# Patient Record
Sex: MALE | Race: WHITE | NOT HISPANIC OR LATINO
[De-identification: names, ages, dates, MRNs, and addresses within clinical notes are randomized per-mention and may not be internally consistent; named-entity substitution may affect disease eponyms.]

---

## 2018-04-16 PROBLEM — Z00.00 ENCOUNTER FOR PREVENTIVE HEALTH EXAMINATION: Status: ACTIVE | Noted: 2018-04-16

## 2018-07-17 ENCOUNTER — APPOINTMENT (OUTPATIENT)
Dept: NEUROLOGY | Facility: CLINIC | Age: 63
End: 2018-07-17

## 2018-07-26 ENCOUNTER — APPOINTMENT (OUTPATIENT)
Dept: PAIN MANAGEMENT | Facility: CLINIC | Age: 63
End: 2018-07-26

## 2019-11-27 ENCOUNTER — INPATIENT (INPATIENT)
Facility: HOSPITAL | Age: 64
LOS: 5 days | Discharge: NURSING, REHAB OR LONG-TERM CARE | DRG: 54 | End: 2019-12-03
Attending: NEUROLOGICAL SURGERY | Admitting: NEUROLOGICAL SURGERY
Payer: MEDICARE

## 2019-11-27 ENCOUNTER — EMERGENCY (EMERGENCY)
Facility: HOSPITAL | Age: 64
LOS: 1 days | Discharge: ANOTHER HOSPITAL | End: 2019-11-27
Attending: EMERGENCY MEDICINE | Admitting: EMERGENCY MEDICINE
Payer: MEDICARE

## 2019-11-27 VITALS
DIASTOLIC BLOOD PRESSURE: 104 MMHG | OXYGEN SATURATION: 98 % | HEART RATE: 761 BPM | RESPIRATION RATE: 15 BRPM | SYSTOLIC BLOOD PRESSURE: 191 MMHG | HEIGHT: 70 IN | WEIGHT: 195.11 LBS | TEMPERATURE: 98 F

## 2019-11-27 VITALS
TEMPERATURE: 98 F | SYSTOLIC BLOOD PRESSURE: 186 MMHG | HEART RATE: 103 BPM | OXYGEN SATURATION: 98 % | DIASTOLIC BLOOD PRESSURE: 106 MMHG | RESPIRATION RATE: 20 BRPM

## 2019-11-27 VITALS
OXYGEN SATURATION: 98 % | SYSTOLIC BLOOD PRESSURE: 181 MMHG | WEIGHT: 190.04 LBS | HEART RATE: 107 BPM | DIASTOLIC BLOOD PRESSURE: 92 MMHG | HEIGHT: 70 IN | TEMPERATURE: 98 F | RESPIRATION RATE: 20 BRPM

## 2019-11-27 LAB
ALBUMIN SERPL ELPH-MCNC: 3.5 G/DL — SIGNIFICANT CHANGE UP (ref 3.3–5)
ALBUMIN SERPL ELPH-MCNC: 4.2 G/DL — SIGNIFICANT CHANGE UP (ref 3.3–5)
ALP SERPL-CCNC: 124 U/L — HIGH (ref 40–120)
ALP SERPL-CCNC: 129 U/L — HIGH (ref 40–120)
ALT FLD-CCNC: 20 U/L — SIGNIFICANT CHANGE UP (ref 10–45)
ALT FLD-CCNC: 22 U/L — SIGNIFICANT CHANGE UP (ref 12–78)
ANION GAP SERPL CALC-SCNC: 13 MMOL/L — SIGNIFICANT CHANGE UP (ref 5–17)
ANION GAP SERPL CALC-SCNC: 9 MMOL/L — SIGNIFICANT CHANGE UP (ref 5–17)
APPEARANCE UR: CLEAR — SIGNIFICANT CHANGE UP
APTT BLD: 35.5 SEC — SIGNIFICANT CHANGE UP (ref 27.5–36.3)
APTT BLD: 38.7 SEC — HIGH (ref 28.5–37)
AST SERPL-CCNC: 12 U/L — SIGNIFICANT CHANGE UP (ref 10–40)
AST SERPL-CCNC: 16 U/L — SIGNIFICANT CHANGE UP (ref 15–37)
BASOPHILS # BLD AUTO: 0.09 K/UL — SIGNIFICANT CHANGE UP (ref 0–0.2)
BASOPHILS # BLD AUTO: 0.11 K/UL — SIGNIFICANT CHANGE UP (ref 0–0.2)
BASOPHILS NFR BLD AUTO: 0.7 % — SIGNIFICANT CHANGE UP (ref 0–2)
BASOPHILS NFR BLD AUTO: 1.2 % — SIGNIFICANT CHANGE UP (ref 0–2)
BILIRUB SERPL-MCNC: 0.6 MG/DL — SIGNIFICANT CHANGE UP (ref 0.2–1.2)
BILIRUB SERPL-MCNC: 0.6 MG/DL — SIGNIFICANT CHANGE UP (ref 0.2–1.2)
BILIRUB UR-MCNC: NEGATIVE — SIGNIFICANT CHANGE UP
BUN SERPL-MCNC: 21 MG/DL — SIGNIFICANT CHANGE UP (ref 7–23)
BUN SERPL-MCNC: 21 MG/DL — SIGNIFICANT CHANGE UP (ref 7–23)
CALCIUM SERPL-MCNC: 9.4 MG/DL — SIGNIFICANT CHANGE UP (ref 8.5–10.1)
CALCIUM SERPL-MCNC: 9.9 MG/DL — SIGNIFICANT CHANGE UP (ref 8.4–10.5)
CHLORIDE SERPL-SCNC: 103 MMOL/L — SIGNIFICANT CHANGE UP (ref 96–108)
CHLORIDE SERPL-SCNC: 108 MMOL/L — SIGNIFICANT CHANGE UP (ref 96–108)
CO2 SERPL-SCNC: 20 MMOL/L — LOW (ref 22–31)
CO2 SERPL-SCNC: 21 MMOL/L — LOW (ref 22–31)
COLOR SPEC: YELLOW — SIGNIFICANT CHANGE UP
CREAT SERPL-MCNC: 1.1 MG/DL — SIGNIFICANT CHANGE UP (ref 0.5–1.3)
CREAT SERPL-MCNC: 1.19 MG/DL — SIGNIFICANT CHANGE UP (ref 0.5–1.3)
DIFF PNL FLD: NEGATIVE — SIGNIFICANT CHANGE UP
EOSINOPHIL # BLD AUTO: 0.27 K/UL — SIGNIFICANT CHANGE UP (ref 0–0.5)
EOSINOPHIL # BLD AUTO: 0.31 K/UL — SIGNIFICANT CHANGE UP (ref 0–0.5)
EOSINOPHIL NFR BLD AUTO: 2.2 % — SIGNIFICANT CHANGE UP (ref 0–6)
EOSINOPHIL NFR BLD AUTO: 3.4 % — SIGNIFICANT CHANGE UP (ref 0–6)
GLUCOSE SERPL-MCNC: 123 MG/DL — HIGH (ref 70–99)
GLUCOSE SERPL-MCNC: 199 MG/DL — HIGH (ref 70–99)
GLUCOSE UR QL: 50 MG/DL
HCT VFR BLD CALC: 44.4 % — SIGNIFICANT CHANGE UP (ref 39–50)
HCT VFR BLD CALC: 44.4 % — SIGNIFICANT CHANGE UP (ref 39–50)
HGB BLD-MCNC: 15.2 G/DL — SIGNIFICANT CHANGE UP (ref 13–17)
HGB BLD-MCNC: 15.4 G/DL — SIGNIFICANT CHANGE UP (ref 13–17)
IMM GRANULOCYTES NFR BLD AUTO: 0.3 % — SIGNIFICANT CHANGE UP (ref 0–1.5)
IMM GRANULOCYTES NFR BLD AUTO: 0.7 % — SIGNIFICANT CHANGE UP (ref 0–1.5)
INR BLD: 1.14 RATIO — SIGNIFICANT CHANGE UP (ref 0.88–1.16)
INR BLD: 1.16 RATIO — SIGNIFICANT CHANGE UP (ref 0.88–1.16)
KETONES UR-MCNC: NEGATIVE — SIGNIFICANT CHANGE UP
LEUKOCYTE ESTERASE UR-ACNC: NEGATIVE — SIGNIFICANT CHANGE UP
LIDOCAIN IGE QN: 69 U/L — LOW (ref 73–393)
LYMPHOCYTES # BLD AUTO: 16.9 % — SIGNIFICANT CHANGE UP (ref 13–44)
LYMPHOCYTES # BLD AUTO: 2.02 K/UL — SIGNIFICANT CHANGE UP (ref 1–3.3)
LYMPHOCYTES # BLD AUTO: 2.06 K/UL — SIGNIFICANT CHANGE UP (ref 1–3.3)
LYMPHOCYTES # BLD AUTO: 22.3 % — SIGNIFICANT CHANGE UP (ref 13–44)
MCHC RBC-ENTMCNC: 26 PG — LOW (ref 27–34)
MCHC RBC-ENTMCNC: 26.3 PG — LOW (ref 27–34)
MCHC RBC-ENTMCNC: 34.2 GM/DL — SIGNIFICANT CHANGE UP (ref 32–36)
MCHC RBC-ENTMCNC: 34.7 GM/DL — SIGNIFICANT CHANGE UP (ref 32–36)
MCV RBC AUTO: 75.8 FL — LOW (ref 80–100)
MCV RBC AUTO: 75.9 FL — LOW (ref 80–100)
MONOCYTES # BLD AUTO: 0.69 K/UL — SIGNIFICANT CHANGE UP (ref 0–0.9)
MONOCYTES # BLD AUTO: 0.95 K/UL — HIGH (ref 0–0.9)
MONOCYTES NFR BLD AUTO: 7.6 % — SIGNIFICANT CHANGE UP (ref 2–14)
MONOCYTES NFR BLD AUTO: 7.8 % — SIGNIFICANT CHANGE UP (ref 2–14)
NEUTROPHILS # BLD AUTO: 5.88 K/UL — SIGNIFICANT CHANGE UP (ref 1.8–7.4)
NEUTROPHILS # BLD AUTO: 8.79 K/UL — HIGH (ref 1.8–7.4)
NEUTROPHILS NFR BLD AUTO: 64.8 % — SIGNIFICANT CHANGE UP (ref 43–77)
NEUTROPHILS NFR BLD AUTO: 72.1 % — SIGNIFICANT CHANGE UP (ref 43–77)
NITRITE UR-MCNC: POSITIVE
NRBC # BLD: 0 /100 WBCS — SIGNIFICANT CHANGE UP (ref 0–0)
NRBC # BLD: 0 /100 WBCS — SIGNIFICANT CHANGE UP (ref 0–0)
PH UR: 5 — SIGNIFICANT CHANGE UP (ref 5–8)
PLATELET # BLD AUTO: 439 K/UL — HIGH (ref 150–400)
PLATELET # BLD AUTO: 451 K/UL — HIGH (ref 150–400)
POTASSIUM SERPL-MCNC: 3.8 MMOL/L — SIGNIFICANT CHANGE UP (ref 3.5–5.3)
POTASSIUM SERPL-MCNC: 3.8 MMOL/L — SIGNIFICANT CHANGE UP (ref 3.5–5.3)
POTASSIUM SERPL-SCNC: 3.8 MMOL/L — SIGNIFICANT CHANGE UP (ref 3.5–5.3)
POTASSIUM SERPL-SCNC: 3.8 MMOL/L — SIGNIFICANT CHANGE UP (ref 3.5–5.3)
PROT SERPL-MCNC: 8 G/DL — SIGNIFICANT CHANGE UP (ref 6–8.3)
PROT SERPL-MCNC: 8 G/DL — SIGNIFICANT CHANGE UP (ref 6–8.3)
PROT UR-MCNC: NEGATIVE — SIGNIFICANT CHANGE UP
PROTHROM AB SERPL-ACNC: 13.1 SEC — HIGH (ref 10–12.9)
PROTHROM AB SERPL-ACNC: 13.3 SEC — HIGH (ref 10–12.9)
RBC # BLD: 5.85 M/UL — HIGH (ref 4.2–5.8)
RBC # BLD: 5.86 M/UL — HIGH (ref 4.2–5.8)
RBC # FLD: 13 % — SIGNIFICANT CHANGE UP (ref 10.3–14.5)
RBC # FLD: 13.2 % — SIGNIFICANT CHANGE UP (ref 10.3–14.5)
SODIUM SERPL-SCNC: 136 MMOL/L — SIGNIFICANT CHANGE UP (ref 135–145)
SODIUM SERPL-SCNC: 138 MMOL/L — SIGNIFICANT CHANGE UP (ref 135–145)
SP GR SPEC: 1.02 — SIGNIFICANT CHANGE UP (ref 1.01–1.02)
TROPONIN I SERPL-MCNC: <.015 NG/ML — SIGNIFICANT CHANGE UP (ref 0.01–0.04)
UROBILINOGEN FLD QL: NEGATIVE — SIGNIFICANT CHANGE UP
WBC # BLD: 12.2 K/UL — HIGH (ref 3.8–10.5)
WBC # BLD: 9.07 K/UL — SIGNIFICANT CHANGE UP (ref 3.8–10.5)
WBC # FLD AUTO: 12.2 K/UL — HIGH (ref 3.8–10.5)
WBC # FLD AUTO: 9.07 K/UL — SIGNIFICANT CHANGE UP (ref 3.8–10.5)

## 2019-11-27 PROCEDURE — 70450 CT HEAD/BRAIN W/O DYE: CPT

## 2019-11-27 PROCEDURE — 85610 PROTHROMBIN TIME: CPT

## 2019-11-27 PROCEDURE — 84484 ASSAY OF TROPONIN QUANT: CPT

## 2019-11-27 PROCEDURE — 85730 THROMBOPLASTIN TIME PARTIAL: CPT

## 2019-11-27 PROCEDURE — 83690 ASSAY OF LIPASE: CPT

## 2019-11-27 PROCEDURE — 81001 URINALYSIS AUTO W/SCOPE: CPT

## 2019-11-27 PROCEDURE — 36415 COLL VENOUS BLD VENIPUNCTURE: CPT

## 2019-11-27 PROCEDURE — 80053 COMPREHEN METABOLIC PANEL: CPT

## 2019-11-27 PROCEDURE — 99291 CRITICAL CARE FIRST HOUR: CPT | Mod: GC

## 2019-11-27 PROCEDURE — 71045 X-RAY EXAM CHEST 1 VIEW: CPT

## 2019-11-27 PROCEDURE — 70450 CT HEAD/BRAIN W/O DYE: CPT | Mod: 26

## 2019-11-27 PROCEDURE — 85027 COMPLETE CBC AUTOMATED: CPT

## 2019-11-27 PROCEDURE — 75809 NONVASCULAR SHUNT X-RAY: CPT | Mod: 26

## 2019-11-27 PROCEDURE — 93010 ELECTROCARDIOGRAM REPORT: CPT

## 2019-11-27 PROCEDURE — 96374 THER/PROPH/DIAG INJ IV PUSH: CPT

## 2019-11-27 PROCEDURE — 71045 X-RAY EXAM CHEST 1 VIEW: CPT | Mod: 26

## 2019-11-27 PROCEDURE — 93005 ELECTROCARDIOGRAM TRACING: CPT

## 2019-11-27 PROCEDURE — 99285 EMERGENCY DEPT VISIT HI MDM: CPT | Mod: 25

## 2019-11-27 PROCEDURE — 99285 EMERGENCY DEPT VISIT HI MDM: CPT

## 2019-11-27 RX ORDER — HYDRALAZINE HCL 50 MG
10 TABLET ORAL ONCE
Refills: 0 | Status: COMPLETED | OUTPATIENT
Start: 2019-11-27 | End: 2019-11-27

## 2019-11-27 RX ORDER — MORPHINE SULFATE 50 MG/1
4 CAPSULE, EXTENDED RELEASE ORAL ONCE
Refills: 0 | Status: DISCONTINUED | OUTPATIENT
Start: 2019-11-27 | End: 2019-11-27

## 2019-11-27 RX ADMIN — MORPHINE SULFATE 4 MILLIGRAM(S): 50 CAPSULE, EXTENDED RELEASE ORAL at 20:09

## 2019-11-27 RX ADMIN — Medication 10 MILLIGRAM(S): at 23:09

## 2019-11-27 RX ADMIN — MORPHINE SULFATE 4 MILLIGRAM(S): 50 CAPSULE, EXTENDED RELEASE ORAL at 20:24

## 2019-11-27 RX ADMIN — Medication 0.1 MILLIGRAM(S): at 21:01

## 2019-11-27 NOTE — ED PROVIDER NOTE - ATTENDING CONTRIBUTION TO CARE
65 y/o disheveled poorly maintained male with generalized weakness.  CT head with chronic  shunt, teratoma and questionable acute vs subacute small sdh.  pt unsure of any fall or trauma.  pt is not a reliable source.  transfer to [place] for further neuro evaluation and SW

## 2019-11-27 NOTE — ED ADULT NURSE NOTE - OBJECTIVE STATEMENT
Patient complaining of weakness and inability  to take care of himself. Arrived covered in feces cleaned up by staff. seen by social work.

## 2019-11-27 NOTE — ED ADULT NURSE NOTE - CHPI ED NUR SYMPTOMS NEG
no change in level of consciousness/no confusion/no dizziness/no loss of consciousness/no nausea/no weakness/no blurred vision/no vomiting/no fever

## 2019-11-27 NOTE — ED PROVIDER NOTE - CLINICAL SUMMARY MEDICAL DECISION MAKING FREE TEXT BOX
Neurosurgery to eval for new CT findings of subacute atraumatic SDH and large new brain mass. Pt A&Ox3, VS stable, complaining of worsening paresthesias in LUE and LLE; baseline weakness and paresthesias in this region from prior CVA -[place]

## 2019-11-27 NOTE — ED PROVIDER NOTE - PHYSICAL EXAMINATION
· CONSTITUTIONAL: Well appearing, awake, alert, oriented to person, place, time/situation and in no apparent distress. poor hygiene + dirt/stool in nails  · CARDIAC: Normal rate, regular rhythm.  Heart sounds S1, S2.  No murmurs, rubs or gallops.  · RESPIRATORY: Breath sounds clear and equal bilaterally.  · GASTROINTESTINAL: Abdomen soft, non-tender, no guarding.  · MUSCULOSKELETAL: Spine appears normal, range of motion is not limited, no muscle or joint tenderness  · NEUROLOGICAL: - - -     · NEURO LOC: alert     · NEURO NERVE: cranial nerves 2-12 intact     · NEURO NECK: normal     · NEURO SIGN: Brudzinski's sign negative, Kernig's sign negative     · NEURO SPEECH: clear     · NEURO WEIGHT: UNABLE     · NEURO SENSATION: DIMINISHED     · Sensation Absent: left upper extremity  left lower extremity     · NEURO POSTURING: normal  · SKIN: Skin normal color for race, warm, dry and intact. No evidence of rash.

## 2019-11-27 NOTE — ED PROVIDER NOTE - CONSTITUTIONAL, MLM
normal... Well appearing, awake, alert, oriented to person, place, time/situation and in no apparent distress. poor hygiene + dirt/stool in nails

## 2019-11-27 NOTE — ED PROVIDER NOTE - PROGRESS NOTE DETAILS
Clif, pgy3: neurosurg requesting 6hr rp head CT along with  shunt series. Also recommending SBP <160

## 2019-11-27 NOTE — ED ADULT NURSE NOTE - OBJECTIVE STATEMENT
64y M aaox4 transfer from [facility] 64y M aaox4 transfer from [facility].  PMHx CVA, brain surgery, placement brain shunt.  As per pt, in the last 2 days he felt pain and weakness "more than usual"  in the left side. Pt went to [facility] where  Cat scan was dome and Dx w/ SDH, pt denies trauma. CP< SoB , HA, abdominal pain,  issues , pt w/hx of incontinence. Denies Fever, chills, N/V/D  , PT safety maintained with family at bedside, call bell within reach and bed in the lowest position. 64y M aaox4 transfer from [facility].  PMHx CVA, brain surgeryv ,w/placement brain shunt. pt in blood thinner.   As per pt, in the last 2 days he felt pain and weakness "more than usual"  in the left side. Pt went to [facility] where  Cat scan was done and Dx w/ SDH, pt denies trauma. CP,  SOB , HA, abdominal pain,  issues , pt w/incontinence. Denies Fever, chills, N/V/D  , PT safety maintained with family at bedside, call bell within reach and bed in the lowest position. 64y M aaox4 transfer from [facility].  PMHx CVA, w/ residual left weakness,  brain surgery , w/placement brain shunt.  pt on  blood thinner.   As per pt, in the last 2 days he felt pain and weakness "more than usual"  in the left side. Pt went to [facility] where  Cat scan was done and Dx w/ SDH, pt denies trauma. CP,  SOB , HA, abdominal pain,  issues . Denies Fever, chills, N/V/D  , PT safety maintained with family at bedside, call bell within reach and bed in the lowest position.

## 2019-11-27 NOTE — ED PROVIDER NOTE - ATTENDING CONTRIBUTION TO CARE
Pt with new subdural hematoma with assoc symptoms of paresthesia L side.  Airway intact, b/l bs, pulses intact *4, no new neuro deficits just baseline L side weakness.  NSG consulted, consider admission for concerns for SDH and hydrocephalus.

## 2019-11-27 NOTE — ED PROVIDER NOTE - OBJECTIVE STATEMENT
64y m PMhx prior CVA w/ residual L sided weakness and paresthesias, brain "cyst" s/p removal c/b hydrocephalus s/p  shunt placement years ago in [place], HTN, pw 64y m PMhx prior CVA w/ residual L sided weakness and paresthesias, brain "cyst" s/p removal c/b hydrocephalus s/p  shunt placement in 1980 in [place], HTN, pw worsening L sided paresthesias and transferred from [facility] for new CT findings. Pt found to have subacute SDH along R frontal lobe and large mass in third ventricle. Pt denies any falls or recent head trauma to explain the SDH.

## 2019-11-27 NOTE — ED PROVIDER NOTE - CROS ED ROS STATEMENT
no abrasions, no jaundice, no lesions, no pruritis, and no rashes. all other ROS negative except as per HPI

## 2019-11-27 NOTE — ED PROVIDER NOTE - PROGRESS NOTE DETAILS
social work consulted due to poor living conditions   labs wnl ct head + subacute subdural/ teratoma spoke with Dr. Gregorio and Juliana Wills transfer to [place] for neur surg consult pt monica

## 2019-11-27 NOTE — ED PROVIDER NOTE - OBJECTIVE STATEMENT
Pt is a 63 yo male with pmhx of htn cva depression anxiety and seizure BIBEMS for generalized weakness elevated bp and unable to walk as per pt his son called. Pt c/o of pain and weakness in left side which is residual weakness since stroke denies any fall chest pain sob fever cough. Pt lives with his son and is wheelchair bound. Pt is a 63 yo male with pmhx of htn cva depression anxiety and seizure shunt placed 1980 at [facility] for generalized weakness elevated bp and unable to walk as per pt his son called. Pt c/o of pain and weakness in left side which is residual weakness since stroke denies any falls chest pain sob fever cough. Pt lives with his son and is wheelchair bound.

## 2019-11-28 DIAGNOSIS — E11.9 TYPE 2 DIABETES MELLITUS WITHOUT COMPLICATIONS: ICD-10-CM

## 2019-11-28 DIAGNOSIS — S06.5X9A TRAUMATIC SUBDURAL HEMORRHAGE WITH LOSS OF CONSCIOUSNESS OF UNSPECIFIED DURATION, INITIAL ENCOUNTER: ICD-10-CM

## 2019-11-28 DIAGNOSIS — Z87.898 PERSONAL HISTORY OF OTHER SPECIFIED CONDITIONS: ICD-10-CM

## 2019-11-28 DIAGNOSIS — Z86.73 PERSONAL HISTORY OF TRANSIENT ISCHEMIC ATTACK (TIA), AND CEREBRAL INFARCTION WITHOUT RESIDUAL DEFICITS: ICD-10-CM

## 2019-11-28 DIAGNOSIS — F41.9 ANXIETY DISORDER, UNSPECIFIED: ICD-10-CM

## 2019-11-28 DIAGNOSIS — G93.89 OTHER SPECIFIED DISORDERS OF BRAIN: ICD-10-CM

## 2019-11-28 DIAGNOSIS — Z98.2 PRESENCE OF CEREBROSPINAL FLUID DRAINAGE DEVICE: Chronic | ICD-10-CM

## 2019-11-28 DIAGNOSIS — I10 ESSENTIAL (PRIMARY) HYPERTENSION: ICD-10-CM

## 2019-11-28 DIAGNOSIS — Z98.890 OTHER SPECIFIED POSTPROCEDURAL STATES: Chronic | ICD-10-CM

## 2019-11-28 DIAGNOSIS — Z79.899 OTHER LONG TERM (CURRENT) DRUG THERAPY: ICD-10-CM

## 2019-11-28 DIAGNOSIS — G91.9 HYDROCEPHALUS, UNSPECIFIED: ICD-10-CM

## 2019-11-28 DIAGNOSIS — D72.829 ELEVATED WHITE BLOOD CELL COUNT, UNSPECIFIED: ICD-10-CM

## 2019-11-28 LAB
BLD GP AB SCN SERPL QL: NEGATIVE — SIGNIFICANT CHANGE UP
GLUCOSE BLDC GLUCOMTR-MCNC: 105 MG/DL — HIGH (ref 70–99)
GLUCOSE BLDC GLUCOMTR-MCNC: 133 MG/DL — HIGH (ref 70–99)
GLUCOSE BLDC GLUCOMTR-MCNC: 139 MG/DL — HIGH (ref 70–99)
GLUCOSE BLDC GLUCOMTR-MCNC: 222 MG/DL — HIGH (ref 70–99)
HCV AB S/CO SERPL IA: 0.19 S/CO — SIGNIFICANT CHANGE UP (ref 0–0.99)
HCV AB SERPL-IMP: SIGNIFICANT CHANGE UP
RH IG SCN BLD-IMP: POSITIVE — SIGNIFICANT CHANGE UP
RH IG SCN BLD-IMP: POSITIVE — SIGNIFICANT CHANGE UP

## 2019-11-28 PROCEDURE — 99223 1ST HOSP IP/OBS HIGH 75: CPT

## 2019-11-28 PROCEDURE — 99231 SBSQ HOSP IP/OBS SF/LOW 25: CPT

## 2019-11-28 PROCEDURE — 70250 X-RAY EXAM OF SKULL: CPT | Mod: 26

## 2019-11-28 PROCEDURE — 70450 CT HEAD/BRAIN W/O DYE: CPT | Mod: 26

## 2019-11-28 PROCEDURE — 70553 MRI BRAIN STEM W/O & W/DYE: CPT | Mod: 26

## 2019-11-28 RX ORDER — DEXTROSE 50 % IN WATER 50 %
15 SYRINGE (ML) INTRAVENOUS ONCE
Refills: 0 | Status: DISCONTINUED | OUTPATIENT
Start: 2019-11-28 | End: 2019-12-03

## 2019-11-28 RX ORDER — DEXTROSE 50 % IN WATER 50 %
25 SYRINGE (ML) INTRAVENOUS ONCE
Refills: 0 | Status: DISCONTINUED | OUTPATIENT
Start: 2019-11-28 | End: 2019-12-03

## 2019-11-28 RX ORDER — AMLODIPINE BESYLATE 2.5 MG/1
5 TABLET ORAL ONCE
Refills: 0 | Status: COMPLETED | OUTPATIENT
Start: 2019-11-28 | End: 2019-11-28

## 2019-11-28 RX ORDER — FENTANYL CITRATE 50 UG/ML
50 INJECTION INTRAVENOUS ONCE
Refills: 0 | Status: DISCONTINUED | OUTPATIENT
Start: 2019-11-28 | End: 2019-11-28

## 2019-11-28 RX ORDER — SODIUM CHLORIDE 9 MG/ML
1000 INJECTION, SOLUTION INTRAVENOUS
Refills: 0 | Status: DISCONTINUED | OUTPATIENT
Start: 2019-11-28 | End: 2019-12-03

## 2019-11-28 RX ORDER — GLUCAGON INJECTION, SOLUTION 0.5 MG/.1ML
1 INJECTION, SOLUTION SUBCUTANEOUS ONCE
Refills: 0 | Status: DISCONTINUED | OUTPATIENT
Start: 2019-11-28 | End: 2019-12-03

## 2019-11-28 RX ORDER — DEXTROSE MONOHYDRATE, SODIUM CHLORIDE, AND POTASSIUM CHLORIDE 50; .745; 4.5 G/1000ML; G/1000ML; G/1000ML
1000 INJECTION, SOLUTION INTRAVENOUS
Refills: 0 | Status: DISCONTINUED | OUTPATIENT
Start: 2019-11-28 | End: 2019-11-30

## 2019-11-28 RX ORDER — METOCLOPRAMIDE HCL 10 MG
10 TABLET ORAL ONCE
Refills: 0 | Status: COMPLETED | OUTPATIENT
Start: 2019-11-28 | End: 2019-11-28

## 2019-11-28 RX ORDER — INSULIN LISPRO 100/ML
VIAL (ML) SUBCUTANEOUS
Refills: 0 | Status: DISCONTINUED | OUTPATIENT
Start: 2019-11-28 | End: 2019-12-03

## 2019-11-28 RX ORDER — OXYCODONE HYDROCHLORIDE 5 MG/1
5 TABLET ORAL EVERY 4 HOURS
Refills: 0 | Status: DISCONTINUED | OUTPATIENT
Start: 2019-11-28 | End: 2019-12-03

## 2019-11-28 RX ORDER — ACETAMINOPHEN 500 MG
975 TABLET ORAL ONCE
Refills: 0 | Status: COMPLETED | OUTPATIENT
Start: 2019-11-28 | End: 2019-11-28

## 2019-11-28 RX ORDER — SODIUM CHLORIDE 9 MG/ML
1000 INJECTION INTRAMUSCULAR; INTRAVENOUS; SUBCUTANEOUS ONCE
Refills: 0 | Status: COMPLETED | OUTPATIENT
Start: 2019-11-28 | End: 2019-11-28

## 2019-11-28 RX ORDER — LABETALOL HCL 100 MG
100 TABLET ORAL DAILY
Refills: 0 | Status: DISCONTINUED | OUTPATIENT
Start: 2019-11-28 | End: 2019-11-28

## 2019-11-28 RX ORDER — GABAPENTIN 400 MG/1
300 CAPSULE ORAL THREE TIMES A DAY
Refills: 0 | Status: DISCONTINUED | OUTPATIENT
Start: 2019-11-28 | End: 2019-12-03

## 2019-11-28 RX ORDER — FOLIC ACID 0.8 MG
1 TABLET ORAL DAILY
Refills: 0 | Status: DISCONTINUED | OUTPATIENT
Start: 2019-11-28 | End: 2019-12-03

## 2019-11-28 RX ORDER — LANOLIN ALCOHOL/MO/W.PET/CERES
3 CREAM (GRAM) TOPICAL AT BEDTIME
Refills: 0 | Status: DISCONTINUED | OUTPATIENT
Start: 2019-11-28 | End: 2019-12-03

## 2019-11-28 RX ORDER — ONDANSETRON 8 MG/1
4 TABLET, FILM COATED ORAL EVERY 6 HOURS
Refills: 0 | Status: DISCONTINUED | OUTPATIENT
Start: 2019-11-28 | End: 2019-12-03

## 2019-11-28 RX ORDER — ACETAMINOPHEN 500 MG
650 TABLET ORAL EVERY 6 HOURS
Refills: 0 | Status: DISCONTINUED | OUTPATIENT
Start: 2019-11-28 | End: 2019-12-03

## 2019-11-28 RX ORDER — DEXTROSE 50 % IN WATER 50 %
12.5 SYRINGE (ML) INTRAVENOUS ONCE
Refills: 0 | Status: DISCONTINUED | OUTPATIENT
Start: 2019-11-28 | End: 2019-12-03

## 2019-11-28 RX ORDER — INSULIN LISPRO 100/ML
VIAL (ML) SUBCUTANEOUS AT BEDTIME
Refills: 0 | Status: DISCONTINUED | OUTPATIENT
Start: 2019-11-28 | End: 2019-12-03

## 2019-11-28 RX ORDER — SENNA PLUS 8.6 MG/1
2 TABLET ORAL AT BEDTIME
Refills: 0 | Status: DISCONTINUED | OUTPATIENT
Start: 2019-11-28 | End: 2019-12-02

## 2019-11-28 RX ORDER — SIMVASTATIN 20 MG/1
20 TABLET, FILM COATED ORAL AT BEDTIME
Refills: 0 | Status: DISCONTINUED | OUTPATIENT
Start: 2019-11-28 | End: 2019-12-03

## 2019-11-28 RX ORDER — DULOXETINE HYDROCHLORIDE 30 MG/1
60 CAPSULE, DELAYED RELEASE ORAL DAILY
Refills: 0 | Status: DISCONTINUED | OUTPATIENT
Start: 2019-11-28 | End: 2019-12-03

## 2019-11-28 RX ADMIN — FENTANYL CITRATE 50 MICROGRAM(S): 50 INJECTION INTRAVENOUS at 00:55

## 2019-11-28 RX ADMIN — Medication 975 MILLIGRAM(S): at 00:55

## 2019-11-28 RX ADMIN — Medication 1 TABLET(S): at 11:50

## 2019-11-28 RX ADMIN — SIMVASTATIN 20 MILLIGRAM(S): 20 TABLET, FILM COATED ORAL at 06:25

## 2019-11-28 RX ADMIN — Medication 1 MILLIGRAM(S): at 11:51

## 2019-11-28 RX ADMIN — Medication 3 MILLIGRAM(S): at 22:25

## 2019-11-28 RX ADMIN — Medication 650 MILLIGRAM(S): at 06:26

## 2019-11-28 RX ADMIN — Medication 4: at 09:02

## 2019-11-28 RX ADMIN — Medication 100 MILLIGRAM(S): at 06:25

## 2019-11-28 RX ADMIN — GABAPENTIN 300 MILLIGRAM(S): 400 CAPSULE ORAL at 14:05

## 2019-11-28 RX ADMIN — Medication 10 MILLIGRAM(S): at 08:46

## 2019-11-28 RX ADMIN — Medication 650 MILLIGRAM(S): at 05:30

## 2019-11-28 RX ADMIN — DULOXETINE HYDROCHLORIDE 60 MILLIGRAM(S): 30 CAPSULE, DELAYED RELEASE ORAL at 06:25

## 2019-11-28 RX ADMIN — SODIUM CHLORIDE 1000 MILLILITER(S): 9 INJECTION INTRAMUSCULAR; INTRAVENOUS; SUBCUTANEOUS at 08:28

## 2019-11-28 RX ADMIN — AMLODIPINE BESYLATE 5 MILLIGRAM(S): 2.5 TABLET ORAL at 06:25

## 2019-11-28 RX ADMIN — SIMVASTATIN 20 MILLIGRAM(S): 20 TABLET, FILM COATED ORAL at 22:25

## 2019-11-28 RX ADMIN — ONDANSETRON 4 MILLIGRAM(S): 8 TABLET, FILM COATED ORAL at 07:36

## 2019-11-28 RX ADMIN — GABAPENTIN 300 MILLIGRAM(S): 400 CAPSULE ORAL at 22:25

## 2019-11-28 RX ADMIN — FENTANYL CITRATE 50 MICROGRAM(S): 50 INJECTION INTRAVENOUS at 01:25

## 2019-11-28 RX ADMIN — Medication 975 MILLIGRAM(S): at 00:25

## 2019-11-28 RX ADMIN — DEXTROSE MONOHYDRATE, SODIUM CHLORIDE, AND POTASSIUM CHLORIDE 75 MILLILITER(S): 50; .745; 4.5 INJECTION, SOLUTION INTRAVENOUS at 11:51

## 2019-11-28 RX ADMIN — GABAPENTIN 300 MILLIGRAM(S): 400 CAPSULE ORAL at 06:25

## 2019-11-28 NOTE — PHYSICAL THERAPY INITIAL EVALUATION ADULT - IMPAIRMENTS CONTRIBUTING IMPAIRED BED MOBILITY, REHAB EVAL
decreased strength/impaired postural control/impaired balance/cognition/impaired coordination/impaired motor control

## 2019-11-28 NOTE — PHYSICAL THERAPY INITIAL EVALUATION ADULT - PRECAUTIONS/LIMITATIONS, REHAB EVAL
CTHead; Unchanged bilateral subdural collections. Unchanged noncommunicating hydrocephalus secondary to a third ventricle mass. MRI is recommended for further evaluation. XRayShuntogram:  shunt identified originating from the right mirian hole and ending in the lower abdomen without kinks or discontinuity. CTHead; Unchanged bilateral subdural collections. Unchanged noncommunicating hydrocephalus secondary to a third ventricle mass. MRI is recommended for further evaluation. XRayShuntogram:  shunt identified originating from the right mirian hole and ending in the lower abdomen without kinks or discontinuity./fall precautions

## 2019-11-28 NOTE — H&P ADULT - HISTORY OF PRESENT ILLNESS
· HPI Objective Statement: 64y m PMhx prior CVA w/ residual L sided weakness and paresthesias, brain "cyst" s/p removal c/b hydrocephalus s/p  shunt placement in 1980 in [place], HTN, pw worsening L sided paresthesias and transferred from [facility] for new CT findings. Pt found to have subacute SDH along R frontal lobe and large mass in third ventricle. Pt denies any falls or recent head trauma to explain the SDH.

## 2019-11-28 NOTE — CHART NOTE - NSCHARTNOTEFT_GEN_A_CORE
CAPRINI SCORE [CLOT] Score on Admission for     AGE RELATED RISK FACTORS                                                       MOBILITY RELATED FACTORS  [ ] Age 41-60 years                                            (1 Point)                  [ ] Bed rest                                                        (1 Point)  [ X] Age: 61-74 years                                           (2 Points)                 [ ] Plaster cast                                                   (2 Points)  [ ] Age= 75 years                                              (3 Points)                 [ ] Bed bound for more than 72 hours                 (2 Points)    DISEASE RELATED RISK FACTORS                                               GENDER SPECIFIC FACTORS  [ ] Edema in the lower extremities                       (1 Point)                  [ ] Pregnancy                                                     (1 Point)  [ ] Varicose veins                                               (1 Point)                  [ ] Post-partum < 6 weeks                                   (1 Point)             [ X] BMI > 25 Kg/m2                                            (1 Point)                  [ ] Hormonal therapy  or oral contraception          (1 Point)                 [ ] Sepsis (in the previous month)                        (1 Point)                  [ ] History of pregnancy complications                 (1 point)  [ ] Pneumonia or serious lung disease                                               [ ] Unexplained or recurrent                     (1 Point)           (in the previous month)                               (1 Point)  [ ] Abnormal pulmonary function test                     (1 Point)                 SURGERY RELATED RISK FACTORS (include planned surgeries)  [ ] Acute myocardial infarction                              (1 Point)                 [ ]  Section                                             (1 Point)  [ ] Congestive heart failure (in the previous month)  (1 Point)         [ ] Minor surgery                                                  (1 Point)   [ ] Inflammatory bowel disease                             (1 Point)                 [ ] Arthroscopic surgery                                        (2 Points)  [ ] Central venous access                                      (2 Points)                [ ] General surgery lasting more than 45 minutes   (2 Points)       [ ] Stroke (in the previous month)                          (5 Points)               [ ] Elective arthroplasty                                         (5 Points)            [ ] current or past malignancy                              (2 Points)                                                                                                       HEMATOLOGY RELATED FACTORS                                                 TRAUMA RELATED RISK FACTORS  [ ] Prior episodes of VTE                                     (3 Points)                [ ] Fracture of the hip, pelvis, or leg                       (5 Points)  [ ] Positive family history for VTE                         (3 Points)                 [ ] Acute spinal cord injury (in the previous month)  (5 Points)  [ ] Prothrombin 19051 A                                     (3 Points)                 [ ] Paralysis  (less than 1 month)                             (5 Points)  [ ] Factor V Leiden                                             (3 Points)                  [ ] Multiple Trauma within 1 month                        (5 Points)  [ ] Lupus anticoagulants                                     (3 Points)                                                           [ ] Anticardiolipin antibodies                               (3 Points)                                                       [ ] High homocysteine in the blood                      (3 Points)                                             [ ] Other congenital or acquired thrombophilia      (3 Points)                                                [ ] Heparin induced thrombocytopenia                  (3 Points)                                          Total Score [     3     ]    Risk:  Very low 0   Low 1 to 2   Moderate 3 to 4   High =5       VTE Prophylasix Recommednations:  [X ] mechanical pneumatic compression devices                                      [ ] contraindicated: _____________________  [ ] chemo prophylasix                                                                                   [ X] contraindicated _____________________    **** HIGH LIKELIHOOD DVT PRESENT ON ADMISSION  [ ] (please order LE dopplers within 24 hours of admission)

## 2019-11-28 NOTE — CONSULT NOTE ADULT - PROBLEM SELECTOR RECOMMENDATION 3
history of hydocephalus s/p VPS placement.  - plan for reprogramming of VPS after MRI per Neurosurgery

## 2019-11-28 NOTE — PROVIDER CONTACT NOTE (OTHER) - ASSESSMENT
Pt A&Ox4, hypotensive, BP 64/46, HR 59, diaphoretic, afebrile temp 97.9 orally. Vomiting after zofran given

## 2019-11-28 NOTE — PHYSICAL THERAPY INITIAL EVALUATION ADULT - STRENGTHENING, PT EVAL
GOAL: Pt will improve bilateral LLE and UE strength by 1/2 grade on MMT, for increased limb stability, to improve gait and facilitate stair negotiation in 4 weeks.

## 2019-11-28 NOTE — H&P ADULT - NSICDXPASTMEDICALHX_GEN_ALL_CORE_FT
PAST MEDICAL HISTORY:  Diabetes     H/O brain tumor     H/O hydrocephalus     Hypertension     Peripheral neuropathy     Stroke

## 2019-11-28 NOTE — CONSULT NOTE ADULT - PROBLEM SELECTOR RECOMMENDATION 9
CT head showing subacute b/l SDH as above.  - went for MRI today, f/u read  - management as per Neurosurgery confirmed medications with patient. tried to call pharmacy, but closed today.  - amlodipine 5mg BID  - losartan 50mg BID  - clonidine 0.1mg TID  - labetalol 100 BID  - clonazepam 0.5mg BID PRN  - simvastatin 20mg qHS  - patient unclear of metformin dose but takes after meals up to TID? states was instructed to take this way - will need to clarify with pharmacy  - patient NO LONGER TAKES gabapentin and cymbalta - informed neurosurgery IDALIA Warner who states it was restarted for patient's presenting paresthesias

## 2019-11-28 NOTE — PHYSICAL THERAPY INITIAL EVALUATION ADULT - IMPAIRMENTS CONTRIBUTING TO GAIT DEVIATIONS, PT EVAL
impaired postural control/decreased strength/cognition/impaired coordination/impaired motor control/impaired balance

## 2019-11-28 NOTE — PHYSICAL THERAPY INITIAL EVALUATION ADULT - GAIT DEVIATIONS NOTED, PT EVAL
Skilled Nursing Facility
decreased step length/decreased more/decreased stride length/decreased weight-shifting ability

## 2019-11-28 NOTE — CONSULT NOTE ADULT - PROBLEM SELECTOR RECOMMENDATION 7
hx of diabetes not on medications. diet controlled.  - f/u HbA1c  - c/w sliding scale insulin  - FS within acceptable range I-STOP Reference #: [id number]  - patient prescribed clonazepam 0.5mg BID PRN panic attack/insomnia  - patient amenable to trying melatonin instead for insomnia - ordered

## 2019-11-28 NOTE — CONSULT NOTE ADULT - SUBJECTIVE AND OBJECTIVE BOX
Dr. Marilin Walker  Pager: [phone]  (During off hours please page [phone])    PMD:     Patient is a 64y old  Male who presents with a chief complaint of SDH, hydrocephalus, Third ventricular tumor (28 Nov 2019 10:50)    Interval History/Subjective: Patient hypotensive this AM with SBP 60s-80s, improved s/p bolus of IVF.      HPI:  64y m PMH prior CVA w/ residual L sided weakness and paresthesias, brain "cyst" s/p removal c/b hydrocephalus s/p  shunt placement in 1980 in [place], HTN, who presents with  worsening L sided paresthesias and transferred from [facility] for new CT findings. Pt found to have subacute SDH along R frontal lobe and large mass in third ventricle. Pt denies any falls or recent head trauma to explain the SDH. (28 Nov 2019 01:00)      Allergies  No Known Allergies      HOME MEDICATIONS: [x] Reviewed   Home Medications:  amLODIPine 5 mg oral tablet: 1 tab(s) orally once a day (28 Nov 2019 01:34)  DULoxetine 60 mg oral delayed release capsule: 1 cap(s) orally once a day (28 Nov 2019 01:34)  gabapentin 300 mg oral tablet: 1 tab(s) orally 3 times a day (28 Nov 2019 01:34)  labetalol 100 mg oral tablet: 1 tab(s) orally 2 times a day (28 Nov 2019 01:34)  simvastatin 20 mg oral tablet: 1 tab(s) orally once a day (at bedtime) (28 Nov 2019 01:34)      MEDICATIONS  (STANDING):  dextrose 5%. 1000 milliLiter(s) (50 mL/Hr) IV Continuous <Continuous>  dextrose 50% Injectable 12.5 Gram(s) IV Push once  dextrose 50% Injectable 25 Gram(s) IV Push once  dextrose 50% Injectable 25 Gram(s) IV Push once  DULoxetine 60 milliGRAM(s) Oral daily  folic acid 1 milliGRAM(s) Oral daily  gabapentin 300 milliGRAM(s) Oral three times a day  insulin lispro (HumaLOG) corrective regimen sliding scale   SubCutaneous three times a day before meals  insulin lispro (HumaLOG) corrective regimen sliding scale   SubCutaneous at bedtime  multivitamin 1 Tablet(s) Oral daily  simvastatin 20 milliGRAM(s) Oral at bedtime  sodium chloride 0.9% with potassium chloride 20 mEq/L 1000 milliLiter(s) (75 mL/Hr) IV Continuous <Continuous>    MEDICATIONS  (PRN):  acetaminophen   Tablet .. 650 milliGRAM(s) Oral every 6 hours PRN Mild Pain (1 - 3)  bisacodyl 5 milliGRAM(s) Oral daily PRN Constipation  dextrose 40% Gel 15 Gram(s) Oral once PRN Blood Glucose LESS THAN 70 milliGRAM(s)/deciliter  glucagon  Injectable 1 milliGRAM(s) IntraMuscular once PRN Glucose LESS THAN 70 milligrams/deciliter  ondansetron Injectable 4 milliGRAM(s) IV Push every 6 hours PRN Nausea and/or Vomiting  oxyCODONE    IR 5 milliGRAM(s) Oral every 4 hours PRN Moderate Pain (4 - 6)  senna 2 Tablet(s) Oral at bedtime PRN Constipation      PAST MEDICAL & SURGICAL HISTORY:  H/O hydrocephalus  H/O brain tumor  Peripheral neuropathy  Stroke  Diabetes  Hypertension  S/P ventriculoperitoneal shunt  H/O craniotomy  [x] Reviewed     SOCIAL HISTORY:  Residence: [ ] [facility]  [ ] [facility]  [ ] Community  [ ] Substance abuse:   [ ] Tobacco:   [ ] Alcohol use:     FAMILY HISTORY:  [x] No pertinent family history in first degree relatives     REVIEW OF SYSTEMS:    CONSTITUTIONAL: No fever, weight loss, or fatigue  EYES: No eye pain, visual disturbances, or discharge  ENMT:  No difficulty hearing, tinnitus, vertigo; No sinus or throat pain  NECK: No pain or stiffness  RESPIRATORY: No cough, wheezing, chills or hemoptysis; No shortness of breath  CARDIOVASCULAR: No chest pain, palpitations, dizziness, or leg swelling  GASTROINTESTINAL: No abdominal or epigastric pain. No nausea, vomiting, or hematemesis; No diarrhea or constipation. No melena or hematochezia.  GENITOURINARY: No dysuria, frequency, hematuria, or incontinence  NEUROLOGICAL: No headaches, memory loss, loss of strength, numbness, or tremors  SKIN: No itching, burning, rashes, or lesions   LYMPH NODES: No enlarged glands  ENDOCRINE: No heat or cold intolerance; No hair loss  MUSCULOSKELETAL: No muscle or back pain  PSYCHIATRIC: No depression, anxiety, mood swings, or difficulty sleeping  HEME/LYMPH: No easy bruising, or bleeding gums    [  ] All other ROS negative  [  ] Unable to obtain due to poor mental status    Vital Signs Last 24 Hrs  T(C): 36.6 (28 Nov 2019 12:03), Max: 37.1 (28 Nov 2019 02:07)  T(F): 97.8 (28 Nov 2019 12:03), Max: 98.8 (28 Nov 2019 02:07)  HR: 64 (28 Nov 2019 12:03) (59 - 108)  BP: 102/65 (28 Nov 2019 10:43) (64/46 - 181/92)  BP(mean): --  RR: 19 (28 Nov 2019 12:03) (18 - 20)  SpO2: 96% (28 Nov 2019 12:03) (94% - 99%)    PHYSICAL EXAM:    GENERAL: NAD, well-groomed, well-developed  HEAD:  Atraumatic, Normocephalic  EYES: EOMI, PERRLA, conjunctiva and sclera clear  ENMT: Moist mucous membranes  NECK: Supple, No JVD  RESPIRATORY: Clear to auscultation bilaterally; No rales, rhonchi, wheezing, or rubs  CARDIOVASCULAR: Regular rate and rhythm; No murmurs, rubs, or gallops  GASTROINTESTINAL: Soft, Nontender, Nondistended; Bowel sounds present  EXTREMITIES:  2+ Peripheral Pulses, No clubbing, cyanosis, or edema  NEURO:  Alert & Oriented X3; Moving all 4 extremities; No gross sensory deficits  HEME/LYMPH: No lymphadenopathy noted  SKIN: No rashes or lesions; Incisions C/D/I    LABS:                        15.4   12.20 )-----------( 451      ( 27 Nov 2019 22:19 )             44.4     11-27    136  |  103  |  21  ----------------------------<  199<H>  3.8   |  20<L>  |  1.19    Ca    9.9      27 Nov 2019 22:19    TPro  8.0  /  Alb  4.2  /  TBili  0.6  /  DBili  x   /  AST  12  /  ALT  20  /  AlkPhos  124<H>  11-27    PT/INR - ( 27 Nov 2019 22:19 )   PT: 13.3 sec;   INR: 1.16 ratio         PTT - ( 27 Nov 2019 22:19 )  PTT:35.5 sec    CAPILLARY BLOOD GLUCOSE      POCT Blood Glucose.: 139 mg/dL (28 Nov 2019 12:18)        RADIOLOGY & ADDITIONAL STUDIES:      Imaging:   Personally Reviewed:  [x] YES   XR Shuntogram with VPS originating from R mirian hole ending in lower abd; no kinks nor discontinuity  CT Head: unchanged b/l subdural hematomas 4mm on L and 7mm on R. 2.6x2.0x3.3cm lesion in 3rd ventricle with resultant hydrocephalus.       Consultant(s) notes reviewed:  Neurosurgery  Care Discussed with Consultant(s)/Other Providers: Neurosurgery PA Dr. Marilin Walker  Pager: [phone]  (During off hours please page [phone])    PMD:     Patient is a 64y old  Male who presents with a chief complaint of SDH, hydrocephalus, Third ventricular tumor (28 Nov 2019 10:50)    Interval History/Subjective: Patient hypotensive this AM with SBP 60s-80s, improved s/p bolus of IVF, during episode of nausea/vomiting that has since resolved.       HPI:  64y m PMH prior CVA w/ residual L sided weakness and paresthesias, brain "cyst" s/p removal c/b hydrocephalus s/p  shunt placement in 1980 in [place], HTN, who presents with  worsening L sided paresthesias and transferred from [facility] for new CT findings. Pt found to have subacute SDH along R frontal lobe and large mass in third ventricle. Of note, patient states did and fall and tumble down flight of stairs 2 weeks ago, no LOC, but did not go to ER/hospital to get checked at that time.      Allergies  No Known Allergies      HOME MEDICATIONS: [x] Reviewed   Home Medications:  amLODIPine 5 mg oral tablet: 1 tab(s) orally once a day (28 Nov 2019 01:34)  DULoxetine 60 mg oral delayed release capsule: 1 cap(s) orally once a day (28 Nov 2019 01:34)  gabapentin 300 mg oral tablet: 1 tab(s) orally 3 times a day (28 Nov 2019 01:34)  labetalol 100 mg oral tablet: 1 tab(s) orally 2 times a day (28 Nov 2019 01:34)  simvastatin 20 mg oral tablet: 1 tab(s) orally once a day (at bedtime) (28 Nov 2019 01:34)      MEDICATIONS  (STANDING):  dextrose 5%. 1000 milliLiter(s) (50 mL/Hr) IV Continuous <Continuous>  dextrose 50% Injectable 12.5 Gram(s) IV Push once  dextrose 50% Injectable 25 Gram(s) IV Push once  dextrose 50% Injectable 25 Gram(s) IV Push once  DULoxetine 60 milliGRAM(s) Oral daily  folic acid 1 milliGRAM(s) Oral daily  gabapentin 300 milliGRAM(s) Oral three times a day  insulin lispro (HumaLOG) corrective regimen sliding scale   SubCutaneous three times a day before meals  insulin lispro (HumaLOG) corrective regimen sliding scale   SubCutaneous at bedtime  multivitamin 1 Tablet(s) Oral daily  simvastatin 20 milliGRAM(s) Oral at bedtime  sodium chloride 0.9% with potassium chloride 20 mEq/L 1000 milliLiter(s) (75 mL/Hr) IV Continuous <Continuous>    MEDICATIONS  (PRN):  acetaminophen   Tablet .. 650 milliGRAM(s) Oral every 6 hours PRN Mild Pain (1 - 3)  bisacodyl 5 milliGRAM(s) Oral daily PRN Constipation  dextrose 40% Gel 15 Gram(s) Oral once PRN Blood Glucose LESS THAN 70 milliGRAM(s)/deciliter  glucagon  Injectable 1 milliGRAM(s) IntraMuscular once PRN Glucose LESS THAN 70 milligrams/deciliter  ondansetron Injectable 4 milliGRAM(s) IV Push every 6 hours PRN Nausea and/or Vomiting  oxyCODONE    IR 5 milliGRAM(s) Oral every 4 hours PRN Moderate Pain (4 - 6)  senna 2 Tablet(s) Oral at bedtime PRN Constipation      PAST MEDICAL & SURGICAL HISTORY:  H/O hydrocephalus  H/O brain tumor  Peripheral neuropathy  Stroke  Diabetes  Hypertension  S/P ventriculoperitoneal shunt  H/O craniotomy  [x] Reviewed     SOCIAL HISTORY:  Lives at home with son, wheelchair-bound  Former smoker, smoked 2PPD quit in 2008 but still vapes "3 drags" when has urge once in a while  Denies drug use  retired     FAMILY HISTORY:  [x] No pertinent family history in first degree relatives     REVIEW OF SYSTEMS:    CONSTITUTIONAL: No fever, weight loss, or fatigue  EYES: No eye pain, visual disturbances, or discharge  ENMT:  No difficulty hearing, tinnitus, vertigo; No sinus or throat pain  NECK: No pain or stiffness  RESPIRATORY: No cough, wheezing, chills or hemoptysis; No shortness of breath  CARDIOVASCULAR: No chest pain, palpitations, dizziness, or leg swelling  GASTROINTESTINAL: No abdominal or epigastric pain. No hematemesis; No diarrhea or constipation. No melena or hematochezia. +nausea, +vomiting  GENITOURINARY: No dysuria, hematuria, or incontinence +frequency  NEUROLOGICAL: No headaches, memory loss, loss of strength, numbness, or tremors  SKIN: No itching, burning, rashes, or lesions   LYMPH NODES: No enlarged glands  ENDOCRINE: No heat or cold intolerance; No hair loss  MUSCULOSKELETAL: No muscle or back pain  PSYCHIATRIC: No depression, mood swings. +anxiety, +difficulty sleeping  HEME/LYMPH: No easy bruising, or bleeding gums    Vital Signs Last 24 Hrs  T(C): 36.6 (28 Nov 2019 12:03), Max: 37.1 (28 Nov 2019 02:07)  T(F): 97.8 (28 Nov 2019 12:03), Max: 98.8 (28 Nov 2019 02:07)  HR: 64 (28 Nov 2019 12:03) (59 - 108)  BP: 102/65 (28 Nov 2019 10:43) (64/46 - 181/92)  BP(mean): --  RR: 19 (28 Nov 2019 12:03) (18 - 20)  SpO2: 96% (28 Nov 2019 12:03) (94% - 99%)    PHYSICAL EXAM:    GENERAL: NAD, well-developed  HEAD:  Atraumatic, Normocephalic  EYES: EOMI, PERRLA, conjunctiva and sclera clear  ENMT: Moist mucous membranes, +poor oral hygiene/dentition  NECK: Supple, No JVD  RESPIRATORY: Clear to auscultation bilaterally; No rales, rhonchi, wheezing, or rubs  CARDIOVASCULAR: Regular rate and rhythm; No murmurs, rubs, or gallops  GASTROINTESTINAL: Soft, Nontender, Nondistended; Bowel sounds present  EXTREMITIES:  2+ Peripheral Pulses, No clubbing, cyanosis, or edema  NEURO:  Alert & Oriented X3; Moving all 4 extremities; No gross sensory deficits 5/5 strength in R UE/LE, 4/5 strength in L UE/LE  HEME/LYMPH: No lymphadenopathy noted  SKIN: No rashes or lesions; Incisions C/D/I    LABS:                        15.4   12.20 )-----------( 451      ( 27 Nov 2019 22:19 )             44.4     11-27    136  |  103  |  21  ----------------------------<  199<H>  3.8   |  20<L>  |  1.19    Ca    9.9      27 Nov 2019 22:19    TPro  8.0  /  Alb  4.2  /  TBili  0.6  /  DBili  x   /  AST  12  /  ALT  20  /  AlkPhos  124<H>  11-27    PT/INR - ( 27 Nov 2019 22:19 )   PT: 13.3 sec;   INR: 1.16 ratio         PTT - ( 27 Nov 2019 22:19 )  PTT:35.5 sec    CAPILLARY BLOOD GLUCOSE      POCT Blood Glucose.: 139 mg/dL (28 Nov 2019 12:18)        RADIOLOGY & ADDITIONAL STUDIES:      Imaging:   Personally Reviewed:  [x] YES   XR Shuntogram with VPS originating from R mirian hole ending in lower abd; no kinks nor discontinuity  CT Head: unchanged b/l subdural hematomas 4mm on L and 7mm on R. 2.6x2.0x3.3cm lesion in 3rd ventricle with resultant hydrocephalus.       Consultant(s) notes reviewed:  Neurosurgery  Care Discussed with Consultant(s)/Other Providers: Neurosurgery IDALIA Warner

## 2019-11-28 NOTE — PROGRESS NOTE ADULT - SUBJECTIVE AND OBJECTIVE BOX
SUBJECTIVE:     OVERNIGHT EVENTS:     Vital Signs Last 24 Hrs  T(C): 36.6 (28 Nov 2019 08:28), Max: 37.1 (28 Nov 2019 02:07)  T(F): 97.9 (28 Nov 2019 08:28), Max: 98.8 (28 Nov 2019 02:07)  HR: 66 (28 Nov 2019 10:43) (59 - 108)  BP: 102/65 (28 Nov 2019 10:43) (64/46 - 181/92)  BP(mean): --  RR: 20 (28 Nov 2019 08:28) (18 - 20)  SpO2: 94% (28 Nov 2019 08:28) (94% - 99%)  IVF: [ ] IVL [ ] NS+K@   DIET: [ ] Regular [ ] CCD [ ] Renal [ ] Puree [ ] Dysphagia [ ] Tube Feeds:   PCA: [ ] YES [ ] NO   RUSSELL: [ ] YES [ ] NO [ ] VOID   BM: [ ] YES [ ] NO     DRAINS: [ ] MARIA E (cc/24h) [ ] HMV (cc/24h)    PHYSICAL EXAM:    General: No Acute Distress     Neurological: Awake, alert oriented to person, place and time, Following Commands, PERRL, EOMI, Face Symmetrical, Speech Fluent, Moving all extremities, Muscle Strength normal in all four extremities, No Drift, Sensation to Light Touch Intact    Pulmonary: Clear to Auscultation, No Rales, No Rhonchi, No Wheezes     Cardiovascular: S1, S2, Regular Rate and Rhythm     Gastrointestinal: Soft, Nontender, Nondistended     Incision:     LABS:                        15.4   12.20 )-----------( 451      ( 27 Nov 2019 22:19 )             44.4    11-27    136  |  103  |  21  ----------------------------<  199<H>  3.8   |  20<L>  |  1.19    Ca    9.9      27 Nov 2019 22:19    TPro  8.0  /  Alb  4.2  /  TBili  0.6  /  DBili  x   /  AST  12  /  ALT  20  /  AlkPhos  124<H>  11-27  PT/INR - ( 27 Nov 2019 22:19 )   PT: 13.3 sec;   INR: 1.16 ratio         PTT - ( 27 Nov 2019 22:19 )  PTT:35.5 sec      11-27 @ 07:01 - 11-28 @ 07:00  --------------------------------------------------------  IN: 0 mL / OUT: 1 mL / NET: -1 mL      IMAGING:     MEDICATIONS  (STANDING):  dextrose 5%. 1000 milliLiter(s) (50 mL/Hr) IV Continuous <Continuous>  dextrose 50% Injectable 12.5 Gram(s) IV Push once  dextrose 50% Injectable 25 Gram(s) IV Push once  dextrose 50% Injectable 25 Gram(s) IV Push once  DULoxetine 60 milliGRAM(s) Oral daily  folic acid 1 milliGRAM(s) Oral daily  gabapentin 300 milliGRAM(s) Oral three times a day  insulin lispro (HumaLOG) corrective regimen sliding scale   SubCutaneous three times a day before meals  insulin lispro (HumaLOG) corrective regimen sliding scale   SubCutaneous at bedtime  labetalol 100 milliGRAM(s) Oral daily  multivitamin 1 Tablet(s) Oral daily  simvastatin 20 milliGRAM(s) Oral at bedtime    MEDICATIONS  (PRN):  acetaminophen   Tablet .. 650 milliGRAM(s) Oral every 6 hours PRN Mild Pain (1 - 3)  bisacodyl 5 milliGRAM(s) Oral daily PRN Constipation  dextrose 40% Gel 15 Gram(s) Oral once PRN Blood Glucose LESS THAN 70 milliGRAM(s)/deciliter  glucagon  Injectable 1 milliGRAM(s) IntraMuscular once PRN Glucose LESS THAN 70 milligrams/deciliter  ondansetron Injectable 4 milliGRAM(s) IV Push every 6 hours PRN Nausea and/or Vomiting  oxyCODONE    IR 5 milliGRAM(s) Oral every 4 hours PRN Moderate Pain (4 - 6)  senna 2 Tablet(s) Oral at bedtime PRN Constipation SUBJECTIVE: NAD, c/o lt sided pain and nausea this AM.     OVERNIGHT EVENTS: None    Vital Signs Last 24 Hrs  T(C): 36.6 (28 Nov 2019 08:28), Max: 37.1 (28 Nov 2019 02:07)  T(F): 97.9 (28 Nov 2019 08:28), Max: 98.8 (28 Nov 2019 02:07)  HR: 66 (28 Nov 2019 10:43) (59 - 108)  BP: 102/65 (28 Nov 2019 10:43) (64/46 - 181/92)  BP(mean): --  RR: 20 (28 Nov 2019 08:28) (18 - 20)  SpO2: 94% (28 Nov 2019 08:28) (94% - 99%)  IVF: [X ] IVL [ ] NS+K@   DIET: [ ] Regular [ X] CCD [ ] Renal [ ] Puree [ ] Dysphagia [ ] Tube Feeds:   PCA: [ ] YES [ X] NO   RUSSELL: [ ] YES [X ] NO [X ] VOID   BM: [ X] YES-on 11/28 x2    DRAINS: None    PHYSICAL EXAM:    General: No Acute Distress     Neurological: Awake, alert oriented to person, place and time, Following Commands, PERRL, EOMI, Face Symmetrical, Rt eye-count fingers, Lt eye peripheral vision only. Speech Fluent, No Drift, HINSON 5/5, except LUE 4+/5 and contracted at elbow (Hx CVA). Sensation to Light Touch Intact    Pulmonary: Clear to Auscultation, No Rales, No Rhonchi, No Wheezes     Cardiovascular: S1, S2, Regular Rate and Rhythm     Gastrointestinal: Soft, Nontender, Nondistended     Incision: N/A    LABS:                        15.4   12.20 )-----------( 451      ( 27 Nov 2019 22:19 )             44.4    11-27    136  |  103  |  21  ----------------------------<  199<H>  3.8   |  20<L>  |  1.19    Ca    9.9      27 Nov 2019 22:19    TPro  8.0  /  Alb  4.2  /  TBili  0.6  /  DBili  x   /  AST  12  /  ALT  20  /  AlkPhos  124<H>  11-27  PT/INR - ( 27 Nov 2019 22:19 )   PT: 13.3 sec;   INR: 1.16 ratio         PTT - ( 27 Nov 2019 22:19 )  PTT:35.5 sec      11-27 @ 07:01  -  11-28 @ 07:00  --------------------------------------------------------  IN: 0 mL / OUT: 1 mL / NET: -1 mL      IMAGING:   < from: CT Head No Cont (11.28.19 @ 00:07) >  IMPRESSION:  Unchanged bilateral subdural collections.    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 MRI is recommended for further evaluation.    < from: Xray Shuntogram  Shunt (11.27.19 @ 23:34) >   shunt identified originating from the right mirian hole and ending in   the lower abdomen without kinks or discontinuity.    MEDICATIONS  (STANDING):  dextrose 5%. 1000 milliLiter(s) (50 mL/Hr) IV Continuous <Continuous>  dextrose 50% Injectable 12.5 Gram(s) IV Push once  dextrose 50% Injectable 25 Gram(s) IV Push once  dextrose 50% Injectable 25 Gram(s) IV Push once  DULoxetine 60 milliGRAM(s) Oral daily  folic acid 1 milliGRAM(s) Oral daily  gabapentin 300 milliGRAM(s) Oral three times a day  insulin lispro (HumaLOG) corrective regimen sliding scale   SubCutaneous three times a day before meals  insulin lispro (HumaLOG) corrective regimen sliding scale   SubCutaneous at bedtime  labetalol 100 milliGRAM(s) Oral daily  multivitamin 1 Tablet(s) Oral daily  simvastatin 20 milliGRAM(s) Oral at bedtime    MEDICATIONS  (PRN):  acetaminophen   Tablet .. 650 milliGRAM(s) Oral every 6 hours PRN Mild Pain (1 - 3)  bisacodyl 5 milliGRAM(s) Oral daily PRN Constipation  dextrose 40% Gel 15 Gram(s) Oral once PRN Blood Glucose LESS THAN 70 milliGRAM(s)/deciliter  glucagon  Injectable 1 milliGRAM(s) IntraMuscular once PRN Glucose LESS THAN 70 milligrams/deciliter  ondansetron Injectable 4 milliGRAM(s) IV Push every 6 hours PRN Nausea and/or Vomiting  oxyCODONE    IR 5 milliGRAM(s) Oral every 4 hours PRN Moderate Pain (4 - 6)  senna 2 Tablet(s) Oral at bedtime PRN Constipation

## 2019-11-28 NOTE — H&P ADULT - ASSESSMENT
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 CTH at OSH showed R subacute SDH, with R frontal encephalomalacia, hydrocephalus. Patient endorses fall off stairs 2 weeks ago, with no LOC. Patient reports having a cyst removed from his brain about 15-16 yrs ago in [place] and was told he was at high risk for developing hydrocephalus needing  shunt. Patient is unsure of the type of tumor and his current shunt settings. On exam: Patient has some baseline L side weakness 4/5 in UE and LE. he also states he has blurry vision in R eye with +fc worse than the left which has been ongoing for a period of time.  Shunt pumps and refills and shunt series shows no kinks or breaks (Hakim valve at 200)   - Admit to neurosurgery  - Interval 4 hr CTH stable.  f/u final read.   - Shunt settings confirmed at 200.    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 CTH at OSH showed R subacute SDH, with R frontal encephalomalacia, hydrocephalus. Patient endorses fall off stairs 2 weeks ago, with no LOC. Patient reports having a cyst removed from his brain about 15-16 yrs ago in [place] and was told he was at high risk for developing hydrocephalus needing  shunt. Patient is unsure of the type of tumor and his current shunt settings. On exam: Patient has some baseline L side weakness 4/5 in UE and LE. he also states he has blurry vision in R eye with +fc worse than the left which has been ongoing for a period of time.  Shunt pumps and refills and shunt series shows no kinks or breaks (Hakim valve at 200)   - Admit to neurosurgery  - Interval 4 hr CTH stable.  f/u final read.   - Shunt settings confirmed at 200.    - MRI poonam LUNDBERG

## 2019-11-28 NOTE — PHYSICAL THERAPY INITIAL EVALUATION ADULT - IMPAIRED TRANSFERS: SIT/STAND, REHAB EVAL
impaired postural control/impaired motor control/decreased strength/impaired balance/cognition/impaired coordination

## 2019-11-28 NOTE — PHYSICAL THERAPY INITIAL EVALUATION ADULT - PERTINENT HX OF CURRENT PROBLEM, REHAB EVAL
64y m PMhx prior CVA w/ residual L sided weakness and paresthesias, brain "cyst" s/p removal c/b hydrocephalus s/p  shunt placement in 1980 in [place], HTN, pw worsening L sided paresthesias and transferred from [facility] for new CT findings. Pt found to have subacute SDH along R frontal lobe and large mass in third ventricle. Pt denies any falls or recent head trauma to explain the SDH.

## 2019-11-28 NOTE — PHYSICAL THERAPY INITIAL EVALUATION ADULT - HEALTH SCREEN CRITERIA
yes
Bilateral knee flexion 0-45 degrees, ankles within functional limits, bilateral hips 0-20 degrees. Limited secondary to pain.

## 2019-11-28 NOTE — PHYSICAL THERAPY INITIAL EVALUATION ADULT - PLANNED THERAPY INTERVENTIONS, PT EVAL
stair negotiation GOAL: pt will ascend/descend 6 steps (I) with U HR and step to pattern in 4 weeks/bed mobility training/transfer training/gait training/balance training/strengthening

## 2019-11-28 NOTE — PROGRESS NOTE ADULT - ASSESSMENT
· HPI Objective Statement: 64y m PMhx prior CVA w/ residual L sided weakness and paresthesias, brain "cyst" s/p removal c/b hydrocephalus s/p  shunt placement in 1980 in [place], HTN, pw worsening L sided paresthesias and transferred from [facility] for new CT findings. Pt found to have subacute SDH along R frontal lobe and large mass in third ventricle. Pt denies any falls or recent head trauma to explain the SDH. 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  [id number] FU. MRI Brain w/wo-P (will send for pt now as d/w MRI Dept). Need to reprogram VPS after MRI.  Inc activity/OOB. Further plans after MRI completed.    Medicine/Hosp-made aware at this time to see pt FU.    Respiratory: Patient instructed to use incentive spirometer [ X] YES [ ] NO              DVT ppx: [X ] SQL [ ] SQH and Venodynes [ ] Left [ ] Right [ X] Bilateral    Discharge Planning:  The patient was evaluated by PT and recommended out patient PT/A. Rehab/S. Acute Rehab.   She/He was subsequently DC on /2018 in stable condition.    More than 30 minutes spent on total encounter: more than 50% of the visit was spent on educating the patient and family regarding condition, medications, follow up plans, signs and symptoms to be concerned with, preparing paperwork, and questions answered regarding discharge.      Assessment:  Please Check When Present   []  GCS  E   V  M     Heart Failure: []Acute, [] acute on chronic , []chronic  Heart Failure:  [] Diastolic (HFpEF), [] Systolic (HFrEF), []Combined (HFpEF and HFrEF), [] RHF, [] Pulm HTN, [] Other    [] RA, [] ATN, [] AIN, [] other  [] CKD1, [] CKD2, [] CKD 3, [] CKD 4, [] CKD 5, []ESRD    Encephalopathy: [] Metabolic, [] Hepatic, [] toxic, [] Neurological, [] Other    Abnormal Nurtitional Status: [] malnurtition (see nutrition note), [ ]underweight: BMI < 19, [] morbid obesity: BMI >40, [] Cachexia    [] Sepsis  [] hypovolemic shock,[] cardiogenic shock, [] hemorrhagic shock, [] neuogenic shock  [] Acute Respiratory Failure  []Cerebral edema, [] Brain compression/ herniation,   [] Functional quadriplegia  [] Acute blood loss anemia · HPI Objective Statement: 64y m PMhx prior CVA w/ residual L sided weakness and paresthesias, brain "cyst" s/p removal c/b hydrocephalus s/p  shunt placement in 1980 in [place], HTN, pw worsening L sided paresthesias and transferred from [facility] for new CT findings. Pt found to have subacute SDH along R frontal lobe and large mass in third ventricle. Pt denies any falls or recent head trauma to explain the SDH. 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  [id number]-MX-Y FU. MRI Brain w/wo-P (will send for pt now as d/w MRI Dept). Need to reprogram VPS after MRI.  Inc activity/OOB. Further plans after MRI completed.    Medicine/Hosp-made aware at this time to see pt FU.    Respiratory: Patient instructed to use incentive spirometer [ ] YES [ ] NO              DVT ppx: [ ] SQL-Hold [ ] SQH and Venodynes [ ] Left [ ] Right [ X] Bilateral    Discharge Planning:  PT eval-P FU      Assessment:  Please Check When Present   []  GCS  E   V  M     Heart Failure: []Acute, [] acute on chronic , []chronic  Heart Failure:  [] Diastolic (HFpEF), [] Systolic (HFrEF), []Combined (HFpEF and HFrEF), [] RHF, [] Pulm HTN, [] Other    [] RA, [] ATN, [] AIN, [] other  [] CKD1, [] CKD2, [] CKD 3, [] CKD 4, [] CKD 5, []ESRD    Encephalopathy: [] Metabolic, [] Hepatic, [] toxic, [] Neurological, [] Other    Abnormal Nurtitional Status: [] malnurtition (see nutrition note), [ ]underweight: BMI < 19, [] morbid obesity: BMI >40, [] Cachexia    [] Sepsis  [] hypovolemic shock,[] cardiogenic shock, [] hemorrhagic shock, [] neuogenic shock  [] Acute Respiratory Failure  []Cerebral edema, [] Brain compression/ herniation,   [] Functional quadriplegia  [] Acute blood loss anemia

## 2019-11-28 NOTE — PHYSICAL THERAPY INITIAL EVALUATION ADULT - TRANSFER SAFETY CONCERNS NOTED: SIT/STAND, REHAB EVAL
decreased sequencing ability/decreased safety awareness/decreased balance during turns/decreased weight-shifting ability/decreased step length/losing balance

## 2019-11-28 NOTE — PHYSICAL THERAPY INITIAL EVALUATION ADULT - ADDITIONAL COMMENTS
"----- Message from Eugenia Owens sent at 12/12/2018 10:33 AM CST -----  Contact: Self [phone]  RX request - refill or new RX.  Is this a refill or new RX:  refill  RX name and strength: oxyCODONE-acetaminophen (PERCOCET)  mg per tablet  Directions:   Is this a 30 day or 90 day RX:  25 day  Local pharmacy or mail order pharmacy:    Pharmacy name and phone # (DON'T enter "on file" or "in chart"):   Comments:          " Pt lives with son and family in private split level home, 6 steps to each level. Pt states he requires assist with functional mobility, ambulates short distances with RW and a "". Propels himself independently in wheelchair with BLE. Pt lives with son and family in private split level home, 6 steps to each level. Pt reports he ambulates short distances with RW and a "" without much assist. Propels himself independently in wheelchair with BLE for longer distances. Pt was able to negotiate steps (I), required assist with bathing from son. Toileting and dressing (I) Pt reports several months ago he was able to ambulate short distances without use of an AD with physical therapy.

## 2019-11-28 NOTE — PHYSICAL THERAPY INITIAL EVALUATION ADULT - GENERAL OBSERVATIONS, REHAB EVAL
pt received semi-supine in bed +IV pt received semi-supine in bed in NAD, VSS, IVL, agreeable to PT session

## 2019-11-28 NOTE — H&P ADULT - NSHPPHYSICALEXAM_GEN_ALL_CORE
AOx3, FC, PERRL, EOMI, no facial, R eye blurry, L eye peripheral vision loss  5/5 R, 4+/5 L no drift  SILT  no clonus

## 2019-11-28 NOTE — CONSULT NOTE ADULT - PROBLEM SELECTOR RECOMMENDATION 5
currently hypotensive.  - discontinued all home antihypertensives (amlodipine, labetalol, losartan)  - would resume one by one as BP improves currently hypotensive. improved s/p 1L NS bolus.  - agree with starting maintenance fluids for now  - discontinued all home antihypertensives (amlodipine, labetalol, losartan)  - would resume one by one as BP improves currently hypotensive. improved s/p 1L NS bolus.  - agree with starting maintenance fluids for now  - discontinued all home antihypertensives (amlodipine, labetalol, losartan, clonidine)  - would resume one by one as BP improves with hold parameters

## 2019-11-28 NOTE — CONSULT NOTE ADULT - PROBLEM SELECTOR RECOMMENDATION 8
states takes metformin after each meal? does not recall who instructed him this way, but that is how he was informed to take it. diet controlled.  - f/u HbA1c  - c/w sliding scale insulin  - FS within acceptable range

## 2019-11-29 DIAGNOSIS — F17.200 NICOTINE DEPENDENCE, UNSPECIFIED, UNCOMPLICATED: ICD-10-CM

## 2019-11-29 DIAGNOSIS — R79.89 OTHER SPECIFIED ABNORMAL FINDINGS OF BLOOD CHEMISTRY: ICD-10-CM

## 2019-11-29 LAB
ANION GAP SERPL CALC-SCNC: 11 MMOL/L — SIGNIFICANT CHANGE UP (ref 5–17)
APPEARANCE UR: CLEAR — SIGNIFICANT CHANGE UP
BILIRUB UR-MCNC: NEGATIVE — SIGNIFICANT CHANGE UP
BUN SERPL-MCNC: 23 MG/DL — SIGNIFICANT CHANGE UP (ref 7–23)
CALCIUM SERPL-MCNC: 8.9 MG/DL — SIGNIFICANT CHANGE UP (ref 8.4–10.5)
CHLORIDE SERPL-SCNC: 109 MMOL/L — HIGH (ref 96–108)
CO2 SERPL-SCNC: 18 MMOL/L — LOW (ref 22–31)
COLOR SPEC: YELLOW — SIGNIFICANT CHANGE UP
CREAT SERPL-MCNC: 1.4 MG/DL — HIGH (ref 0.5–1.3)
DIFF PNL FLD: NEGATIVE — SIGNIFICANT CHANGE UP
GLUCOSE BLDC GLUCOMTR-MCNC: 105 MG/DL — HIGH (ref 70–99)
GLUCOSE BLDC GLUCOMTR-MCNC: 116 MG/DL — HIGH (ref 70–99)
GLUCOSE BLDC GLUCOMTR-MCNC: 133 MG/DL — HIGH (ref 70–99)
GLUCOSE BLDC GLUCOMTR-MCNC: 149 MG/DL — HIGH (ref 70–99)
GLUCOSE SERPL-MCNC: 110 MG/DL — HIGH (ref 70–99)
GLUCOSE UR QL: NEGATIVE — SIGNIFICANT CHANGE UP
HBA1C BLD-MCNC: 6.4 % — HIGH (ref 4–5.6)
KETONES UR-MCNC: NEGATIVE — SIGNIFICANT CHANGE UP
LEUKOCYTE ESTERASE UR-ACNC: NEGATIVE — SIGNIFICANT CHANGE UP
NITRITE UR-MCNC: NEGATIVE — SIGNIFICANT CHANGE UP
PH UR: 5.5 — SIGNIFICANT CHANGE UP (ref 5–8)
POTASSIUM SERPL-MCNC: 4 MMOL/L — SIGNIFICANT CHANGE UP (ref 3.5–5.3)
POTASSIUM SERPL-SCNC: 4 MMOL/L — SIGNIFICANT CHANGE UP (ref 3.5–5.3)
PROT UR-MCNC: SIGNIFICANT CHANGE UP
SODIUM SERPL-SCNC: 138 MMOL/L — SIGNIFICANT CHANGE UP (ref 135–145)
SP GR SPEC: 1.04 — HIGH (ref 1.01–1.02)
UROBILINOGEN FLD QL: ABNORMAL

## 2019-11-29 PROCEDURE — 99223 1ST HOSP IP/OBS HIGH 75: CPT

## 2019-11-29 PROCEDURE — 99233 SBSQ HOSP IP/OBS HIGH 50: CPT

## 2019-11-29 PROCEDURE — 99221 1ST HOSP IP/OBS SF/LOW 40: CPT

## 2019-11-29 PROCEDURE — 99231 SBSQ HOSP IP/OBS SF/LOW 25: CPT

## 2019-11-29 RX ORDER — GABAPENTIN 400 MG/1
1 CAPSULE ORAL
Qty: 0 | Refills: 0 | DISCHARGE

## 2019-11-29 RX ORDER — NICOTINE POLACRILEX 2 MG
1 GUM BUCCAL DAILY
Refills: 0 | Status: DISCONTINUED | OUTPATIENT
Start: 2019-11-29 | End: 2019-11-30

## 2019-11-29 RX ORDER — DULOXETINE HYDROCHLORIDE 30 MG/1
1 CAPSULE, DELAYED RELEASE ORAL
Qty: 0 | Refills: 0 | DISCHARGE

## 2019-11-29 RX ORDER — CLONAZEPAM 1 MG
0.5 TABLET ORAL EVERY 12 HOURS
Refills: 0 | Status: DISCONTINUED | OUTPATIENT
Start: 2019-11-29 | End: 2019-12-03

## 2019-11-29 RX ADMIN — Medication 0.5 MILLIGRAM(S): at 18:24

## 2019-11-29 RX ADMIN — Medication 3 MILLIGRAM(S): at 21:37

## 2019-11-29 RX ADMIN — Medication 1 TABLET(S): at 13:49

## 2019-11-29 RX ADMIN — Medication 1 PATCH: at 17:00

## 2019-11-29 RX ADMIN — GABAPENTIN 300 MILLIGRAM(S): 400 CAPSULE ORAL at 13:48

## 2019-11-29 RX ADMIN — DULOXETINE HYDROCHLORIDE 60 MILLIGRAM(S): 30 CAPSULE, DELAYED RELEASE ORAL at 13:49

## 2019-11-29 RX ADMIN — SIMVASTATIN 20 MILLIGRAM(S): 20 TABLET, FILM COATED ORAL at 21:37

## 2019-11-29 RX ADMIN — Medication 1 MILLIGRAM(S): at 13:48

## 2019-11-29 RX ADMIN — GABAPENTIN 300 MILLIGRAM(S): 400 CAPSULE ORAL at 21:37

## 2019-11-29 RX ADMIN — GABAPENTIN 300 MILLIGRAM(S): 400 CAPSULE ORAL at 06:29

## 2019-11-29 RX ADMIN — Medication 1 PATCH: at 19:16

## 2019-11-29 NOTE — OCCUPATIONAL THERAPY INITIAL EVALUATION ADULT - LIVES WITH, PROFILE
lives with son in a private house +6 steps to the bathroom, pt uses WC for mobility throughout house, pt requires assistance for bathing, and toileting. Pt states he is independent with dressing and transferring from bed to WC. Pt has quad cane, WC, RW, shower chair and urinal

## 2019-11-29 NOTE — PROGRESS NOTE ADULT - PROBLEM SELECTOR PLAN 5
creatinine is rising after low bp episode  could be ATN/hemodynamically mediated  continue to monitor - no RA yet

## 2019-11-29 NOTE — OCCUPATIONAL THERAPY INITIAL EVALUATION ADULT - VISUAL ACUITY
not tested/pt with glasses, reports double vision with upward gaze, loss of peripheral vision at baseline

## 2019-11-29 NOTE — OCCUPATIONAL THERAPY INITIAL EVALUATION ADULT - ADDITIONAL COMMENTS
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 This lesion does demonstrate subtle area of enhancement anteriorly as well as calcification on prior head CT. Approximately 2.9 x 2.1 x 3.3 cm. This lesion was present on prior CT scan. Mass effect on the third ventricle is seen with dilatation of the third and lateral ventricles seen and normal appearing fourth ventricle.

## 2019-11-29 NOTE — OCCUPATIONAL THERAPY INITIAL EVALUATION ADULT - PLANNED THERAPY INTERVENTIONS, OT EVAL
ADL retraining/balance training/bed mobility training/strengthening/transfer training/fine motor coordination training

## 2019-11-29 NOTE — PROGRESS NOTE ADULT - SUBJECTIVE AND OBJECTIVE BOX
SUBJECTIVE: Comfortable, NAD. No Nausea x 24hrs, Lt side pain better today.     OVERNIGHT EVENTS: None    Vital Signs Last 24 Hrs  T(C): 36.9 (29 Nov 2019 08:11), Max: 37.6 (28 Nov 2019 23:40)  T(F): 98.5 (29 Nov 2019 08:11), Max: 99.6 (28 Nov 2019 23:40)  HR: 91 (29 Nov 2019 08:11) (64 - 91)  BP: 134/74 (29 Nov 2019 08:11) (99/61 - 134/74)  BP(mean): --  RR: 18 (29 Nov 2019 08:11) (18 - 19)  SpO2: 96% (29 Nov 2019 08:11) (96% - 98%)  IVF: [ ] IVL [X ] NS+K@ 75  DIET: [ ] Regular [ X] CCD [ ] Renal [ ] Puree [ ] Dysphagia [ ] Tube Feeds:   PCA: [ ] YES [ X] NO   RUSSELL: [ ] YES [X ] NO [X ] VOID   BM: [ X] YES-on 11/28 x2    DRAINS: None    PHYSICAL EXAM:    General: No Acute Distress     Neurological: Feel better today. Awake, alert oriented to person, place and time, Following Commands, PERRL, EOMI, Face Symmetrical, Rt eye-count fingers, Lt eye peripheral vision only. Speech Fluent, No Drift, HINSON 5/5, except LUE 4+/5 and sl contracted at elbow (Hx CVA). Sensation to Light Touch Intact    Pulmonary: Clear to Auscultation, No Rales, No Rhonchi, No Wheezes     Cardiovascular: S1, S2, Regular Rate and Rhythm     Gastrointestinal: Soft, Nontender, Nondistended     Incision: N/A    LABS:                        15.4   12.20 )-----------( 451      ( 27 Nov 2019 22:19 )             44.4      11-29    138  |  109<H>  |  23  ----------------------------<  110<H>  4.0   |  18<L>  |  1.40<H>    Ca    8.9      29 Nov 2019 06:51    TPro  8.0  /  Alb  4.2  /  TBili  0.6  /  DBili  x   /  AST  12  /  ALT  20  /  AlkPhos  124<H>  11-27    PT/INR - ( 27 Nov 2019 22:19 )   PT: 13.3 sec;   INR: 1.16 ratio       PTT - ( 27 Nov 2019 22:19 )  PTT:35.5 sec    Urinalysis (11.28.19 @ 23:53)    pH Urine: 5.5    Glucose Qualitative, Urine: Negative    Blood, Urine: Negative    Color: Yellow    Urine Appearance: Clear    Bilirubin: Negative    Ketone - Urine: Negative    Specific Gravity: 1.038    Protein, Urine: Trace    Urobilinogen: 3 mg/dL    Nitrite: Negative    Leukocyte Esterase Concentration: Negative    I&O's Summary    28 Nov 2019 07:01  -  29 Nov 2019 07:00  --------------------------------------------------------  IN: 240 mL / OUT: 900 mL / NET: -660 mL      IMAGING:   < from: Xray Shuntogram  Shunt (11.27.19 @ 23:34) >  There is a  shuntcoursing out of a right-sided mirian hole.    The  shunt tip is identified in the region of the right lateral   ventricle and courses within the soft tissues of the right neck, right   hemithorax, and finally into the abdomen. The distal end is coiled within   the lower abdomen. There is no discontinuity or kinks.    IMPRESSION:    shunt identified originating from the right mirian hole and ending in   the lower abdomen without kinks or discontinuity.    < from: MR Head w/wo IV Cont (11.28.19 @ 15:22) >  MRI of the brain was performed using sagittal T1, axial T1 T2 T2 FLAIR   diffusion and susceptibility weighted sequence. The patient was injected   with approximately 8 cc of Gadavist IV with 2 cc conscious discarded.   Sagittal coronal and axial T1-weighted sequences were performed.    There is evidence of abnormal midline lesion seen in the septum   pellucidum region. This lesion does demonstrate subtle area of   enhancement anteriorly as well as calcification on prior head CT.   Approximately 2.9 x 2.1 x 3.3 cm. This lesion was present on prior CT   scan. Mass effect on the third ventricle is seen with dilatation of the   third and lateral ventricles seen and normal appearing fourth ventricle.    Diffuse smooth dural enhancement is seen which is likely the result of   patient's shunt catheter.    Right frontal shunt catheter is identified.     There is extra-axial collection seen involving the left frontal region   again seen. This collection measures approximately 1.2 cm in widest   diameter.     Abnormal T2 prolongation involving the rightfrontal region is identified   which is likely compatible area gliosis.    The visualized paranasal sinuses mastoid and middle ear regions appear   clear.    The patient is status post cataract surgery.    Impression: Abnormal lesion as described above    < from: CT Head No Cont (11.28.19 @ 00:07) >  IMPRESSION:  Unchanged bilateral subdural collections.    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 MRI is recommended for further evaluation.    < from: Xray Shuntogram  Shunt (11.27.19 @ 23:34) >   shunt identified originating from the right mirian hole and ending in   the lower abdomen without kinks or discontinuity.    MEDICATIONS  (STANDING):  dextrose 5%. 1000 milliLiter(s) (50 mL/Hr) IV Continuous <Continuous>  dextrose 50% Injectable 12.5 Gram(s) IV Push once  dextrose 50% Injectable 25 Gram(s) IV Push once  dextrose 50% Injectable 25 Gram(s) IV Push once  DULoxetine 60 milliGRAM(s) Oral daily  folic acid 1 milliGRAM(s) Oral daily  gabapentin 300 milliGRAM(s) Oral three times a day  insulin lispro (HumaLOG) corrective regimen sliding scale   SubCutaneous three times a day before meals  insulin lispro (HumaLOG) corrective regimen sliding scale   SubCutaneous at bedtime  melatonin 3 milliGRAM(s) Oral at bedtime  multivitamin 1 Tablet(s) Oral daily  simvastatin 20 milliGRAM(s) Oral at bedtime  sodium chloride 0.9% with potassium chloride 20 mEq/L 1000 milliLiter(s) (75 mL/Hr) IV Continuous <Continuous>    MEDICATIONS  (PRN):  acetaminophen   Tablet .. 650 milliGRAM(s) Oral every 6 hours PRN Mild Pain (1 - 3)  bisacodyl 5 milliGRAM(s) Oral daily PRN Constipation  dextrose 40% Gel 15 Gram(s) Oral once PRN Blood Glucose LESS THAN 70 milliGRAM(s)/deciliter  glucagon  Injectable 1 milliGRAM(s) IntraMuscular once PRN Glucose LESS THAN 70 milligrams/deciliter  ondansetron Injectable 4 milliGRAM(s) IV Push every 6 hours PRN Nausea and/or Vomiting  oxyCODONE    IR 5 milliGRAM(s) Oral every 4 hours PRN Moderate Pain (4 - 6)  senna 2 Tablet(s) Oral at bedtime PRN Constipation

## 2019-11-29 NOTE — PROGRESS NOTE ADULT - ASSESSMENT
· HPI Objective Statement: 64y m PMhx prior CVA w/ residual L sided weakness and paresthesias, brain "cyst" s/p removal c/b hydrocephalus s/p  shunt placement in 1980 in [place], HTN, pw worsening L sided paresthesias and transferred from [facility] for new CT findings. Pt found to have subacute SDH along R frontal lobe and large mass in third ventricle. Pt denies any falls or recent head trauma to explain the SDH. 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  Lt side pain controlled. Sl bump in Creatinine-poss from MRI w/duy 11/28-cont IVF. Leukocytosis-UA neg, Bld Cx done FU. Inc activity/OOB. Surgical plans TBD FU. Hold SQl and cont off ASA. Shut reprogrammed 11/28 after MRI by resident Dr KEREN Sevilla.     Medicine/Hosp note of 11/28-Problem: Leukocytosis.  Recommendation: patient with leukocytosis to 12K on admission. would pursue infectious work-up especially in setting of hypotension to rule out SIRS/sepsis. - CXR with clear lungs and no evidence of pneumonia. - f/u UA. - f/u blood culture. - monitor off abx at this time.Problem: Hypertension.  Recommendation: currently hypotensive. improved s/p 1L NS bolus. - agree with starting maintenance fluids for now. - discontinued all home antihypertensives (amlodipine, labetalol, losartan, clonidine). - would resume one by one as BP improves with hold parameters.Problem: History of CVA (cerebrovascular accident). Recommendation: with residual L sided weakness. - c/w statin. Problem: Anxiety.Recommendation: I-STOP Reference #: [id number] - patient prescribed clonazepam 0.5mg BID PRN panic attack/insomnia. - patient amenable to trying melatonin instead for insomnia - ordered.Problem: Diabetes. Recommendation: states takes metformin after each meal? does not recall who instructed him this way, but that is how he was informed to take it. diet controlled. - f/u HbA1c. - c/w sliding scale insulin. - FS within acceptable range. Problem: Medication management. Recommendation: confirmed medications with patient. tried to call pharmacy, but closed today. - amlodipine 5mg BID. - losartan 50mg BID. - clonidine 0.1mg TID. - labetalol 100 BID. - clonazepam 0.5mg BID PRN. - simvastatin 20mg qHS. - patient unclear of metformin dose but takes after meals up to TID? states was instructed to take this way - will need to clarify with pharmacy. - patient NO LONGER TAKES gabapentin and cymbalta - informed neurosurgery IDALIA Warner who states it was restarted for patient's presenting paresthesias. Attending Attestation:   Dr. Marilin Walker Division of Hospital Medicine [facility]  Pager: [phone]. Electronic Signatures: Marilin Walker (MD)    Respiratory: Patient instructed to use incentive spirometer [ ] YES [X ] NO              DVT ppx: [ ] SQL-Hold [ ] SQH and Venodynes [ ] Left [ ] Right [ X] Bilateral    Discharge Planning:  PT eval-P FU    Assessment:  Please Check When Present   []  GCS  E   V  M     Heart Failure: []Acute, [] acute on chronic , []chronic  Heart Failure:  [] Diastolic (HFpEF), [] Systolic (HFrEF), []Combined (HFpEF and HFrEF), [] RHF, [] Pulm HTN, [] Other    [] RA, [] ATN, [] AIN, [] other  [] CKD1, [] CKD2, [] CKD 3, [] CKD 4, [] CKD 5, []ESRD    Encephalopathy: [] Metabolic, [] Hepatic, [] toxic, [] Neurological, [] Other    Abnormal Nurtitional Status: [] malnurtition (see nutrition note), [ ]underweight: BMI < 19, [] morbid obesity: BMI >40, [] Cachexia    [] Sepsis  [] hypovolemic shock,[] cardiogenic shock, [] hemorrhagic shock, [] neuogenic shock  [] Acute Respiratory Failure  []Cerebral edema, [] Brain compression/ herniation,   [] Functional quadriplegia  [] Acute blood loss anemia

## 2019-11-29 NOTE — OCCUPATIONAL THERAPY INITIAL EVALUATION ADULT - DIAGNOSIS, OT EVAL
Pt demonstrated decreased strength, balance, cognition, vision, sensation impacting pt's ability to participate in functional mobility and ADLs.

## 2019-11-29 NOTE — PROGRESS NOTE ADULT - ATTENDING COMMENTS
I examined the patient and reviewed the MRI. Discussed recurrence of third ventricle tumor. Not sure what was the prior diagnoses, but the enhancement and calcification are not typical for colloid cyst. Other possibilities are ependymoma, astrocytoma, craniopharyngioma or central neurocytoma.  Obtain medical and cardiac clearance. Off all AP/AC. Possible surgery in December. I examined the patient and reviewed the MRI. 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 Not sure what was the prior diagnoses, but the enhancement and calcification are not typical for colloid cyst. Other possibilities are ependymoma, low grade astrocytoma or oligodindroglioma, SEGA or central neurocytoma. Craniopharyngioma unlikely but not excluded.   Obtain medical and cardiac clearance. Off all AP/AC. Possible surgery in December.

## 2019-11-29 NOTE — CONSULT NOTE ADULT - SUBJECTIVE AND OBJECTIVE BOX
Patient is a 64y old  Male who presents with a chief complaint of SDH, hydrocephalus, Third ventricular tumor (2019 14:35)      HPI: (per H &P)  · HPI Objective Statement: 64y m PMhx prior CVA w/ residual L sided weakness and paresthesias, brain "cyst" s/p removal c/b hydrocephalus s/p  shunt placement in  in [place], HTN, pw worsening L sided paresthesias and transferred from [facility] for new CT findings. Pt found to have subacute SDH along R frontal lobe and large mass in third ventricle. Pt denies any falls or recent head trauma to explain the SDH. (2019 01:00)    Per H & P patient denied recent fall, however today patient reports he fell a few days prior to admission.  Patient states he uses wheelchair most of the time but walks short distances with RW.  Patient had prior cva and was in acute and subacute rehab in the past. He states he does not want to go to subacute rehab but would be willing to go to acute rehab if needed.  Pt complains of left knee buckling.     REVIEW OF SYSTEMS  Constitutional - No fever, No weight loss, No fatigue  HEENT - No eye pain, No visual disturbances, No difficulty hearing, No tinnitus, No vertigo, No neck pain  Respiratory - No cough, No wheezing, No shortness of breath  Cardiovascular - No chest pain, No palpitations  Gastrointestinal - No abdominal pain, No nausea, No vomiting, No diarrhea, No constipation  Genitourinary - No dysuria, No frequency, No hematuria, +incontinence  Neurological - No headaches, No memory loss, No loss of strength, + numbness, No tremors  Skin - No itching, No rashes, No lesions   Endocrine - No temperature intolerance  Musculoskeletal - No joint pain, No joint swelling, No muscle pain, + left knee buckling  Psychiatric - No depression,+ anxiety    PAST MEDICAL & SURGICAL HISTORY  H/O hydrocephalus  H/O brain tumor  History of pineal cyst  Peripheral neuropathy  Stroke  Diabetes  Hypertension  Depression  Seizure  HTN (hypertension)  CVA (cerebral vascular accident)  S/P ventriculoperitoneal shunt  H/O craniotomy      SOCIAL HISTORY  Smoking - Denied however per chart vapes  EtOH - Denied   Drugs - Denied    FUNCTIONAL HISTORY  Lives with family in private house split level with 6 stairs per level.  Patient uses RW at times but uses wheelchair for longer distances  Independent for toileting and dressing, son helps with bathing.    CURRENT FUNCTIONAL STATUS  BM: cg  T: mod  ambulated 5 steps min assist      RECENT LABS/IMAGING  CBC Full  -  ( 2019 22:19 )  WBC Count : 12.20 K/uL  RBC Count : 5.86 M/uL  Hemoglobin : 15.4 g/dL  Hematocrit : 44.4 %  Platelet Count - Automated : 451 K/uL  Mean Cell Volume : 75.8 fl  Mean Cell Hemoglobin : 26.3 pg  Mean Cell Hemoglobin Concentration : 34.7 gm/dL  Auto Neutrophil # : 8.79 K/uL  Auto Lymphocyte # : 2.06 K/uL  Auto Monocyte # : 0.95 K/uL  Auto Eosinophil # : 0.27 K/uL  Auto Basophil # : 0.09 K/uL  Auto Neutrophil % : 72.1 %  Auto Lymphocyte % : 16.9 %  Auto Monocyte % : 7.8 %  Auto Eosinophil % : 2.2 %  Auto Basophil % : 0.7 %        138  |  109<H>  |  23  ----------------------------<  110<H>  4.0   |  18<L>  |  1.40<H>    Ca    8.9      2019 06:51    TPro  8.0  /  Alb  4.2  /  TBili  0.6  /  DBili  x   /  AST  12  /  ALT  20  /  AlkPhos  124<H>      Urinalysis Basic - ( 2019 23:53 )    Color: Yellow / Appearance: Clear / S.038 / pH: x  Gluc: x / Ketone: Negative  / Bili: Negative / Urobili: 3 mg/dL   Blood: x / Protein: Trace / Nitrite: Negative   Leuk Esterase: Negative / RBC: x / WBC x   Sq Epi: x / Non Sq Epi: x / Bacteria: x        VITALS  T(C): 37 (19 @ 11:45), Max: 37.6 (19 @ 23:40)  HR: 71 (19 @ 14:56) (65 - 98)  BP: 153/87 (19 @ 14:56) (110/67 - 153/87)  RR: 18 (19 @ 11:45) (18 - 18)  SpO2: 99% (19 @ 13:15) (95% - 99%)  Wt(kg): --    ALLERGIES  No Known Allergies      MEDICATIONS   acetaminophen   Tablet .. 650 milliGRAM(s) Oral every 6 hours PRN  bisacodyl 5 milliGRAM(s) Oral daily PRN  clonazePAM  Tablet 0.5 milliGRAM(s) Oral every 12 hours PRN  dextrose 40% Gel 15 Gram(s) Oral once PRN  dextrose 5%. 1000 milliLiter(s) IV Continuous <Continuous>  dextrose 50% Injectable 12.5 Gram(s) IV Push once  dextrose 50% Injectable 25 Gram(s) IV Push once  dextrose 50% Injectable 25 Gram(s) IV Push once  DULoxetine 60 milliGRAM(s) Oral daily  folic acid 1 milliGRAM(s) Oral daily  gabapentin 300 milliGRAM(s) Oral three times a day  glucagon  Injectable 1 milliGRAM(s) IntraMuscular once PRN  insulin lispro (HumaLOG) corrective regimen sliding scale   SubCutaneous three times a day before meals  insulin lispro (HumaLOG) corrective regimen sliding scale   SubCutaneous at bedtime  melatonin 3 milliGRAM(s) Oral at bedtime  multivitamin 1 Tablet(s) Oral daily  nicotine -  14 mG/24Hr(s) Patch 1 patch Transdermal daily  ondansetron Injectable 4 milliGRAM(s) IV Push every 6 hours PRN  oxyCODONE    IR 5 milliGRAM(s) Oral every 4 hours PRN  senna 2 Tablet(s) Oral at bedtime PRN  simvastatin 20 milliGRAM(s) Oral at bedtime  sodium chloride 0.9% with potassium chloride 20 mEq/L 1000 milliLiter(s) IV Continuous <Continuous>      ----------------------------------------------------------------------------------------  PHYSICAL EXAM  Constitutional - NAD, Comfortable  HEENT - NCAT, EOMI  Neck - Supple, No limited ROM  Chest - CTA bilaterally, No wheeze, No rhonchi, No crackles  Cardiovascular - RRR, S1S2, No murmurs  Abdomen - BS+, Soft, NTND  Extremities - No C/C/E, No calf tenderness, pt denies tenderness to left knee, no swelling, bruising or erythema noted  Neurologic Exam -                    Cognitive - Awake, Alert, AAO to self, place, date, year, situation     Communication - Fluent, No dysarthria     Cranial Nerves - CN 2-12 intact     Motor -                     LEFT    UE - ShAB 5/5, EF 5/5, EE 5/5, WE 5/5,  5/5                    RIGHT UE - ShAB 5/5, EF 5/5, EE 5/5, WE 5/5,  5/5                    LEFT    LE - HF 4/5, KE 4+/5, DF 5/5, PF 5/5                    RIGHT LE - HF 4+/5, KE 4+/5, DF 5/5, PF 5/5        Sensory - + paresthesias LUE and LLE, sensation intact on R side     Coordination - FTN intact on right, dysmetria on left.        Balance - WNL Static   Psychiatric - Mood stable, + anxious  ----------------------------------------------------------------------------------------  ASSESSMENT/PLAN  64 year old male with residual L sided weakness and paresthesias, presents after fall, found to have SDH along R frontal lobe, also with left knee buckling.     Patient reports new buckling of left knee s/p fall- consider imaging and workup for left knee as this may be limiting him functionally.  Denies pain in left knee however has discomfort on palpation from chronic paresthesias  Pain -tylenol prn, neurontin, oxy ir prn, senna prn, dulcolax prn  DVT PPX - SCDs  PT/OT: continue bedside therapy for bed mobility, transfers, ambulation with AD, ADLs  Rehab -    Recommend ACUTE inpatient rehabilitation for the functional deficits consisting of 3 hours of therapy/day & 24 hour RN/daily PMR physician for comorbid medical management. Will continue to follow for ongoing rehab needs and recommendations. Patient is a 64y old  Male who presents with a chief complaint of SDH, hydrocephalus, Third ventricular tumor (2019 14:35)      HPI: (per H &P)  · HPI Objective Statement: 64y m PMhx prior CVA w/ residual L sided weakness and paresthesias, brain "cyst" s/p removal c/b hydrocephalus s/p  shunt placement in  in [place], HTN, pw worsening L sided paresthesias and transferred from [facility] for new CT findings. Pt found to have subacute SDH along R frontal lobe and large mass in third ventricle. Pt denies any falls or recent head trauma to explain the SDH. (2019 01:00)    Per H & P patient denied recent fall, however today patient reports he fell a few days prior to admission.  Patient states he uses wheelchair most of the time but walks short distances with RW.  Patient had prior cva and was in acute and subacute rehab in the past. He states he does not want to go to subacute rehab but would be willing to go to acute rehab if needed.  Pt complains of left knee buckling.   Patient with leukocytosis during admission, workup ongoing.  Pt received IV fluids for hypotension, home antihtn medications held.      REVIEW OF SYSTEMS  Constitutional - No fever, No weight loss, No fatigue  HEENT - No eye pain, No visual disturbances, No difficulty hearing, No tinnitus, No vertigo, No neck pain  Respiratory - No cough, No wheezing, No shortness of breath  Cardiovascular - No chest pain, No palpitations  Gastrointestinal - No abdominal pain, No nausea, No vomiting, No diarrhea, No constipation  Genitourinary - No dysuria, No frequency, No hematuria, +incontinence  Neurological - No headaches, No memory loss, No loss of strength, + numbness, No tremors  Skin - No itching, No rashes, No lesions   Endocrine - No temperature intolerance  Musculoskeletal - No joint pain, No joint swelling, No muscle pain, + left knee buckling  Psychiatric - No depression,+ anxiety    PAST MEDICAL & SURGICAL HISTORY  H/O hydrocephalus  H/O brain tumor  History of pineal cyst  Peripheral neuropathy  Stroke  Diabetes  Hypertension  Depression  Seizure  HTN (hypertension)  CVA (cerebral vascular accident)  S/P ventriculoperitoneal shunt  H/O craniotomy      SOCIAL HISTORY  Smoking - Denied however per chart vapes  EtOH - Denied   Drugs - Denied    FUNCTIONAL HISTORY  Lives with family in private house split level with 6 stairs per level.  Patient uses RW at times but uses wheelchair for longer distances  Independent for toileting and dressing, son helps with bathing.    CURRENT FUNCTIONAL STATUS  BM: cg  T: mod  ambulated 5 steps min assist      RECENT LABS/IMAGING  CBC Full  -  ( 2019 22:19 )  WBC Count : 12.20 K/uL  RBC Count : 5.86 M/uL  Hemoglobin : 15.4 g/dL  Hematocrit : 44.4 %  Platelet Count - Automated : 451 K/uL  Mean Cell Volume : 75.8 fl  Mean Cell Hemoglobin : 26.3 pg  Mean Cell Hemoglobin Concentration : 34.7 gm/dL  Auto Neutrophil # : 8.79 K/uL  Auto Lymphocyte # : 2.06 K/uL  Auto Monocyte # : 0.95 K/uL  Auto Eosinophil # : 0.27 K/uL  Auto Basophil # : 0.09 K/uL  Auto Neutrophil % : 72.1 %  Auto Lymphocyte % : 16.9 %  Auto Monocyte % : 7.8 %  Auto Eosinophil % : 2.2 %  Auto Basophil % : 0.7 %        138  |  109<H>  |  23  ----------------------------<  110<H>  4.0   |  18<L>  |  1.40<H>    Ca    8.9      2019 06:51    TPro  8.0  /  Alb  4.2  /  TBili  0.6  /  DBili  x   /  AST  12  /  ALT  20  /  AlkPhos  124<H>      Urinalysis Basic - ( 2019 23:53 )    Color: Yellow / Appearance: Clear / S.038 / pH: x  Gluc: x / Ketone: Negative  / Bili: Negative / Urobili: 3 mg/dL   Blood: x / Protein: Trace / Nitrite: Negative   Leuk Esterase: Negative / RBC: x / WBC x   Sq Epi: x / Non Sq Epi: x / Bacteria: x    < from: MR Head w/wo IV Cont (19 @ 15:22) >  EXAM:  MR BRAIN WAW IC                            PROCEDURE DATE:  2019            INTERPRETATION:  Clinical indication: Double vision. Left-sided   neuropathy. Nausea and vomiting.     MRI of the brain was performed using sagittal T1, axial T1 T2 T2 FLAIR   diffusion and susceptibility weighted sequence. The patient was injected   with approximately 8 cc of Gadavist IV with 2 cc conscious discarded.   Sagittal coronal and axial T1-weighted sequences were performed.    There is evidence of abnormal midline lesion seen in the septum   pellucidum region. This lesion does demonstrate subtle area of   enhancement anteriorly as well as calcification on prior head CT.   Approximately 2.9 x 2.1 x 3.3 cm. This lesion was present on prior CT   scan. Mass effect on the third ventricle is seen with dilatation of the   third and lateral ventricles seen and normal appearing fourth ventricle.    Diffuse smooth dural enhancement is seen which is likely the result of   patient's shunt catheter.    Right frontal shunt catheter is identified.     There is extra-axial collection seen involving the left frontal region   again seen. This collection measures approximately 1.2 cm in widest   diameter.     Abnormal T2 prolongation involving the rightfrontal region is identified   which is likely compatible area gliosis.    The visualized paranasal sinuses mastoid and middle ear regions appear   clear.    The patient is status post cataract surgery.    Impression: Abnormal lesion as described above      < end of copied text >    < from: CT Head No Cont (19 @ 00:07) >  EXAM:  CT BRAIN                            PROCEDURE DATE:  2019            INTERPRETATION:  HISTORY: CVA with residual left-sided weakness and   paresthesias. Brain "cyst" status post removal complicated by   hydrocephalus status post  shunt placement in . Now worsening   left-sided paresthesias. Subacute subdural hematoma along the right   frontal lobe and large mass in third ventricle seen on prior CT before   transfer.    Technique: CT of the head was performed without contrast.    Multiple contiguous axial images were acquired from the skullbase to the   vertex without the administration of intravenous contrast.  Coronal and   sagittal reformations were made.    COMPARISON: CT head from 2019 at 5:55 PM    FINDINGS:   shunt with tip in the right lateral ventricle courses through right   frontal mirian hole with gliosis along the catheter tract.     A 2.6 x 2.0 x 3.3 cm lesion in the third ventricle containing   calcifications and fat results in hydrocephalus with abifrontal diameter   of 7.0 cm.    Unchanged left frontotemporal subdural hematoma measuring 4 mm. Right   holohemispheric subdural hematoma measuring 7 mm in greatest transverse   diameter, unchanged. Both collections are medium density, likely   subacute. Calcifications are noted within the bilateral collections.    Right frontal encephalomalacia is unchanged.    Status post frontal craniotomy. The visualized intraorbital compartments,   paranasal sinuses and mastoid complexes appear free of acute disease.    IMPRESSION:  Unchanged bilateral subdural collections.    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 MRI is recommended for further evaluation.      < end of copied text >      VITALS  T(C): 37 (19 @ 11:45), Max: 37.6 (19 @ 23:40)  HR: 71 (19 @ 14:56) (65 - 98)  BP: 153/87 (19 @ 14:56) (110/67 - 153/87)  RR: 18 (19 @ 11:45) (18 - 18)  SpO2: 99% (19 @ 13:15) (95% - 99%)  Wt(kg): --    ALLERGIES  No Known Allergies      MEDICATIONS   acetaminophen   Tablet .. 650 milliGRAM(s) Oral every 6 hours PRN  bisacodyl 5 milliGRAM(s) Oral daily PRN  clonazePAM  Tablet 0.5 milliGRAM(s) Oral every 12 hours PRN  dextrose 40% Gel 15 Gram(s) Oral once PRN  dextrose 5%. 1000 milliLiter(s) IV Continuous <Continuous>  dextrose 50% Injectable 12.5 Gram(s) IV Push once  dextrose 50% Injectable 25 Gram(s) IV Push once  dextrose 50% Injectable 25 Gram(s) IV Push once  DULoxetine 60 milliGRAM(s) Oral daily  folic acid 1 milliGRAM(s) Oral daily  gabapentin 300 milliGRAM(s) Oral three times a day  glucagon  Injectable 1 milliGRAM(s) IntraMuscular once PRN  insulin lispro (HumaLOG) corrective regimen sliding scale   SubCutaneous three times a day before meals  insulin lispro (HumaLOG) corrective regimen sliding scale   SubCutaneous at bedtime  melatonin 3 milliGRAM(s) Oral at bedtime  multivitamin 1 Tablet(s) Oral daily  nicotine -  14 mG/24Hr(s) Patch 1 patch Transdermal daily  ondansetron Injectable 4 milliGRAM(s) IV Push every 6 hours PRN  oxyCODONE    IR 5 milliGRAM(s) Oral every 4 hours PRN  senna 2 Tablet(s) Oral at bedtime PRN  simvastatin 20 milliGRAM(s) Oral at bedtime  sodium chloride 0.9% with potassium chloride 20 mEq/L 1000 milliLiter(s) IV Continuous <Continuous>      ----------------------------------------------------------------------------------------  PHYSICAL EXAM  Constitutional - NAD, Comfortable  HEENT - NCAT, EOMI  Neck - Supple, No limited ROM  Chest - CTA bilaterally, No wheeze, No rhonchi, No crackles  Cardiovascular - RRR, S1S2, No murmurs  Abdomen - BS+, Soft, NTND  Extremities - No C/C/E, No calf tenderness, pt denies tenderness to left knee, no swelling, bruising or erythema noted  Neurologic Exam -                    Cognitive - Awake, Alert, AAO to self, place, date, year, situation     Communication - Fluent, No dysarthria     Cranial Nerves - CN 2-12 intact     Motor -                     LEFT    UE - ShAB 5/5, EF 5/5, EE 5/5, WE 5/5,  5/5                    RIGHT UE - ShAB 5/5, EF 5/5, EE 5/5, WE 5/5,  5/5                    LEFT    LE - HF 4/5, KE 4+/5, DF 5/5, PF 5/5                    RIGHT LE - HF 4+/5, KE 4+/5, DF 5/5, PF 5/5        Sensory - + paresthesias LUE and LLE, sensation intact on R side     Coordination - FTN intact on right, dysmetria on left.        Balance - WNL Static   Psychiatric - Mood stable, + anxious  ----------------------------------------------------------------------------------------  ASSESSMENT/PLAN  64 year old male with residual L sided weakness and paresthesias, presents after fall, found to have SDH along R frontal lobe, as well as enhancing brain lesion.  also with left knee buckling.     Patient reports new buckling of left knee s/p fall- consider imaging and workup for left knee as this may be limiting him functionally.  Denies pain in left knee however has discomfort on palpation from chronic paresthesias  Pain -tylenol prn, neurontin, oxy ir prn, senna prn, dulcolax prn  DVT PPX - SCDs  PT/OT: continue bedside therapy for bed mobility, transfers, ambulation with AD, ADLs  Rehab -    Recommend ACUTE inpatient rehabilitation for the functional deficits consisting of 3 hours of therapy/day & 24 hour RN/daily PMR physician for comorbid medical management. Will continue to follow for ongoing rehab needs and recommendations. Patient is a 64y old  Male who presents with a chief complaint of SDH, hydrocephalus, Third ventricular tumor (2019 14:35)      HPI: (per H &P)  · HPI Objective Statement: 64y m PMhx prior CVA w/ residual L sided weakness and paresthesias, brain "cyst" s/p removal c/b hydrocephalus s/p  shunt placement in  in [place], HTN, pw worsening L sided paresthesias and transferred from [facility] for new CT findings. Pt found to have subacute SDH along R frontal lobe and large mass in third ventricle. Pt denies any falls or recent head trauma to explain the SDH. (2019 01:00)    Per H & P patient denied recent fall, however today patient reports he fell a few days prior to admission.  Patient states he uses wheelchair most of the time but walks short distances with RW.  Patient had prior cva and was in acute and subacute rehab in the past. He states he does not want to go to subacute rehab but would be willing to go to acute rehab if needed.  Pt complains of left knee buckling.   Patient with leukocytosis during admission, workup ongoing.  Pt received IV fluids for hypotension, home antihtn medications held.      REVIEW OF SYSTEMS  Constitutional - No fever, No weight loss, No fatigue  HEENT - No eye pain, No visual disturbances, No difficulty hearing, No tinnitus, No vertigo, No neck pain  Respiratory - No cough, No wheezing, No shortness of breath  Cardiovascular - No chest pain, No palpitations  Gastrointestinal - No abdominal pain, No nausea, No vomiting, No diarrhea, No constipation  Genitourinary - No dysuria, No frequency, No hematuria, +incontinence  Neurological - No headaches, No memory loss, No loss of strength, + numbness, No tremors  Skin - No itching, No rashes, No lesions   Endocrine - No temperature intolerance  Musculoskeletal - No joint pain, No joint swelling, No muscle pain, + left knee buckling  Psychiatric - No depression,+ anxiety    PAST MEDICAL & SURGICAL HISTORY  H/O hydrocephalus  H/O brain tumor  History of pineal cyst  Peripheral neuropathy  Stroke  Diabetes  Hypertension  Depression  Seizure  HTN (hypertension)  CVA (cerebral vascular accident)  S/P ventriculoperitoneal shunt  H/O craniotomy      SOCIAL HISTORY  Smoking - Denied however per chart vapes  EtOH - Denied   Drugs - Denied    FUNCTIONAL HISTORY  Lives with family in private house split level with 6 stairs per level.  Patient uses RW at times but uses wheelchair for longer distances  Independent for toileting and dressing, son helps with bathing.    CURRENT FUNCTIONAL STATUS  BM: cg  T: mod  ambulated 5 steps min assist      RECENT LABS/IMAGING  CBC Full  -  ( 2019 22:19 )  WBC Count : 12.20 K/uL  RBC Count : 5.86 M/uL  Hemoglobin : 15.4 g/dL  Hematocrit : 44.4 %  Platelet Count - Automated : 451 K/uL  Mean Cell Volume : 75.8 fl  Mean Cell Hemoglobin : 26.3 pg  Mean Cell Hemoglobin Concentration : 34.7 gm/dL  Auto Neutrophil # : 8.79 K/uL  Auto Lymphocyte # : 2.06 K/uL  Auto Monocyte # : 0.95 K/uL  Auto Eosinophil # : 0.27 K/uL  Auto Basophil # : 0.09 K/uL  Auto Neutrophil % : 72.1 %  Auto Lymphocyte % : 16.9 %  Auto Monocyte % : 7.8 %  Auto Eosinophil % : 2.2 %  Auto Basophil % : 0.7 %        138  |  109<H>  |  23  ----------------------------<  110<H>  4.0   |  18<L>  |  1.40<H>    Ca    8.9      2019 06:51    TPro  8.0  /  Alb  4.2  /  TBili  0.6  /  DBili  x   /  AST  12  /  ALT  20  /  AlkPhos  124<H>      Urinalysis Basic - ( 2019 23:53 )    Color: Yellow / Appearance: Clear / S.038 / pH: x  Gluc: x / Ketone: Negative  / Bili: Negative / Urobili: 3 mg/dL   Blood: x / Protein: Trace / Nitrite: Negative   Leuk Esterase: Negative / RBC: x / WBC x   Sq Epi: x / Non Sq Epi: x / Bacteria: x    < from: MR Head w/wo IV Cont (19 @ 15:22) >  EXAM:  MR BRAIN WAW IC                            PROCEDURE DATE:  2019            INTERPRETATION:  Clinical indication: Double vision. Left-sided   neuropathy. Nausea and vomiting.     MRI of the brain was performed using sagittal T1, axial T1 T2 T2 FLAIR   diffusion and susceptibility weighted sequence. The patient was injected   with approximately 8 cc of Gadavist IV with 2 cc conscious discarded.   Sagittal coronal and axial T1-weighted sequences were performed.    There is evidence of abnormal midline lesion seen in the septum   pellucidum region. This lesion does demonstrate subtle area of   enhancement anteriorly as well as calcification on prior head CT.   Approximately 2.9 x 2.1 x 3.3 cm. This lesion was present on prior CT   scan. Mass effect on the third ventricle is seen with dilatation of the   third and lateral ventricles seen and normal appearing fourth ventricle.    Diffuse smooth dural enhancement is seen which is likely the result of   patient's shunt catheter.    Right frontal shunt catheter is identified.     There is extra-axial collection seen involving the left frontal region   again seen. This collection measures approximately 1.2 cm in widest   diameter.     Abnormal T2 prolongation involving the rightfrontal region is identified   which is likely compatible area gliosis.    The visualized paranasal sinuses mastoid and middle ear regions appear   clear.    The patient is status post cataract surgery.    Impression: Abnormal lesion as described above      < end of copied text >    < from: CT Head No Cont (19 @ 00:07) >  EXAM:  CT BRAIN                            PROCEDURE DATE:  2019            INTERPRETATION:  HISTORY: CVA with residual left-sided weakness and   paresthesias. Brain "cyst" status post removal complicated by   hydrocephalus status post  shunt placement in . Now worsening   left-sided paresthesias. Subacute subdural hematoma along the right   frontal lobe and large mass in third ventricle seen on prior CT before   transfer.    Technique: CT of the head was performed without contrast.    Multiple contiguous axial images were acquired from the skullbase to the   vertex without the administration of intravenous contrast.  Coronal and   sagittal reformations were made.    COMPARISON: CT head from 2019 at 5:55 PM    FINDINGS:   shunt with tip in the right lateral ventricle courses through right   frontal mirian hole with gliosis along the catheter tract.     A 2.6 x 2.0 x 3.3 cm lesion in the third ventricle containing   calcifications and fat results in hydrocephalus with abifrontal diameter   of 7.0 cm.    Unchanged left frontotemporal subdural hematoma measuring 4 mm. Right   holohemispheric subdural hematoma measuring 7 mm in greatest transverse   diameter, unchanged. Both collections are medium density, likely   subacute. Calcifications are noted within the bilateral collections.    Right frontal encephalomalacia is unchanged.    Status post frontal craniotomy. The visualized intraorbital compartments,   paranasal sinuses and mastoid complexes appear free of acute disease.    IMPRESSION:  Unchanged bilateral subdural collections.    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 MRI is recommended for further evaluation.      < end of copied text >      VITALS  T(C): 37 (19 @ 11:45), Max: 37.6 (19 @ 23:40)  HR: 71 (19 @ 14:56) (65 - 98)  BP: 153/87 (19 @ 14:56) (110/67 - 153/87)  RR: 18 (19 @ 11:45) (18 - 18)  SpO2: 99% (19 @ 13:15) (95% - 99%)  Wt(kg): --    ALLERGIES  No Known Allergies      MEDICATIONS   acetaminophen   Tablet .. 650 milliGRAM(s) Oral every 6 hours PRN  bisacodyl 5 milliGRAM(s) Oral daily PRN  clonazePAM  Tablet 0.5 milliGRAM(s) Oral every 12 hours PRN  dextrose 40% Gel 15 Gram(s) Oral once PRN  dextrose 5%. 1000 milliLiter(s) IV Continuous <Continuous>  dextrose 50% Injectable 12.5 Gram(s) IV Push once  dextrose 50% Injectable 25 Gram(s) IV Push once  dextrose 50% Injectable 25 Gram(s) IV Push once  DULoxetine 60 milliGRAM(s) Oral daily  folic acid 1 milliGRAM(s) Oral daily  gabapentin 300 milliGRAM(s) Oral three times a day  glucagon  Injectable 1 milliGRAM(s) IntraMuscular once PRN  insulin lispro (HumaLOG) corrective regimen sliding scale   SubCutaneous three times a day before meals  insulin lispro (HumaLOG) corrective regimen sliding scale   SubCutaneous at bedtime  melatonin 3 milliGRAM(s) Oral at bedtime  multivitamin 1 Tablet(s) Oral daily  nicotine -  14 mG/24Hr(s) Patch 1 patch Transdermal daily  ondansetron Injectable 4 milliGRAM(s) IV Push every 6 hours PRN  oxyCODONE    IR 5 milliGRAM(s) Oral every 4 hours PRN  senna 2 Tablet(s) Oral at bedtime PRN  simvastatin 20 milliGRAM(s) Oral at bedtime  sodium chloride 0.9% with potassium chloride 20 mEq/L 1000 milliLiter(s) IV Continuous <Continuous>      ----------------------------------------------------------------------------------------  PHYSICAL EXAM  Constitutional - NAD, Comfortable  HEENT - NCAT, EOMI  Neck - Supple, No limited ROM  Chest - CTA bilaterally, No wheeze, No rhonchi, No crackles  Cardiovascular - RRR, S1S2, No murmurs  Abdomen - BS+, Soft, NTND  Extremities - No C/C/E, No calf tenderness, pt denies tenderness to left knee, no swelling, bruising or erythema noted  Neurologic Exam -                    Cognitive - Awake, Alert, AAO to self, place, date, year, situation     Communication - Fluent, No dysarthria     Cranial Nerves - CN 2-12 intact     Motor -                     LEFT    UE - ShAB 5/5, EF 5/5, EE 5/5, WE 5/5,  5/5                    RIGHT UE - ShAB 5/5, EF 5/5, EE 5/5, WE 5/5,  5/5                    LEFT    LE - HF 4/5, KE 4+/5, DF 5/5, PF 5/5                    RIGHT LE - HF 4+/5, KE 4+/5, DF 5/5, PF 5/5        Sensory - + paresthesias LUE and LLE, sensation intact on R side     Coordination - FTN intact on right, dysmetria on left.        Balance - WNL Static   Psychiatric - Mood stable, + anxious  ----------------------------------------------------------------------------------------  ASSESSMENT/PLAN  64 year old male with residual L sided weakness and paresthesias, presents after fall, found to have SDH along R frontal lobe, as well as enhancing brain lesion.  also with left knee buckling.     Patient reports new buckling of left knee s/p fall- consider imaging and workup for left knee as this may be limiting him functionally.  Denies pain in left knee however has discomfort on palpation from chronic paresthesias  -pending neurosurgery rec for plan regarding brain mass  Pain -tylenol prn, neurontin, oxy ir prn, senna prn, dulcolax prn  DVT PPX - SCDs  PT/OT: continue bedside therapy for bed mobility, transfers, ambulation with AD, ADLs  Rehab - pending medical course and clearance by medical team,    Recommend ACUTE inpatient rehabilitation for the functional deficits consisting of 3 hours of therapy/day & 24 hour RN/daily PMR physician for comorbid medical management. Will continue to follow for ongoing rehab needs and recommendations.

## 2019-11-29 NOTE — OCCUPATIONAL THERAPY INITIAL EVALUATION ADULT - PERTINENT HX OF CURRENT PROBLEM, REHAB EVAL
64y m PMhx prior CVA w/ residual L sided weakness and paresthesias, brain "cyst" s/p removal c/b hydrocephalus s/p  shunt placement in 1980 in [place], HTN, pw worsening L sided paresthesias and transferred from [facility] for new CT findings. Pt found to have subacute SDH along R frontal lobe and large mass in third ventricle.

## 2019-11-29 NOTE — PROGRESS NOTE ADULT - SUBJECTIVE AND OBJECTIVE BOX
[facility] Division of Hospital Medicine  Trell Hannah MD  Pager (M-F, 2V-1S): [phone]  Other Times:  [phone]    Patient is a 64y old  Male who presents with a chief complaint of SDH, hydrocephalus, Third ventricular tumor (2019 09:30)      SUBJECTIVE / OVERNIGHT EVENTS: no new complaints - L sided clumsiness. craves nicotine  ADDITIONAL REVIEW OF SYSTEMS:    MEDICATIONS  (STANDING):  dextrose 5%. 1000 milliLiter(s) (50 mL/Hr) IV Continuous <Continuous>  dextrose 50% Injectable 12.5 Gram(s) IV Push once  dextrose 50% Injectable 25 Gram(s) IV Push once  dextrose 50% Injectable 25 Gram(s) IV Push once  DULoxetine 60 milliGRAM(s) Oral daily  folic acid 1 milliGRAM(s) Oral daily  gabapentin 300 milliGRAM(s) Oral three times a day  insulin lispro (HumaLOG) corrective regimen sliding scale   SubCutaneous three times a day before meals  insulin lispro (HumaLOG) corrective regimen sliding scale   SubCutaneous at bedtime  melatonin 3 milliGRAM(s) Oral at bedtime  multivitamin 1 Tablet(s) Oral daily  nicotine -  14 mG/24Hr(s) Patch 1 patch Transdermal daily  simvastatin 20 milliGRAM(s) Oral at bedtime  sodium chloride 0.9% with potassium chloride 20 mEq/L 1000 milliLiter(s) (75 mL/Hr) IV Continuous <Continuous>    MEDICATIONS  (PRN):  acetaminophen   Tablet .. 650 milliGRAM(s) Oral every 6 hours PRN Mild Pain (1 - 3)  bisacodyl 5 milliGRAM(s) Oral daily PRN Constipation  clonazePAM  Tablet 0.5 milliGRAM(s) Oral every 12 hours PRN anxiety/panic attack  dextrose 40% Gel 15 Gram(s) Oral once PRN Blood Glucose LESS THAN 70 milliGRAM(s)/deciliter  glucagon  Injectable 1 milliGRAM(s) IntraMuscular once PRN Glucose LESS THAN 70 milligrams/deciliter  ondansetron Injectable 4 milliGRAM(s) IV Push every 6 hours PRN Nausea and/or Vomiting  oxyCODONE    IR 5 milliGRAM(s) Oral every 4 hours PRN Moderate Pain (4 - 6)  senna 2 Tablet(s) Oral at bedtime PRN Constipation      CAPILLARY BLOOD GLUCOSE      POCT Blood Glucose.: 149 mg/dL (2019 12:30)  POCT Blood Glucose.: 105 mg/dL (2019 08:41)  POCT Blood Glucose.: 105 mg/dL (2019 22:23)  POCT Blood Glucose.: 133 mg/dL (2019 17:28)    I&O's Summary    2019 07:  -  2019 07:00  --------------------------------------------------------  IN: 240 mL / OUT: 900 mL / NET: -660 mL    :  -  2019 14:35  --------------------------------------------------------  IN: 0 mL / OUT: 500 mL / NET: -500 mL        PHYSICAL EXAM:  Vital Signs Last 24 Hrs  T(C): 37 (2019 11:45), Max: 37.6 (2019 23:40)  T(F): 98.6 (2019 11:45), Max: 99.6 (2019 23:40)  HR: 71 (2019 13:15) (65 - 98)  BP: 135/76 (2019 13:15) (110/67 - 135/76)  BP(mean): --  RR: 18 (2019 11:45) (18 - 18)  SpO2: 99% (2019 13:15) (95% - 99%)  GENERAL: NAD, well-developed  HEAD:  Atraumatic, Normocephalic  EYES: EOMI, PERRLA, conjunctiva and sclera clear  ENMT: Moist mucous membranes, +poor oral hygiene/dentition  NECK: Supple, No JVD  RESPIRATORY: Clear to auscultation bilaterally; No rales, rhonchi, wheezing, or rubs  CARDIOVASCULAR: Regular rate and rhythm; No murmurs, rubs, or gallops  GASTROINTESTINAL: Soft, Nontender, Nondistended; Bowel sounds present  EXTREMITIES:  2+ Peripheral Pulses, No clubbing, cyanosis, or edema  NEURO:  Alert & Oriented X3; Moving all 4 extremities; 5/5 strength in R UE/LE, 4/5 strength in L UE/LE  SKIN: No rashes or lesions; Incisions C/D/I    LABS:                        15.4   12.20 )-----------( 451      ( 2019 22:19 )             44.4     11    138  |  109<H>  |  23  ----------------------------<  110<H>  4.0   |  18<L>  |  1.40<H>    Ca    8.9      2019 06:51    TPro  8.0  /  Alb  4.2  /  TBili  0.6  /  DBili  x   /  AST  12  /  ALT  20  /  AlkPhos  124<H>  11    PT/INR - ( 2019 22:19 )   PT: 13.3 sec;   INR: 1.16 ratio      PTT - ( 2019 22:19 )  PTT:35.5 sec  CARDIAC MARKERS ( 2019 18:27 )  <.015 ng/mL / x     / x     / x     / x          Urinalysis Basic - ( 2019 23:53 )    Color: Yellow / Appearance: Clear / S.038 / pH: x  Gluc: x / Ketone: Negative  / Bili: Negative / Urobili: 3 mg/dL   Blood: x / Protein: Trace / Nitrite: Negative   Leuk Esterase: Negative / RBC: x / WBC x   Sq Epi: x / Non Sq Epi: x / Bacteria: x    RADIOLOGY & ADDITIONAL TESTS:  Results Reviewed:   Imaging Personally Reviewed:  Electrocardiogram Personally Reviewed:    COORDINATION OF CARE:  Care Discussed with Consultants/Other Providers [Y/N]:  Prior or Outpatient Records Reviewed [Y/N]:

## 2019-11-29 NOTE — PROGRESS NOTE ADULT - ASSESSMENT
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 CTH at OSH showed R subacute SDH, with R frontal encephalomalacia, hydrocephalus. s/p MRI - plan being determined by nsgy

## 2019-11-29 NOTE — OCCUPATIONAL THERAPY INITIAL EVALUATION ADULT - NS ASR FOLLOW COMMAND OT EVAL
able to follow single-step instructions/Pt is tangential, hyperverbal, confused throughout session/100% of the time

## 2019-11-30 DIAGNOSIS — D49.6 NEOPLASM OF UNSPECIFIED BEHAVIOR OF BRAIN: ICD-10-CM

## 2019-11-30 DIAGNOSIS — M25.562 PAIN IN LEFT KNEE: ICD-10-CM

## 2019-11-30 LAB
ANION GAP SERPL CALC-SCNC: 15 MMOL/L — SIGNIFICANT CHANGE UP (ref 5–17)
BUN SERPL-MCNC: 21 MG/DL — SIGNIFICANT CHANGE UP (ref 7–23)
CALCIUM SERPL-MCNC: 8.9 MG/DL — SIGNIFICANT CHANGE UP (ref 8.4–10.5)
CHLORIDE SERPL-SCNC: 102 MMOL/L — SIGNIFICANT CHANGE UP (ref 96–108)
CO2 SERPL-SCNC: 20 MMOL/L — LOW (ref 22–31)
CREAT SERPL-MCNC: 1.19 MG/DL — SIGNIFICANT CHANGE UP (ref 0.5–1.3)
GLUCOSE BLDC GLUCOMTR-MCNC: 115 MG/DL — HIGH (ref 70–99)
GLUCOSE BLDC GLUCOMTR-MCNC: 117 MG/DL — HIGH (ref 70–99)
GLUCOSE BLDC GLUCOMTR-MCNC: 126 MG/DL — HIGH (ref 70–99)
GLUCOSE BLDC GLUCOMTR-MCNC: 160 MG/DL — HIGH (ref 70–99)
GLUCOSE SERPL-MCNC: 114 MG/DL — HIGH (ref 70–99)
HCT VFR BLD CALC: 37.3 % — LOW (ref 39–50)
HGB BLD-MCNC: 12.6 G/DL — LOW (ref 13–17)
MCHC RBC-ENTMCNC: 26.6 PG — LOW (ref 27–34)
MCHC RBC-ENTMCNC: 33.8 GM/DL — SIGNIFICANT CHANGE UP (ref 32–36)
MCV RBC AUTO: 78.7 FL — LOW (ref 80–100)
PLATELET # BLD AUTO: 337 K/UL — SIGNIFICANT CHANGE UP (ref 150–400)
POTASSIUM SERPL-MCNC: 3.6 MMOL/L — SIGNIFICANT CHANGE UP (ref 3.5–5.3)
POTASSIUM SERPL-SCNC: 3.6 MMOL/L — SIGNIFICANT CHANGE UP (ref 3.5–5.3)
RBC # BLD: 4.74 M/UL — SIGNIFICANT CHANGE UP (ref 4.2–5.8)
RBC # FLD: 13.2 % — SIGNIFICANT CHANGE UP (ref 10.3–14.5)
SODIUM SERPL-SCNC: 137 MMOL/L — SIGNIFICANT CHANGE UP (ref 135–145)
WBC # BLD: 8.54 K/UL — SIGNIFICANT CHANGE UP (ref 3.8–10.5)
WBC # FLD AUTO: 8.54 K/UL — SIGNIFICANT CHANGE UP (ref 3.8–10.5)

## 2019-11-30 PROCEDURE — 99231 SBSQ HOSP IP/OBS SF/LOW 25: CPT

## 2019-11-30 PROCEDURE — 99233 SBSQ HOSP IP/OBS HIGH 50: CPT

## 2019-11-30 PROCEDURE — 73564 X-RAY EXAM KNEE 4 OR MORE: CPT | Mod: 26,LT

## 2019-11-30 RX ORDER — POTASSIUM CHLORIDE 20 MEQ
40 PACKET (EA) ORAL ONCE
Refills: 0 | Status: COMPLETED | OUTPATIENT
Start: 2019-11-30 | End: 2019-11-30

## 2019-11-30 RX ORDER — AMLODIPINE BESYLATE 2.5 MG/1
5 TABLET ORAL AT BEDTIME
Refills: 0 | Status: DISCONTINUED | OUTPATIENT
Start: 2019-11-30 | End: 2019-12-03

## 2019-11-30 RX ORDER — NICOTINE POLACRILEX 2 MG
1 GUM BUCCAL DAILY
Refills: 0 | Status: DISCONTINUED | OUTPATIENT
Start: 2019-11-30 | End: 2019-12-03

## 2019-11-30 RX ADMIN — Medication 1 PATCH: at 19:22

## 2019-11-30 RX ADMIN — Medication 3 MILLIGRAM(S): at 21:19

## 2019-11-30 RX ADMIN — GABAPENTIN 300 MILLIGRAM(S): 400 CAPSULE ORAL at 14:28

## 2019-11-30 RX ADMIN — Medication 1 TABLET(S): at 11:10

## 2019-11-30 RX ADMIN — SIMVASTATIN 20 MILLIGRAM(S): 20 TABLET, FILM COATED ORAL at 21:19

## 2019-11-30 RX ADMIN — Medication 2: at 08:41

## 2019-11-30 RX ADMIN — Medication 1 PATCH: at 17:21

## 2019-11-30 RX ADMIN — Medication 40 MILLIEQUIVALENT(S): at 11:10

## 2019-11-30 RX ADMIN — Medication 650 MILLIGRAM(S): at 01:00

## 2019-11-30 RX ADMIN — AMLODIPINE BESYLATE 5 MILLIGRAM(S): 2.5 TABLET ORAL at 11:22

## 2019-11-30 RX ADMIN — Medication 650 MILLIGRAM(S): at 00:31

## 2019-11-30 RX ADMIN — GABAPENTIN 300 MILLIGRAM(S): 400 CAPSULE ORAL at 21:19

## 2019-11-30 RX ADMIN — GABAPENTIN 300 MILLIGRAM(S): 400 CAPSULE ORAL at 05:43

## 2019-11-30 RX ADMIN — Medication 1 PATCH: at 07:00

## 2019-11-30 RX ADMIN — DULOXETINE HYDROCHLORIDE 60 MILLIGRAM(S): 30 CAPSULE, DELAYED RELEASE ORAL at 11:10

## 2019-11-30 RX ADMIN — Medication 1 PATCH: at 12:09

## 2019-11-30 RX ADMIN — Medication 1 MILLIGRAM(S): at 11:10

## 2019-11-30 NOTE — CONSULT NOTE ADULT - CONSULT REASON
medicine co-management
Pre-Operative Cardiac Risk Stratification and Optimization
evaluate for rehab

## 2019-11-30 NOTE — PROGRESS NOTE ADULT - PROBLEM SELECTOR PLAN 5
uptrending. home BP meds had been held due to initial hypotension.  - start amlodipine 5mg daily today (ordered), if continues to be elevated, can uptitrate to home dose which is 5mg BID  - continue to monitor and add back patient's home regimen one by one 2/2 to recent fall down flight of stairs 2 weeks ago worse with weight-bearing and ambulating with PT. L knee ROM intact with no swelling  - f/u L knee xray in setting of recent traumatic injury though low suspicion for fracture

## 2019-11-30 NOTE — PROGRESS NOTE ADULT - ATTENDING COMMENTS
Dr. Marilin Walker  Division of Hospital Medicine  [facility]   Pager: [phone]

## 2019-11-30 NOTE — PROGRESS NOTE ADULT - ASSESSMENT
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 CTH at OSH showed R subacute SDH, with R frontal encephalomalacia, hydrocephalus. s/p MRI with plan for OR in next 2-3 weeks.

## 2019-11-30 NOTE — PROGRESS NOTE ADULT - SUBJECTIVE AND OBJECTIVE BOX
[facility] Division of Hospital Medicine  Marilin Walker MD  Pager (NBA-BASSAM, 0E-9H): [phone]  Other Times:  [phone]      Patient is a 64y old  Male who presents with a chief complaint of SDH, hydrocephalus, Third ventricular tumor (2019 10:36)      SUBJECTIVE / OVERNIGHT EVENTS: Reports L knee infrapatellar pain worse after working with PT worse with weight bearing and ambulation. Believes it began after his fall down the stairs 2 weeks ago, but more pronounced now after working with PT. Otherwise no complaints.    ADDITIONAL REVIEW OF SYSTEMS:    MEDICATIONS  (STANDING):  amLODIPine   Tablet 5 milliGRAM(s) Oral at bedtime  dextrose 5%. 1000 milliLiter(s) (50 mL/Hr) IV Continuous <Continuous>  dextrose 50% Injectable 12.5 Gram(s) IV Push once  dextrose 50% Injectable 25 Gram(s) IV Push once  dextrose 50% Injectable 25 Gram(s) IV Push once  DULoxetine 60 milliGRAM(s) Oral daily  folic acid 1 milliGRAM(s) Oral daily  gabapentin 300 milliGRAM(s) Oral three times a day  insulin lispro (HumaLOG) corrective regimen sliding scale   SubCutaneous three times a day before meals  insulin lispro (HumaLOG) corrective regimen sliding scale   SubCutaneous at bedtime  melatonin 3 milliGRAM(s) Oral at bedtime  multivitamin 1 Tablet(s) Oral daily  nicotine - 21 mG/24Hr(s) Patch 1 patch Transdermal daily  simvastatin 20 milliGRAM(s) Oral at bedtime    MEDICATIONS  (PRN):  acetaminophen   Tablet .. 650 milliGRAM(s) Oral every 6 hours PRN Mild Pain (1 - 3)  bisacodyl 5 milliGRAM(s) Oral daily PRN Constipation  clonazePAM  Tablet 0.5 milliGRAM(s) Oral every 12 hours PRN anxiety/panic attack  dextrose 40% Gel 15 Gram(s) Oral once PRN Blood Glucose LESS THAN 70 milliGRAM(s)/deciliter  glucagon  Injectable 1 milliGRAM(s) IntraMuscular once PRN Glucose LESS THAN 70 milligrams/deciliter  ondansetron Injectable 4 milliGRAM(s) IV Push every 6 hours PRN Nausea and/or Vomiting  oxyCODONE    IR 5 milliGRAM(s) Oral every 4 hours PRN Moderate Pain (4 - 6)  senna 2 Tablet(s) Oral at bedtime PRN Constipation      CAPILLARY BLOOD GLUCOSE      POCT Blood Glucose.: 160 mg/dL (2019 08:39)  POCT Blood Glucose.: 133 mg/dL (2019 21:31)  POCT Blood Glucose.: 116 mg/dL (2019 17:59)  POCT Blood Glucose.: 149 mg/dL (2019 12:30)    I&O's Summary    2019 07:01  -  2019 07:00  --------------------------------------------------------  IN: 0 mL / OUT: 1100 mL / NET: -1100 mL        PHYSICAL EXAM:  Vital Signs Last 24 Hrs  T(C): 36.4 (2019 08:21), Max: 37.4 (2019 15:50)  T(F): 97.6 (2019 08:21), Max: 99.3 (2019 15:50)  HR: 65 (2019 08:21) (64 - 98)  BP: 148/81 (2019 08:21) (129/94 - 158/78)  BP(mean): --  RR: 17 (2019 08:21) (16 - 18)  SpO2: 95% (2019 08:21) (95% - 99%)    GENERAL: NAD, well-developed  HEAD:  Atraumatic, Normocephalic  EYES: EOMI, PERRLA, conjunctiva and sclera clear  ENMT: Moist mucous membranes, +poor oral hygiene/dentition  NECK: Supple, No JVD  RESPIRATORY: Clear to auscultation bilaterally; No rales, rhonchi, wheezing, or rubs  CARDIOVASCULAR: Regular rate and rhythm; No murmurs, rubs, or gallops  GASTROINTESTINAL: Soft, Nontender, Nondistended; Bowel sounds present  MUSCULOSKELETAL: +L knee ROM intact, no palpable swelling nor erythema, +tenderness to palpation in L infrapatellar region  EXTREMITIES:  2+ Peripheral Pulses, No clubbing, cyanosis, or edema    LABS:                        12.6   8.54  )-----------( 337      ( 2019 10:45 )             37.3     11    137  |  102  |  21  ----------------------------<  114<H>  3.6   |  20<L>  |  1.19    Ca    8.9      2019 06:27            Urinalysis Basic - ( 2019 23:53 )    Color: Yellow / Appearance: Clear / S.038 / pH: x  Gluc: x / Ketone: Negative  / Bili: Negative / Urobili: 3 mg/dL   Blood: x / Protein: Trace / Nitrite: Negative   Leuk Esterase: Negative / RBC: x / WBC x   Sq Epi: x / Non Sq Epi: x / Bacteria: x        Culture - Blood (collected 2019 08:25)  Source: .Blood Blood-Peripheral  Preliminary Report (2019 09:01):  No growth to date.        RADIOLOGY & ADDITIONAL TESTS:  Results Reviewed: leukocytosis resolved, RA resolved. 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

## 2019-11-30 NOTE — PROGRESS NOTE ADULT - PROBLEM SELECTOR PLAN 10
patient vapes at home, has nicotine craving  - nicotine patch ordered patient vapes at home, has nicotine craving  - nicotine patch ordered    PT recommending Rehab.

## 2019-11-30 NOTE — PROGRESS NOTE ADULT - PROBLEM SELECTOR PLAN 4
resolved.   - no need for abx uptrending. home BP meds had been held due to initial hypotension.  - start amlodipine 5mg daily today (ordered), if continues to be elevated, can uptitrate to home dose which is 5mg BID  - continue to monitor and add back patient's home regimen one by one

## 2019-11-30 NOTE — CONSULT NOTE ADULT - SUBJECTIVE AND OBJECTIVE BOX
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 CTH at OSH showed R subacute SDH, with R frontal encephalomalacia, hydrocephalus.   planned for surgery  Pre-Operative Cardiac Risk Stratification and Optimization is requested  denies any chest pain, sob, palpitation, dizziness or syncope.      PAST MEDICAL & SURGICAL HISTORY:  H/O hydrocephalus  H/O brain tumor  Peripheral neuropathy  Stroke  Diabetes  Hypertension  Depression  Seizure  HTN (hypertension)  CVA (cerebral vascular accident)  S/P ventriculoperitoneal shunt  H/O craniotomy          MEDICATIONS:        acetaminophen   Tablet .. 650 milliGRAM(s) Oral every 6 hours PRN  clonazePAM  Tablet 0.5 milliGRAM(s) Oral every 12 hours PRN  DULoxetine 60 milliGRAM(s) Oral daily  gabapentin 300 milliGRAM(s) Oral three times a day  melatonin 3 milliGRAM(s) Oral at bedtime  ondansetron Injectable 4 milliGRAM(s) IV Push every 6 hours PRN  oxyCODONE    IR 5 milliGRAM(s) Oral every 4 hours PRN    bisacodyl 5 milliGRAM(s) Oral daily PRN  senna 2 Tablet(s) Oral at bedtime PRN    dextrose 40% Gel 15 Gram(s) Oral once PRN  dextrose 50% Injectable 12.5 Gram(s) IV Push once  dextrose 50% Injectable 25 Gram(s) IV Push once  dextrose 50% Injectable 25 Gram(s) IV Push once  glucagon  Injectable 1 milliGRAM(s) IntraMuscular once PRN  insulin lispro (HumaLOG) corrective regimen sliding scale   SubCutaneous three times a day before meals  insulin lispro (HumaLOG) corrective regimen sliding scale   SubCutaneous at bedtime  simvastatin 20 milliGRAM(s) Oral at bedtime    dextrose 5%. 1000 milliLiter(s) IV Continuous <Continuous>  folic acid 1 milliGRAM(s) Oral daily  multivitamin 1 Tablet(s) Oral daily  sodium chloride 0.9% with potassium chloride 20 mEq/L 1000 milliLiter(s) IV Continuous <Continuous>      FAMILY HISTORY:      Non-contributory    SOCIAL HISTORY:    No tobacco, drugs or etoh    Allergies    No Known Allergies    Intolerances    	    REVIEW OF SYSTEMS:  as above  The rest of the 14 points ROS reviewed and except above they are unremarkable.        PHYSICAL EXAM:  T(C): 36.6 (11-30-19 @ 04:57), Max: 37.4 (11-29-19 @ 15:50)  HR: 64 (11-30-19 @ 04:57) (64 - 98)  BP: 135/80 (11-30-19 @ 04:57) (129/94 - 158/78)  RR: 16 (11-30-19 @ 04:57) (16 - 18)  SpO2: 97% (11-30-19 @ 04:57) (95% - 99%)  Wt(kg): --  I&O's Summary    29 Nov 2019 07:01  -  30 Nov 2019 07:00  --------------------------------------------------------  IN: 0 mL / OUT: 1100 mL / NET: -1100 mL      JVP: Normal  Neck: supple  Lung: clear   CV: S1 S2 , Murmur:  Abd: soft  Ext: No edema  neuro: Awake / alert  Psych: flat affect  Skin: normal    LABS/DATA:    TELEMETRY: 	    ECG:  	sinus, old inf , anterolat infarct    	  CARDIAC MARKERS:  Troponin I, Serum: <.015 ng/mL (11-27 @ 18:27)                  11-30    137  |  102  |  21  ----------------------------<  114<H>  3.6   |  20<L>  |  1.19    Ca    8.9      30 Nov 2019 06:27      proBNP:   Lipid Profile:   HgA1c: Hemoglobin A1C, Whole Blood: 6.4 % (11-29 @ 08:25)    TSH:

## 2019-11-30 NOTE — CONSULT NOTE ADULT - ASSESSMENT
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 CTH at OSH showed R subacute SDH, with R frontal encephalomalacia, hydrocephalus. Pending MRI. Medicine consulted for co-management.
Abnormal EKG  suspect segmental LV systolic dysfunction   obtain echo    DM  Monitor finger stick. Insulin coverage. Diabetic education and Diabetic diet. Consider nutrition consultation.    PreOP  Based on current ACC/AHA guidelines, patient history and physical exam, the patient is considered to have elevated risk   pending echo  may need ischemic eval

## 2019-11-30 NOTE — PROGRESS NOTE ADULT - ATTENDING COMMENTS
I examined the patient and reviewed the MRI. 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 Not sure what was the prior diagnoses, but the enhancement and calcification are not typical for colloid cyst. Other possibilities are ependymoma, low grade astrocytoma or oligodindroglioma, SEGA or central neurocytoma. Craniopharyngioma unlikely but not excluded.   Obtain medical and cardiac clearance. Off all AP/AC. Possible surgery in December.

## 2019-11-30 NOTE — CONSULT NOTE ADULT - ATTENDING COMMENTS
Advanced care planning was discussed with patient and family.  Risks, benefits and alternatives of the cardiac treatments and medical therapy including procedures were discussed in detail and all questions were answered. Importance of compliance with medical therapy and lifestyle modification to improve cardiovascular health were addressed. Appropriate forms and patient educational materials were reviewed. 30 minutes face to face spent.
Dr. Marilin Walker  Division of Hospital Medicine  [facility]   Pager: [phone]

## 2019-11-30 NOTE — PROGRESS NOTE ADULT - SUBJECTIVE AND OBJECTIVE BOX
SUBJECTIVE: Comfortable, NAD. No Nausea x 48hrs, Lt side pain improved, but c/o Lt knee pain s/p fall on knee.     OVERNIGHT EVENTS: None    Vital Signs Last 24 Hrs  T(C): 36.4 (30 Nov 2019 08:21), Max: 37.4 (29 Nov 2019 15:50)  T(F): 97.6 (30 Nov 2019 08:21), Max: 99.3 (29 Nov 2019 15:50)  HR: 65 (30 Nov 2019 08:21) (64 - 98)  BP: 148/81 (30 Nov 2019 08:21) (129/94 - 158/78)  BP(mean): --  RR: 17 (30 Nov 2019 08:21) (16 - 18)  SpO2: 95% (30 Nov 2019 08:21) (95% - 99%)  IVF: [ ] IVL [X ] NS+K@ 75  DIET: [ ] Regular [ X] CCD [ ] Renal [ ] Puree [ ] Dysphagia [ ] Tube Feeds:   PCA: [ ] YES [ X] NO   RUSSELL: [ ] YES [X ] NO [X ] VOID   BM: [ X] YES-on 11/28 x2    DRAINS: None    PHYSICAL EXAM:    General: No Acute Distress     Neurological: No interval change-improved. Awake, alert oriented to person, place and time, Following Commands, PERRL, EOMI, Face Symmetrical, Rt eye-count fingers, Lt eye peripheral vision only. Speech Fluent, No Drift, HINSON 5/5, except LUE 4+/5 and sl contracted at elbow (Hx CVA). Sensation to Light Touch Intact    Pulmonary: Clear to Auscultation, No Rales, No Rhonchi, No Wheezes     Cardiovascular: S1, S2, Regular Rate and Rhythm     Gastrointestinal: Soft, Nontender, Nondistended     Ext:  Lt Knee-mild inferior patella tenderness, no daniel/ecchymosis. FROM    Incision: N/A    LABS:                        15.4   12.20 )-----------( 451      ( 27 Nov 2019 22:19 )             44.4      11-29 11-30    137  |  102  |  21  ----------------------------<  114<H>  3.6   |  20<L>  |  1.19    Ca    8.9      30 Nov 2019 06:27    TPro  8.0  /  Alb  4.2  /  TBili  0.6  /  DBili  x   /  AST  12  /  ALT  20  /  AlkPhos  124<H>  11-27    PT/INR - ( 27 Nov 2019 22:19 )   PT: 13.3 sec;   INR: 1.16 ratio    PTT - ( 27 Nov 2019 22:19 )  PTT:35.5 sec    Urinalysis (11.28.19 @ 23:53)    pH Urine: 5.5    Glucose Qualitative, Urine: Negative    Blood, Urine: Negative    Color: Yellow    Urine Appearance: Clear    Bilirubin: Negative    Ketone - Urine: Negative    Specific Gravity: 1.038    Protein, Urine: Trace    Urobilinogen: 3 mg/dL    Nitrite: Negative    Leukocyte Esterase Concentration: Negative    Culture - Blood in AM (11.29.19 @ 08:25)    Specimen Source: .Blood Blood-Peripheral    Culture Results:   No growth to date.    I&O's Summary    I&O's Summary    29 Nov 2019 07:01  -  30 Nov 2019 07:00  --------------------------------------------------------  IN: 0 mL / OUT: 1100 mL / NET: -1100 mL    IMAGING:   < from: Xray Shuntogram  Shunt (11.27.19 @ 23:34) >  There is a  shuntcoursing out of a right-sided mirian hole.    The  shunt tip is identified in the region of the right lateral   ventricle and courses within the soft tissues of the right neck, right   hemithorax, and finally into the abdomen. The distal end is coiled within   the lower abdomen. There is no discontinuity or kinks.    IMPRESSION:    shunt identified originating from the right mirian hole and ending in   the lower abdomen without kinks or discontinuity.    < from: MR Head w/wo IV Cont (11.28.19 @ 15:22) >  MRI of the brain was performed using sagittal T1, axial T1 T2 T2 FLAIR   diffusion and susceptibility weighted sequence. The patient was injected   with approximately 8 cc of Gadavist IV with 2 cc conscious discarded.   Sagittal coronal and axial T1-weighted sequences were performed.    There is evidence of abnormal midline lesion seen in the septum   pellucidum region. This lesion does demonstrate subtle area of   enhancement anteriorly as well as calcification on prior head CT.   Approximately 2.9 x 2.1 x 3.3 cm. This lesion was present on prior CT   scan. Mass effect on the third ventricle is seen with dilatation of the   third and lateral ventricles seen and normal appearing fourth ventricle.    Diffuse smooth dural enhancement is seen which is likely the result of   patient's shunt catheter.    Right frontal shunt catheter is identified.     There is extra-axial collection seen involving the left frontal region   again seen. This collection measures approximately 1.2 cm in widest   diameter.     Abnormal T2 prolongation involving the rightfrontal region is identified   which is likely compatible area gliosis.    The visualized paranasal sinuses mastoid and middle ear regions appear   clear.    The patient is status post cataract surgery.    Impression: Abnormal lesion as described above    < from: CT Head No Cont (11.28.19 @ 00:07) >  IMPRESSION:  Unchanged bilateral subdural collections.    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 MRI is recommended for further evaluation.    < from: Xray Shuntogram  Shunt (11.27.19 @ 23:34) >   shunt identified originating from the right mirian hole and ending in   the lower abdomen without kinks or discontinuity.    MEDICATIONS  (STANDING):  dextrose 5%. 1000 milliLiter(s) (50 mL/Hr) IV Continuous <Continuous>  dextrose 50% Injectable 12.5 Gram(s) IV Push once  dextrose 50% Injectable 25 Gram(s) IV Push once  dextrose 50% Injectable 25 Gram(s) IV Push once  DULoxetine 60 milliGRAM(s) Oral daily  folic acid 1 milliGRAM(s) Oral daily  gabapentin 300 milliGRAM(s) Oral three times a day  insulin lispro (HumaLOG) corrective regimen sliding scale   SubCutaneous three times a day before meals  insulin lispro (HumaLOG) corrective regimen sliding scale   SubCutaneous at bedtime  melatonin 3 milliGRAM(s) Oral at bedtime  multivitamin 1 Tablet(s) Oral daily  nicotine -  14 mG/24Hr(s) Patch 1 patch Transdermal daily  potassium chloride    Tablet ER 40 milliEquivalent(s) Oral once  simvastatin 20 milliGRAM(s) Oral at bedtime  sodium chloride 0.9% with potassium chloride 20 mEq/L 1000 milliLiter(s) (75 mL/Hr) IV Continuous <Continuous>    MEDICATIONS  (PRN):  acetaminophen   Tablet .. 650 milliGRAM(s) Oral every 6 hours PRN Mild Pain (1 - 3)  bisacodyl 5 milliGRAM(s) Oral daily PRN Constipation  clonazePAM  Tablet 0.5 milliGRAM(s) Oral every 12 hours PRN anxiety/panic attack  dextrose 40% Gel 15 Gram(s) Oral once PRN Blood Glucose LESS THAN 70 milliGRAM(s)/deciliter  glucagon  Injectable 1 milliGRAM(s) IntraMuscular once PRN Glucose LESS THAN 70 milligrams/deciliter  ondansetron Injectable 4 milliGRAM(s) IV Push every 6 hours PRN Nausea and/or Vomiting  oxyCODONE    IR 5 milliGRAM(s) Oral every 4 hours PRN Moderate Pain (4 - 6)  senna 2 Tablet(s) Oral at bedtime PRN Constipation

## 2019-11-30 NOTE — CONSULT NOTE ADULT - REASON FOR ADMISSION
SDH, hydrocephalus, Third ventricular tumor

## 2019-11-30 NOTE — PROGRESS NOTE ADULT - ASSESSMENT
· HPI Objective Statement: 64y m PMhx prior CVA w/ residual L sided weakness and paresthesias, brain "cyst" s/p removal c/b hydrocephalus s/p  shunt placement in 1980 in [place], HTN, pw worsening L sided paresthesias and transferred from [facility] for new CT findings. Pt found to have subacute SDH along R frontal lobe and large mass in third ventricle. Pt denies any falls or recent head trauma to explain the SDH. 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  Lt side pain improved. Creatinine now normalized. IVL. Leukocytosis-UA neg, Bld Cx NGTD FU. Inc activity/OOB. Med/Cardio optimization for surgery in 2-3 weeks due to scheduling. Hold SQl and cont off ASA. Shut reprogrammed 11/28 after MRI by resident Dr KEREN Sevilla. Xray Lt knee-P FU.  Echo-P FU. DC plan to Rehab and return for surgery in 2-3 weeks.  Med FU for BP control-?resume Amlodipine. K 3.6 supp today.    Cardio-Abnormal EKG. suspect segmental LV systolic dysfunction. obtain echo. DM-Monitor finger stick. Insulin coverage. Diabetic education and Diabetic diet. Consider nutrition consultation. PreOP-Based on current ACC/AHA guidelines, patient history and physical exam, the patient is considered to have elevated risk. pending echo  may need ischemic eval.     Medicine/Hosp note of 11/28-Problem: Leukocytosis.  Recommendation: patient with leukocytosis to 12K on admission. would pursue infectious work-up especially in setting of hypotension to rule out SIRS/sepsis. - CXR with clear lungs and no evidence of pneumonia. - f/u UA. - f/u blood culture. - monitor off abx at this time.Problem: Hypertension.  Recommendation: currently hypotensive. improved s/p 1L NS bolus. - agree with starting maintenance fluids for now. - discontinued all home antihypertensives (amlodipine, labetalol, losartan, clonidine). - would resume one by one as BP improves with hold parameters.Problem: History of CVA (cerebrovascular accident). Recommendation: with residual L sided weakness. - c/w statin. Problem: Anxiety.Recommendation: I-STOP Reference #: [id number] - patient prescribed clonazepam 0.5mg BID PRN panic attack/insomnia. - patient amenable to trying melatonin instead for insomnia - ordered.Problem: Diabetes. Recommendation: states takes metformin after each meal? does not recall who instructed him this way, but that is how he was informed to take it. diet controlled. - f/u HbA1c. - c/w sliding scale insulin. - FS within acceptable range. Problem: Medication management. Recommendation: confirmed medications with patient. tried to call pharmacy, but closed today. - amlodipine 5mg BID. - losartan 50mg BID. - clonidine 0.1mg TID. - labetalol 100 BID. - clonazepam 0.5mg BID PRN. - simvastatin 20mg qHS. - patient unclear of metformin dose but takes after meals up to TID? states was instructed to take this way - will need to clarify with pharmacy. - patient NO LONGER TAKES gabapentin and cymbalta - informed neurosurgery IDALIA Warner who states it was restarted for patient's presenting paresthesias. Attending Attestation:   Dr. Marilin Walker Division of Hospital Medicine [facility]  Pager: [phone]. Electronic Signatures: Marilin Walker (MD)    Respiratory: Patient instructed to use incentive spirometer [ ] YES [X ] NO              DVT ppx: [ ] SQL-Hold [ ] SQH and Venodynes [ ] Left [ ] Right [ X] Bilateral    Discharge Planning:  PT/OT/PMR-recomm A. Rehab.    Assessment:  Please Check When Present   []  GCS  E   V  M     Heart Failure: []Acute, [] acute on chronic , []chronic  Heart Failure:  [] Diastolic (HFpEF), [] Systolic (HFrEF), []Combined (HFpEF and HFrEF), [] RHF, [] Pulm HTN, [] Other    [] RA, [] ATN, [] AIN, [] other  [] CKD1, [] CKD2, [] CKD 3, [] CKD 4, [] CKD 5, []ESRD    Encephalopathy: [] Metabolic, [] Hepatic, [] toxic, [] Neurological, [] Other    Abnormal Nurtitional Status: [] malnurtition (see nutrition note), [ ]underweight: BMI < 19, [] morbid obesity: BMI >40, [] Cachexia    [] Sepsis  [] hypovolemic shock,[] cardiogenic shock, [] hemorrhagic shock, [] neuogenic shock  [] Acute Respiratory Failure  []Cerebral edema, [] Brain compression/ herniation,   [] Functional quadriplegia  [] Acute blood loss anemia

## 2019-12-01 LAB
GLUCOSE BLDC GLUCOMTR-MCNC: 107 MG/DL — HIGH (ref 70–99)
GLUCOSE BLDC GLUCOMTR-MCNC: 109 MG/DL — HIGH (ref 70–99)
GLUCOSE BLDC GLUCOMTR-MCNC: 117 MG/DL — HIGH (ref 70–99)
GLUCOSE BLDC GLUCOMTR-MCNC: 139 MG/DL — HIGH (ref 70–99)

## 2019-12-01 PROCEDURE — 93306 TTE W/DOPPLER COMPLETE: CPT | Mod: 26

## 2019-12-01 PROCEDURE — 99232 SBSQ HOSP IP/OBS MODERATE 35: CPT

## 2019-12-01 RX ORDER — POLYETHYLENE GLYCOL 3350 17 G/17G
17 POWDER, FOR SOLUTION ORAL
Refills: 0 | Status: DISCONTINUED | OUTPATIENT
Start: 2019-12-01 | End: 2019-12-03

## 2019-12-01 RX ADMIN — AMLODIPINE BESYLATE 5 MILLIGRAM(S): 2.5 TABLET ORAL at 21:24

## 2019-12-01 RX ADMIN — DULOXETINE HYDROCHLORIDE 60 MILLIGRAM(S): 30 CAPSULE, DELAYED RELEASE ORAL at 12:49

## 2019-12-01 RX ADMIN — GABAPENTIN 300 MILLIGRAM(S): 400 CAPSULE ORAL at 05:57

## 2019-12-01 RX ADMIN — Medication 1 PATCH: at 12:00

## 2019-12-01 RX ADMIN — Medication 1 PATCH: at 19:17

## 2019-12-01 RX ADMIN — Medication 1 PATCH: at 12:49

## 2019-12-01 RX ADMIN — SIMVASTATIN 20 MILLIGRAM(S): 20 TABLET, FILM COATED ORAL at 21:24

## 2019-12-01 RX ADMIN — GABAPENTIN 300 MILLIGRAM(S): 400 CAPSULE ORAL at 21:24

## 2019-12-01 RX ADMIN — Medication 1 MILLIGRAM(S): at 12:49

## 2019-12-01 RX ADMIN — Medication 3 MILLIGRAM(S): at 21:24

## 2019-12-01 RX ADMIN — Medication 1 PATCH: at 07:00

## 2019-12-01 RX ADMIN — GABAPENTIN 300 MILLIGRAM(S): 400 CAPSULE ORAL at 14:00

## 2019-12-01 RX ADMIN — Medication 1 TABLET(S): at 12:49

## 2019-12-01 NOTE — PROGRESS NOTE ADULT - ASSESSMENT
Abnormal EKG  suspect segmental LV systolic dysfunction   awaiting echo    DM  Monitor finger stick. Insulin coverage. Diabetic education and Diabetic diet. Consider nutrition consultation.    PreOP  Based on current ACC/AHA guidelines, patient history and physical exam, the patient is considered to have elevated risk   pending echo  may need ischemic eval

## 2019-12-01 NOTE — PROGRESS NOTE ADULT - ASSESSMENT
HPI:  · HPI Objective Statement: 64y m PMhx prior CVA w/ residual L sided weakness and paresthesias, brain "cyst" s/p removal c/b hydrocephalus s/p  shunt placement in 1980 in [place], HTN, pw worsening L sided paresthesias and transferred from [facility] for new CT findings. Pt found to have subacute SDH along R frontal lobe and large mass in third ventricle. Pt denies any falls or recent head trauma to explain the SDH. (28 Nov 2019 01:00)    PAST MEDICAL & SURGICAL HISTORY:  H/O hydrocephalus  H/O brain tumor  Peripheral neuropathy  Stroke  Diabetes  Hypertension  Depression  Seizure  HTN (hypertension)  CVA (cerebral vascular accident)  S/P ventriculoperitoneal shunt  H/O craniotomy      Assessment:  Please Check When Present   [x]  GCS 15    Heart Failure: []Acute, [] acute on chronic , []chronic  Heart Failure:  [] Diastolic (HFpEF), [] Systolic (HFrEF), []Combined (HFpEF and HFrEF), [] RHF, [] Pulm HTN, [] Other    [] RA, [] ATN, [] AIN, [] other  [] CKD1, [] CKD2, [] CKD 3, [] CKD 4, [] CKD 5, []ESRD    Encephalopathy: [] Metabolic, [] Hepatic, [] toxic, [] Neurological, [] Other    Abnormal Nurtitional Status: [] malnurtition (see nutrition note), [ ]underweight: BMI < 19, [] morbid obesity: BMI >40, [] Cachexia    [] Sepsis  [] hypovolemic shock,[] cardiogenic shock, [] hemorrhagic shock, [] neuogenic shock  [] Acute Respiratory Failure  []Cerebral edema, [] Brain compression/ herniation,   [] Functional quadriplegia  [] Acute blood loss anemia      PLAN:  1. neuro:  - medicine cleared  - OR in2-3 weeks    2. cardio:  - TTE grossly normal  - vitals stable overnight, continue amlodipine 5mg daily  - cardiology Dr. Sahu following    3. endo:  - blood glucose well controlled with HISS and CCD diet    4. GI:  - IVL  - bowel regimen with senna, miralax, and dulcolax                DVT ppx: [] SQL [ ] SQH and Venodynes [ ] Left [ ] Right [ X] Bilateral    Discharge Planning: PT/OT/PMR- acute rehab

## 2019-12-01 NOTE — PROGRESS NOTE ADULT - SUBJECTIVE AND OBJECTIVE BOX
Subjective: Patient seen and examined. No new events except as noted.     SUBJECTIVE/ROS:  No chest pain, dyspnea, palpitation, or dizziness.       MEDICATIONS:  MEDICATIONS  (STANDING):  amLODIPine   Tablet 5 milliGRAM(s) Oral at bedtime  dextrose 5%. 1000 milliLiter(s) (50 mL/Hr) IV Continuous <Continuous>  dextrose 50% Injectable 12.5 Gram(s) IV Push once  dextrose 50% Injectable 25 Gram(s) IV Push once  dextrose 50% Injectable 25 Gram(s) IV Push once  DULoxetine 60 milliGRAM(s) Oral daily  folic acid 1 milliGRAM(s) Oral daily  gabapentin 300 milliGRAM(s) Oral three times a day  insulin lispro (HumaLOG) corrective regimen sliding scale   SubCutaneous three times a day before meals  insulin lispro (HumaLOG) corrective regimen sliding scale   SubCutaneous at bedtime  melatonin 3 milliGRAM(s) Oral at bedtime  multivitamin 1 Tablet(s) Oral daily  nicotine - 21 mG/24Hr(s) Patch 1 patch Transdermal daily  simvastatin 20 milliGRAM(s) Oral at bedtime      PHYSICAL EXAM:  T(C): 36.5 (12-01-19 @ 08:03), Max: 36.8 (11-30-19 @ 13:53)  HR: 61 (12-01-19 @ 08:03) (61 - 74)  BP: 146/88 (12-01-19 @ 08:03) (129/82 - 148/81)  RR: 20 (12-01-19 @ 08:03) (17 - 20)  SpO2: 96% (12-01-19 @ 08:03) (95% - 98%)  Wt(kg): --  I&O's Summary    30 Nov 2019 07:01  -  01 Dec 2019 07:00  --------------------------------------------------------  IN: 0 mL / OUT: 200 mL / NET: -200 mL            JVP: Normal  Neck: supple  Lung: clear   CV: S1 S2 , Murmur:  Abd: soft  Ext: No edema  neuro: Awake / alert  Psych: flat affect  Skin: normal``    LABS/DATA:    CARDIAC MARKERS:                                12.6   8.54  )-----------( 337      ( 30 Nov 2019 10:45 )             37.3     11-30    137  |  102  |  21  ----------------------------<  114<H>  3.6   |  20<L>  |  1.19    Ca    8.9      30 Nov 2019 06:27      proBNP:   Lipid Profile:   HgA1c:   TSH:     TELE:  EKG:

## 2019-12-01 NOTE — PROGRESS NOTE ADULT - SUBJECTIVE AND OBJECTIVE BOX
SUBJECTIVE:   Patient was seen at bedside this morning. Patient was not in any acute distress. Patient still has left side paresthesia with no acute worsening. Denies any headaches, new focal weakness, cp, sob, n/v, or abd pain.    OVERNIGHT EVENTS: no acute events    Vital Signs Last 24 Hrs  T(C): 36.6 (01 Dec 2019 12:00), Max: 36.8 (30 Nov 2019 13:53)  T(F): 97.9 (01 Dec 2019 12:00), Max: 98.2 (30 Nov 2019 13:53)  HR: 65 (01 Dec 2019 12:00) (61 - 74)  BP: 139/85 (01 Dec 2019 12:00) (129/82 - 146/88)  BP(mean): --  RR: 20 (01 Dec 2019 12:00) (17 - 20)  SpO2: 97% (01 Dec 2019 12:00) (95% - 98%)  IVF: [x] IVL [ ] NS+K@   DIET: [ ] Regular [x] CCD [ ] Renal [ ] Puree [ ] Dysphagia [ ] Tube Feeds:   PCA: [ ] YES [ ] NO   RUSSELL: [ ] YES [ ] NO [x] VOID   BM: [ ] YES [ ] NO       PHYSICAL EXAM:    General: No Acute Distress     Neurological: AOx3, facial symmetrical, Following Commands, right side full strength, left side 4+/5, but contracted at elbow from prior stroke. Sensation to Light Touch Intact    Pulmonary: Clear to Auscultation, No Rales, No Rhonchi, No Wheezes     Cardiovascular: S1, S2, Regular Rate and Rhythm     Gastrointestinal: Soft, Nontender, Nondistended     Musculoskeletal: no joint swelling noted at Lt knee with normal ROM. Nontender to palpate at Lt knee.    LABS:                        12.6   8.54  )-----------( 337      ( 30 Nov 2019 10:45 )             37.3    11-30    137  |  102  |  21  ----------------------------<  114<H>  3.6   |  20<L>  |  1.19    Ca    8.9      30 Nov 2019 06:27      Hemoglobin A1C, Whole Blood: 6.4 % (11-29 @ 08:25)      11-30 @ 07:01  -  12-01 @ 07:00  --------------------------------------------------------  IN: 0 mL / OUT: 200 mL / NET: -200 mL    12-01 @ 07:01  -  12-01 @ 13:11  --------------------------------------------------------  IN: 0 mL / OUT: 400 mL / NET: -400 mL      IMAGING:     MEDICATIONS  (STANDING):  amLODIPine   Tablet 5 milliGRAM(s) Oral at bedtime  dextrose 5%. 1000 milliLiter(s) (50 mL/Hr) IV Continuous <Continuous>  dextrose 50% Injectable 12.5 Gram(s) IV Push once  dextrose 50% Injectable 25 Gram(s) IV Push once  dextrose 50% Injectable 25 Gram(s) IV Push once  DULoxetine 60 milliGRAM(s) Oral daily  folic acid 1 milliGRAM(s) Oral daily  gabapentin 300 milliGRAM(s) Oral three times a day  insulin lispro (HumaLOG) corrective regimen sliding scale   SubCutaneous three times a day before meals  insulin lispro (HumaLOG) corrective regimen sliding scale   SubCutaneous at bedtime  melatonin 3 milliGRAM(s) Oral at bedtime  multivitamin 1 Tablet(s) Oral daily  nicotine - 21 mG/24Hr(s) Patch 1 patch Transdermal daily  simvastatin 20 milliGRAM(s) Oral at bedtime    MEDICATIONS  (PRN):  acetaminophen   Tablet .. 650 milliGRAM(s) Oral every 6 hours PRN Mild Pain (1 - 3)  bisacodyl 5 milliGRAM(s) Oral daily PRN Constipation  clonazePAM  Tablet 0.5 milliGRAM(s) Oral every 12 hours PRN anxiety/panic attack  dextrose 40% Gel 15 Gram(s) Oral once PRN Blood Glucose LESS THAN 70 milliGRAM(s)/deciliter  glucagon  Injectable 1 milliGRAM(s) IntraMuscular once PRN Glucose LESS THAN 70 milligrams/deciliter  ondansetron Injectable 4 milliGRAM(s) IV Push every 6 hours PRN Nausea and/or Vomiting  oxyCODONE    IR 5 milliGRAM(s) Oral every 4 hours PRN Moderate Pain (4 - 6)  senna 2 Tablet(s) Oral at bedtime PRN Constipation

## 2019-12-02 LAB
ANION GAP SERPL CALC-SCNC: 14 MMOL/L — SIGNIFICANT CHANGE UP (ref 5–17)
BUN SERPL-MCNC: 21 MG/DL — SIGNIFICANT CHANGE UP (ref 7–23)
CALCIUM SERPL-MCNC: 9.2 MG/DL — SIGNIFICANT CHANGE UP (ref 8.4–10.5)
CHLORIDE SERPL-SCNC: 102 MMOL/L — SIGNIFICANT CHANGE UP (ref 96–108)
CO2 SERPL-SCNC: 20 MMOL/L — LOW (ref 22–31)
CREAT SERPL-MCNC: 1.14 MG/DL — SIGNIFICANT CHANGE UP (ref 0.5–1.3)
GLUCOSE BLDC GLUCOMTR-MCNC: 106 MG/DL — HIGH (ref 70–99)
GLUCOSE BLDC GLUCOMTR-MCNC: 107 MG/DL — HIGH (ref 70–99)
GLUCOSE BLDC GLUCOMTR-MCNC: 92 MG/DL — SIGNIFICANT CHANGE UP (ref 70–99)
GLUCOSE BLDC GLUCOMTR-MCNC: 97 MG/DL — SIGNIFICANT CHANGE UP (ref 70–99)
GLUCOSE SERPL-MCNC: 107 MG/DL — HIGH (ref 70–99)
HCT VFR BLD CALC: 39.4 % — SIGNIFICANT CHANGE UP (ref 39–50)
HGB BLD-MCNC: 13 G/DL — SIGNIFICANT CHANGE UP (ref 13–17)
MCHC RBC-ENTMCNC: 25.9 PG — LOW (ref 27–34)
MCHC RBC-ENTMCNC: 33 GM/DL — SIGNIFICANT CHANGE UP (ref 32–36)
MCV RBC AUTO: 78.6 FL — LOW (ref 80–100)
PLATELET # BLD AUTO: 349 K/UL — SIGNIFICANT CHANGE UP (ref 150–400)
POTASSIUM SERPL-MCNC: 3.9 MMOL/L — SIGNIFICANT CHANGE UP (ref 3.5–5.3)
POTASSIUM SERPL-SCNC: 3.9 MMOL/L — SIGNIFICANT CHANGE UP (ref 3.5–5.3)
RBC # BLD: 5.01 M/UL — SIGNIFICANT CHANGE UP (ref 4.2–5.8)
RBC # FLD: 13.3 % — SIGNIFICANT CHANGE UP (ref 10.3–14.5)
SODIUM SERPL-SCNC: 136 MMOL/L — SIGNIFICANT CHANGE UP (ref 135–145)
WBC # BLD: 9.61 K/UL — SIGNIFICANT CHANGE UP (ref 3.8–10.5)
WBC # FLD AUTO: 9.61 K/UL — SIGNIFICANT CHANGE UP (ref 3.8–10.5)

## 2019-12-02 PROCEDURE — 99232 SBSQ HOSP IP/OBS MODERATE 35: CPT | Mod: 57

## 2019-12-02 PROCEDURE — 99232 SBSQ HOSP IP/OBS MODERATE 35: CPT

## 2019-12-02 RX ORDER — NYSTATIN CREAM 100000 [USP'U]/G
1 CREAM TOPICAL THREE TIMES A DAY
Refills: 0 | Status: DISCONTINUED | OUTPATIENT
Start: 2019-12-02 | End: 2019-12-03

## 2019-12-02 RX ORDER — ENOXAPARIN SODIUM 100 MG/ML
40 INJECTION SUBCUTANEOUS DAILY
Refills: 0 | Status: DISCONTINUED | OUTPATIENT
Start: 2019-12-02 | End: 2019-12-03

## 2019-12-02 RX ORDER — SENNA PLUS 8.6 MG/1
2 TABLET ORAL AT BEDTIME
Refills: 0 | Status: DISCONTINUED | OUTPATIENT
Start: 2019-12-02 | End: 2019-12-03

## 2019-12-02 RX ADMIN — Medication 1 PATCH: at 11:50

## 2019-12-02 RX ADMIN — GABAPENTIN 300 MILLIGRAM(S): 400 CAPSULE ORAL at 05:28

## 2019-12-02 RX ADMIN — Medication 1 PATCH: at 07:15

## 2019-12-02 RX ADMIN — GABAPENTIN 300 MILLIGRAM(S): 400 CAPSULE ORAL at 22:11

## 2019-12-02 RX ADMIN — Medication 3 MILLIGRAM(S): at 22:11

## 2019-12-02 RX ADMIN — GABAPENTIN 300 MILLIGRAM(S): 400 CAPSULE ORAL at 13:29

## 2019-12-02 RX ADMIN — NYSTATIN CREAM 1 APPLICATION(S): 100000 CREAM TOPICAL at 22:40

## 2019-12-02 RX ADMIN — AMLODIPINE BESYLATE 5 MILLIGRAM(S): 2.5 TABLET ORAL at 22:11

## 2019-12-02 RX ADMIN — Medication 1 MILLIGRAM(S): at 11:56

## 2019-12-02 RX ADMIN — Medication 650 MILLIGRAM(S): at 14:15

## 2019-12-02 RX ADMIN — Medication 1 TABLET(S): at 11:56

## 2019-12-02 RX ADMIN — SIMVASTATIN 20 MILLIGRAM(S): 20 TABLET, FILM COATED ORAL at 22:11

## 2019-12-02 RX ADMIN — Medication 1 PATCH: at 19:33

## 2019-12-02 RX ADMIN — DULOXETINE HYDROCHLORIDE 60 MILLIGRAM(S): 30 CAPSULE, DELAYED RELEASE ORAL at 11:56

## 2019-12-02 RX ADMIN — POLYETHYLENE GLYCOL 3350 17 GRAM(S): 17 POWDER, FOR SOLUTION ORAL at 05:28

## 2019-12-02 RX ADMIN — Medication 1 PATCH: at 11:56

## 2019-12-02 RX ADMIN — Medication 650 MILLIGRAM(S): at 13:29

## 2019-12-02 RX ADMIN — ENOXAPARIN SODIUM 40 MILLIGRAM(S): 100 INJECTION SUBCUTANEOUS at 22:11

## 2019-12-02 RX ADMIN — POLYETHYLENE GLYCOL 3350 17 GRAM(S): 17 POWDER, FOR SOLUTION ORAL at 17:12

## 2019-12-02 NOTE — PROGRESS NOTE ADULT - SUBJECTIVE AND OBJECTIVE BOX
CHIEF COMPLAINT: still c/o left sided parasthesias and pain and visual issues stable    Vital Signs Last 24 Hrs  T(C): 36.7 (02 Dec 2019 12:02), Max: 37.3 (02 Dec 2019 08:24)  T(F): 98 (02 Dec 2019 12:02), Max: 99.2 (02 Dec 2019 08:24)  HR: 70 (02 Dec 2019 12:02) (70 - 84)  BP: 132/79 (02 Dec 2019 12:02) (127/86 - 149/78)  BP(mean): --  RR: 17 (02 Dec 2019 12:02) (17 - 18)  SpO2: 98% (02 Dec 2019 12:02) (94% - 98%)    PHYSICAL EXAM:    General: No Acute Distress     Neurological: Awake, alert oriented to person, place and time, Following Commands, PERRL, EOMI bilaterally no nystagmus; able to finger count with right eye but very blurry vision, ?left partial visual feild loss- unable to fully assess due to inattentinon, Face Symmetrical, Speech Fluent, Muscle Strength 4+/5 left UE/LE with contracture of left arm post CVA,  No Drift, Sensation to Light Touch Intact; + L UE dysmetria, none noted on right    Pulmonary: Clear to Auscultation, No Rales, No Rhonchi, No Wheezes     Cardiovascular: S1, S2, Regular Rate and Rhythm     Gastrointestinal: Soft, Nontender, Nondistended     Incision: none    LABS:                        13.0   9.61  )-----------( 349      ( 02 Dec 2019 08:22 )             39.4    12-02    136  |  102  |  21  ----------------------------<  107<H>  3.9   |  20<L>  |  1.14    Ca    9.2      02 Dec 2019 07:09      Hemoglobin A1C, Whole Blood: 6.4 % (11-29 @ 08:25)      12-01 @ 07:01  -  12-02 @ 07:00  --------------------------------------------------------  IN: 0 mL / OUT: 750 mL / NET: -750 mL    12-02 @ 07:01  -  12-02 @ 12:49  --------------------------------------------------------  IN: 240 mL / OUT: 0 mL / NET: 240 mL    MEDICATIONS  (STANDING):  amLODIPine   Tablet 5 milliGRAM(s) Oral at bedtime  dextrose 5%. 1000 milliLiter(s) (50 mL/Hr) IV Continuous <Continuous>  dextrose 50% Injectable 12.5 Gram(s) IV Push once  dextrose 50% Injectable 25 Gram(s) IV Push once  dextrose 50% Injectable 25 Gram(s) IV Push once  DULoxetine 60 milliGRAM(s) Oral daily  folic acid 1 milliGRAM(s) Oral daily  gabapentin 300 milliGRAM(s) Oral three times a day  insulin lispro (HumaLOG) corrective regimen sliding scale   SubCutaneous three times a day before meals  insulin lispro (HumaLOG) corrective regimen sliding scale   SubCutaneous at bedtime  melatonin 3 milliGRAM(s) Oral at bedtime  multivitamin 1 Tablet(s) Oral daily  nicotine - 21 mG/24Hr(s) Patch 1 patch Transdermal daily  polyethylene glycol 3350 17 Gram(s) Oral two times a day  simvastatin 20 milliGRAM(s) Oral at bedtime    MEDICATIONS  (PRN):  acetaminophen   Tablet .. 650 milliGRAM(s) Oral every 6 hours PRN Mild Pain (1 - 3)  bisacodyl 5 milliGRAM(s) Oral daily PRN Constipation  clonazePAM  Tablet 0.5 milliGRAM(s) Oral every 12 hours PRN anxiety/panic attack  dextrose 40% Gel 15 Gram(s) Oral once PRN Blood Glucose LESS THAN 70 milliGRAM(s)/deciliter  glucagon  Injectable 1 milliGRAM(s) IntraMuscular once PRN Glucose LESS THAN 70 milligrams/deciliter  ondansetron Injectable 4 milliGRAM(s) IV Push every 6 hours PRN Nausea and/or Vomiting  oxyCODONE    IR 5 milliGRAM(s) Oral every 4 hours PRN Moderate Pain (4 - 6)  senna 2 Tablet(s) Oral at bedtime PRN Constipation    Other:   dextrose 5%. 1000 milliLiter(s) IV Continuous <Continuous>  folic acid 1 milliGRAM(s) Oral daily  multivitamin 1 Tablet(s) Oral daily  nicotine - 21 mG/24Hr(s) Patch 1 patch Transdermal daily    DIET: [] Regular [x] CCD [] Renal [] Puree [] Dysphagia [] Tube Feeds:     IMAGING:   < from: Xray Knee w/Patella 4 View, Left (11.30.19 @ 15:50) >  INTERPRETATION:  Clinical indication: Left patella pain, fall    Comparison: None    Findings: Normal bone mineralization and alignment. No fractures  identified. Mild degenerative narrowing of the medial compartment.   Minimal spurring of the superior margin of the patella at the distal   quadriceps insertion. No soft tissue swelling or knee effusion. Segmental   atherosclerotic ossification of the distal femoral and popliteal arteries.      IMPRESSION:    No acute osseous abnormality.    < end of copied text >    < from: MR Head w/wo IV Cont (11.28.19 @ 15:22) >  INTERPRETATION:  Clinical indication: Double vision. Left-sided   neuropathy. Nausea and vomiting.     MRI of the brain was performed using sagittal T1, axial T1 T2 T2 FLAIR   diffusion and susceptibility weighted sequence. The patient was injected   with approximately 8 cc of Gadavist IV with 2 cc conscious discarded.   Sagittal coronal and axial T1-weighted sequences were performed.    There is evidence of abnormal midline lesion seen in the septum   pellucidum region. This lesion does demonstrate subtle area of   enhancement anteriorly as well as calcification on prior head CT.   Approximately 2.9 x 2.1 x 3.3 cm. This lesion was present on prior CT   scan. Mass effect on the third ventricle is seen with dilatation of the   third and lateral ventricles seen and normal appearing fourth ventricle.    Diffuse smooth dural enhancement is seen which is likely the result of   patient's shunt catheter.    Right frontal shunt catheter is identified.     There is extra-axial collection seen involving the left frontal region   again seen. This collection measures approximately 1.2 cm in widest   diameter.     Abnormal T2 prolongation involving the rightfrontal region is identified   which is likely compatible area gliosis.    The visualized paranasal sinuses mastoid and middle ear regions appear   clear.    The patient is status post cataract surgery.    Impression: Abnormal lesion as described above      < end of copied text >

## 2019-12-02 NOTE — PROGRESS NOTE ADULT - ASSESSMENT
HPI:  · HPI Objective Statement: 64y m PMhx prior CVA w/ residual L sided weakness and paresthesias, brain "cyst" s/p removal c/b hydrocephalus s/p  shunt placement in 1980 in [place], HTN, pw worsening L sided paresthesias and transferred from [facility] for new CT findings. Pt found to have subacute SDH along R frontal lobe and large mass in third ventricle. Pt denies any falls or recent head trauma to explain the SDH. (28 Nov 2019 01:00)    PAST MEDICAL & SURGICAL HISTORY:  H/O hydrocephalus  H/O brain tumor  Peripheral neuropathy  Stroke  Diabetes  Hypertension  Depression  Seizure  HTN (hypertension)  CVA (cerebral vascular accident)  S/P ventriculoperitoneal shunt  H/O craniotomy    PROCEDURE:  none    PLAN:  Neuro: no surgical intervention on this admission, to return in approximately 4 weeks for surgical intervention for brain mass  Respiratory: patient instructed on incentive spirometer  CV: norvasc for HTN; appreciate cardiology following  Endocrine: HISS for type 2 DM  Heme/Onc:  will need tissue diagnosis at time of surgery to evaluate for further need of adjuvant therapy            DVT ppx: [] SQH [] SQL and venodynes bilaterally; off chemoprophylaxis as was on aspirin prior to admission and ambulatory/OOB daily  Renal: IVL  ID: afebrile  GI: bowel regimen  PT/OT/PMR- acute rehab upon d/c  Hospitalist Medicine following  Patient has elevated risk for surgical procedure     Will discuss with Dr Vineet Arriaza # [id number]    Assessment:  Please Check When Present   []  GCS  E   V  M     Heart Failure: []Acute, [] acute on chronic , []chronic  Heart Failure:  [] Diastolic (HFpEF), [] Systolic (HFrEF), []Combined (HFpEF and HFrEF), [] RHF, [] Pulm HTN, [] Other    [] RA, [] ATN, [] AIN, [] other  [] CKD1, [] CKD2, [] CKD 3, [] CKD 4, [] CKD 5, []ESRD    Encephalopathy: [] Metabolic, [] Hepatic, [] toxic, [] Neurological, [] Other    Abnormal Nutritional Status: [] malnutrition (see nutrition note), [ ]underweight: BMI < 19, [] morbid obesity: BMI >40, [] Cachexia    [] Sepsis  [] hypovolemic shock,[] cardiogenic shock, [] hemorrhagic shock, [] neurogenic shock  [] Acute Respiratory Failure  [x]Cerebral edema, [] Brain compression/ herniation,   [] Functional quadriplegia  [] Acute blood loss anemia

## 2019-12-02 NOTE — PROGRESS NOTE ADULT - ASSESSMENT
64y m PMhx prior CVA w/ residual L sided weakness and paresthesias, brain "cyst" s/p removal c/b hydrocephalus s/p  shunt placement in 1980 in [place], HTN, pw worsening L sided paresthesias and transferred from [facility] for new CT findings. Pt found to have subacute SDH along R frontal lobe and large mass in third ventricle.

## 2019-12-02 NOTE — PROGRESS NOTE ADULT - PROBLEM SELECTOR PLAN 6
Resolved. Transient elevation of Cr after episode of hypotension.   - Cr downtrended to 1.19 from 1.40  - continue to monitor Cr
statin
A1C 6.4. Fingersticks well controlled. Resume Metformin bid at discharge.

## 2019-12-02 NOTE — PROGRESS NOTE ADULT - PROBLEM SELECTOR PLAN 7
- c/w statin
klonopin restarted
patient vapes at home, has nicotine craving. Continue Nicotine patch.     PT recommending Rehab.

## 2019-12-02 NOTE — PROGRESS NOTE ADULT - ASSESSMENT
Abnormal EKG  echo shows normal LV function without segmental wma     DM  Monitor finger stick. Insulin coverage. Diabetic education and Diabetic diet. Consider nutrition consultation.    PreOP  Based on current ACC/AHA guidelines, patient history and physical exam, the patient is considered to have elevated risk   echo shows normal LV function   normal wall motion  asymptomatic from cardiac standpoint  may proceed to OR as planned

## 2019-12-02 NOTE — PROGRESS NOTE ADULT - PROBLEM SELECTOR PLAN 1
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  - per Neurosurgery, plan for OR in 2-3 weeks  - poor functional status due his L sided weakness 2/2 to CVA and mostly wheelchair -bound at home, thus already at higher risk debra in setting of hx of CVA  - RCRI risk (1pt for CVA, Class II risk), 6.0% 30 day risk of death, MI, or cardiac arrest  - Abnormal EKG on admission with no history of prior ischemic evaluation nor testing  - patient's home antihypertensive regimen will need to be adjusted based on his BP trends for further optimization (patient was on 4 antihypertensives prior to admission but presented with hypotension)  - Agree with Cardiology re: ECHO for further evaluation  - Medically cleared for OR unless ECHO with overt abnormalities warranting ischemic evaluation.
mgmt per nsgy
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  - per Neurosurgery, plan for OR in 2-3 weeks  - RCRI risk (1pt for CVA, Class II risk), 6.0% 30 day risk of death, MI, or cardiac arrest. Echo w normal LVSF.   - patient's home antihypertensive regimen will need to be adjusted based on his BP trends for further optimization (patient was on 4 antihypertensives prior to admission but presented with hypotension)

## 2019-12-02 NOTE — PROGRESS NOTE ADULT - SUBJECTIVE AND OBJECTIVE BOX
Patient is a 64y old  Male who presents with a chief complaint of SDH, hydrocephalus, Third ventricular tumor (02 Dec 2019 12:49)    HPI: Pt up in chair. Feels well.     Vital Signs Last 24 Hrs  T(C): 36.7 (02 Dec 2019 12:02), Max: 37.3 (02 Dec 2019 08:24)  T(F): 98 (02 Dec 2019 12:02), Max: 99.2 (02 Dec 2019 08:24)  HR: 70 (02 Dec 2019 12:02) (70 - 84)  BP: 132/79 (02 Dec 2019 12:02) (127/86 - 149/78)  BP(mean): --  RR: 17 (02 Dec 2019 12:02) (17 - 18)  SpO2: 98% (02 Dec 2019 12:02) (94% - 98%)                          13.0   9.61  )-----------( 349      ( 02 Dec 2019 08:22 )             39.4     12-02    136  |  102  |  21  ----------------------------<  107<H>  3.9   |  20<L>  |  1.14    Ca    9.2      02 Dec 2019 07:09      Home Medications:  amLODIPine 5 mg oral tablet: 1 tab(s) orally once a day (29 Nov 2019 12:04)  cloNIDine 0.1 mg oral tablet: 1 tab(s) orally 3 times a day (29 Nov 2019 12:04)  labetalol 100 mg oral tablet: 1 tab(s) orally 2 times a day (29 Nov 2019 12:04)  losartan 50 mg oral tablet: orally 2 times a day (29 Nov 2019 12:04)  metFORMIN: orally 3 times a day after meals? (29 Nov 2019 12:04)  simvastatin 20 mg oral tablet: 1 tab(s) orally once a day (at bedtime) (29 Nov 2019 12:04)

## 2019-12-02 NOTE — PROGRESS NOTE ADULT - PROBLEM SELECTOR PLAN 4
Home BP meds had been held due to initial hypotension. BP now improved, Norvasc added on 11/30. Monitor.

## 2019-12-02 NOTE — PROGRESS NOTE ADULT - SUBJECTIVE AND OBJECTIVE BOX
Subjective: Patient seen and examined. No new events except as noted.     SUBJECTIVE/ROS:  No chest pain, dyspnea, palpitation, or dizziness.       MEDICATIONS:  MEDICATIONS  (STANDING):  amLODIPine   Tablet 5 milliGRAM(s) Oral at bedtime  dextrose 5%. 1000 milliLiter(s) (50 mL/Hr) IV Continuous <Continuous>  dextrose 50% Injectable 12.5 Gram(s) IV Push once  dextrose 50% Injectable 25 Gram(s) IV Push once  dextrose 50% Injectable 25 Gram(s) IV Push once  DULoxetine 60 milliGRAM(s) Oral daily  folic acid 1 milliGRAM(s) Oral daily  gabapentin 300 milliGRAM(s) Oral three times a day  insulin lispro (HumaLOG) corrective regimen sliding scale   SubCutaneous three times a day before meals  insulin lispro (HumaLOG) corrective regimen sliding scale   SubCutaneous at bedtime  melatonin 3 milliGRAM(s) Oral at bedtime  multivitamin 1 Tablet(s) Oral daily  nicotine - 21 mG/24Hr(s) Patch 1 patch Transdermal daily  polyethylene glycol 3350 17 Gram(s) Oral two times a day  simvastatin 20 milliGRAM(s) Oral at bedtime      PHYSICAL EXAM:  T(C): 37.2 (12-02-19 @ 04:00), Max: 37.2 (12-02-19 @ 04:00)  HR: 71 (12-02-19 @ 04:00) (61 - 84)  BP: 136/80 (12-02-19 @ 04:00) (127/86 - 149/78)  RR: 17 (12-02-19 @ 04:00) (17 - 20)  SpO2: 97% (12-02-19 @ 04:00) (96% - 97%)  Wt(kg): --  I&O's Summary    01 Dec 2019 07:01  -  02 Dec 2019 07:00  --------------------------------------------------------  IN: 0 mL / OUT: 750 mL / NET: -750 mL            JVP: Normal  Neck: supple  Lung: clear   CV: S1 S2 , Murmur:  Abd: soft  Ext: No edema  neuro: Awake / alert  Psych: flat affect  Skin: normal``    LABS/DATA:    CARDIAC MARKERS:                                12.6   8.54  )-----------( 337      ( 30 Nov 2019 10:45 )             37.3           proBNP:   Lipid Profile:   HgA1c:   TSH:     TELE:  EKG:    < from: Transthoracic Echocardiogram (12.01.19 @ 08:02) >  Conclusions:  1. Mitral annular calcification, otherwise normal mitral  valve.  2. Aortic valve not well visualized; probably normal.  3. Normal left ventricular systolic function. No segmental  wall motion abnormalities.  4. Normal right ventricular size and function.  ------------------------------------------------------------------------  Confirmed on  12/1/2019 - 12:49:38 by Giselle Cha M.D.  ------------------------------------------------------------------------    < end of copied text >

## 2019-12-03 ENCOUNTER — TRANSCRIPTION ENCOUNTER (OUTPATIENT)
Age: 64
End: 2019-12-03

## 2019-12-03 ENCOUNTER — INPATIENT (INPATIENT)
Facility: HOSPITAL | Age: 64
LOS: 13 days | Discharge: NURSING, REHAB OR LONG-TERM CARE | DRG: 949 | End: 2019-12-17
Attending: PHYSICAL MEDICINE & REHABILITATION | Admitting: PHYSICAL MEDICINE & REHABILITATION
Payer: MEDICARE

## 2019-12-03 VITALS
OXYGEN SATURATION: 99 % | RESPIRATION RATE: 18 BRPM | HEART RATE: 77 BPM | DIASTOLIC BLOOD PRESSURE: 81 MMHG | TEMPERATURE: 98 F | SYSTOLIC BLOOD PRESSURE: 128 MMHG

## 2019-12-03 VITALS
DIASTOLIC BLOOD PRESSURE: 90 MMHG | HEIGHT: 70 IN | RESPIRATION RATE: 14 BRPM | SYSTOLIC BLOOD PRESSURE: 142 MMHG | WEIGHT: 198.42 LBS | HEART RATE: 78 BPM | OXYGEN SATURATION: 98 % | TEMPERATURE: 99 F

## 2019-12-03 DIAGNOSIS — Z98.2 PRESENCE OF CEREBROSPINAL FLUID DRAINAGE DEVICE: Chronic | ICD-10-CM

## 2019-12-03 DIAGNOSIS — S06.5X9A TRAUMATIC SUBDURAL HEMORRHAGE WITH LOSS OF CONSCIOUSNESS OF UNSPECIFIED DURATION, INITIAL ENCOUNTER: ICD-10-CM

## 2019-12-03 DIAGNOSIS — Z98.890 OTHER SPECIFIED POSTPROCEDURAL STATES: Chronic | ICD-10-CM

## 2019-12-03 PROBLEM — I10 ESSENTIAL (PRIMARY) HYPERTENSION: Chronic | Status: ACTIVE | Noted: 2019-11-27

## 2019-12-03 PROBLEM — I10 ESSENTIAL (PRIMARY) HYPERTENSION: Chronic | Status: ACTIVE | Noted: 2019-11-28

## 2019-12-03 PROBLEM — Z87.898 PERSONAL HISTORY OF OTHER SPECIFIED CONDITIONS: Chronic | Status: ACTIVE | Noted: 2019-11-28

## 2019-12-03 PROBLEM — F32.9 MAJOR DEPRESSIVE DISORDER, SINGLE EPISODE, UNSPECIFIED: Chronic | Status: ACTIVE | Noted: 2019-11-27

## 2019-12-03 PROBLEM — G62.9 POLYNEUROPATHY, UNSPECIFIED: Chronic | Status: ACTIVE | Noted: 2019-11-28

## 2019-12-03 LAB
ALBUMIN SERPL ELPH-MCNC: 3.2 G/DL — LOW (ref 3.3–5)
ALP SERPL-CCNC: 116 U/L — SIGNIFICANT CHANGE UP (ref 40–120)
ALT FLD-CCNC: 25 U/L — SIGNIFICANT CHANGE UP (ref 10–45)
ANION GAP SERPL CALC-SCNC: 11 MMOL/L — SIGNIFICANT CHANGE UP (ref 5–17)
AST SERPL-CCNC: 15 U/L — SIGNIFICANT CHANGE UP (ref 10–40)
BASOPHILS # BLD AUTO: 0.1 K/UL — SIGNIFICANT CHANGE UP (ref 0–0.2)
BASOPHILS NFR BLD AUTO: 1.1 % — SIGNIFICANT CHANGE UP (ref 0–2)
BILIRUB SERPL-MCNC: 0.4 MG/DL — SIGNIFICANT CHANGE UP (ref 0.2–1.2)
BUN SERPL-MCNC: 31 MG/DL — HIGH (ref 7–23)
CALCIUM SERPL-MCNC: 8.7 MG/DL — SIGNIFICANT CHANGE UP (ref 8.4–10.5)
CHLORIDE SERPL-SCNC: 103 MMOL/L — SIGNIFICANT CHANGE UP (ref 96–108)
CO2 SERPL-SCNC: 23 MMOL/L — SIGNIFICANT CHANGE UP (ref 22–31)
CREAT SERPL-MCNC: 1.51 MG/DL — HIGH (ref 0.5–1.3)
EOSINOPHIL # BLD AUTO: 0.22 K/UL — SIGNIFICANT CHANGE UP (ref 0–0.5)
EOSINOPHIL NFR BLD AUTO: 2.4 % — SIGNIFICANT CHANGE UP (ref 0–6)
GLUCOSE BLDC GLUCOMTR-MCNC: 119 MG/DL — HIGH (ref 70–99)
GLUCOSE BLDC GLUCOMTR-MCNC: 123 MG/DL — HIGH (ref 70–99)
GLUCOSE BLDC GLUCOMTR-MCNC: 126 MG/DL — HIGH (ref 70–99)
GLUCOSE BLDC GLUCOMTR-MCNC: 96 MG/DL — SIGNIFICANT CHANGE UP (ref 70–99)
GLUCOSE SERPL-MCNC: 130 MG/DL — HIGH (ref 70–99)
HCT VFR BLD CALC: 38.6 % — LOW (ref 39–50)
HGB BLD-MCNC: 12.7 G/DL — LOW (ref 13–17)
IMM GRANULOCYTES NFR BLD AUTO: 0.4 % — SIGNIFICANT CHANGE UP (ref 0–1.5)
LYMPHOCYTES # BLD AUTO: 2.27 K/UL — SIGNIFICANT CHANGE UP (ref 1–3.3)
LYMPHOCYTES # BLD AUTO: 24.4 % — SIGNIFICANT CHANGE UP (ref 13–44)
MCHC RBC-ENTMCNC: 26.1 PG — LOW (ref 27–34)
MCHC RBC-ENTMCNC: 32.9 GM/DL — SIGNIFICANT CHANGE UP (ref 32–36)
MCV RBC AUTO: 79.4 FL — LOW (ref 80–100)
MONOCYTES # BLD AUTO: 0.74 K/UL — SIGNIFICANT CHANGE UP (ref 0–0.9)
MONOCYTES NFR BLD AUTO: 8 % — SIGNIFICANT CHANGE UP (ref 2–14)
NEUTROPHILS # BLD AUTO: 5.92 K/UL — SIGNIFICANT CHANGE UP (ref 1.8–7.4)
NEUTROPHILS NFR BLD AUTO: 63.7 % — SIGNIFICANT CHANGE UP (ref 43–77)
NRBC # BLD: 0 /100 WBCS — SIGNIFICANT CHANGE UP (ref 0–0)
PLATELET # BLD AUTO: 344 K/UL — SIGNIFICANT CHANGE UP (ref 150–400)
POTASSIUM SERPL-MCNC: 4 MMOL/L — SIGNIFICANT CHANGE UP (ref 3.5–5.3)
POTASSIUM SERPL-SCNC: 4 MMOL/L — SIGNIFICANT CHANGE UP (ref 3.5–5.3)
PROT SERPL-MCNC: 7.5 G/DL — SIGNIFICANT CHANGE UP (ref 6–8.3)
RBC # BLD: 4.86 M/UL — SIGNIFICANT CHANGE UP (ref 4.2–5.8)
RBC # FLD: 13.4 % — SIGNIFICANT CHANGE UP (ref 10.3–14.5)
SODIUM SERPL-SCNC: 137 MMOL/L — SIGNIFICANT CHANGE UP (ref 135–145)
WBC # BLD: 9.29 K/UL — SIGNIFICANT CHANGE UP (ref 3.8–10.5)
WBC # FLD AUTO: 9.29 K/UL — SIGNIFICANT CHANGE UP (ref 3.8–10.5)

## 2019-12-03 PROCEDURE — A9585: CPT

## 2019-12-03 PROCEDURE — 85610 PROTHROMBIN TIME: CPT

## 2019-12-03 PROCEDURE — 96374 THER/PROPH/DIAG INJ IV PUSH: CPT

## 2019-12-03 PROCEDURE — 86901 BLOOD TYPING SEROLOGIC RH(D): CPT

## 2019-12-03 PROCEDURE — 93306 TTE W/DOPPLER COMPLETE: CPT

## 2019-12-03 PROCEDURE — 99291 CRITICAL CARE FIRST HOUR: CPT | Mod: 25

## 2019-12-03 PROCEDURE — 99238 HOSP IP/OBS DSCHRG MGMT 30/<: CPT

## 2019-12-03 PROCEDURE — 97166 OT EVAL MOD COMPLEX 45 MIN: CPT

## 2019-12-03 PROCEDURE — 82962 GLUCOSE BLOOD TEST: CPT

## 2019-12-03 PROCEDURE — 97116 GAIT TRAINING THERAPY: CPT

## 2019-12-03 PROCEDURE — 70553 MRI BRAIN STEM W/O & W/DYE: CPT

## 2019-12-03 PROCEDURE — 99231 SBSQ HOSP IP/OBS SF/LOW 25: CPT

## 2019-12-03 PROCEDURE — 86803 HEPATITIS C AB TEST: CPT

## 2019-12-03 PROCEDURE — 86900 BLOOD TYPING SEROLOGIC ABO: CPT

## 2019-12-03 PROCEDURE — 87040 BLOOD CULTURE FOR BACTERIA: CPT

## 2019-12-03 PROCEDURE — 80048 BASIC METABOLIC PNL TOTAL CA: CPT

## 2019-12-03 PROCEDURE — 81003 URINALYSIS AUTO W/O SCOPE: CPT

## 2019-12-03 PROCEDURE — 70250 X-RAY EXAM OF SKULL: CPT

## 2019-12-03 PROCEDURE — 80053 COMPREHEN METABOLIC PANEL: CPT

## 2019-12-03 PROCEDURE — 75809 NONVASCULAR SHUNT X-RAY: CPT

## 2019-12-03 PROCEDURE — 97162 PT EVAL MOD COMPLEX 30 MIN: CPT

## 2019-12-03 PROCEDURE — 70450 CT HEAD/BRAIN W/O DYE: CPT

## 2019-12-03 PROCEDURE — 85027 COMPLETE CBC AUTOMATED: CPT

## 2019-12-03 PROCEDURE — 97110 THERAPEUTIC EXERCISES: CPT

## 2019-12-03 PROCEDURE — 83036 HEMOGLOBIN GLYCOSYLATED A1C: CPT

## 2019-12-03 PROCEDURE — 85730 THROMBOPLASTIN TIME PARTIAL: CPT

## 2019-12-03 PROCEDURE — 86850 RBC ANTIBODY SCREEN: CPT

## 2019-12-03 PROCEDURE — 73564 X-RAY EXAM KNEE 4 OR MORE: CPT

## 2019-12-03 RX ORDER — DULOXETINE HYDROCHLORIDE 30 MG/1
60 CAPSULE, DELAYED RELEASE ORAL DAILY
Refills: 0 | Status: DISCONTINUED | OUTPATIENT
Start: 2019-12-03 | End: 2019-12-17

## 2019-12-03 RX ORDER — GABAPENTIN 400 MG/1
1 CAPSULE ORAL
Qty: 0 | Refills: 0 | DISCHARGE
Start: 2019-12-03

## 2019-12-03 RX ORDER — FOLIC ACID 0.8 MG
1 TABLET ORAL DAILY
Refills: 0 | Status: DISCONTINUED | OUTPATIENT
Start: 2019-12-03 | End: 2019-12-17

## 2019-12-03 RX ORDER — SIMVASTATIN 20 MG/1
1 TABLET, FILM COATED ORAL
Qty: 0 | Refills: 0 | DISCHARGE
Start: 2019-12-03

## 2019-12-03 RX ORDER — OXYCODONE HYDROCHLORIDE 5 MG/1
5 TABLET ORAL EVERY 4 HOURS
Refills: 0 | Status: DISCONTINUED | OUTPATIENT
Start: 2019-12-03 | End: 2019-12-09

## 2019-12-03 RX ORDER — SENNA PLUS 8.6 MG/1
2 TABLET ORAL
Qty: 0 | Refills: 0 | DISCHARGE
Start: 2019-12-03

## 2019-12-03 RX ORDER — DEXTROSE 50 % IN WATER 50 %
15 SYRINGE (ML) INTRAVENOUS ONCE
Refills: 0 | Status: DISCONTINUED | OUTPATIENT
Start: 2019-12-03 | End: 2019-12-17

## 2019-12-03 RX ORDER — ACETAMINOPHEN 500 MG
2 TABLET ORAL
Qty: 0 | Refills: 0 | DISCHARGE
Start: 2019-12-03

## 2019-12-03 RX ORDER — AMLODIPINE BESYLATE 2.5 MG/1
5 TABLET ORAL DAILY
Refills: 0 | Status: DISCONTINUED | OUTPATIENT
Start: 2019-12-03 | End: 2019-12-17

## 2019-12-03 RX ORDER — LANOLIN ALCOHOL/MO/W.PET/CERES
3 CREAM (GRAM) TOPICAL AT BEDTIME
Refills: 0 | Status: DISCONTINUED | OUTPATIENT
Start: 2019-12-03 | End: 2019-12-17

## 2019-12-03 RX ORDER — ENOXAPARIN SODIUM 100 MG/ML
40 INJECTION SUBCUTANEOUS AT BEDTIME
Refills: 0 | Status: DISCONTINUED | OUTPATIENT
Start: 2019-12-03 | End: 2019-12-17

## 2019-12-03 RX ORDER — DEXTROSE 50 % IN WATER 50 %
25 SYRINGE (ML) INTRAVENOUS ONCE
Refills: 0 | Status: DISCONTINUED | OUTPATIENT
Start: 2019-12-03 | End: 2019-12-17

## 2019-12-03 RX ORDER — NYSTATIN CREAM 100000 [USP'U]/G
1 CREAM TOPICAL
Qty: 0 | Refills: 0 | DISCHARGE
Start: 2019-12-03

## 2019-12-03 RX ORDER — DULOXETINE HYDROCHLORIDE 30 MG/1
1 CAPSULE, DELAYED RELEASE ORAL
Qty: 0 | Refills: 0 | DISCHARGE
Start: 2019-12-03

## 2019-12-03 RX ORDER — LANOLIN ALCOHOL/MO/W.PET/CERES
1 CREAM (GRAM) TOPICAL
Qty: 0 | Refills: 0 | DISCHARGE
Start: 2019-12-03

## 2019-12-03 RX ORDER — SIMVASTATIN 20 MG/1
1 TABLET, FILM COATED ORAL
Qty: 0 | Refills: 0 | DISCHARGE

## 2019-12-03 RX ORDER — OXYCODONE HYDROCHLORIDE 5 MG/1
1 TABLET ORAL
Qty: 0 | Refills: 0 | DISCHARGE
Start: 2019-12-03

## 2019-12-03 RX ORDER — SODIUM CHLORIDE 9 MG/ML
1000 INJECTION, SOLUTION INTRAVENOUS
Refills: 0 | Status: DISCONTINUED | OUTPATIENT
Start: 2019-12-03 | End: 2019-12-17

## 2019-12-03 RX ORDER — SENNA PLUS 8.6 MG/1
2 TABLET ORAL AT BEDTIME
Refills: 0 | Status: DISCONTINUED | OUTPATIENT
Start: 2019-12-03 | End: 2019-12-17

## 2019-12-03 RX ORDER — CLONAZEPAM 1 MG
0.5 TABLET ORAL EVERY 12 HOURS
Refills: 0 | Status: DISCONTINUED | OUTPATIENT
Start: 2019-12-03 | End: 2019-12-09

## 2019-12-03 RX ORDER — INSULIN LISPRO 100/ML
VIAL (ML) SUBCUTANEOUS
Refills: 0 | Status: DISCONTINUED | OUTPATIENT
Start: 2019-12-03 | End: 2019-12-06

## 2019-12-03 RX ORDER — GLUCAGON INJECTION, SOLUTION 0.5 MG/.1ML
1 INJECTION, SOLUTION SUBCUTANEOUS ONCE
Refills: 0 | Status: DISCONTINUED | OUTPATIENT
Start: 2019-12-03 | End: 2019-12-17

## 2019-12-03 RX ORDER — CLONAZEPAM 1 MG
1 TABLET ORAL
Qty: 0 | Refills: 0 | DISCHARGE
Start: 2019-12-03

## 2019-12-03 RX ORDER — ATORVASTATIN CALCIUM 80 MG/1
10 TABLET, FILM COATED ORAL AT BEDTIME
Refills: 0 | Status: DISCONTINUED | OUTPATIENT
Start: 2019-12-03 | End: 2019-12-17

## 2019-12-03 RX ORDER — SIMVASTATIN 20 MG/1
20 TABLET, FILM COATED ORAL AT BEDTIME
Refills: 0 | Status: DISCONTINUED | OUTPATIENT
Start: 2019-12-03 | End: 2019-12-03

## 2019-12-03 RX ORDER — NICOTINE POLACRILEX 2 MG
1 GUM BUCCAL DAILY
Refills: 0 | Status: DISCONTINUED | OUTPATIENT
Start: 2019-12-03 | End: 2019-12-17

## 2019-12-03 RX ORDER — POLYETHYLENE GLYCOL 3350 17 G/17G
17 POWDER, FOR SOLUTION ORAL
Qty: 0 | Refills: 0 | DISCHARGE
Start: 2019-12-03

## 2019-12-03 RX ORDER — ENOXAPARIN SODIUM 100 MG/ML
40 INJECTION SUBCUTANEOUS
Qty: 0 | Refills: 0 | DISCHARGE
Start: 2019-12-03

## 2019-12-03 RX ORDER — POLYETHYLENE GLYCOL 3350 17 G/17G
17 POWDER, FOR SOLUTION ORAL
Refills: 0 | Status: DISCONTINUED | OUTPATIENT
Start: 2019-12-03 | End: 2019-12-17

## 2019-12-03 RX ORDER — ACETAMINOPHEN 500 MG
650 TABLET ORAL EVERY 6 HOURS
Refills: 0 | Status: DISCONTINUED | OUTPATIENT
Start: 2019-12-03 | End: 2019-12-17

## 2019-12-03 RX ORDER — NICOTINE POLACRILEX 2 MG
24 GUM BUCCAL
Qty: 0 | Refills: 0 | DISCHARGE
Start: 2019-12-03

## 2019-12-03 RX ORDER — GABAPENTIN 400 MG/1
300 CAPSULE ORAL THREE TIMES A DAY
Refills: 0 | Status: DISCONTINUED | OUTPATIENT
Start: 2019-12-03 | End: 2019-12-17

## 2019-12-03 RX ORDER — LABETALOL HCL 100 MG
1 TABLET ORAL
Qty: 0 | Refills: 0 | DISCHARGE

## 2019-12-03 RX ORDER — LOSARTAN POTASSIUM 100 MG/1
0 TABLET, FILM COATED ORAL
Qty: 0 | Refills: 0 | DISCHARGE

## 2019-12-03 RX ORDER — FOLIC ACID 0.8 MG
1 TABLET ORAL
Qty: 0 | Refills: 0 | DISCHARGE
Start: 2019-12-03

## 2019-12-03 RX ORDER — DEXTROSE 50 % IN WATER 50 %
12.5 SYRINGE (ML) INTRAVENOUS ONCE
Refills: 0 | Status: DISCONTINUED | OUTPATIENT
Start: 2019-12-03 | End: 2019-12-17

## 2019-12-03 RX ADMIN — NYSTATIN CREAM 1 APPLICATION(S): 100000 CREAM TOPICAL at 13:44

## 2019-12-03 RX ADMIN — GABAPENTIN 300 MILLIGRAM(S): 400 CAPSULE ORAL at 13:44

## 2019-12-03 RX ADMIN — Medication 1 PATCH: at 07:20

## 2019-12-03 RX ADMIN — ENOXAPARIN SODIUM 40 MILLIGRAM(S): 100 INJECTION SUBCUTANEOUS at 21:28

## 2019-12-03 RX ADMIN — GABAPENTIN 300 MILLIGRAM(S): 400 CAPSULE ORAL at 05:27

## 2019-12-03 RX ADMIN — ENOXAPARIN SODIUM 40 MILLIGRAM(S): 100 INJECTION SUBCUTANEOUS at 13:44

## 2019-12-03 RX ADMIN — ATORVASTATIN CALCIUM 10 MILLIGRAM(S): 80 TABLET, FILM COATED ORAL at 21:28

## 2019-12-03 RX ADMIN — Medication 1 TABLET(S): at 13:44

## 2019-12-03 RX ADMIN — DULOXETINE HYDROCHLORIDE 60 MILLIGRAM(S): 30 CAPSULE, DELAYED RELEASE ORAL at 13:44

## 2019-12-03 RX ADMIN — POLYETHYLENE GLYCOL 3350 17 GRAM(S): 17 POWDER, FOR SOLUTION ORAL at 17:04

## 2019-12-03 RX ADMIN — NYSTATIN CREAM 1 APPLICATION(S): 100000 CREAM TOPICAL at 05:27

## 2019-12-03 RX ADMIN — Medication 1 MILLIGRAM(S): at 13:44

## 2019-12-03 RX ADMIN — SENNA PLUS 2 TABLET(S): 8.6 TABLET ORAL at 21:27

## 2019-12-03 RX ADMIN — Medication 1 PATCH: at 13:44

## 2019-12-03 RX ADMIN — Medication 3 MILLIGRAM(S): at 21:27

## 2019-12-03 RX ADMIN — Medication 1 PATCH: at 13:42

## 2019-12-03 RX ADMIN — GABAPENTIN 300 MILLIGRAM(S): 400 CAPSULE ORAL at 21:27

## 2019-12-03 RX ADMIN — Medication 5 MILLIGRAM(S): at 17:04

## 2019-12-03 NOTE — DISCHARGE NOTE NURSING/CASE MANAGEMENT/SOCIAL WORK - NSDCPEPTSTRK_GEN_ALL_CORE
Stroke education booklet/Prescribed medications/Call 911 for stroke/Signs and symptoms of stroke/Stroke warning signs and symptoms/Risk factors for stroke/Stroke support groups for patients, families, and friends/Need for follow up after discharge

## 2019-12-03 NOTE — PROGRESS NOTE ADULT - SUBJECTIVE AND OBJECTIVE BOX
Subjective: Patient seen and examined. No new events except as noted.     SUBJECTIVE/ROS:  No chest pain, dyspnea, palpitation, or dizziness.       MEDICATIONS:  MEDICATIONS  (STANDING):  amLODIPine   Tablet 5 milliGRAM(s) Oral at bedtime  dextrose 5%. 1000 milliLiter(s) (50 mL/Hr) IV Continuous <Continuous>  dextrose 50% Injectable 12.5 Gram(s) IV Push once  dextrose 50% Injectable 25 Gram(s) IV Push once  dextrose 50% Injectable 25 Gram(s) IV Push once  DULoxetine 60 milliGRAM(s) Oral daily  enoxaparin Injectable 40 milliGRAM(s) SubCutaneous daily  folic acid 1 milliGRAM(s) Oral daily  gabapentin 300 milliGRAM(s) Oral three times a day  insulin lispro (HumaLOG) corrective regimen sliding scale   SubCutaneous three times a day before meals  insulin lispro (HumaLOG) corrective regimen sliding scale   SubCutaneous at bedtime  melatonin 3 milliGRAM(s) Oral at bedtime  multivitamin 1 Tablet(s) Oral daily  nicotine - 21 mG/24Hr(s) Patch 1 patch Transdermal daily  nystatin Powder 1 Application(s) Topical three times a day  polyethylene glycol 3350 17 Gram(s) Oral two times a day  senna 2 Tablet(s) Oral at bedtime  simvastatin 20 milliGRAM(s) Oral at bedtime      PHYSICAL EXAM:  T(C): 37.3 (12-03-19 @ 08:07), Max: 37.3 (12-03-19 @ 08:07)  HR: 66 (12-03-19 @ 08:07) (66 - 78)  BP: 127/80 (12-03-19 @ 08:07) (120/80 - 132/79)  RR: 17 (12-03-19 @ 08:07) (17 - 18)  SpO2: 97% (12-03-19 @ 08:07) (97% - 99%)  Wt(kg): --  I&O's Summary    02 Dec 2019 07:01  -  03 Dec 2019 07:00  --------------------------------------------------------  IN: 940 mL / OUT: 1750 mL / NET: -810 mL            JVP: Normal  Neck: supple  Lung: clear   CV: S1 S2 , Murmur:  Abd: soft  Ext: No edema  neuro: Awake / alert  Psych: flat affect  Skin: normal``    LABS/DATA:    CARDIAC MARKERS:                                13.0   9.61  )-----------( 349      ( 02 Dec 2019 08:22 )             39.4     12-02    136  |  102  |  21  ----------------------------<  107<H>  3.9   |  20<L>  |  1.14    Ca    9.2      02 Dec 2019 07:09      proBNP:   Lipid Profile:   HgA1c:   TSH:     TELE:  EKG:

## 2019-12-03 NOTE — PROGRESS NOTE ADULT - REASON FOR ADMISSION
SDH, hydrocephalus, Third ventricular tumor

## 2019-12-03 NOTE — DISCHARGE NOTE NURSING/CASE MANAGEMENT/SOCIAL WORK - NSDCPELOVENOX_GEN_ALL_CORE
Enoxaparin/Lovenox - Compliance/Enoxaparin/Lovenox - Potential for adverse drug reactions and interactions/Enoxaparin/Lovenox - Follow up monitoring/Enoxaparin/Lovenox - Dietary Advice

## 2019-12-03 NOTE — DISCHARGE NOTE PROVIDER - NSDCFUADDINST_GEN_ALL_CORE_FT
Followup with your Private MD in 1-2 weeks.  Return to Emergency Department or contact your Neurosurgeon if any changes in mental status, weakness, numbness or tingling of extremities; difficulty swallowing; drainage or redness of wound, fever; pain in legs; difficulty urinating or constipation.  Donot restart your Aspirin or take any Motrin/NSAIDS until checking with your Neurosurgeon.

## 2019-12-03 NOTE — H&P ADULT - NSHPPHYSICALEXAM_GEN_ALL_CORE
PHYSICAL EXAMINATION     General: NAD, Resting Comfortable,                                  HEENT: NC/AT, EOM I, PERRLA, Normal Conjunctivae  Cardio: RRR, Normal S1-S2, No M/G/R                              Pulm: No Respiratory Distress,  Lungs CTAB                        Abdomen: ND/NT, Soft, BS+                                                Breast/Pelvic: not necessary   MSK: No joint swelling, Full ROM                                         Ext: No C/C/E, Pulses 2+ throughout, No calf tenderness    Skin:  all skin intact                                                                 Wounds: none  Decubitus Ulcers: None Present     Neurological Examination    Cognitive: AAO x 3                                                                                                                                                  CN II - XII  intact                                                                      Sensory: Intact to light touch, PP and Vibration   Coordination: FTN/HTS intact                                                                                                 Tone: normal Throughout     Motor    LEFT    UE: SF 5/5, EF 5/5, EE 5/5, WE 5/5, Hand Intrinsics 5/5,  wnl  RIGHT UE: SF 5/5, EF 5/5, EE 5/5, WE 5/5, Hand Intrinsics 5/5,  wnl  LEFT    LE:  HF 5/5, KE 5/5, DF 5/5, EHL 5/5,  PF 5/5  RIGHT LE:  HF 5/5, KE 5/5, DF 5/5, EHL 5/5,  PF 5/5     Reflex:  2 + thoroughout, Cuellar/Babinski negative     Mood/Affect: wnl PHYSICAL EXAMINATION     General: NAD, Resting Comfortable,                                  HEENT: NC/AT, EOM I, PERRLA, Normal Conjunctivae  Cardio: RRR, Normal S1-S2, No M/G/R                              Pulm: No Respiratory Distress,  Lungs CTAB                        Abdomen: ND/NT, Soft, BS+                                                Breast/Pelvic: not necessary   MSK: No joint swelling, Full ROM                                         Ext: No C/C/E, Pulses 2+ throughout, No calf tenderness    Skin:  all skin intact                                                                 Wounds: none  Decubitus Ulcers: None Present     Neurological Examination    Cognitive: AAO x 3                                                                                                                                                  CN II - XII  intact                                                                      Sensory: Intact to light touch, PP and Vibration   Coordination: FTN/HTS intact                                                                                                 Tone: normal Throughout     Motor    LEFT    UE: SF 5/5, EF 5/5, EE 5/5, WE 5/5, Hand Intrinsics 4/5,  dec  RIGHT UE: SF 5/5, EF 5/5, EE 5/5, WE 5/5, Hand Intrinsics 5/5,  wnl  LEFT    LE:  HF 5/5, KE 5/5, DF 5/5, EHL 5/5,  PF 5/5  RIGHT LE:  HF 5/5, KE 5/5, DF 5/5, EHL 5/5,  PF 5/5         Mood/Affect: wnl

## 2019-12-03 NOTE — DISCHARGE NOTE PROVIDER - NSDCMRMEDTOKEN_GEN_ALL_CORE_FT
acetaminophen 325 mg oral tablet: 2 tab(s) orally every 6 hours, As needed, Mild Pain (1 - 3)  amLODIPine 5 mg oral tablet: 1 tab(s) orally once a day  bisacodyl 5 mg oral delayed release tablet: 1 tab(s) orally once a day, As needed, Constipation  clonazePAM 0.5 mg oral tablet: 1 tab(s) orally every 12 hours, As needed, anxiety/panic attack  DULoxetine 60 mg oral delayed release capsule: 1 cap(s) orally once a day  enoxaparin: 40 milligram(s) subcutaneous once a day (at bedtime)  folic acid 1 mg oral tablet: 1 tab(s) orally once a day  gabapentin 300 mg oral capsule: 1 cap(s) orally 3 times a day  melatonin 3 mg oral tablet: 1 tab(s) orally once a day (at bedtime)  metFORMIN: orally 3 times a day after meals?  Multiple Vitamins oral tablet: 1 tab(s) orally once a day  nicotine 21 mg/24 hr transdermal film, extended release: 24 milligram(s) transdermal once a day  nystatin 100,000 units/g topical powder: 1 application topically 3 times a day  oxyCODONE 5 mg oral tablet: 1 tab(s) orally every 4 hours, As needed, Moderate Pain (4 - 6)  polyethylene glycol 3350 oral powder for reconstitution: 17 gram(s) orally 2 times a day  senna oral tablet: 2 tab(s) orally once a day (at bedtime)  simvastatin 20 mg oral tablet: 1 tab(s) orally once a day (at bedtime)

## 2019-12-03 NOTE — PROGRESS NOTE ADULT - ASSESSMENT
· HPI Objective Statement: 64y m PMhx prior CVA w/ residual L sided weakness and paresthesias, brain "cyst" s/p removal c/b hydrocephalus s/p  shunt placement in 1980 in [place], HTN, pw worsening L sided paresthesias and transferred from [facility] for new CT findings. Pt found to have subacute SDH along R frontal lobe and large mass in third ventricle. Pt denies any falls or recent head trauma to explain the SDH. (28 Nov 2019 01:00)    PROCEDURE:  Adm 11/27 Stable Rt front SDH, VPS Hakim @ 200, 3rd Ventricular Tumor  POD# N/A    PLAN:  Neuro:  Lt side pain improved. Creatinine now normalized.  Leukocytosis-now normalized. UA neg, Bld Cx NGTD.  Inc activity/OOB. Shut reprogrammed 11/28 after MRI by resident Dr KEREN Sevilla.  Start SQL tonite.  DC to Rehab when bed available and will schedule Surgery at end of December with Dr Manley. Pt to call Optho Dr Edmondson [phone] for appt outpt.    Hosp/Med-Problem: Brain tumor, recurrent.  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 - per Neurosurgery, plan for OR in 2-3 weeks.- RCRI risk (1pt for CVA, Class II risk), 6.0% 30 day risk of death, MI, or cardiac arrest. Echo w normal LVSF.   - patient's home antihypertensive regimen will need to be adjusted based on his BP trends for further optimization (patient was on 4 antihypertensives prior to admission but presented with hypotension).  Problem: SDH (subdural hematoma).  Plan: management as per neurosurgery.   Problem: Hydrocephalus.  Plan:  shunt management as per neurosurgery. Problem: Hypertension.  Plan: Home BP meds had been held due to initial hypotension. BP now improved, Norvasc added on 11/30. Monitor. Problem: Anxiety.  Plan: - c/w home clonazepam (ISTOP in intial medicine consult note). Problem: Diabetes. Plan: A1C 6.4. Fingersticks well controlled. Resume Metformin bid at discharge.  Problem: Smoker.  Plan: patient vapes at home, has nicotine craving. Continue Nicotine patch. PT recommending Rehab. Electronic Signatures:  Myrtle Hernandez)    Cardio-Abnormal EKG. suspect segmental LV systolic dysfunction. DM. Monitor finger stick. Insulin coverage. Diabetic education and Diabetic diet. Consider nutrition consultation. PreOP Based on current ACC/AHA guidelines, patient history and physical exam, the patient is considered to have elevated risk. may need ischemic eval. Attending Attestation: Advanced care planning was discussed with patient and family.  Risks, benefits and alternatives of the cardiac treatments and medical therapy including procedures were discussed in detail and all questions were answered. Importance of compliance with medical therapy and lifestyle modification to improve cardiovascular health were addressed. Appropriate forms and patient educational materials were reviewed. 30 minutes face to face spent. Electronic Signatures:  Paulino Sahu)    Respiratory: Patient instructed to use incentive spirometer [ ] YES [X ] NO              DVT ppx: [ ] SQL-Hold [ ] SQH and Venodynes [ ] Left [ ] Right [ X] Bilateral    Discharge Planning:  PT/OT/PMR-recomm A. Rehab.    Assessment:  Please Check When Present   []  GCS  E   V  M     Heart Failure: []Acute, [] acute on chronic , []chronic  Heart Failure:  [] Diastolic (HFpEF), [] Systolic (HFrEF), []Combined (HFpEF and HFrEF), [] RHF, [] Pulm HTN, [] Other    [] RA, [] ATN, [] AIN, [] other  [] CKD1, [] CKD2, [] CKD 3, [] CKD 4, [] CKD 5, []ESRD    Encephalopathy: [] Metabolic, [] Hepatic, [] toxic, [] Neurological, [] Other    Abnormal Nurtitional Status: [] malnurtition (see nutrition note), [ ]underweight: BMI < 19, [] morbid obesity: BMI >40, [] Cachexia    [] Sepsis  [] hypovolemic shock,[] cardiogenic shock, [] hemorrhagic shock, [] neuogenic shock  [] Acute Respiratory Failure  []Cerebral edema, [] Brain compression/ herniation,   [] Functional quadriplegia  [] Acute blood loss anemia · HPI Objective Statement: 64y m PMhx prior CVA w/ residual L sided weakness and paresthesias, brain "cyst" s/p removal c/b hydrocephalus s/p  shunt placement in 1980 in [place], HTN, pw worsening L sided paresthesias and transferred from [facility] for new CT findings. Pt found to have subacute SDH along R frontal lobe and large mass in third ventricle. Pt denies any falls or recent head trauma to explain the SDH. (28 Nov 2019 01:00)    PROCEDURE:  Adm 11/27 Stable Rt front SDH, VPS Hakim @ 200, 3rd Ventricular Tumor  POD# N/A    PLAN:  Neuro:  Lt side pain improved. Creatinine now normalized.  Leukocytosis-now normalized. UA neg, Bld Cx NGTD.  Inc activity/OOB. Shut reprogrammed 11/28 after MRI by resident Dr KEREN Sevilla.  Start SQL tonite.  The pt was Medically & Cardio optimized for planned surgery. DC to Rehab when bed available and will schedule Surgery at end of December with Dr Manley. Pt to call Optho Dr Edmondson [phone] for appt outpt.    Hosp/Med-Problem: Brain tumor, recurrent.  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 - per Neurosurgery, plan for OR in 2-3 weeks.- RCRI risk (1pt for CVA, Class II risk), 6.0% 30 day risk of death, MI, or cardiac arrest. Echo w normal LVSF.   - patient's home antihypertensive regimen will need to be adjusted based on his BP trends for further optimization (patient was on 4 antihypertensives prior to admission but presented with hypotension).  Problem: SDH (subdural hematoma).  Plan: management as per neurosurgery.   Problem: Hydrocephalus.  Plan:  shunt management as per neurosurgery. Problem: Hypertension.  Plan: Home BP meds had been held due to initial hypotension. BP now improved, Norvasc added on 11/30. Monitor. Problem: Anxiety.  Plan: - c/w home clonazepam (ISTOP in intial medicine consult note). Problem: Diabetes. Plan: A1C 6.4. Fingersticks well controlled. Resume Metformin bid at discharge.  Problem: Smoker.  Plan: patient vapes at home, has nicotine craving. Continue Nicotine patch. PT recommending Rehab. Electronic Signatures:  Myrtle Hernandez)    Cardio-Abnormal EKG. Echo shows normal LV function without segmental wma. DM. Monitor finger stick. Insulin coverage. Diabetic education and Diabetic diet. Consider nutrition consultation. PreOP Based on current ACC/AHA guidelines, patient history and physical exam, the patient is considered to have elevated risk echo shows normal LV function. normal wall motion. asymptomatic from cardiac standpoint. may proceed to OR as planned. Attending Attestation:  Advanced care planning was discussed with patient and family.  Risks, benefits and alternatives of the cardiac treatments and medical therapy including procedures were discussed in detail and all questions were answered. Importance of compliance with medical therapy and lifestyle modification to improve cardiovascular health were addressed. Appropriate forms and patient educational materials were reviewed. 30 minutes face to face spent. Electronic Signatures: Paulino Sahu)    Respiratory: Patient instructed to use incentive spirometer [ ] YES [X ] NO              DVT ppx: [ ] SQL-Hold [ ] SQH and Venodynes [ ] Left [ ] Right [ X] Bilateral    Discharge Planning:  PT/OT/PMR-recomm A. Rehab.    Assessment:  Please Check When Present   []  GCS  E   V  M     Heart Failure: []Acute, [] acute on chronic , []chronic  Heart Failure:  [] Diastolic (HFpEF), [] Systolic (HFrEF), []Combined (HFpEF and HFrEF), [] RHF, [] Pulm HTN, [] Other    [] RA, [] ATN, [] AIN, [] other  [] CKD1, [] CKD2, [] CKD 3, [] CKD 4, [] CKD 5, []ESRD    Encephalopathy: [] Metabolic, [] Hepatic, [] toxic, [] Neurological, [] Other    Abnormal Nurtitional Status: [] malnurtition (see nutrition note), [ ]underweight: BMI < 19, [] morbid obesity: BMI >40, [] Cachexia    [] Sepsis  [] hypovolemic shock,[] cardiogenic shock, [] hemorrhagic shock, [] neuogenic shock  [] Acute Respiratory Failure  []Cerebral edema, [] Brain compression/ herniation,   [] Functional quadriplegia  [] Acute blood loss anemia

## 2019-12-03 NOTE — DISCHARGE NOTE PROVIDER - HOSPITAL COURSE
HPI Objective Statement: 64y m PMhx prior CVA w/ residual L sided weakness and paresthesias, brain "cyst" s/p removal c/b hydrocephalus s/p  shunt placement in 1980 in [place], HTN, pw worsening L sided paresthesias and transferred from [facility] for new CT findings. Pt found to have subacute SDH along R frontal lobe and large mass in third ventricle. Pt denies any falls or recent head trauma to explain the SDH. (28 Nov 2019 01:00)        PROCEDURE:  Adm 11/27 Stable Rt front SDH, VPS Hakim @ 200, 3rd Ventricular Tumor    POD# N/A        PLAN:    Neuro:  Lt side pain improved. Creatinine now normalized.  Leukocytosis-now normalized. UA neg, Bld Cx NGTD.  Inc activity/OOB. Shut reprogrammed 11/28 after MRI by resident Dr KEREN Sevilla.  Start SQL tonite.  The pt was Medically & Cardio optimized for planned surgery. DC to Rehab when bed available and will schedule Surgery at end of December with Dr Manley. Pt to call Optho Dr Edmondson [phone] for appt outpt.        Hosp/Med-Problem: Brain tumor, recurrent.  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 - per Neurosurgery, plan for OR in 2-3 weeks.- RCRI risk (1pt for CVA, Class II risk), 6.0% 30 day risk of death, MI, or cardiac arrest. Echo w normal LVSF.     - patient's home antihypertensive regimen will need to be adjusted based on his BP trends for further optimization (patient was on 4 antihypertensives prior to admission but presented with hypotension).  Problem: SDH (subdural hematoma).  Plan: management as per neurosurgery.     Problem: Hydrocephalus.  Plan:  shunt management as per neurosurgery. Problem: Hypertension.  Plan: Home BP meds had been held due to initial hypotension. BP now improved, Norvasc added on 11/30. Monitor. Problem: Anxiety.  Plan: - c/w home clonazepam (ISTOP in intial medicine consult note). Problem: Diabetes. Plan: A1C 6.4. Fingersticks well controlled. Resume Metformin bid at discharge.  Problem: Smoker.  Plan: patient vapes at home, has nicotine craving. Continue Nicotine patch. PT recommending Rehab. Electronic Signatures:    Myrtle Hernandez)        Cardio-Abnormal EKG. Echo shows normal LV function without segmental wma. DM. Monitor finger stick. Insulin coverage. Diabetic education and Diabetic diet. Consider nutrition consultation. PreOP Based on current ACC/AHA guidelines, patient history and physical exam, the patient is considered to have elevated risk echo shows normal LV function. normal wall motion. asymptomatic from cardiac standpoint. may proceed to OR as planned. Attending Attestation:  Advanced care planning was discussed with patient and family.  Risks, benefits and alternatives of the cardiac treatments and medical therapy including procedures were discussed in detail and all questions were answered. Importance of compliance with medical therapy and lifestyle modification to improve cardiovascular health were addressed. Appropriate forms and patient educational materials were reviewed. 30 minutes face to face spent. Electronic Signatures: Paulino Sahu)        Respiratory: Patient instructed to use incentive spirometer [ ] YES [X ] NO                  DVT ppx: [ ] SQL-Hold [ ] SQH and Venodynes [ ] Left [ ] Right [ X] Bilateral        Discharge Planning:  PT/OT/PMR-recomm A. Rehab. HPI Objective Statement: 64y m PMhx prior CVA w/ residual L sided weakness and paresthesias, brain "cyst" s/p removal c/b hydrocephalus s/p  shunt placement in 1980 in [place], HTN, pw worsening L sided paresthesias and transferred from [facility] for new CT findings. Pt found to have subacute SDH along R frontal lobe and large mass in third ventricle. Pt denies any falls or recent head trauma to explain the SDH. 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 Creatinine now normalized.  Leukocytosis-now normalized. UA neg, Bld Cx NGTD.  Inc activity/OOB. Shut reprogrammed 11/28 after MRI by resident Dr KEREN Sevilla.  Start SQL tonite.  The pt was Medically & Cardio optimized for planned surgery. DC to Rehab when bed available and will schedule Surgery at end of December with Dr Manley. Pt to call Optho Dr Edmondson [phone] for appt outpt.        Hosp/Med-Problem: Brain tumor, recurrent.  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 - per Neurosurgery, plan for OR in 2-3 weeks.- RCRI risk (1pt for CVA, Class II risk), 6.0% 30 day risk of death, MI, or cardiac arrest. Echo w normal LVSF.     - patient's home antihypertensive regimen will need to be adjusted based on his BP trends for further optimization (patient was on 4 antihypertensives prior to admission but presented with hypotension).  Problem: SDH (subdural hematoma).  Plan: management as per neurosurgery.     Problem: Hydrocephalus.  Plan:  shunt management as per neurosurgery. Problem: Hypertension.  Plan: Home BP meds had been held due to initial hypotension. BP now improved, Norvasc added on 11/30. Monitor. Problem: Anxiety.  Plan: - c/w home clonazepam (ISTOP in intial medicine consult note). Problem: Diabetes. Plan: A1C 6.4. Fingersticks well controlled. Resume Metformin bid at discharge.  Problem: Smoker.  Plan: patient vapes at home, has nicotine craving. Continue Nicotine patch. PT recommending Rehab. Electronic Signatures:    Myrtle Hernandez)        Cardio-Abnormal EKG. Echo shows normal LV function without segmental wma. DM. Monitor finger stick. Insulin coverage. Diabetic education and Diabetic diet. Consider nutrition consultation. PreOP Based on current ACC/AHA guidelines, patient history and physical exam, the patient is considered to have elevated risk echo shows normal LV function. normal wall motion. asymptomatic from cardiac standpoint. may proceed to OR as planned. Attending Attestation:  Advanced care planning was discussed with patient and family.  Risks, benefits and alternatives of the cardiac treatments and medical therapy including procedures were discussed in detail and all questions were answered. Importance of compliance with medical therapy and lifestyle modification to improve cardiovascular health were addressed. Appropriate forms and patient educational materials were reviewed. 30 minutes face to face spent. Electronic Signatures: Paulino Sahu)        Respiratory: Patient instructed to use incentive spirometer [ ] YES [X ] NO                  DVT ppx: [ ] SQL-Hold [ ] SQH and Venodynes [ ] Left [ ] Right [ X] Bilateral        Discharge Planning:  PT/OT/PMR-recomm A. Rehab.

## 2019-12-03 NOTE — DISCHARGE NOTE NURSING/CASE MANAGEMENT/SOCIAL WORK - PATIENT PORTAL LINK FT
You can access the FollowMyHealth Patient Portal offered by [facility] by registering at the following website: [email or web address]. By joining [employer]’s FollowMyHealth portal, you will also be able to view your health information using other applications (apps) compatible with our system.

## 2019-12-03 NOTE — H&P ADULT - NSHPSOCIALHISTORY_GEN_ALL_CORE
FUNCTIONAL HISTORY  Lives with family in private house split level with 6 stairs per level.  Patient uses RW at times but uses wheelchair for longer distances  Independent for toileting and dressing, son helps with bathing.    Patient states he uses wheelchair most of the time but walks short distances with RW.  Patient had prior cva and was in acute and subacute rehab in the past. He states he does not want to go to subacute rehab but would be willing to go to acute rehab if needed.      CURRENT FUNCTIONAL STATUS  BM: cg  T: mod  ambulated 5 steps min assist

## 2019-12-03 NOTE — PROGRESS NOTE ADULT - PROBLEM SELECTOR PROBLEM 1
Brain tumor, recurrent
H/O brain tumor
SDH (subdural hematoma)
H/O brain tumor
Brain tumor, recurrent

## 2019-12-03 NOTE — DISCHARGE NOTE NURSING/CASE MANAGEMENT/SOCIAL WORK - NSDCPEWEB_GEN_ALL_CORE
[facility] for Tobacco Control website --- [email or web address] --- [email or web address]

## 2019-12-03 NOTE — H&P ADULT - HISTORY OF PRESENT ILLNESS
64y m PMhx prior CVA w/ residual L sided weakness and paresthesias, brain "cyst" s/p removal c/b hydrocephalus s/p  shunt placement in 1980 in [place], HTN, pw worsening L sided paresthesias and transferred from [facility] for new CT findings. Pt found to have subacute SDH along R frontal lobe and large mass in third ventricle. Pt denies any falls or recent head trauma to explain the SDH. (28 Nov 2019 01:00)    Per H & P patient denied recent fall, however patient reports he fell a few days prior to admission.  Patient states he uses wheelchair most of the time but walks short distances with RW.  Patient had prior cva and was in acute and subacute rehab in the past. He states he does not want to go to subacute rehab but would be willing to go to acute rehab if needed.  Pt complains of left knee buckling.   Patient with leukocytosis during admission, workup ongoing.  Pt received IV fluids for hypotension, home anti htn medications held. 64y m PMhx prior CVA w/ residual L sided weakness and paresthesias, brain "cyst" s/p removal c/b hydrocephalus s/p  shunt placement in 1980 in [place], HTN, pw worsening L sided paresthesias and transferred from [facility] for new CT findings. Pt found to have subacute SDH along R frontal lobe and large mass in third ventricle. Pt denies any falls or recent head trauma to explain the SDH. 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  Hospital course complicated  with leukocytosis during admission, workup ongoing.  Pt received IV fluids for hypotension, home anti htn medications held.

## 2019-12-03 NOTE — PROGRESS NOTE ADULT - SUBJECTIVE AND OBJECTIVE BOX
SUBJECTIVE: Appears well, sitting at edge of bed adama diet.  Still with Lt side paresthesia, pain better.    OVERNIGHT EVENTS: None    Vital Signs Last 24 Hrs  T(C): 37.3 (03 Dec 2019 08:07), Max: 37.3 (03 Dec 2019 08:07)  T(F): 99.1 (03 Dec 2019 08:07), Max: 99.1 (03 Dec 2019 08:07)  HR: 66 (03 Dec 2019 08:07) (66 - 78)  BP: 127/80 (03 Dec 2019 08:07) (120/80 - 132/79)  BP(mean): --  RR: 17 (03 Dec 2019 08:07) (17 - 18)  SpO2: 97% (03 Dec 2019 08:07) (97% - 99%)  IVL  DIET: [ ] Regular [ X] CCD [ ] Renal [ ] Puree [ ] Dysphagia [ ] Tube Feeds:   PCA: [ ] YES [ X] NO   RUSSELL: [ ] YES [X ] NO [X ] VOID & incontinence of Urine  BM: [ X] YES-on 11/28 x2, 12/2    DRAINS: None    PHYSICAL EXAM:    General: No Acute Distress     Neurological: Stable. Awake, alert oriented to person, place and time, Following Commands, PERRL, EOMI, Face Symmetrical, Rt eye-count fingers, Lt eye peripheral vision only. Speech Fluent, No Drift, HINSON 5/5, except LUE 4+/5 and sl contracted at elbow (Hx CVA). Sensation to Light Touch Intact    Pulmonary: Clear to Auscultation, No Rales, No Rhonchi, No Wheezes     Cardiovascular: S1, S2, Regular Rate and Rhythm     Gastrointestinal: Soft, Nontender, Nondistended     Incision: N/A    LABS:                                   13.0   9.61  )-----------( 349      ( 02 Dec 2019 08:22 )             39.4     12-02    136  |  102  |  21  ----------------------------<  107<H>  3.9   |  20<L>  |  1.14    Ca    9.2      02 Dec 2019 07:09    TPro  8.0  /  Alb  4.2  /  TBili  0.6  /  DBili  x   /  AST  12  /  ALT  20  /  AlkPhos  124<H>  11-27    PT/INR - ( 27 Nov 2019 22:19 )   PT: 13.3 sec;   INR: 1.16 ratio    PTT - ( 27 Nov 2019 22:19 )  PTT:35.5 sec    Urinalysis (11.28.19 @ 23:53)    pH Urine: 5.5    Glucose Qualitative, Urine: Negative    Blood, Urine: Negative    Color: Yellow    Urine Appearance: Clear    Bilirubin: Negative    Ketone - Urine: Negative    Specific Gravity: 1.038    Protein, Urine: Trace    Urobilinogen: 3 mg/dL    Nitrite: Negative    Leukocyte Esterase Concentration: Negative    Culture - Blood in AM (11.29.19 @ 08:25)    Specimen Source: .Blood Blood-Peripheral    Culture Results:   No growth to date.    I&O's Summary    02 Dec 2019 07:01  -  03 Dec 2019 07:00  --------------------------------------------------------  IN: 940 mL / OUT: 1750 mL / NET: -810 mL    IMAGING:   < from: Xray Knee w/Patella 4 View, Left (11.30.19 @ 15:50) >  No acute osseous abnormality.    < from: Transthoracic Echocardiogram (12.01.19 @ 08:02) >  Conclusions:  1. Mitral annular calcification, otherwise normal mitral  valve.  2. Aortic valve not well visualized; probably normal.  3. Normal left ventricular systolic function. No segmental  wall motion abnormalities.  4. Normal right ventricular size and function.    < from: Xray Shuntogram  Shunt (11.27.19 @ 23:34) >  There is a  shuntcoursing out of a right-sided mirian hole.    The  shunt tip is identified in the region of the right lateral   ventricle and courses within the soft tissues of the right neck, right   hemithorax, and finally into the abdomen. The distal end is coiled within   the lower abdomen. There is no discontinuity or kinks.    IMPRESSION:    shunt identified originating from the right mirian hole and ending in   the lower abdomen without kinks or discontinuity.    < from: MR Head w/wo IV Cont (11.28.19 @ 15:22) >  MRI of the brain was performed using sagittal T1, axial T1 T2 T2 FLAIR   diffusion and susceptibility weighted sequence. The patient was injected   with approximately 8 cc of Gadavist IV with 2 cc conscious discarded.   Sagittal coronal and axial T1-weighted sequences were performed.    There is evidence of abnormal midline lesion seen in the septum   pellucidum region. This lesion does demonstrate subtle area of   enhancement anteriorly as well as calcification on prior head CT.   Approximately 2.9 x 2.1 x 3.3 cm. This lesion was present on prior CT   scan. Mass effect on the third ventricle is seen with dilatation of the   third and lateral ventricles seen and normal appearing fourth ventricle.    Diffuse smooth dural enhancement is seen which is likely the result of   patient's shunt catheter.    Right frontal shunt catheter is identified.     There is extra-axial collection seen involving the left frontal region   again seen. This collection measures approximately 1.2 cm in widest   diameter.     Abnormal T2 prolongation involving the rightfrontal region is identified   which is likely compatible area gliosis.    The visualized paranasal sinuses mastoid and middle ear regions appear   clear.    The patient is status post cataract surgery.    Impression: Abnormal lesion as described above    < from: CT Head No Cont (11.28.19 @ 00:07) >  IMPRESSION:  Unchanged bilateral subdural collections.    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 MRI is recommended for further evaluation.    < from: Xray Shuntogram  Shunt (11.27.19 @ 23:34) >   shunt identified originating from the right mirian hole and ending in   the lower abdomen without kinks or discontinuity.    MEDICATIONS  (STANDING):  amLODIPine   Tablet 5 milliGRAM(s) Oral at bedtime  dextrose 5%. 1000 milliLiter(s) (50 mL/Hr) IV Continuous <Continuous>  dextrose 50% Injectable 12.5 Gram(s) IV Push once  dextrose 50% Injectable 25 Gram(s) IV Push once  dextrose 50% Injectable 25 Gram(s) IV Push once  DULoxetine 60 milliGRAM(s) Oral daily  enoxaparin Injectable 40 milliGRAM(s) SubCutaneous daily  folic acid 1 milliGRAM(s) Oral daily  gabapentin 300 milliGRAM(s) Oral three times a day  insulin lispro (HumaLOG) corrective regimen sliding scale   SubCutaneous three times a day before meals  insulin lispro (HumaLOG) corrective regimen sliding scale   SubCutaneous at bedtime  melatonin 3 milliGRAM(s) Oral at bedtime  multivitamin 1 Tablet(s) Oral daily  nicotine - 21 mG/24Hr(s) Patch 1 patch Transdermal daily  nystatin Powder 1 Application(s) Topical three times a day  polyethylene glycol 3350 17 Gram(s) Oral two times a day  senna 2 Tablet(s) Oral at bedtime  simvastatin 20 milliGRAM(s) Oral at bedtime    MEDICATIONS  (PRN):  acetaminophen   Tablet .. 650 milliGRAM(s) Oral every 6 hours PRN Mild Pain (1 - 3)  bisacodyl 5 milliGRAM(s) Oral daily PRN Constipation  clonazePAM  Tablet 0.5 milliGRAM(s) Oral every 12 hours PRN anxiety/panic attack  dextrose 40% Gel 15 Gram(s) Oral once PRN Blood Glucose LESS THAN 70 milliGRAM(s)/deciliter  glucagon  Injectable 1 milliGRAM(s) IntraMuscular once PRN Glucose LESS THAN 70 milligrams/deciliter  ondansetron Injectable 4 milliGRAM(s) IV Push every 6 hours PRN Nausea and/or Vomiting  oxyCODONE    IR 5 milliGRAM(s) Oral every 4 hours PRN Moderate Pain (4 - 6)

## 2019-12-03 NOTE — H&P ADULT - NSHPLABSRESULTS_GEN_ALL_CORE
13.0   9.61  )-----------( 349      ( 02 Dec 2019 08:22 )             39.4     12-02    136  |  102  |  21  ----------------------------<  107<H>  3.9   |  20<L>  |  1.14    Ca    9.2      02 Dec 2019 07:09

## 2019-12-03 NOTE — H&P ADULT - ATTENDING COMMENTS
Patient seen and examined. Agree with note/plan.   Begin PT/OT/Speech eval.  Plan for OR in 4 weeks. Will coordinate with NSG  Labs/vitals stable     MEDICAL PROGNOSIS: GOOD            REHAB POTENTIAL: GOOD             ESTIMATED DISPOSITION: HOME            ELOS: 10-14 Days   EXPECTED THERAPY:     P.T. 1hr/day  and   O.T. 1hr/day and if necessary  S.L.P. 1hr/day  and if necessary  P&O    EXP FREQUENCY: 5 days per 7 day period     PRESCREEN COMPARISON:   I have reviewed the prescreen information and I have found no relevant changes between the preadmission screening and my post admission evaluation     RATIONALE FOR INPATIENT ADMISSION - Patient demonstrates the following: (check all that apply)  [X] Medically appropriate for rehabilitation admission  [X] Has attainable rehab goals with an appropriate initial discharge plan  [X] Has rehabilitation potential (expected to make a significant improvement within a reasonable period of time)   [X] Requires close medical management by a rehab physician, rehab nursing care, Hospitalist and comprehensive interdisciplinary team (including PT, OT, & or SLP, Prosthetics and Orthotics)

## 2019-12-03 NOTE — DISCHARGE NOTE NURSING/CASE MANAGEMENT/SOCIAL WORK - NSDCFUADDAPPT_GEN_ALL_CORE_FT
Please call your Neurosurgeon to schedule surgery for the end of December if he has not contacted you,    [facility] to schedule an appointment AS[facility].

## 2019-12-03 NOTE — DISCHARGE NOTE NURSING/CASE MANAGEMENT/SOCIAL WORK - NSDCPEEMAIL_GEN_ALL_CORE
[facility] for Tobacco Control email [email or web address]

## 2019-12-03 NOTE — DISCHARGE NOTE PROVIDER - NSDCACTIVITY_GEN_ALL_CORE
No heavy lifting/straining/Stairs allowed/Bathing allowed/Do not drive or operate machinery/Showering allowed/Do not make important decisions/Walking - Outdoors allowed/Walking - Indoors allowed

## 2019-12-03 NOTE — DISCHARGE NOTE PROVIDER - CARE PROVIDERS DIRECT ADDRESSES
[email or web address]

## 2019-12-03 NOTE — DISCHARGE NOTE PROVIDER - CARE PROVIDER_API CALL
Tamar Manely)  Neurosurgery  General  [address] [facility], [address] [place] [id number]  Phone: [phone]  Fax: [phone]  Follow Up Time:     Denis Franklin)  Ophthalmology  [address] [place] [address], [address]  [place] [address]  Phone: [phone]  Fax: [phone]  Follow Up Time:

## 2019-12-03 NOTE — DISCHARGE NOTE PROVIDER - NSDCFUADDAPPT_GEN_ALL_CORE_FT
Please call your Neurosurgeon to schedule surgery for the end of December if he has not contacted you,    [facility] to schedule an appointment ASAP.

## 2019-12-03 NOTE — H&P ADULT - ASSESSMENT
ASSESSMENT/PLAN  64 year-old Man with a PMH of CVA who was found to have subacute SDH along R frontal lobe and large mass in third ventricle and is now with Gait Instability, ADL impairments and Functional impairments.    COMORBIDITIES / ACTIVE MEDICAL ISSUES     Gait Instability, ADL impairments and Functional impairments 2/2 SDH and Brain Mass :   - start Comprehensive Rehab Program of PT/OT   - Pain Mgmt - Tylenol PRN, Oxycodone PRN, gabapentin, cymbalta   - GI/Bowel Mgmt -  Senna,  Dulcolax PRN, Miralax PRN,  - /Bladder Mgmt -  PVR  - sleep - melatonin     Cardio  - HTN/HLD   - norvasc , Statin   - Home BP meds had been held due to initial hypotension. BP now improved, Norvasc added on 11/30. Monitor.    Anxiety  - prn clonazepam     FEN   - Diet - Regular + Thins  ([facility])       Precautions / PROPHYLAXIS:   - Falls, Seizure  - ortho: Weight bearing status: WBAT   - Pressure injury/Skin: Turn Q2hrs while in bed, OOB to Chair, PT/OT    - DVT: Lovenox, SCDs, TEDs     MEDICAL PROGNOSIS: GOOD            REHAB POTENTIAL: GOOD             ESTIMATED DISPOSITION: HOME            ELOS: 10-14 Days   EXPECTED THERAPY:     P.T. 1hr/day  and   O.T. 1hr/day and if necessary  S.L.P. 1hr/day  and if necessary  P&O    EXP FREQUENCY: 5 days per 7 day period     PRESCREEN COMPARISON:   I have reviewed the prescreen information and I have found no relevant changes between the preadmission screening and my post admission evaluation     RATIONALE FOR INPATIENT ADMISSION - Patient demonstrates the following: (check all that apply)  [X] Medically appropriate for rehabilitation admission  [X] Has attainable rehab goals with an appropriate initial discharge plan  [X] Has rehabilitation potential (expected to make a significant improvement within a reasonable period of time)   [X] Requires close medical management by a rehab physician, rehab nursing care, Hospitalist and comprehensive interdisciplinary team (including PT, OT, & or SLP, Prosthetics and Orthotics)

## 2019-12-03 NOTE — DISCHARGE NOTE PROVIDER - REASON FOR ADMISSION
SDH, hydrocephalus, Third ventricular tumor.  64y m PMhx prior CVA w/ residual L sided weakness and paresthesias, brain "cyst" s/p removal c/b hydrocephalus s/p  shunt placement in 1980 in [place], HTN, pw worsening L sided paresthesias and transferred from [facility] for new CT findings. Pt found to have subacute SDH along R frontal lobe and large mass in third ventricle. Pt denies any falls or recent head trauma to explain the SDH. SDH, hydrocephalus, Third ventricular tumor

## 2019-12-03 NOTE — H&P ADULT - NSHPREVIEWOFSYSTEMS_GEN_ALL_CORE
[X] Constitutional WNL       [X] HEENT   [X] Cardio WNL              [X] Resp WNL            [X] GI WNL                            [X]  WNL                               [X] MSK WNL            [X] Neuro WNL                     [X] Cognitive WNL   [X] Psych WNL  [X] Skin WNL      [X] Heme WNL              [X] Endo WNL [X] Constitutional WNL       [X] HEENT   [X] Cardio WNL              [X] Resp WNL            [X] GI WNL                            [X]  WNL                               [X] MSK WNL            [X] Neuro +Hx left sided weakness, fine motor impairment on left  [X] Cognitive WNL +worked at [employer]   [X] Psych WNL  [X] Skin WNL

## 2019-12-04 LAB
CULTURE RESULTS: SIGNIFICANT CHANGE UP
GLUCOSE BLDC GLUCOMTR-MCNC: 102 MG/DL — HIGH (ref 70–99)
GLUCOSE BLDC GLUCOMTR-MCNC: 104 MG/DL — HIGH (ref 70–99)
GLUCOSE BLDC GLUCOMTR-MCNC: 125 MG/DL — HIGH (ref 70–99)
GLUCOSE BLDC GLUCOMTR-MCNC: 170 MG/DL — HIGH (ref 70–99)
MAGNESIUM SERPL-MCNC: 2 MG/DL — SIGNIFICANT CHANGE UP (ref 1.6–2.6)
PHOSPHATE SERPL-MCNC: 4.5 MG/DL — SIGNIFICANT CHANGE UP (ref 2.5–4.5)
SPECIMEN SOURCE: SIGNIFICANT CHANGE UP

## 2019-12-04 PROCEDURE — 99223 1ST HOSP IP/OBS HIGH 75: CPT | Mod: GC

## 2019-12-04 PROCEDURE — 99223 1ST HOSP IP/OBS HIGH 75: CPT

## 2019-12-04 RX ADMIN — Medication 50 MILLIGRAM(S): at 20:15

## 2019-12-04 RX ADMIN — ENOXAPARIN SODIUM 40 MILLIGRAM(S): 100 INJECTION SUBCUTANEOUS at 20:15

## 2019-12-04 RX ADMIN — Medication 650 MILLIGRAM(S): at 12:57

## 2019-12-04 RX ADMIN — AMLODIPINE BESYLATE 5 MILLIGRAM(S): 2.5 TABLET ORAL at 05:34

## 2019-12-04 RX ADMIN — Medication 1 PATCH: at 19:40

## 2019-12-04 RX ADMIN — Medication 1 TABLET(S): at 12:57

## 2019-12-04 RX ADMIN — Medication 1 PATCH: at 12:57

## 2019-12-04 RX ADMIN — GABAPENTIN 300 MILLIGRAM(S): 400 CAPSULE ORAL at 20:14

## 2019-12-04 RX ADMIN — Medication 650 MILLIGRAM(S): at 13:02

## 2019-12-04 RX ADMIN — DULOXETINE HYDROCHLORIDE 60 MILLIGRAM(S): 30 CAPSULE, DELAYED RELEASE ORAL at 12:56

## 2019-12-04 RX ADMIN — Medication 3 MILLIGRAM(S): at 20:15

## 2019-12-04 RX ADMIN — Medication 650 MILLIGRAM(S): at 17:09

## 2019-12-04 RX ADMIN — GABAPENTIN 300 MILLIGRAM(S): 400 CAPSULE ORAL at 13:02

## 2019-12-04 RX ADMIN — SENNA PLUS 2 TABLET(S): 8.6 TABLET ORAL at 20:15

## 2019-12-04 RX ADMIN — Medication 650 MILLIGRAM(S): at 06:00

## 2019-12-04 RX ADMIN — Medication 650 MILLIGRAM(S): at 05:33

## 2019-12-04 RX ADMIN — ATORVASTATIN CALCIUM 10 MILLIGRAM(S): 80 TABLET, FILM COATED ORAL at 20:14

## 2019-12-04 RX ADMIN — Medication 1 MILLIGRAM(S): at 12:56

## 2019-12-04 NOTE — CONSULT NOTE ADULT - ASSESSMENT
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    dm2- ISS Accu-Cheks,  was on metformin at home, A1C 6.4    HTN-- norvasc     HLD- Statin     insomnia- melatonin     Anxiety- clonazepam prn    DVT: Lovenox    former smoker- nicotine patch Transdermal daily    will follow, c/w current care

## 2019-12-04 NOTE — DIETITIAN INITIAL EVALUATION ADULT. - DIET TYPE
on Consistent Carbohydrate Diet w/ Thin Liquids  Nutrition Education Provided on Consistent Carbohydrate DIet  Patient Does Not Follow Diet @Home, Consumed 2Meals a Day  & Doesn't Take Vitamin/Supplements @Home

## 2019-12-04 NOTE — DIETITIAN INITIAL EVALUATION ADULT. - ENERGY NEEDS
Height: 70Inches   Weight: 198lb   Body Mass Index (BMI): 28.5kg/m2   Ideal Body Weight Range: 166lb (+/-10%)   Percent Ideal Body Weight ~119%

## 2019-12-04 NOTE — DIETITIAN INITIAL EVALUATION ADULT. - OTHER INFO
64yr Old Male - Dx: SDH - Initial Assessment - Diet - Consistent Carbohydrate Diet w/ Thin Liquids, Denies Food Allergy/Intolerance, Tolerates Diet Well, No Chewing/Swallowing Complications (Per Patient), No Pressure Ulcers (as Per Nursing Flow Sheets), No Edema Noted (as Per Nursing Flow Sheets), No Recent Nausea/Vomiting/Diarrhea & Some Constipation (as Per Patient)

## 2019-12-04 NOTE — DIETITIAN INITIAL EVALUATION ADULT. - ADD RECOMMEND
1) Monitor Weights, Intake, Tolerance, Skin, POCT & Labwork   2) Nutrition Education Provided on Consistent Carbohydrate Diet 4) Continue Nutrition Plan of Care

## 2019-12-04 NOTE — DIETITIAN INITIAL EVALUATION ADULT. - PERTINENT LABORATORY DATA
12/3 - Na-137  K-4.0  BUN-31H  Creat-1.51H  Gluc-130H  Hemoglobin A1c -  6.4H  POCT (over Last 2 Days) - Ranging from

## 2019-12-05 LAB
GLUCOSE BLDC GLUCOMTR-MCNC: 101 MG/DL — HIGH (ref 70–99)
GLUCOSE BLDC GLUCOMTR-MCNC: 109 MG/DL — HIGH (ref 70–99)
GLUCOSE BLDC GLUCOMTR-MCNC: 113 MG/DL — HIGH (ref 70–99)
GLUCOSE BLDC GLUCOMTR-MCNC: 99 MG/DL — SIGNIFICANT CHANGE UP (ref 70–99)

## 2019-12-05 PROCEDURE — 99232 SBSQ HOSP IP/OBS MODERATE 35: CPT | Mod: GC

## 2019-12-05 PROCEDURE — 99232 SBSQ HOSP IP/OBS MODERATE 35: CPT

## 2019-12-05 RX ADMIN — Medication 650 MILLIGRAM(S): at 06:08

## 2019-12-05 RX ADMIN — SENNA PLUS 2 TABLET(S): 8.6 TABLET ORAL at 21:33

## 2019-12-05 RX ADMIN — Medication 650 MILLIGRAM(S): at 17:35

## 2019-12-05 RX ADMIN — ENOXAPARIN SODIUM 40 MILLIGRAM(S): 100 INJECTION SUBCUTANEOUS at 21:33

## 2019-12-05 RX ADMIN — AMLODIPINE BESYLATE 5 MILLIGRAM(S): 2.5 TABLET ORAL at 05:41

## 2019-12-05 RX ADMIN — DULOXETINE HYDROCHLORIDE 60 MILLIGRAM(S): 30 CAPSULE, DELAYED RELEASE ORAL at 12:39

## 2019-12-05 RX ADMIN — Medication 650 MILLIGRAM(S): at 03:00

## 2019-12-05 RX ADMIN — Medication 3 MILLIGRAM(S): at 21:33

## 2019-12-05 RX ADMIN — GABAPENTIN 300 MILLIGRAM(S): 400 CAPSULE ORAL at 13:31

## 2019-12-05 RX ADMIN — ATORVASTATIN CALCIUM 10 MILLIGRAM(S): 80 TABLET, FILM COATED ORAL at 21:33

## 2019-12-05 RX ADMIN — GABAPENTIN 300 MILLIGRAM(S): 400 CAPSULE ORAL at 21:33

## 2019-12-05 RX ADMIN — Medication 1 PATCH: at 12:38

## 2019-12-05 RX ADMIN — Medication 650 MILLIGRAM(S): at 17:34

## 2019-12-05 RX ADMIN — Medication 50 MILLIGRAM(S): at 21:33

## 2019-12-05 RX ADMIN — Medication 1 TABLET(S): at 12:38

## 2019-12-05 RX ADMIN — Medication 650 MILLIGRAM(S): at 05:42

## 2019-12-05 RX ADMIN — Medication 650 MILLIGRAM(S): at 21:54

## 2019-12-05 RX ADMIN — POLYETHYLENE GLYCOL 3350 17 GRAM(S): 17 POWDER, FOR SOLUTION ORAL at 17:33

## 2019-12-05 RX ADMIN — GABAPENTIN 300 MILLIGRAM(S): 400 CAPSULE ORAL at 05:41

## 2019-12-05 RX ADMIN — Medication 1 PATCH: at 17:35

## 2019-12-05 RX ADMIN — Medication 650 MILLIGRAM(S): at 12:39

## 2019-12-05 RX ADMIN — Medication 650 MILLIGRAM(S): at 23:00

## 2019-12-05 RX ADMIN — Medication 1 MILLIGRAM(S): at 12:38

## 2019-12-05 NOTE — PROGRESS NOTE ADULT - SUBJECTIVE AND OBJECTIVE BOX
64y m PMhx prior CVA w/ residual L sided weakness and paresthesias, brain "cyst" s/p removal c/b hydrocephalus s/p  shunt placement in 1980 in [place], HTN, pw worsening L sided paresthesias and transferred from [facility] for new CT findings. Pt found to have subacute SDH along R frontal lobe and large mass in third ventricle. Pt denies any falls or recent head trauma to explain the SDH. 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  Hospital course complicated  with leukocytosis during admission, workup ongoing.  Pt received IV fluids for hypotension, home anti htn medications held.      seen at the bedside, no new complaints, still with left sided chronic pain. no n/v, no sob    Vital Signs Last 24 Hrs  T(C): 36.8 (05 Dec 2019 08:20), Max: 36.8 (05 Dec 2019 08:20)  T(F): 98.2 (05 Dec 2019 08:20), Max: 98.2 (05 Dec 2019 08:20)  HR: 70 (05 Dec 2019 08:20) (70 - 70)  BP: 131/93 (05 Dec 2019 08:20) (125/80 - 131/93)  BP(mean): --  RR: 14 (05 Dec 2019 08:20) (14 - 14)  SpO2: 97% (05 Dec 2019 08:20) (96% - 97%)      GENERAL- NAD  EAR/NOSE/MOUTH/THROAT - no pharyngeal exudates, no oral lesions  MMM  EYES- CORINNE, conjunctiva and Sclera clear  NECK- supple  RESPIRATORY-  clear to auscultation bilaterally  CARDIOVASCULAR - SIS2, RRR  GI - soft NT BS present  EXTREMITIES- no pedal edema  NEUROLOGY- no gross focal deficits  SKIN- no rashes, warm to touch  PSYCHIATRY- AAO X 3  MUSCULOSKELETAL- ROM normal                x                    x    | x    | x            x     >-----------< x       ------------------------< x                     x                    x    | x    | x                                            Ca x     Mg 2.0   Ph 4.5          CAPILLARY BLOOD GLUCOSE      POCT Blood Glucose.: 99 mg/dL (05 Dec 2019 11:59)  POCT Blood Glucose.: 109 mg/dL (05 Dec 2019 07:58)  POCT Blood Glucose.: 170 mg/dL (04 Dec 2019 20:11)  POCT Blood Glucose.: 125 mg/dL (04 Dec 2019 17:07)

## 2019-12-05 NOTE — PROGRESS NOTE ADULT - ASSESSMENT
64 year-old Man with a PMH of CVA who was found to have subacute SDH along R frontal lobe and large mass in third ventricle and is now with Gait Instability, ADL impairments and Functional impairments.    COMORBIDITIES / ACTIVE MEDICAL ISSUES     Gait Instability, ADL impairments and Functional impairments 2/2 SDH and Brain Mass :    Comprehensive Rehab Program of PT/OT with focus on RUE/RLE strengthening, balance and coordination training  - Pain Mgmt - Tylenol PRN, Oxycodone PRN,  cymbalta - added Lyrica 25 mg TID   - GI/Bowel Mgmt -  Senna,  Dulcolax PRN, Miralax PRN,  - /Bladder Mgmt -  PVR  - sleep - melatonin     Cardio  - HTN/HLD   - norvasc , Statin   Anxiety- chronic problem   - prn clonazepam     Team Conference 12/5/19  PT: mod a for gait, min a for transfers  OT: min a for transfers, LE dressing  barriers to dc: lives with son, but alone all day, poor safety awareness

## 2019-12-05 NOTE — PROGRESS NOTE ADULT - SUBJECTIVE AND OBJECTIVE BOX
64y m PMhx prior CVA w/ residual L sided weakness and paresthesias, brain "cyst" s/p removal c/b hydrocephalus s/p  shunt placement in 1980 in [place], HTN, pw worsening L sided paresthesias and transferred from [facility] for new CT findings. Pt found to have subacute SDH along R frontal lobe and large mass in third ventricle. Pt denies any falls or recent head trauma to explain the SDH. 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     Interval: continues to have whole body right sided pain.       CURRENT FUNCTIONAL STATUS      REVIEW OF SYSTEMS  No chest pain, SOB, nausea, vomiting, diarrhea        RECENT LABS/IMAGING  CBC Full  -  ( 03 Dec 2019 22:00 )  WBC Count : 9.29 K/uL  RBC Count : 4.86 M/uL  Hemoglobin : 12.7 g/dL  Hematocrit : 38.6 %  Platelet Count - Automated : 344 K/uL  Mean Cell Volume : 79.4 fl  Mean Cell Hemoglobin : 26.1 pg  Mean Cell Hemoglobin Concentration : 32.9 gm/dL  Auto Neutrophil # : 5.92 K/uL  Auto Lymphocyte # : 2.27 K/uL  Auto Monocyte # : 0.74 K/uL  Auto Eosinophil # : 0.22 K/uL  Auto Basophil # : 0.10 K/uL  Auto Neutrophil % : 63.7 %  Auto Lymphocyte % : 24.4 %  Auto Monocyte % : 8.0 %  Auto Eosinophil % : 2.4 %  Auto Basophil % : 1.1 %    12-03    137  |  103  |  31<H>  ----------------------------<  130<H>  4.0   |  23  |  1.51<H>    Ca    8.7      03 Dec 2019 22:00  Phos  4.5     12-04  Mg     2.0     12-04    TPro  7.5  /  Alb  3.2<L>  /  TBili  0.4  /  DBili  x   /  AST  15  /  ALT  25  /  AlkPhos  116  12-03        VITALS  T(C): 36.8 (12-05-19 @ 08:20), Max: 36.8 (12-05-19 @ 08:20)  HR: 70 (12-05-19 @ 08:20) (70 - 70)  BP: 131/93 (12-05-19 @ 08:20) (125/80 - 131/93)  RR: 14 (12-05-19 @ 08:20) (14 - 14)  SpO2: 97% (12-05-19 @ 08:20) (96% - 97%)  Wt(kg): --    MEDICATIONS   acetaminophen   Tablet .. 650 milliGRAM(s) every 6 hours  amLODIPine   Tablet 5 milliGRAM(s) daily  atorvastatin 10 milliGRAM(s) at bedtime  clonazePAM  Tablet 0.5 milliGRAM(s) every 12 hours PRN  dextrose 40% Gel 15 Gram(s) once PRN  dextrose 5%. 1000 milliLiter(s) <Continuous>  dextrose 50% Injectable 12.5 Gram(s) once  dextrose 50% Injectable 25 Gram(s) once  dextrose 50% Injectable 25 Gram(s) once  DULoxetine 60 milliGRAM(s) daily  enoxaparin Injectable 40 milliGRAM(s) at bedtime  folic acid 1 milliGRAM(s) daily  gabapentin 300 milliGRAM(s) three times a day  glucagon  Injectable 1 milliGRAM(s) once PRN  insulin lispro (HumaLOG) corrective regimen sliding scale   three times a day before meals  melatonin 3 milliGRAM(s) at bedtime  multivitamin 1 Tablet(s) daily  nicotine - 21 mG/24Hr(s) Patch 1 patch daily  oxyCODONE    IR 5 milliGRAM(s) every 4 hours PRN  polyethylene glycol 3350 17 Gram(s) two times a day  pregabalin 50 milliGRAM(s) at bedtime  senna 2 Tablet(s) at bedtime        PE:    Vital Signs Last 24 Hrs  T(C): 36.8 (05 Dec 2019 08:20), Max: 36.8 (05 Dec 2019 08:20)  T(F): 98.2 (05 Dec 2019 08:20), Max: 98.2 (05 Dec 2019 08:20)  HR: 70 (05 Dec 2019 08:20) (70 - 70)  BP: 131/93 (05 Dec 2019 08:20) (125/80 - 131/93)  BP(mean): --  RR: 14 (05 Dec 2019 08:20) (14 - 14)  SpO2: 97% (05 Dec 2019 08:20) (96% - 97%)    	General: NAD, Resting Comfortable,                                  	HEENT: NC/AT, EOM I, PERRLA, Normal Conjunctivae  	Cardio: RRR, Normal S1-S2, No M/G/R                              	Pulm: No Respiratory Distress,  Lungs CTAB                        	Abdomen: ND/NT, Soft, BS+                                                	Breast/Pelvic: not necessary   	MSK: No joint swelling, Full ROM                                         	Ext: No C/C/E, Pulses 2+ throughout, No calf tenderness    	Skin:  all skin intact                                                                 	Wounds: none  	Decubitus Ulcers: None Present     	Neurological Examination    	Cognitive: AAO x 3                                                                                                                                                  	  	Motor    	LEFT    UE: SF 5/5, EF 5/5, EE 5/5, WE 5/5, Hand Intrinsics 4/5,  dec  	RIGHT UE: SF 5/5, EF 5/5, EE 5/5, WE 5/5, Hand Intrinsics 5/5,  wnl  	LEFT    LE:  HF 5/5, KE 5/5, DF 5/5, EHL 5/5,  PF 5/5  	RIGHT LE:  HF 5/5, KE 5/5, DF 5/5, EHL 5/5,  PF 5/5       --------------------------------------------------------------------

## 2019-12-06 LAB — GLUCOSE BLDC GLUCOMTR-MCNC: 103 MG/DL — HIGH (ref 70–99)

## 2019-12-06 PROCEDURE — 99232 SBSQ HOSP IP/OBS MODERATE 35: CPT | Mod: GC

## 2019-12-06 PROCEDURE — 99232 SBSQ HOSP IP/OBS MODERATE 35: CPT

## 2019-12-06 RX ORDER — INSULIN LISPRO 100/ML
VIAL (ML) SUBCUTANEOUS
Refills: 0 | Status: DISCONTINUED | OUTPATIENT
Start: 2019-12-06 | End: 2019-12-17

## 2019-12-06 RX ADMIN — Medication 650 MILLIGRAM(S): at 17:48

## 2019-12-06 RX ADMIN — Medication 650 MILLIGRAM(S): at 12:50

## 2019-12-06 RX ADMIN — Medication 1 PATCH: at 11:30

## 2019-12-06 RX ADMIN — POLYETHYLENE GLYCOL 3350 17 GRAM(S): 17 POWDER, FOR SOLUTION ORAL at 05:41

## 2019-12-06 RX ADMIN — Medication 1 DROP(S): at 17:48

## 2019-12-06 RX ADMIN — GABAPENTIN 300 MILLIGRAM(S): 400 CAPSULE ORAL at 22:14

## 2019-12-06 RX ADMIN — Medication 50 MILLIGRAM(S): at 22:14

## 2019-12-06 RX ADMIN — ATORVASTATIN CALCIUM 10 MILLIGRAM(S): 80 TABLET, FILM COATED ORAL at 22:14

## 2019-12-06 RX ADMIN — Medication 650 MILLIGRAM(S): at 11:28

## 2019-12-06 RX ADMIN — Medication 1 PATCH: at 11:32

## 2019-12-06 RX ADMIN — Medication 1 TABLET(S): at 11:30

## 2019-12-06 RX ADMIN — Medication 3 MILLIGRAM(S): at 22:14

## 2019-12-06 RX ADMIN — Medication 650 MILLIGRAM(S): at 05:41

## 2019-12-06 RX ADMIN — AMLODIPINE BESYLATE 5 MILLIGRAM(S): 2.5 TABLET ORAL at 05:41

## 2019-12-06 RX ADMIN — SENNA PLUS 2 TABLET(S): 8.6 TABLET ORAL at 22:14

## 2019-12-06 RX ADMIN — GABAPENTIN 300 MILLIGRAM(S): 400 CAPSULE ORAL at 05:41

## 2019-12-06 RX ADMIN — Medication 1 PATCH: at 08:07

## 2019-12-06 RX ADMIN — Medication 1 MILLIGRAM(S): at 11:30

## 2019-12-06 RX ADMIN — DULOXETINE HYDROCHLORIDE 60 MILLIGRAM(S): 30 CAPSULE, DELAYED RELEASE ORAL at 11:30

## 2019-12-06 RX ADMIN — Medication 1 PATCH: at 19:23

## 2019-12-06 RX ADMIN — ENOXAPARIN SODIUM 40 MILLIGRAM(S): 100 INJECTION SUBCUTANEOUS at 22:14

## 2019-12-06 NOTE — PROGRESS NOTE ADULT - ASSESSMENT
64 year-old Man with a PMH of CVA who was found to have subacute SDH along R frontal lobe and large mass in third ventricle and is now with Gait Instability, ADL impairments and Functional impairments.    COMORBIDITIES / ACTIVE MEDICAL ISSUES     Gait Instability, ADL impairments and Functional impairments 2/2 SDH and Brain Mass :    Comprehensive Rehab Program of PT/OT with focus on RUE/RLE strengthening, balance and coordination training  - Pain Mgmt - Tylenol PRN, Oxycodone PRN,  cymbalta - added Lyrica 25 mg TID, will increase to 50 TID mon   - GI/Bowel Mgmt -  Senna,  Dulcolax PRN, Miralax PRN,  - /Bladder Mgmt -  PVR  - sleep - melatonin     Cardio  - HTN/HLD- controlled     Anxiety- chronic problem- prn clonazepam   added nasal saline, aritificial tears     Team Conference 12/5/19  PT: mod a for gait, min a for transfers  OT: min a for transfers, LE dressing  barriers to dc: lives with son, but alone all day, poor safety awareness

## 2019-12-06 NOTE — PROGRESS NOTE ADULT - ASSESSMENT
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    dm2- ISS Accu-Cheks,  was on metformin at home, A1C 6.4, diabetic diet, repeat bmp on monday if gfr improves consider d/c home on metformin 250 bid, d/w dr. olivera.    HTN-- norvasc     HLD- Statin     insomnia- melatonin     Anxiety- clonazepam prn    DVT: Lovenox    former smoker- nicotine patch Transdermal daily    will follow, c/w current care

## 2019-12-06 NOTE — PROGRESS NOTE ADULT - SUBJECTIVE AND OBJECTIVE BOX
64y m PMhx prior CVA w/ residual L sided weakness and paresthesias, brain "cyst" s/p removal c/b hydrocephalus s/p  shunt placement in 1980 in [place], HTN, pw worsening L sided paresthesias and transferred from [facility] for new CT findings. Pt found to have subacute SDH along R frontal lobe and large mass in third ventricle. Pt denies any falls or recent head trauma to explain the SDH. 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     Interval: continues to have whole body right sided pain, unsure if Lyrica is helping. + nasal congestion and dry eyes,       CURRENT FUNCTIONAL STATUS min a       REVIEW OF SYSTEMS  No chest pain, SOB, nausea, vomiting, diarrhea    Vital Signs Last 24 Hrs  T(C): 36.7 (06 Dec 2019 08:42), Max: 37.1 (05 Dec 2019 20:20)  T(F): 98.1 (06 Dec 2019 08:42), Max: 98.8 (05 Dec 2019 20:20)  HR: 74 (06 Dec 2019 08:42) (64 - 74)  BP: 129/77 (06 Dec 2019 08:42) (129/77 - 147/88)  BP(mean): --  RR: 14 (06 Dec 2019 08:42) (14 - 14)  SpO2: 97% (06 Dec 2019 08:42) (96% - 97%)    PE:	General: NAD, Resting Comfortable,                                  	HEENT: NC/AT, EOM I, PERRLA, Normal Conjunctivae  	Cardio: RRR, Normal S1-S2, No M/G/R                              	Pulm: No Respiratory Distress,  Lungs CTAB                        	Abdomen: ND/NT, Soft, BS+                                                	Breast/Pelvic: not necessary   	MSK: No joint swelling, Full ROM                                         	Ext: No C/C/E, Pulses 2+ throughout, No calf tenderness    	Skin:  all skin intact                                                                 	Wounds: none  	Decubitus Ulcers: None Present     	Neurological Examination    	Cognitive: AAO x 3                                                                                                                                                  	  	Motor    	LEFT    UE: SF 5/5, EF 5/5, EE 5/5, WE 5/5, Hand Intrinsics 4/5,  dec  	RIGHT UE: SF 5/5, EF 5/5, EE 5/5, WE 5/5, Hand Intrinsics 5/5,  wnl  	LEFT    LE:  HF 5/5, KE 5/5, DF 5/5, EHL 5/5,  PF 5/5  	RIGHT LE:  HF 5/5, KE 5/5, DF 5/5, EHL 5/5,  PF 5/5       --------------------------------------------------------------------

## 2019-12-06 NOTE — PROGRESS NOTE ADULT - SUBJECTIVE AND OBJECTIVE BOX
REHABILITATION DIAGNOSIS/IMPAIRMENT GROUP CODE:   ***    COMORBIDITIES/COMPLICATING CONDITIONS IMPACTING REHABILITATION:  HEALTH ISSUES - PROBLEM Dx:Brain Tumor,  Gait abnormality.   Gait abnormality   HTN      PAST MEDICAL & SURGICAL HISTORY:  H/O hydrocephalus  H/O brain tumor  Peripheral neuropathy  Stroke  Diabetes  Hypertension  Depression  Seizure  HTN (hypertension)  CVA (cerebral vascular accident)  S/P ventriculoperitoneal shunt  H/O craniotomy      Based upon consideration of the patient's impairments, functional status, complicating conditions and any other contributing factors and after information garnered from the assessments of all therapy disciplines involved in treating the patient and other pertinent clinicians:    INTERDISCIPLINARY REHABILITATION INTERVENTIONS:    [  X ] Transfer Training  [  X ] Bed Mobility  [  X ] Therapeutic Exercise  [X   ] Balance/Coordination Exercises  [  X ] Locomotion retraining  [   ] Stairs  [X   ] Functional Transfer Training  [ X  ] Bowel/Bladder program  [ X  ] Pain Management  [ X  ] Skin/Wound Care  [   ] Visual/Perceptual Training  [ X  ] Therapeutic Recreation Activities  [ X  ] Neuromuscular Re-education  [  X ] Activities of Daily Living  [   ] Speech Exercise  [   ] Swallowing Exercises  [   ] Vital Stim  [   ] Dietary Supplements  [   ] Calorie Count  [   ] Cognitive Exercises  [   ] Congnitive/Linguistic Treatment  [   ] Behavior Program  [  X ] Neuropsych Therapy  [  X ] Patient/Family Counseling  [ X  ] Family Training  [ X  ] Community Re-entry  [   X] Orthotic Evaluation  [   ] Prosthetic Eval/Training    MEDICAL PROGNOSIS: good   ***    REHAB POTENTIAL: fair   ***  EXPECTED DAILY THERAPY:         PT: 1 hour        OT: 1 hour        ST: 1 hour        S&O:    EXPECTED INTENSITY OF PROGRAM: 3hours a day   ***    EXPECTED FREQUENCY OF PROGRAM: 5 days a week   ***    ESTIMATED LOS: 14 d   ***    ESTIMATED DISPOSITION: home   ***    INTERDISCIPLINARY FUNCTIONAL OUTCOMES?GOALS:         Gait/Mobility: mod i        Transfers: mod i        ADLs: mod i        Functional Transfers: mod i        Medication Management: min a        Communication: independent        Cognitive: supervision        Dysphagia:       Bladder mod i        Bowel: mod i

## 2019-12-06 NOTE — PROGRESS NOTE ADULT - SUBJECTIVE AND OBJECTIVE BOX
64y m PMhx prior CVA w/ residual L sided weakness and paresthesias, brain "cyst" s/p removal c/b hydrocephalus s/p  shunt placement in 1980 in [place], HTN, pw worsening L sided paresthesias and transferred from [facility] for new CT findings. Pt found to have subacute SDH along R frontal lobe and large mass in third ventricle. Pt denies any falls or recent head trauma to explain the SDH. 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  Hospital course complicated  with leukocytosis during admission, workup ongoing.  Pt received IV fluids for hypotension, home anti htn medications held.      seen at the bedside, no new complaints,  left sided chronic pain. no n/v, no sob    Vital Signs Last 24 Hrs  T(C): 36.7 (06 Dec 2019 08:42), Max: 37.1 (05 Dec 2019 20:20)  T(F): 98.1 (06 Dec 2019 08:42), Max: 98.8 (05 Dec 2019 20:20)  HR: 74 (06 Dec 2019 08:42) (64 - 74)  BP: 129/77 (06 Dec 2019 08:42) (129/77 - 147/88)  BP(mean): --  RR: 14 (06 Dec 2019 08:42) (14 - 14)  SpO2: 97% (06 Dec 2019 08:42) (96% - 97%)      GENERAL- NAD  EAR/NOSE/MOUTH/THROAT - no pharyngeal exudates, no oral lesions  MMM  EYES- CORINNE, conjunctiva and Sclera clear  NECK- supple  RESPIRATORY-  clear to auscultation bilaterally  CARDIOVASCULAR - SIS2, RRR  GI - soft NT BS present  EXTREMITIES- no pedal edema  NEUROLOGY- no gross focal deficits  SKIN- no rashes, warm to touch  PSYCHIATRY- AAO X 3  MUSCULOSKELETAL- ROM normal     CAPILLARY BLOOD GLUCOSE      POCT Blood Glucose.: 103 mg/dL (06 Dec 2019 08:00)  POCT Blood Glucose.: 113 mg/dL (05 Dec 2019 21:27)  POCT Blood Glucose.: 101 mg/dL (05 Dec 2019 16:44)  POCT Blood Glucose.: 99 mg/dL (05 Dec 2019 11:59)

## 2019-12-07 LAB — GLUCOSE BLDC GLUCOMTR-MCNC: 113 MG/DL — HIGH (ref 70–99)

## 2019-12-07 PROCEDURE — 99232 SBSQ HOSP IP/OBS MODERATE 35: CPT

## 2019-12-07 RX ORDER — LIDOCAINE 4 G/100G
1 CREAM TOPICAL EVERY 24 HOURS
Refills: 0 | Status: DISCONTINUED | OUTPATIENT
Start: 2019-12-07 | End: 2019-12-17

## 2019-12-07 RX ORDER — FLUTICASONE PROPIONATE 50 MCG
1 SPRAY, SUSPENSION NASAL
Refills: 0 | Status: DISCONTINUED | OUTPATIENT
Start: 2019-12-07 | End: 2019-12-17

## 2019-12-07 RX ADMIN — Medication 3 MILLIGRAM(S): at 22:02

## 2019-12-07 RX ADMIN — Medication 650 MILLIGRAM(S): at 12:21

## 2019-12-07 RX ADMIN — Medication 650 MILLIGRAM(S): at 18:20

## 2019-12-07 RX ADMIN — POLYETHYLENE GLYCOL 3350 17 GRAM(S): 17 POWDER, FOR SOLUTION ORAL at 05:33

## 2019-12-07 RX ADMIN — Medication 1 PATCH: at 06:50

## 2019-12-07 RX ADMIN — LIDOCAINE 1 PATCH: 4 CREAM TOPICAL at 12:17

## 2019-12-07 RX ADMIN — GABAPENTIN 300 MILLIGRAM(S): 400 CAPSULE ORAL at 22:02

## 2019-12-07 RX ADMIN — Medication 650 MILLIGRAM(S): at 12:17

## 2019-12-07 RX ADMIN — ATORVASTATIN CALCIUM 10 MILLIGRAM(S): 80 TABLET, FILM COATED ORAL at 22:02

## 2019-12-07 RX ADMIN — GABAPENTIN 300 MILLIGRAM(S): 400 CAPSULE ORAL at 13:01

## 2019-12-07 RX ADMIN — ENOXAPARIN SODIUM 40 MILLIGRAM(S): 100 INJECTION SUBCUTANEOUS at 22:02

## 2019-12-07 RX ADMIN — Medication 1 DROP(S): at 22:02

## 2019-12-07 RX ADMIN — Medication 1 PATCH: at 12:09

## 2019-12-07 RX ADMIN — GABAPENTIN 300 MILLIGRAM(S): 400 CAPSULE ORAL at 05:33

## 2019-12-07 RX ADMIN — Medication 650 MILLIGRAM(S): at 06:50

## 2019-12-07 RX ADMIN — Medication 1 MILLIGRAM(S): at 12:18

## 2019-12-07 RX ADMIN — Medication 650 MILLIGRAM(S): at 17:30

## 2019-12-07 RX ADMIN — Medication 1 PATCH: at 20:14

## 2019-12-07 RX ADMIN — Medication 1 TABLET(S): at 12:18

## 2019-12-07 RX ADMIN — Medication 1 SPRAY(S): at 17:29

## 2019-12-07 RX ADMIN — LIDOCAINE 1 PATCH: 4 CREAM TOPICAL at 23:56

## 2019-12-07 RX ADMIN — DULOXETINE HYDROCHLORIDE 60 MILLIGRAM(S): 30 CAPSULE, DELAYED RELEASE ORAL at 12:17

## 2019-12-07 RX ADMIN — Medication 650 MILLIGRAM(S): at 05:33

## 2019-12-07 RX ADMIN — Medication 1 PATCH: at 12:17

## 2019-12-07 RX ADMIN — Medication 50 MILLIGRAM(S): at 22:02

## 2019-12-07 RX ADMIN — SENNA PLUS 2 TABLET(S): 8.6 TABLET ORAL at 22:02

## 2019-12-07 RX ADMIN — AMLODIPINE BESYLATE 5 MILLIGRAM(S): 2.5 TABLET ORAL at 05:33

## 2019-12-07 RX ADMIN — LIDOCAINE 1 PATCH: 4 CREAM TOPICAL at 20:14

## 2019-12-07 NOTE — PROGRESS NOTE ADULT - SUBJECTIVE AND OBJECTIVE BOX
64y m PMhx prior CVA w/ residual L sided weakness and paresthesias, brain "cyst" s/p removal c/b hydrocephalus s/p  shunt placement in 1980 in [place], HTN, pw worsening L sided paresthesias and transferred from [facility] for new CT findings. Pt found to have subacute SDH along R frontal lobe and large mass in third ventricle. Pt denies any falls or recent head trauma to explain the SDH. 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  Hospital course complicated  with leukocytosis during admission, workup ongoing.  Pt received IV fluids for hypotension, home anti htn medications held.      seen at the bedside, no new complaints,  left sided chronic pain due to neuropathy. no n/v, no sob      Vital Signs Last 24 Hrs  T(C): 36.8 (07 Dec 2019 08:55), Max: 36.8 (07 Dec 2019 08:55)  T(F): 98.2 (07 Dec 2019 08:55), Max: 98.2 (07 Dec 2019 08:55)  HR: 67 (07 Dec 2019 08:55) (60 - 69)  BP: 147/87 (07 Dec 2019 08:55) (142/78 - 148/86)  BP(mean): --  RR: 14 (07 Dec 2019 08:55) (14 - 14)  SpO2: 96% (07 Dec 2019 08:55) (94% - 96%)      GENERAL- NAD  EAR/NOSE/MOUTH/THROAT - no pharyngeal exudates, no oral lesions  MMM  EYES- CORINNE, conjunctiva and Sclera clear  NECK- supple  RESPIRATORY-  clear to auscultation bilaterally  CARDIOVASCULAR - SIS2, RRR  GI - soft NT BS present  EXTREMITIES- no pedal edema  NEUROLOGY- no gross focal deficits  SKIN- no rashes, warm to touch  PSYCHIATRY- AAO X 3  MUSCULOSKELETAL- ROM normal        CAPILLARY BLOOD GLUCOSE      POCT Blood Glucose.: 113 mg/dL (07 Dec 2019 08:00)

## 2019-12-07 NOTE — PROGRESS NOTE ADULT - SUBJECTIVE AND OBJECTIVE BOX
CC: Gait dysfunction    Subjective: Patient seen and evaluated at the bedside. Pain controlled, no chest pain, no nausea, vomitting, no fever, chills.   No acute events overnight. Has been tolerating rehabilitation program. He reports stuffy nose and congestion. Denies shortness of breath.    acetaminophen   Tablet .. 650 milliGRAM(s) Oral every 6 hours  amLODIPine   Tablet 5 milliGRAM(s) Oral daily  artificial tears (preservative free) Ophthalmic Solution 1 Drop(s) Both EYES two times a day PRN  atorvastatin 10 milliGRAM(s) Oral at bedtime  clonazePAM  Tablet 0.5 milliGRAM(s) Oral every 12 hours PRN  dextrose 40% Gel 15 Gram(s) Oral once PRN  dextrose 5%. 1000 milliLiter(s) IV Continuous <Continuous>  dextrose 50% Injectable 12.5 Gram(s) IV Push once  dextrose 50% Injectable 25 Gram(s) IV Push once  dextrose 50% Injectable 25 Gram(s) IV Push once  DULoxetine 60 milliGRAM(s) Oral daily  enoxaparin Injectable 40 milliGRAM(s) SubCutaneous at bedtime  fluticasone propionate 50 MICROgram(s)/spray Nasal Spray 1 Spray(s) Both Nostrils two times a day  folic acid 1 milliGRAM(s) Oral daily  gabapentin 300 milliGRAM(s) Oral three times a day  glucagon  Injectable 1 milliGRAM(s) IntraMuscular once PRN  insulin lispro (HumaLOG) corrective regimen sliding scale   SubCutaneous before breakfast  melatonin 3 milliGRAM(s) Oral at bedtime  multivitamin 1 Tablet(s) Oral daily  nicotine - 21 mG/24Hr(s) Patch 1 patch Transdermal daily  oxyCODONE    IR 5 milliGRAM(s) Oral every 4 hours PRN  polyethylene glycol 3350 17 Gram(s) Oral two times a day  pregabalin 50 milliGRAM(s) Oral at bedtime  senna 2 Tablet(s) Oral at bedtime      T(C): 36.8 (12-07-19 @ 08:55), Max: 36.8 (12-07-19 @ 08:55)  HR: 67 (12-07-19 @ 08:55) (60 - 69)  BP: 147/87 (12-07-19 @ 08:55) (142/78 - 148/86)  RR: 14 (12-07-19 @ 08:55) (14 - 14)  SpO2: 96% (12-07-19 @ 08:55) (94% - 96%)    Exam:  NAD  HEENT: EOMI  Heart: RRR, S1/2  Lungs: CTA bilaterally  Abd: soft ND  Ext: no calf pain  Neuro: exam unchanged      Impression:  Traumatic subdural hemorrhage with loss of consciousness of unspecified duration, initial encounter  Handoff  MEWS Score  H/O hydrocephalus  H/O brain tumor  History of pineal cyst  Peripheral neuropathy  Stroke  Diabetes  Hypertension  Depression  Seizure  HTN (hypertension)  CVA (cerebral vascular accident)  S/P ventriculoperitoneal shunt  H/O craniotomy      BRITTNEE POSADA 64 year-old Man with a PMH of CVA who was found to have subacute SDH along R frontal lobe and large mass in third ventricle and is now with Gait Instability, ADL impairments and Functional impairments.      Plan:  - continue medical management as per primary team  - continue PT/OT/SLP  - DVT prophylaxis  - CVS stable  - lidoderm patch of left knee  - flonase for nasal congestion  - Patient is stable to continue current rehabilitation program

## 2019-12-07 NOTE — PROGRESS NOTE ADULT - ASSESSMENT
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    dm2- ISS Accu-Cheks,  was on metformin at home, A1C 6.4, diabetic diet, repeat bmp on Monday if gfr improves consider d/c home on metformin 250 bid    HTN-- norvasc     HLD- Statin     insomnia- melatonin     Anxiety- clonazepam prn    DVT: Lovenox    former smoker- nicotine patch Transdermal daily    will follow, c/w current care

## 2019-12-08 LAB — GLUCOSE BLDC GLUCOMTR-MCNC: 111 MG/DL — HIGH (ref 70–99)

## 2019-12-08 PROCEDURE — 99232 SBSQ HOSP IP/OBS MODERATE 35: CPT

## 2019-12-08 RX ADMIN — SENNA PLUS 2 TABLET(S): 8.6 TABLET ORAL at 21:42

## 2019-12-08 RX ADMIN — Medication 1 PATCH: at 13:14

## 2019-12-08 RX ADMIN — AMLODIPINE BESYLATE 5 MILLIGRAM(S): 2.5 TABLET ORAL at 06:39

## 2019-12-08 RX ADMIN — GABAPENTIN 300 MILLIGRAM(S): 400 CAPSULE ORAL at 15:29

## 2019-12-08 RX ADMIN — POLYETHYLENE GLYCOL 3350 17 GRAM(S): 17 POWDER, FOR SOLUTION ORAL at 06:40

## 2019-12-08 RX ADMIN — DULOXETINE HYDROCHLORIDE 60 MILLIGRAM(S): 30 CAPSULE, DELAYED RELEASE ORAL at 13:13

## 2019-12-08 RX ADMIN — POLYETHYLENE GLYCOL 3350 17 GRAM(S): 17 POWDER, FOR SOLUTION ORAL at 18:01

## 2019-12-08 RX ADMIN — ENOXAPARIN SODIUM 40 MILLIGRAM(S): 100 INJECTION SUBCUTANEOUS at 21:42

## 2019-12-08 RX ADMIN — GABAPENTIN 300 MILLIGRAM(S): 400 CAPSULE ORAL at 21:42

## 2019-12-08 RX ADMIN — Medication 1 PATCH: at 07:00

## 2019-12-08 RX ADMIN — Medication 650 MILLIGRAM(S): at 13:12

## 2019-12-08 RX ADMIN — Medication 650 MILLIGRAM(S): at 13:45

## 2019-12-08 RX ADMIN — Medication 1 PATCH: at 12:30

## 2019-12-08 RX ADMIN — Medication 1 SPRAY(S): at 18:02

## 2019-12-08 RX ADMIN — LIDOCAINE 1 PATCH: 4 CREAM TOPICAL at 20:53

## 2019-12-08 RX ADMIN — Medication 50 MILLIGRAM(S): at 21:42

## 2019-12-08 RX ADMIN — Medication 650 MILLIGRAM(S): at 00:29

## 2019-12-08 RX ADMIN — GABAPENTIN 300 MILLIGRAM(S): 400 CAPSULE ORAL at 06:40

## 2019-12-08 RX ADMIN — ATORVASTATIN CALCIUM 10 MILLIGRAM(S): 80 TABLET, FILM COATED ORAL at 21:42

## 2019-12-08 RX ADMIN — Medication 650 MILLIGRAM(S): at 07:00

## 2019-12-08 RX ADMIN — Medication 650 MILLIGRAM(S): at 00:05

## 2019-12-08 RX ADMIN — Medication 1 SPRAY(S): at 09:10

## 2019-12-08 RX ADMIN — Medication 3 MILLIGRAM(S): at 21:42

## 2019-12-08 RX ADMIN — Medication 650 MILLIGRAM(S): at 06:40

## 2019-12-08 RX ADMIN — Medication 1 TABLET(S): at 13:13

## 2019-12-08 RX ADMIN — Medication 650 MILLIGRAM(S): at 18:03

## 2019-12-08 RX ADMIN — LIDOCAINE 1 PATCH: 4 CREAM TOPICAL at 13:14

## 2019-12-08 RX ADMIN — Medication 650 MILLIGRAM(S): at 18:02

## 2019-12-08 RX ADMIN — Medication 1 MILLIGRAM(S): at 13:13

## 2019-12-08 RX ADMIN — Medication 1 PATCH: at 20:53

## 2019-12-08 NOTE — PROGRESS NOTE ADULT - SUBJECTIVE AND OBJECTIVE BOX
64y m PMhx prior CVA w/ residual L sided weakness and paresthesias, brain "cyst" s/p removal c/b hydrocephalus s/p  shunt placement in 1980 in [place], HTN, pw worsening L sided paresthesias and transferred from [facility] for new CT findings. Pt found to have subacute SDH along R frontal lobe and large mass in third ventricle. Pt denies any falls or recent head trauma to explain the SDH. 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  Hospital course complicated  with leukocytosis during admission, workup ongoing.  Pt received IV fluids for hypotension, home anti htn medications held.      seen at the bedside, no new complaints,  left sided chronic pain due to neuropathy. no n/v, no sob      Vital Signs Last 24 Hrs  T(C): 36.3 (08 Dec 2019 09:00), Max: 36.9 (07 Dec 2019 21:50)  T(F): 97.3 (08 Dec 2019 09:00), Max: 98.5 (07 Dec 2019 21:50)  HR: 76 (08 Dec 2019 09:00) (75 - 83)  BP: 128/81 (08 Dec 2019 09:00) (123/73 - 147/90)  BP(mean): --  RR: 14 (08 Dec 2019 09:00) (14 - 14)  SpO2: 98% (08 Dec 2019 09:00) (95% - 98%)      GENERAL- NAD  EAR/NOSE/MOUTH/THROAT - no pharyngeal exudates, no oral lesions  MMM  EYES- CORINNE, conjunctiva and Sclera clear  NECK- supple  RESPIRATORY-  clear to auscultation bilaterally  CARDIOVASCULAR - SIS2, RRR  GI - soft NT BS present  EXTREMITIES- no pedal edema  NEUROLOGY- no gross focal deficits  SKIN- no rashes, warm to touch  PSYCHIATRY- AAO X 3  MUSCULOSKELETAL- ROM normal

## 2019-12-08 NOTE — PROGRESS NOTE ADULT - SUBJECTIVE AND OBJECTIVE BOX
CC: Gait dysfunction    Subjective: Patient seen and evaluated at the bedside. Pain controlled, no chest pain, no nausea, vomitting, no fever, chills.   No acute events overnight. Has been tolerating rehabilitation program. Congestion improved.    acetaminophen   Tablet .. 650 milliGRAM(s) Oral every 6 hours  amLODIPine   Tablet 5 milliGRAM(s) Oral daily  artificial tears (preservative free) Ophthalmic Solution 1 Drop(s) Both EYES two times a day PRN  atorvastatin 10 milliGRAM(s) Oral at bedtime  clonazePAM  Tablet 0.5 milliGRAM(s) Oral every 12 hours PRN  dextrose 40% Gel 15 Gram(s) Oral once PRN  dextrose 5%. 1000 milliLiter(s) IV Continuous <Continuous>  dextrose 50% Injectable 12.5 Gram(s) IV Push once  dextrose 50% Injectable 25 Gram(s) IV Push once  dextrose 50% Injectable 25 Gram(s) IV Push once  DULoxetine 60 milliGRAM(s) Oral daily  enoxaparin Injectable 40 milliGRAM(s) SubCutaneous at bedtime  fluticasone propionate 50 MICROgram(s)/spray Nasal Spray 1 Spray(s) Both Nostrils two times a day  folic acid 1 milliGRAM(s) Oral daily  gabapentin 300 milliGRAM(s) Oral three times a day  glucagon  Injectable 1 milliGRAM(s) IntraMuscular once PRN  insulin lispro (HumaLOG) corrective regimen sliding scale   SubCutaneous before breakfast  melatonin 3 milliGRAM(s) Oral at bedtime  multivitamin 1 Tablet(s) Oral daily  nicotine - 21 mG/24Hr(s) Patch 1 patch Transdermal daily  oxyCODONE    IR 5 milliGRAM(s) Oral every 4 hours PRN  polyethylene glycol 3350 17 Gram(s) Oral two times a day  pregabalin 50 milliGRAM(s) Oral at bedtime  senna 2 Tablet(s) Oral at bedtime      Vital Signs Last 24 Hrs  T(C): 36.9 (07 Dec 2019 21:50), Max: 36.9 (07 Dec 2019 21:50)  T(F): 98.5 (07 Dec 2019 21:50), Max: 98.5 (07 Dec 2019 21:50)  HR: 75 (08 Dec 2019 06:37) (75 - 83)  BP: 123/73 (08 Dec 2019 06:37) (123/73 - 147/90)  BP(mean): --  RR: 14 (07 Dec 2019 21:50) (14 - 14)  SpO2: 95% (07 Dec 2019 21:50) (95% - 95%)    Exam:  NAD  HEENT: EOMI  Heart: RRR, S1/2  Lungs: CTA bilaterally  Abd: soft ND  Ext: no calf pain  Neuro: exam unchanged      Impression:  Traumatic subdural hemorrhage with loss of consciousness of unspecified duration, initial encounter  Handoff  MEWS Score  H/O hydrocephalus  H/O brain tumor  History of pineal cyst  Peripheral neuropathy  Stroke  Diabetes  Hypertension  Depression  Seizure  HTN (hypertension)  CVA (cerebral vascular accident)  S/P ventriculoperitoneal shunt  H/O craniotomy      BRITTNEE TOROYAW 64 year-old Man with a PMH of CVA who was found to have subacute SDH along R frontal lobe and large mass in third ventricle and is now with Gait Instability, ADL impairments and Functional impairments.      Plan:  - continue medical management as per primary team  - continue PT/OT/SLP  - DVT prophylaxis  - CVS stable  - lidoderm patch of left knee  - flonase for nasal congestion  - Patient is stable to continue current rehabilitation program

## 2019-12-09 ENCOUNTER — TRANSCRIPTION ENCOUNTER (OUTPATIENT)
Age: 64
End: 2019-12-09

## 2019-12-09 LAB
ANION GAP SERPL CALC-SCNC: 11 MMOL/L — SIGNIFICANT CHANGE UP (ref 5–17)
BUN SERPL-MCNC: 25 MG/DL — HIGH (ref 7–23)
CALCIUM SERPL-MCNC: 9.1 MG/DL — SIGNIFICANT CHANGE UP (ref 8.4–10.5)
CHLORIDE SERPL-SCNC: 104 MMOL/L — SIGNIFICANT CHANGE UP (ref 96–108)
CO2 SERPL-SCNC: 27 MMOL/L — SIGNIFICANT CHANGE UP (ref 22–31)
CREAT SERPL-MCNC: 1.31 MG/DL — HIGH (ref 0.5–1.3)
GLUCOSE BLDC GLUCOMTR-MCNC: 108 MG/DL — HIGH (ref 70–99)
GLUCOSE SERPL-MCNC: 78 MG/DL — SIGNIFICANT CHANGE UP (ref 70–99)
HCT VFR BLD CALC: 39.3 % — SIGNIFICANT CHANGE UP (ref 39–50)
HGB BLD-MCNC: 12.9 G/DL — LOW (ref 13–17)
MCHC RBC-ENTMCNC: 26.9 PG — LOW (ref 27–34)
MCHC RBC-ENTMCNC: 32.8 GM/DL — SIGNIFICANT CHANGE UP (ref 32–36)
MCV RBC AUTO: 81.9 FL — SIGNIFICANT CHANGE UP (ref 80–100)
NRBC # BLD: 0 /100 WBCS — SIGNIFICANT CHANGE UP (ref 0–0)
PLATELET # BLD AUTO: 360 K/UL — SIGNIFICANT CHANGE UP (ref 150–400)
POTASSIUM SERPL-MCNC: 3.4 MMOL/L — LOW (ref 3.5–5.3)
POTASSIUM SERPL-SCNC: 3.4 MMOL/L — LOW (ref 3.5–5.3)
RBC # BLD: 4.8 M/UL — SIGNIFICANT CHANGE UP (ref 4.2–5.8)
RBC # FLD: 13.8 % — SIGNIFICANT CHANGE UP (ref 10.3–14.5)
SODIUM SERPL-SCNC: 142 MMOL/L — SIGNIFICANT CHANGE UP (ref 135–145)
WBC # BLD: 7.33 K/UL — SIGNIFICANT CHANGE UP (ref 3.8–10.5)
WBC # FLD AUTO: 7.33 K/UL — SIGNIFICANT CHANGE UP (ref 3.8–10.5)

## 2019-12-09 PROCEDURE — 99232 SBSQ HOSP IP/OBS MODERATE 35: CPT | Mod: GC

## 2019-12-09 PROCEDURE — 96116 NUBHVL XM PHYS/QHP 1ST HR: CPT

## 2019-12-09 PROCEDURE — 99232 SBSQ HOSP IP/OBS MODERATE 35: CPT

## 2019-12-09 RX ORDER — POTASSIUM CHLORIDE 20 MEQ
40 PACKET (EA) ORAL EVERY 4 HOURS
Refills: 0 | Status: COMPLETED | OUTPATIENT
Start: 2019-12-09 | End: 2019-12-09

## 2019-12-09 RX ORDER — OXYCODONE HYDROCHLORIDE 5 MG/1
5 TABLET ORAL EVERY 4 HOURS
Refills: 0 | Status: DISCONTINUED | OUTPATIENT
Start: 2019-12-09 | End: 2019-12-15

## 2019-12-09 RX ORDER — CLONAZEPAM 1 MG
0.5 TABLET ORAL EVERY 12 HOURS
Refills: 0 | Status: DISCONTINUED | OUTPATIENT
Start: 2019-12-09 | End: 2019-12-15

## 2019-12-09 RX ADMIN — Medication 650 MILLIGRAM(S): at 23:07

## 2019-12-09 RX ADMIN — AMLODIPINE BESYLATE 5 MILLIGRAM(S): 2.5 TABLET ORAL at 06:34

## 2019-12-09 RX ADMIN — SENNA PLUS 2 TABLET(S): 8.6 TABLET ORAL at 21:49

## 2019-12-09 RX ADMIN — GABAPENTIN 300 MILLIGRAM(S): 400 CAPSULE ORAL at 21:49

## 2019-12-09 RX ADMIN — Medication 650 MILLIGRAM(S): at 13:40

## 2019-12-09 RX ADMIN — POLYETHYLENE GLYCOL 3350 17 GRAM(S): 17 POWDER, FOR SOLUTION ORAL at 06:33

## 2019-12-09 RX ADMIN — LIDOCAINE 1 PATCH: 4 CREAM TOPICAL at 21:47

## 2019-12-09 RX ADMIN — DULOXETINE HYDROCHLORIDE 60 MILLIGRAM(S): 30 CAPSULE, DELAYED RELEASE ORAL at 13:11

## 2019-12-09 RX ADMIN — Medication 3 MILLIGRAM(S): at 21:49

## 2019-12-09 RX ADMIN — Medication 1 PATCH: at 21:47

## 2019-12-09 RX ADMIN — POLYETHYLENE GLYCOL 3350 17 GRAM(S): 17 POWDER, FOR SOLUTION ORAL at 17:55

## 2019-12-09 RX ADMIN — Medication 1 PATCH: at 13:18

## 2019-12-09 RX ADMIN — ATORVASTATIN CALCIUM 10 MILLIGRAM(S): 80 TABLET, FILM COATED ORAL at 21:49

## 2019-12-09 RX ADMIN — Medication 650 MILLIGRAM(S): at 06:34

## 2019-12-09 RX ADMIN — Medication 40 MILLIEQUIVALENT(S): at 12:49

## 2019-12-09 RX ADMIN — Medication 650 MILLIGRAM(S): at 01:10

## 2019-12-09 RX ADMIN — LIDOCAINE 1 PATCH: 4 CREAM TOPICAL at 12:45

## 2019-12-09 RX ADMIN — Medication 40 MILLIEQUIVALENT(S): at 10:38

## 2019-12-09 RX ADMIN — Medication 1 SPRAY(S): at 17:55

## 2019-12-09 RX ADMIN — Medication 1 TABLET(S): at 12:46

## 2019-12-09 RX ADMIN — Medication 1 SPRAY(S): at 06:34

## 2019-12-09 RX ADMIN — GABAPENTIN 300 MILLIGRAM(S): 400 CAPSULE ORAL at 06:33

## 2019-12-09 RX ADMIN — Medication 1 PATCH: at 12:45

## 2019-12-09 RX ADMIN — Medication 650 MILLIGRAM(S): at 17:55

## 2019-12-09 RX ADMIN — Medication 50 MILLIGRAM(S): at 17:55

## 2019-12-09 RX ADMIN — Medication 650 MILLIGRAM(S): at 18:10

## 2019-12-09 RX ADMIN — Medication 650 MILLIGRAM(S): at 00:28

## 2019-12-09 RX ADMIN — GABAPENTIN 300 MILLIGRAM(S): 400 CAPSULE ORAL at 13:12

## 2019-12-09 RX ADMIN — ENOXAPARIN SODIUM 40 MILLIGRAM(S): 100 INJECTION SUBCUTANEOUS at 21:49

## 2019-12-09 RX ADMIN — Medication 650 MILLIGRAM(S): at 12:49

## 2019-12-09 RX ADMIN — Medication 1 MILLIGRAM(S): at 12:48

## 2019-12-09 RX ADMIN — Medication 650 MILLIGRAM(S): at 00:00

## 2019-12-09 NOTE — DISCHARGE NOTE PROVIDER - CARE PROVIDERS DIRECT ADDRESSES
[email or web address]

## 2019-12-09 NOTE — DISCHARGE NOTE PROVIDER - NSDCACTIVITY_GEN_ALL_CORE
Walking - Indoors allowed/Stairs allowed/Do not make important decisions/No heavy lifting/straining/Showering allowed/Bathing allowed/Do not drive or operate machinery/Walking - Outdoors allowed

## 2019-12-09 NOTE — PROGRESS NOTE ADULT - ASSESSMENT
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    dm2- ISS Accu-Cheks,  was on metformin at home, A1C 6.4, diabetic diet, repeat bmp noted, encourage po hydration, repeat bmp before d/c, consider d/c home on metformin 250 bid    HTN-- norvasc     HLD- Statin     insomnia- melatonin     Anxiety- clonazepam prn    DVT: Lovenox    former smoker- nicotine patch Transdermal daily    will follow, c/w current care

## 2019-12-09 NOTE — DISCHARGE NOTE PROVIDER - CARE PROVIDER_API CALL
Tamar Manley)  Neurosurgery  General  [facility], [address] [place] [id number]  Phone: [phone]  Fax: [phone]  Follow Up Time:     Denis Franklin)  Ophthalmology  [address] [place] [address], [address]  [place] [address]  Phone: [phone]  Fax: [phone]  Follow Up Time:

## 2019-12-09 NOTE — PROGRESS NOTE ADULT - ASSESSMENT
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  - Gait Instability, ADL impairments and Functional impairments: start Comprehensive Rehab Program of PT/OT with focus on RUE/RLE strengthening, balance and coordination training     #hypokalemia   - 12/9 K 3.4 s/p KCL 40meq x2  - monitor    #HTN  - cont norvasc 5 qd    #HLD  - cont lipitor 10 qhs    #Anxiety  - cont klonopin 0.5 q12h prn    #sleep  - melatonin 3 qhs    #Pain Mgmt   - Tylenol PRN, Oxycodone PRN  - cymbalta 60 qd  - gabapentin 300 TID  - increase lyrica 50 BID  - lidocaine patch    #tobacco use disorder  - cont nicotine patch    #GI/Bowel Mgmt   - Continent c/w Colace, Senna, Miralax PRN    #/Bladder Mgmt   - Continent    #FEN   - Diet - Regular + Thins  [[facility]]      #Precautions / PROPHYLAXIS:   - Falls, Cardiac  - ortho: Weight bearing status: WBAT   - Lungs: Aspiration, Incentive Spirometer   - Pressure injury/Skin: Turn Q2hrs while in bed, OOB to Chair, PT/OT    - DVT: Lovenox, SCDs    Team Conference 12/5/19  PT: mod a for gait, min a for transfers  OT: min a for transfers, LE dressing  barriers to dc: lives with son, but alone all day, poor safety awareness

## 2019-12-09 NOTE — DISCHARGE NOTE PROVIDER - NSDCCPCAREPLAN_GEN_ALL_CORE_FT
PRINCIPAL DISCHARGE DIAGNOSIS  Diagnosis: Brain mass  Assessment and Plan of Treatment: PRINCIPAL DISCHARGE DIAGNOSIS  Diagnosis: Brain mass  Assessment and Plan of Treatment: You initially presented to the hospital with Left sided weakness and tingling and were found to have a tumor in your brain. You were evaluated by neurosurgery and they recommended no acute surgical intervention, but to plan for the OR in 4 weeks from then. You then came to [facility] for an acute inpatient rehabilitation course prior to your surgery. You did well with therapy. You were given medications to help with your nerve pain and you should continue with those while you await your surgery. PRINCIPAL DISCHARGE DIAGNOSIS  Diagnosis: Brain mass  Assessment and Plan of Treatment: You initially presented to the hospital with Left sided weakness and tingling and were found to have a tumor in your brain. You were evaluated by neurosurgery and they recommended no acute surgical intervention, but to plan for the OR in 4 weeks from then. You then came to [facility] for an acute inpatient rehabilitation course prior to your surgery. You did well with therapy. You were given medications to help with your nerve pain and you should continue with those while you await your surgery. You will be going to subacute rehab to await your future neurosurgery.

## 2019-12-09 NOTE — DISCHARGE NOTE PROVIDER - HOSPITAL COURSE
64M with PMhx prior CVA w/ residual L sided weakness and paresthesias, brain "cyst" s/p removal c/b hydrocephalus s/p  shunt placement in 1980 in [place], HTN, presented with worsening L sided paresthesias and transferred from [facility] for new CT findings. Pt found to have subacute SDH along R frontal lobe and large mass in third ventricle. Pt denied any falls or recent head trauma to explain the SDH.  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  Hospital course complicated with leukocytosis during admission, workup ongoing.  Pt received IV fluids for hypotension, home anti htn medications held. Pt was stable for rehab admission on 12/3/19 to  [facility] for comprehensive OT, PT, and SLP. Pt’s rehab course was significant for pain, which was controlled with the addition of lyrica and cymbalta to his pain medication regimen.         Pt tolerated course of inpatient PT/OT/SLP rehab with significant functional improvements.         Pt was medically cleared on 12/ for discharged to home with home care. Pt will follow up with neurosurgery, cardiology, heme/onc and PCP upon discharge. 64M with PMhx prior CVA w/ residual L sided weakness and paresthesias, brain "cyst" s/p removal c/b hydrocephalus s/p  shunt placement in 1980 in [place], HTN, presented with worsening L sided paresthesias and transferred from [facility] for new CT findings. Pt found to have subacute SDH along R frontal lobe and large mass in third ventricle. Pt denied any falls or recent head trauma to explain the SDH.  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  Hospital course complicated with leukocytosis during admission, workup ongoing.  Pt received IV fluids for hypotension, home anti htn medications held. Pt was stable for rehab admission on 12/3/19 to [facility] [place] [facility] for comprehensive OT, PT, and SLP. Pt’s rehab course was significant for pain, which was controlled with the addition of lyrica and cymbalta to his pain medication regimen.         Pt tolerated course of inpatient PT/OT/SLP rehab with significant functional improvements. Patient did well with therapy and he began to feel stronger. He continued to have pain and his Lyrica was increased to 50mg three times a day.        Pt was medically cleared on 12/17/19 for discharged to home with home care. Pt will follow up with neurosurgery, cardiology, heme/onc and PCP upon discharge. 64M with PMhx prior CVA w/ residual L sided weakness and paresthesias, brain "cyst" s/p removal c/b hydrocephalus s/p  shunt placement in 1980 in [place], HTN, presented with worsening L sided paresthesias and transferred from [facility] for new CT findings. Pt found to have subacute SDH along R frontal lobe and large mass in third ventricle. Pt denied any falls or recent head trauma to explain the SDH.  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  Hospital course complicated with leukocytosis during admission, workup ongoing.  Pt received IV fluids for hypotension, home anti htn medications held. Pt was stable for rehab admission on 12/3/19 to [facility] [place] [facility] for comprehensive OT, PT, and SLP. Pt’s rehab course was significant for pain, which was controlled with the addition of lyrica and cymbalta to his pain medication regimen.         Pt tolerated course of inpatient PT/OT/SLP rehab with significant functional improvements. Patient did well with therapy and he began to feel stronger. He continued to have pain and his Lyrica was increased to 50mg three times a day.        Pt was medically cleared on 12/17/19 for discharged to [facility]. He will stay at [facility] until his neurosurgery in the future. Pt will follow up with neurosurgery, cardiology, heme/onc and PCP upon discharge.

## 2019-12-09 NOTE — DISCHARGE NOTE PROVIDER - NSDCFUADDAPPT_GEN_ALL_CORE_FT
Please call your Neurosurgeon to schedule surgery for the end of December if he has not contacted you,    Call Dr Edmondson [facility] to schedule an appointment ASAP.

## 2019-12-09 NOTE — PROGRESS NOTE ADULT - SUBJECTIVE AND OBJECTIVE BOX
Patient is a 64y old  Male who presents with a chief complaint of SDH (08 Dec 2019 09:44)      HPI:  64y m PMhx prior CVA w/ residual L sided weakness and paresthesias, brain "cyst" s/p removal c/b hydrocephalus s/p  shunt placement in 1980 in [place], HTN, pw worsening L sided paresthesias and transferred from [facility] for new CT findings. Pt found to have subacute SDH along R frontal lobe and large mass in third ventricle. Pt denies any falls or recent head trauma to explain the SDH. 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  Hospital course complicated  with leukocytosis during admission, workup ongoing.  Pt received IV fluids for hypotension, home anti htn medications held. (03 Dec 2019 18:14)        SUBJECTIVE: Patient seen and examined. No acute overnight events, slept well. Continues to have pain in LLE, doesn't note significant improvement with current Lyrica dose. Is amenable to increasing dosage, however requests no changes to gabapentin as higher doses cause patient to have nausea and vomiting. Patient otherwise states he feels well and is making gains with therapies. No other complaints.       REVIEW OF SYSTEMS  Constitutional: No fatigue  Pulm: No cough,  No shortness of breath  Cardio: No chest pain  GI:  No abdominal pain, No nausea, No vomiting, No diarrhea, No constipation  Neuro: No headaches, No loss of strength, No numbness, No tremors  MSK: +LLE pain      MEDICATIONS  (STANDING):  acetaminophen   Tablet .. 650 milliGRAM(s) Oral every 6 hours  amLODIPine   Tablet 5 milliGRAM(s) Oral daily  atorvastatin 10 milliGRAM(s) Oral at bedtime  dextrose 5%. 1000 milliLiter(s) (50 mL/Hr) IV Continuous <Continuous>  dextrose 50% Injectable 12.5 Gram(s) IV Push once  dextrose 50% Injectable 25 Gram(s) IV Push once  dextrose 50% Injectable 25 Gram(s) IV Push once  DULoxetine 60 milliGRAM(s) Oral daily  enoxaparin Injectable 40 milliGRAM(s) SubCutaneous at bedtime  fluticasone propionate 50 MICROgram(s)/spray Nasal Spray 1 Spray(s) Both Nostrils two times a day  folic acid 1 milliGRAM(s) Oral daily  gabapentin 300 milliGRAM(s) Oral three times a day  insulin lispro (HumaLOG) corrective regimen sliding scale   SubCutaneous before breakfast  lidocaine   Patch 1 Patch Transdermal every 24 hours  melatonin 3 milliGRAM(s) Oral at bedtime  multivitamin 1 Tablet(s) Oral daily  nicotine - 21 mG/24Hr(s) Patch 1 patch Transdermal daily  polyethylene glycol 3350 17 Gram(s) Oral two times a day  potassium chloride    Tablet ER 40 milliEquivalent(s) Oral every 4 hours  pregabalin 50 milliGRAM(s) Oral every 12 hours  senna 2 Tablet(s) Oral at bedtime    MEDICATIONS  (PRN):  artificial tears (preservative free) Ophthalmic Solution 1 Drop(s) Both EYES two times a day PRN Dry Eyes  clonazePAM  Tablet 0.5 milliGRAM(s) Oral every 12 hours PRN anxiety/panic attack  dextrose 40% Gel 15 Gram(s) Oral once PRN Blood Glucose LESS THAN 70 milliGRAM(s)/deciliter  glucagon  Injectable 1 milliGRAM(s) IntraMuscular once PRN Glucose LESS THAN 70 milligrams/deciliter  oxyCODONE    IR 5 milliGRAM(s) Oral every 4 hours PRN Moderate Pain (4 - 6)          VITALS  64y  Vital Signs Last 24 Hrs  T(C): 36.4 (09 Dec 2019 09:27), Max: 36.8 (08 Dec 2019 21:42)  T(F): 97.5 (09 Dec 2019 09:27), Max: 98.3 (08 Dec 2019 21:42)  HR: 75 (09 Dec 2019 09:27) (68 - 75)  BP: 125/80 (09 Dec 2019 09:27) (125/80 - 137/78)  BP(mean): --  RR: 14 (09 Dec 2019 09:27) (14 - 14)  SpO2: 97% (09 Dec 2019 09:27) (97% - 98%)  Daily     Daily       RECENT LABS:                          12.9   7.33  )-----------( 360      ( 09 Dec 2019 05:35 )             39.3     12-09    142  |  104  |  25<H>  ----------------------------<  78  3.4<L>   |  27  |  1.31<H>    Ca    9.1      09 Dec 2019 05:35                CAPILLARY BLOOD GLUCOSE      POCT Blood Glucose.: 108 mg/dL (09 Dec 2019 08:03)        PHYSICAL EXAM:   Gen - NAD, Comfortable  HEENT - NCAT, EOMI, MMM  Pulm - CTAB, No wheeze  Cardiovascular - RRR, S1S2  Abdomen - Soft, NT/ND, +BS  Extremities - No C/C/E, No calf tenderness  Neuro-     Cognitive - AAOx3     Motor - No focal deficits                    LEFT    UE - ShAB 5/5, EF 5/5, EE 5/5, WE 5/5,  4/5                    RIGHT UE - ShAB 5/5, EF 5/5, EE 5/5, WE 5/5,  5/5                    LEFT    LE - HF 5/5, KE 5/5, DF 5/5, PF 5/5                    RIGHT LE - HF 5/5, KE 5/5, DF 5/5, PF 5/5     Psychiatric - Mood stable, Affect WNL  Skin:  all skin intact    Wounds: None Present HPI:  64y m PMhx prior CVA w/ residual L sided weakness and paresthesias, brain "cyst" s/p removal c/b hydrocephalus s/p  shunt placement in 1980 in [place], HTN, pw worsening L sided paresthesias and transferred from [facility] for [facility] CT findings. Pt found to have subacute SDH along R frontal lobe and large mass in third ventricle. Pt denies any falls or recent head trauma to explain the SDH. 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  Hospital course complicated  with leukocytosis during admission, workup ongoing.  Pt received IV fluids for hypotension, home anti htn medications held. (03 Dec 2019 18:14)        SUBJECTIVE: Patient seen and examined.     REVIEW OF SYSTEMS  Constitutional: No fatigue  Pulm: No cough,  No shortness of breath  Cardio: No chest pain  GI:  No abdominal pain, No nausea, No vomiting, No diarrhea, No constipation  Neuro: No headaches, No loss of strength, No numbness, No tremors  MSK: +LLE pain / lower back pain      MEDICATIONS  (STANDING):  acetaminophen   Tablet .. 650 milliGRAM(s) Oral every 6 hours  amLODIPine   Tablet 5 milliGRAM(s) Oral daily  atorvastatin 10 milliGRAM(s) Oral at bedtime  DULoxetine 60 milliGRAM(s) Oral daily  enoxaparin Injectable 40 milliGRAM(s) SubCutaneous at bedtime  fluticasone propionate 50 MICROgram(s)/spray Nasal Spray 1 Spray(s) Both Nostrils two times a day  folic acid 1 milliGRAM(s) Oral daily  gabapentin 300 milliGRAM(s) Oral three times a day  insulin lispro (HumaLOG) corrective regimen sliding scale   SubCutaneous before breakfast  lidocaine   Patch 1 Patch Transdermal every 24 hours  melatonin 3 milliGRAM(s) Oral at bedtime  multivitamin 1 Tablet(s) Oral daily  nicotine - 21 mG/24Hr(s) Patch 1 patch Transdermal daily  polyethylene glycol 3350 17 Gram(s) Oral two times a day  potassium chloride    Tablet ER 40 milliEquivalent(s) Oral every 4 hours  pregabalin 50 milliGRAM(s) Oral every 12 hours  senna 2 Tablet(s) Oral at bedtime    MEDICATIONS  (PRN):  artificial tears (preservative free) Ophthalmic Solution 1 Drop(s) Both EYES two times a day PRN Dry Eyes  clonazePAM  Tablet 0.5 milliGRAM(s) Oral every 12 hours PRN anxiety/panic attack  dextrose 40% Gel 15 Gram(s) Oral once PRN Blood Glucose LESS THAN 70 milliGRAM(s)/deciliter  glucagon  Injectable 1 milliGRAM(s) IntraMuscular once PRN Glucose LESS THAN 70 milligrams/deciliter  oxyCODONE    IR 5 milliGRAM(s) Oral every 4 hours PRN Moderate Pain (4 - 6)          VITALS  Vital Signs Last 24 Hrs  T(C): 36.4 (09 Dec 2019 09:27), Max: 36.8 (08 Dec 2019 21:42)  T(F): 97.5 (09 Dec 2019 09:27), Max: 98.3 (08 Dec 2019 21:42)  HR: 75 (09 Dec 2019 09:27) (68 - 75)  BP: 125/80 (09 Dec 2019 09:27) (125/80 - 137/78)  BP(mean): --  RR: 14 (09 Dec 2019 09:27) (14 - 14)  SpO2: 97% (09 Dec 2019 09:27) (97% - 98%)      RECENT LABS:                          12.9   7.33  )-----------( 360      ( 09 Dec 2019 05:35 )             39.3     12-09    142  |  104  |  25<H>  ----------------------------<  78  3.4<L>   |  27  |  1.31<H>    Ca    9.1      09 Dec 2019 05:35

## 2019-12-09 NOTE — PROGRESS NOTE ADULT - SUBJECTIVE AND OBJECTIVE BOX
64y m PMhx prior CVA w/ residual L sided weakness and paresthesias, brain "cyst" s/p removal c/b hydrocephalus s/p  shunt placement in 1980 in [place], HTN, pw worsening L sided paresthesias and transferred from [facility] for new CT findings. Pt found to have subacute SDH along R frontal lobe and large mass in third ventricle. Pt denies any falls or recent head trauma to explain the SDH. 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  Hospital course complicated  with leukocytosis during admission, workup ongoing.  Pt received IV fluids for hypotension, home anti htn medications held.      seen at the bedside, no new complaints,  left sided chronic pain due to neuropathy. no n/v, no sob      Vital Signs Last 24 Hrs  T(C): 36.4 (09 Dec 2019 09:27), Max: 36.8 (08 Dec 2019 21:42)  T(F): 97.5 (09 Dec 2019 09:27), Max: 98.3 (08 Dec 2019 21:42)  HR: 75 (09 Dec 2019 09:27) (68 - 75)  BP: 125/80 (09 Dec 2019 09:27) (125/80 - 137/78)  BP(mean): --  RR: 14 (09 Dec 2019 09:27) (14 - 14)  SpO2: 97% (09 Dec 2019 09:27) (97% - 98%)      GENERAL- NAD  EAR/NOSE/MOUTH/THROAT - no pharyngeal exudates, no oral lesions  MMM  EYES- CORINNE, conjunctiva and Sclera clear  NECK- supple  RESPIRATORY-  clear to auscultation bilaterally  CARDIOVASCULAR - SIS2, RRR  GI - soft NT BS present  EXTREMITIES- no pedal edema  NEUROLOGY- no gross focal deficits  SKIN- no rashes, warm to touch  PSYCHIATRY- AAO X 3  MUSCULOSKELETAL- ROM normal                      12.9                 142  | 27   | 25           7.33  >-----------< 360     ------------------------< 78                    39.3                 3.4  | 104  | 1.31                                         Ca 9.1   Mg x     Ph x        CAPILLARY BLOOD GLUCOSE      POCT Blood Glucose.: 108 mg/dL (09 Dec 2019 08:03)

## 2019-12-09 NOTE — DISCHARGE NOTE PROVIDER - NSDCFUADDINST_GEN_ALL_CORE_FT
Followup with your Private MD in 1-2 weeks.  Return to Emergency Department or contact your Neurosurgeon if any changes in mental status, weakness, numbness or tingling of extremities; difficulty swallowing; drainage or redness of wound, fever; pain in legs; difficulty urinating or constipation.  Donot restart your Aspirin or take any Motrin/NSAIDS until checking with your Neurosurgeon. Followup with your Private MD in 1-2 weeks.  Return to Emergency Department or contact your Neurosurgeon if any changes in mental status, weakness, numbness or tingling of extremities; difficulty swallowing; drainage or redness of wound, fever; pain in legs; difficulty urinating or constipation.  Do not restart your Aspirin or take any Motrin/NSAIDS until checking with your Neurosurgeon.

## 2019-12-09 NOTE — DISCHARGE NOTE PROVIDER - NSDCMRMEDTOKEN_GEN_ALL_CORE_FT
acetaminophen 325 mg oral tablet: 2 tab(s) orally every 6 hours, As needed, Mild Pain (1 - 3)  amLODIPine 5 mg oral tablet: 1 tab(s) orally once a day  bisacodyl 5 mg oral delayed release tablet: 1 tab(s) orally once a day, As needed, Constipation  clonazePAM 0.5 mg oral tablet: 1 tab(s) orally every 12 hours, As needed, anxiety/panic attack  DULoxetine 60 mg oral delayed release capsule: 1 cap(s) orally once a day  enoxaparin: 40 milligram(s) subcutaneous once a day (at bedtime)  folic acid 1 mg oral tablet: 1 tab(s) orally once a day  gabapentin 300 mg oral capsule: 1 cap(s) orally 3 times a day  melatonin 3 mg oral tablet: 1 tab(s) orally once a day (at bedtime)  metFORMIN: orally 3 times a day after meals?  Multiple Vitamins oral tablet: 1 tab(s) orally once a day  nicotine 21 mg/24 hr transdermal film, extended release: 24 milligram(s) transdermal once a day  nystatin 100,000 units/g topical powder: 1 application topically 3 times a day  oxyCODONE 5 mg oral tablet: 1 tab(s) orally every 4 hours, As needed, Moderate Pain (4 - 6)  polyethylene glycol 3350 oral powder for reconstitution: 17 gram(s) orally 2 times a day  senna oral tablet: 2 tab(s) orally once a day (at bedtime)  simvastatin 20 mg oral tablet: 1 tab(s) orally once a day (at bedtime) clonazePAM 0.5 mg oral tablet: 1 tab(s) orally every 12 hours, As needed, anxiety/panic attack  folic acid 1 mg oral tablet: 1 tab(s) orally once a day  metFORMIN: orally 3 times a day after meals?  Multiple Vitamins oral tablet: 1 tab(s) orally once a day amLODIPine 5 mg oral tablet: 1 tab(s) orally once a day  atorvastatin 10 mg oral tablet: 1 tab(s) orally once a day (at bedtime)  clonazePAM 0.5 mg oral tablet: 1 tab(s) orally every 12 hours, As needed, anxiety/panic attack  Cymbalta 60 mg oral delayed release capsule: 1 cap(s) orally once a day  enoxaparin: 40 milligram(s) subcutaneously once a day (at bedtime)  fluticasone 50 mcg/inh nasal spray: 1 spray(s) nasal 2 times a day  folic acid 1 mg oral tablet: 1 tab(s) orally once a day  glucose 40% oral gel: 1 gel orally once, As Needed for Blood Glucose LESS THAN 70  glucose 50% intravenous solution: 25 milliliter(s) intravenous once  glucose 50% intravenous solution: 50 milliliter(s) intravenous once  glucose 50% intravenous solution: 50 milliliter(s) intravenous once  Lidoderm 5% topical film: Apply topically to affected area once a day  Lyrica 50 mg oral capsule: 1 cap(s) orally every 8 hours  melatonin 3 mg oral tablet: 1 tab(s) orally once a day (at bedtime)  metFORMIN: orally 3 times a day after meals?  Multiple Vitamins oral tablet: 1 tab(s) orally once a day  Neurontin 300 mg oral capsule: 1 cap(s) orally 3 times a day  nicotine 21 mg/24 hr transdermal film, extended release: 1 patch transdermal once a day  ocular lubricant ophthalmic solution: 1 drop(s) to each affected eye 2 times a day, As needed, Dry Eyes  polyethylene glycol 3350 oral powder for reconstitution: 17 gram(s) orally 2 times a day  senna oral tablet: 2 tab(s) orally once a day (at bedtime)  Tylenol 325 mg oral tablet: 2 tab(s) orally every 6 hours, As Needed for mild pain amLODIPine 5 mg oral tablet: 1 tab(s) orally once a day  atorvastatin 10 mg oral tablet: 1 tab(s) orally once a day (at bedtime)  clonazePAM 0.5 mg oral tablet: 1 tab(s) orally every 12 hours, As needed, anxiety/panic attack  Cymbalta 60 mg oral delayed release capsule: 1 cap(s) orally once a day  enoxaparin: 40 milligram(s) subcutaneously once a day (at bedtime)  fluticasone 50 mcg/inh nasal spray: 1 spray(s) nasal 2 times a day  folic acid 1 mg oral tablet: 1 tab(s) orally once a day  glucose 40% oral gel: 1 gel orally once, As Needed for Blood Glucose LESS THAN 70  glucose 50% intravenous solution: 25 milliliter(s) intravenous once  glucose 50% intravenous solution: 50 milliliter(s) intravenous once  glucose 50% intravenous solution: 50 milliliter(s) intravenous once  Lidoderm 5% topical film: Apply topically to affected area once a day  Lyrica 50 mg oral capsule: 1 cap(s) orally every 8 hours  melatonin 3 mg oral tablet: 1 tab(s) orally once a day (at bedtime)  Multiple Vitamins oral tablet: 1 tab(s) orally once a day  Neurontin 300 mg oral capsule: 1 cap(s) orally 3 times a day  nicotine 21 mg/24 hr transdermal film, extended release: 1 patch transdermal once a day  ocular lubricant ophthalmic solution: 1 drop(s) to each affected eye 2 times a day, As needed, Dry Eyes  polyethylene glycol 3350 oral powder for reconstitution: 17 gram(s) orally 2 times a day  senna oral tablet: 2 tab(s) orally once a day (at bedtime)  Tylenol 325 mg oral tablet: 2 tab(s) orally every 6 hours, As Needed for mild pain

## 2019-12-09 NOTE — DISCHARGE NOTE PROVIDER - NSDCFUSCHEDAPPT_GEN_ALL_CORE_FT
BRITTNEE POSADA ; [facility]2019 [facility]  BRITTNEE POSADA ; 12/27/2019 ; [facility] [address] BRITTNEE Faria ; 12/27/2019 ; [facility] PreAdmits BRITTNEE POSADA ; [facility]18[facility]2019 [facility]  BRITTNEE POSADA ; 12/27/2019 ; [facility] [address] BRITTNEE Faria ; 12/27/2019 ; [facility]mits BRITTNEE POSADA ; [facility]18/2019 [facility]  BRITTNEE POSADA ; 12/27/2019 ; [facility] [address] BRITTNEE Faria ; 12/27/2019 ; [facility] PreAdmits BRITTNEE POSADA ; [facility]  BRITTNEE POSADA ; 12/27/2019 ; [facility] [address] BRITTNEE Faria ; 12/27/2019 ; [facility]

## 2019-12-10 LAB
ANION GAP SERPL CALC-SCNC: 9 MMOL/L — SIGNIFICANT CHANGE UP (ref 5–17)
BUN SERPL-MCNC: 28 MG/DL — HIGH (ref 7–23)
CALCIUM SERPL-MCNC: 9 MG/DL — SIGNIFICANT CHANGE UP (ref 8.4–10.5)
CHLORIDE SERPL-SCNC: 104 MMOL/L — SIGNIFICANT CHANGE UP (ref 96–108)
CO2 SERPL-SCNC: 25 MMOL/L — SIGNIFICANT CHANGE UP (ref 22–31)
CREAT SERPL-MCNC: 1.41 MG/DL — HIGH (ref 0.5–1.3)
GLUCOSE BLDC GLUCOMTR-MCNC: 105 MG/DL — HIGH (ref 70–99)
GLUCOSE SERPL-MCNC: 105 MG/DL — HIGH (ref 70–99)
POTASSIUM SERPL-MCNC: 4 MMOL/L — SIGNIFICANT CHANGE UP (ref 3.5–5.3)
POTASSIUM SERPL-SCNC: 4 MMOL/L — SIGNIFICANT CHANGE UP (ref 3.5–5.3)
SODIUM SERPL-SCNC: 138 MMOL/L — SIGNIFICANT CHANGE UP (ref 135–145)

## 2019-12-10 PROCEDURE — 99232 SBSQ HOSP IP/OBS MODERATE 35: CPT | Mod: GC

## 2019-12-10 RX ADMIN — LIDOCAINE 1 PATCH: 4 CREAM TOPICAL at 20:34

## 2019-12-10 RX ADMIN — Medication 1 MILLIGRAM(S): at 12:00

## 2019-12-10 RX ADMIN — Medication 3 MILLIGRAM(S): at 22:53

## 2019-12-10 RX ADMIN — ENOXAPARIN SODIUM 40 MILLIGRAM(S): 100 INJECTION SUBCUTANEOUS at 22:52

## 2019-12-10 RX ADMIN — GABAPENTIN 300 MILLIGRAM(S): 400 CAPSULE ORAL at 06:09

## 2019-12-10 RX ADMIN — Medication 1 SPRAY(S): at 17:12

## 2019-12-10 RX ADMIN — Medication 650 MILLIGRAM(S): at 06:30

## 2019-12-10 RX ADMIN — Medication 1 PATCH: at 20:36

## 2019-12-10 RX ADMIN — AMLODIPINE BESYLATE 5 MILLIGRAM(S): 2.5 TABLET ORAL at 06:08

## 2019-12-10 RX ADMIN — Medication 50 MILLIGRAM(S): at 17:12

## 2019-12-10 RX ADMIN — LIDOCAINE 1 PATCH: 4 CREAM TOPICAL at 23:48

## 2019-12-10 RX ADMIN — Medication 650 MILLIGRAM(S): at 06:08

## 2019-12-10 RX ADMIN — Medication 650 MILLIGRAM(S): at 23:46

## 2019-12-10 RX ADMIN — POLYETHYLENE GLYCOL 3350 17 GRAM(S): 17 POWDER, FOR SOLUTION ORAL at 06:09

## 2019-12-10 RX ADMIN — LIDOCAINE 1 PATCH: 4 CREAM TOPICAL at 00:00

## 2019-12-10 RX ADMIN — Medication 1 PATCH: at 12:01

## 2019-12-10 RX ADMIN — ATORVASTATIN CALCIUM 10 MILLIGRAM(S): 80 TABLET, FILM COATED ORAL at 22:53

## 2019-12-10 RX ADMIN — Medication 1 TABLET(S): at 12:01

## 2019-12-10 RX ADMIN — Medication 650 MILLIGRAM(S): at 12:02

## 2019-12-10 RX ADMIN — Medication 650 MILLIGRAM(S): at 06:38

## 2019-12-10 RX ADMIN — Medication 1 PATCH: at 12:02

## 2019-12-10 RX ADMIN — DULOXETINE HYDROCHLORIDE 60 MILLIGRAM(S): 30 CAPSULE, DELAYED RELEASE ORAL at 12:00

## 2019-12-10 RX ADMIN — GABAPENTIN 300 MILLIGRAM(S): 400 CAPSULE ORAL at 14:59

## 2019-12-10 RX ADMIN — LIDOCAINE 1 PATCH: 4 CREAM TOPICAL at 12:01

## 2019-12-10 RX ADMIN — Medication 1 PATCH: at 08:00

## 2019-12-10 RX ADMIN — Medication 650 MILLIGRAM(S): at 17:13

## 2019-12-10 RX ADMIN — Medication 650 MILLIGRAM(S): at 17:15

## 2019-12-10 RX ADMIN — Medication 50 MILLIGRAM(S): at 06:17

## 2019-12-10 RX ADMIN — GABAPENTIN 300 MILLIGRAM(S): 400 CAPSULE ORAL at 22:53

## 2019-12-10 RX ADMIN — Medication 650 MILLIGRAM(S): at 12:00

## 2019-12-10 RX ADMIN — SENNA PLUS 2 TABLET(S): 8.6 TABLET ORAL at 22:53

## 2019-12-10 RX ADMIN — POLYETHYLENE GLYCOL 3350 17 GRAM(S): 17 POWDER, FOR SOLUTION ORAL at 17:13

## 2019-12-10 RX ADMIN — Medication 1 SPRAY(S): at 06:09

## 2019-12-10 NOTE — PROGRESS NOTE ADULT - SUBJECTIVE AND OBJECTIVE BOX
Patient is a 64y old  Male who presents with a chief complaint of SDH, brain tumor (09 Dec 2019 15:40)      HPI:  64y m PMhx prior CVA w/ residual L sided weakness and paresthesias, brain "cyst" s/p removal c/b hydrocephalus s/p  shunt placement in 1980 in [place], HTN, pw worsening L sided paresthesias and transferred from [facility] for new CT findings. Pt found to have subacute SDH along R frontal lobe and large mass in third ventricle. Pt denies any falls or recent head trauma to explain the SDH. 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  Hospital course complicated  with leukocytosis during admission, workup ongoing.  Pt received IV fluids for hypotension, home anti htn medications held. (03 Dec 2019 18:14)        SUBJECTIVE: Patient seen and examined. No acute overnight events, did not sleep well due to concern over upcoming surgical procedure and other family stressors. Physically states he feels well, denies any pain or discomfort. No other complaints.       REVIEW OF SYSTEMS  Constitutional: +fatigue  Pulm: No cough,  No shortness of breath  Cardio: No chest pain  GI:  No abdominal pain, No nausea, No vomiting, No diarrhea, No constipation  Neuro: No headaches, No loss of strength, No numbness, No tremors  MSK: +LLE pain improved      MEDICATIONS  (STANDING):  acetaminophen   Tablet .. 650 milliGRAM(s) Oral every 6 hours  amLODIPine   Tablet 5 milliGRAM(s) Oral daily  atorvastatin 10 milliGRAM(s) Oral at bedtime  dextrose 5%. 1000 milliLiter(s) (50 mL/Hr) IV Continuous <Continuous>  dextrose 50% Injectable 12.5 Gram(s) IV Push once  dextrose 50% Injectable 25 Gram(s) IV Push once  dextrose 50% Injectable 25 Gram(s) IV Push once  DULoxetine 60 milliGRAM(s) Oral daily  enoxaparin Injectable 40 milliGRAM(s) SubCutaneous at bedtime  fluticasone propionate 50 MICROgram(s)/spray Nasal Spray 1 Spray(s) Both Nostrils two times a day  folic acid 1 milliGRAM(s) Oral daily  gabapentin 300 milliGRAM(s) Oral three times a day  insulin lispro (HumaLOG) corrective regimen sliding scale   SubCutaneous before breakfast  lidocaine   Patch 1 Patch Transdermal every 24 hours  melatonin 3 milliGRAM(s) Oral at bedtime  multivitamin 1 Tablet(s) Oral daily  nicotine - 21 mG/24Hr(s) Patch 1 patch Transdermal daily  polyethylene glycol 3350 17 Gram(s) Oral two times a day  pregabalin 50 milliGRAM(s) Oral every 12 hours  senna 2 Tablet(s) Oral at bedtime    MEDICATIONS  (PRN):  artificial tears (preservative free) Ophthalmic Solution 1 Drop(s) Both EYES two times a day PRN Dry Eyes  clonazePAM  Tablet 0.5 milliGRAM(s) Oral every 12 hours PRN anxiety/panic attack  dextrose 40% Gel 15 Gram(s) Oral once PRN Blood Glucose LESS THAN 70 milliGRAM(s)/deciliter  glucagon  Injectable 1 milliGRAM(s) IntraMuscular once PRN Glucose LESS THAN 70 milligrams/deciliter  oxyCODONE    IR 5 milliGRAM(s) Oral every 4 hours PRN Moderate Pain (4 - 6)          VITALS  64y  Vital Signs Last 24 Hrs  T(C): 37 (10 Dec 2019 08:40), Max: 37 (10 Dec 2019 08:40)  T(F): 98.6 (10 Dec 2019 08:40), Max: 98.6 (10 Dec 2019 08:40)  HR: 83 (10 Dec 2019 08:40) (80 - 83)  BP: 119/74 (10 Dec 2019 08:40) (119/74 - 149/87)  BP(mean): --  RR: 14 (10 Dec 2019 08:40) (14 - 14)  SpO2: 96% (10 Dec 2019 08:40) (96% - 96%)  Daily     Daily       RECENT LABS:                          12.9   7.33  )-----------( 360      ( 09 Dec 2019 05:35 )             39.3     12-10    138  |  104  |  28<H>  ----------------------------<  105<H>  4.0   |  25  |  1.41<H>    Ca    9.0      10 Dec 2019 05:40                CAPILLARY BLOOD GLUCOSE      POCT Blood Glucose.: 105 mg/dL (10 Dec 2019 07:41)        PHYSICAL EXAM:   Gen - NAD, Comfortable  HEENT - NCAT, EOMI, MMM  Pulm - CTAB, No wheeze  Cardiovascular - RRR, S1S2  Abdomen - Soft, NT/ND, +BS  Extremities - No C/C/E, No calf tenderness  Neuro-     Cognitive - AAOx3     Motor -                     LEFT    UE - ShAB 5/5, EF 5/5, EE 5/5, WE 5/5,  4/5                    RIGHT UE - ShAB 5/5, EF 5/5, EE 5/5, WE 5/5,  5/5                    LEFT    LE - HF 5/5, KE 5/5, DF 5/5, PF 5/5                    RIGHT LE - HF 5/5, KE 5/5, DF 5/5, PF 5/5     Psychiatric - Mood stable, Affect WNL  Skin:  all skin intact    Wounds: None Present

## 2019-12-10 NOTE — PROGRESS NOTE ADULT - ASSESSMENT
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  - Gait Instability, ADL impairments and Functional impairments: Continue Comprehensive Rehab Program of PT/OT with focus on RUE/RLE strengthening, balance and coordination training     #hypokalemia - RESOLVED  - 12/9 K 3.4 s/p KCL 40meq x2 --> 12/10 K 4.0  - monitor    #HTN  - cont norvasc 5 qd    #HLD  - cont lipitor 10 qhs    #Anxiety  - cont klonopin 0.5 q12h prn    #sleep  - melatonin 3 qhs    #Pain Mgmt   - Tylenol PRN, Oxycodone PRN  - cymbalta 60 qd  - gabapentin 300 TID  - 12/10 increased lyrica 50 BID  - lidocaine patch    #tobacco use disorder  - cont nicotine patch    #GI/Bowel Mgmt   - Continent c/w Colace, Senna, Miralax PRN    #/Bladder Mgmt   - Continent    #FEN   - Diet - Regular + Thins  [[facility]]      #Precautions / PROPHYLAXIS:   - Falls, Cardiac  - ortho: Weight bearing status: WBAT   - Lungs: Aspiration, Incentive Spirometer   - Pressure injury/Skin: Turn Q2hrs while in bed, OOB to Chair, PT/OT    - DVT: Lovenox, SCDs    Team Conference 12/5/19  PT: mod a for gait, min a for transfers  OT: min a for transfers, LE dressing  barriers to dc: lives with son, but alone all day, poor safety awareness

## 2019-12-10 NOTE — CHART NOTE - NSCHARTNOTEFT_GEN_A_CORE
Nutrition Follow Up Note  Hospital Course   (Per Electronic Medical Record):     Source:  Patient [X]  Medical Record [X]      Diet:   Consistent Carbohydrate Diet w/ Thin Liquids  Tolerates Diet Well  No Chewing/Swallowing Difficulties  No Recent Nausea, Vomiting, Diarrhea or Constipation  Consumes 100% of Meals (as Per Documentation) - States Good PO Intake/Appetite     Enteral/Parenteral Nutrition: Not Applicable    Current Weight: 198.4lb on 12/3  Obtain New Weight  Obtain Weights Weekly     Pertinent Medications: MEDICATIONS  (STANDING):  acetaminophen   Tablet .. 650 milliGRAM(s) Oral every 6 hours  amLODIPine   Tablet 5 milliGRAM(s) Oral daily  atorvastatin 10 milliGRAM(s) Oral at bedtime  dextrose 5%. 1000 milliLiter(s) (50 mL/Hr) IV Continuous <Continuous>  dextrose 50% Injectable 12.5 Gram(s) IV Push once  dextrose 50% Injectable 25 Gram(s) IV Push once  dextrose 50% Injectable 25 Gram(s) IV Push once  DULoxetine 60 milliGRAM(s) Oral daily  enoxaparin Injectable 40 milliGRAM(s) SubCutaneous at bedtime  fluticasone propionate 50 MICROgram(s)/spray Nasal Spray 1 Spray(s) Both Nostrils two times a day  folic acid 1 milliGRAM(s) Oral daily  gabapentin 300 milliGRAM(s) Oral three times a day  insulin lispro (HumaLOG) corrective regimen sliding scale   SubCutaneous before breakfast  lidocaine   Patch 1 Patch Transdermal every 24 hours  melatonin 3 milliGRAM(s) Oral at bedtime  multivitamin 1 Tablet(s) Oral daily  nicotine - 21 mG/24Hr(s) Patch 1 patch Transdermal daily  polyethylene glycol 3350 17 Gram(s) Oral two times a day  pregabalin 50 milliGRAM(s) Oral every 12 hours  senna 2 Tablet(s) Oral at bedtime    MEDICATIONS  (PRN):  artificial tears (preservative free) Ophthalmic Solution 1 Drop(s) Both EYES two times a day PRN Dry Eyes  clonazePAM  Tablet 0.5 milliGRAM(s) Oral every 12 hours PRN anxiety/panic attack  dextrose 40% Gel 15 Gram(s) Oral once PRN Blood Glucose LESS THAN 70 milliGRAM(s)/deciliter  glucagon  Injectable 1 milliGRAM(s) IntraMuscular once PRN Glucose LESS THAN 70 milligrams/deciliter  oxyCODONE    IR 5 milliGRAM(s) Oral every 4 hours PRN Moderate Pain (4 - 6)    Pertinent Labs:  12-10 Na138 mmol/L Glu 105 mg/dL<H> K+ 4.0 mmol/L Cr  1.41 mg/dL<H> BUN 28 mg/dL<H> 12-04 Phos 4.5 mg/dL 12-03 Alb 3.2 g/dL<L> 11-29 [id number] 6.4 %<H>    POCT (over Last 3 Days) - Ranging from 105-113    Skin: No Pressure Ulcers     Edema: None Noted     Last Bowel Movement: on 12/9    Estimated Needs:   [X] No Change Since Previous Assessment    Previous Nutrition Diagnosis:   No Active Nutrition Dx @ This Present Time    Nutrition Diagnosis is [X] Not Applicable    New Nutrition Diagnosis: [X] Not Applicable    Interventions:   1. Recommend Continue Nutrition Plan of Care     Monitoring & Evaluation:   [X] Weights   [X] PO Intake   [X] Skin Integrity   [X] Follow Up (Per Protocol)  [X] Tolerance to Diet Prescription   [X] Other: Labs & POCT    Registered Dietitian/Nutritionist Remains Available.  Horacio Nichols RDN    Pager # [phone]  Phone# [phone]

## 2019-12-11 LAB — GLUCOSE BLDC GLUCOMTR-MCNC: 118 MG/DL — HIGH (ref 70–99)

## 2019-12-11 PROCEDURE — 99232 SBSQ HOSP IP/OBS MODERATE 35: CPT | Mod: GC

## 2019-12-11 PROCEDURE — 99232 SBSQ HOSP IP/OBS MODERATE 35: CPT

## 2019-12-11 RX ADMIN — POLYETHYLENE GLYCOL 3350 17 GRAM(S): 17 POWDER, FOR SOLUTION ORAL at 17:28

## 2019-12-11 RX ADMIN — Medication 650 MILLIGRAM(S): at 06:24

## 2019-12-11 RX ADMIN — Medication 1 DROP(S): at 23:42

## 2019-12-11 RX ADMIN — Medication 650 MILLIGRAM(S): at 12:38

## 2019-12-11 RX ADMIN — LIDOCAINE 1 PATCH: 4 CREAM TOPICAL at 05:46

## 2019-12-11 RX ADMIN — Medication 1 MILLIGRAM(S): at 12:39

## 2019-12-11 RX ADMIN — Medication 1 SPRAY(S): at 17:28

## 2019-12-11 RX ADMIN — DULOXETINE HYDROCHLORIDE 60 MILLIGRAM(S): 30 CAPSULE, DELAYED RELEASE ORAL at 12:39

## 2019-12-11 RX ADMIN — Medication 650 MILLIGRAM(S): at 23:29

## 2019-12-11 RX ADMIN — Medication 650 MILLIGRAM(S): at 00:29

## 2019-12-11 RX ADMIN — Medication 650 MILLIGRAM(S): at 13:25

## 2019-12-11 RX ADMIN — Medication 1 PATCH: at 05:48

## 2019-12-11 RX ADMIN — Medication 650 MILLIGRAM(S): at 00:30

## 2019-12-11 RX ADMIN — Medication 650 MILLIGRAM(S): at 17:28

## 2019-12-11 RX ADMIN — GABAPENTIN 300 MILLIGRAM(S): 400 CAPSULE ORAL at 21:59

## 2019-12-11 RX ADMIN — Medication 1 SPRAY(S): at 05:35

## 2019-12-11 RX ADMIN — GABAPENTIN 300 MILLIGRAM(S): 400 CAPSULE ORAL at 14:58

## 2019-12-11 RX ADMIN — SENNA PLUS 2 TABLET(S): 8.6 TABLET ORAL at 21:59

## 2019-12-11 RX ADMIN — Medication 1 TABLET(S): at 12:39

## 2019-12-11 RX ADMIN — Medication 650 MILLIGRAM(S): at 05:34

## 2019-12-11 RX ADMIN — Medication 50 MILLIGRAM(S): at 17:28

## 2019-12-11 RX ADMIN — GABAPENTIN 300 MILLIGRAM(S): 400 CAPSULE ORAL at 05:35

## 2019-12-11 RX ADMIN — LIDOCAINE 1 PATCH: 4 CREAM TOPICAL at 12:39

## 2019-12-11 RX ADMIN — AMLODIPINE BESYLATE 5 MILLIGRAM(S): 2.5 TABLET ORAL at 05:35

## 2019-12-11 RX ADMIN — Medication 1 PATCH: at 07:43

## 2019-12-11 RX ADMIN — Medication 50 MILLIGRAM(S): at 05:35

## 2019-12-11 RX ADMIN — POLYETHYLENE GLYCOL 3350 17 GRAM(S): 17 POWDER, FOR SOLUTION ORAL at 05:36

## 2019-12-11 RX ADMIN — ATORVASTATIN CALCIUM 10 MILLIGRAM(S): 80 TABLET, FILM COATED ORAL at 21:59

## 2019-12-11 RX ADMIN — Medication 3 MILLIGRAM(S): at 21:59

## 2019-12-11 RX ADMIN — ENOXAPARIN SODIUM 40 MILLIGRAM(S): 100 INJECTION SUBCUTANEOUS at 21:59

## 2019-12-11 RX ADMIN — Medication 1 PATCH: at 12:38

## 2019-12-11 NOTE — PROGRESS NOTE ADULT - SUBJECTIVE AND OBJECTIVE BOX
Patient is a 64y old  Male who presents with a chief complaint of SDH, brain tumor (09 Dec 2019 15:40)      HPI:  64y m PMhx prior CVA w/ residual L sided weakness and paresthesias, brain "cyst" s/p removal c/b hydrocephalus s/p  shunt placement in 1980 in [place], HTN, pw worsening L sided paresthesias and transferred from [facility] for new CT findings. Pt found to have subacute SDH along R frontal lobe and large mass in third ventricle. Pt denies any falls or recent head trauma to explain the SDH. 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  Hospital course complicated  with leukocytosis during admission, workup ongoing.  Pt received IV fluids for hypotension, home anti htn medications held. (03 Dec 2019 18:14)        SUBJECTIVE: Patient seen and examined. No acute overnight events, complaining of mild low back pain, new since admission.     REVIEW OF SYSTEMS  Constitutional: +fatigue  Pulm: No cough,  No shortness of breath  Cardio: No chest pain  GI:  No abdominal pain, No nausea, No vomiting, No diarrhea, No constipation  Neuro: No headaches, + weakness LLE , No numbness, No tremors  MSK: +LLE pain     MEDICATIONS  (STANDING):  acetaminophen   Tablet .. 650 milliGRAM(s) Oral every 6 hours  amLODIPine   Tablet 5 milliGRAM(s) Oral daily  atorvastatin 10 milliGRAM(s) Oral at bedtime  dextrose 5%. 1000 milliLiter(s) (50 mL/Hr) IV Continuous <Continuous>  dextrose 50% Injectable 12.5 Gram(s) IV Push once  dextrose 50% Injectable 25 Gram(s) IV Push once  dextrose 50% Injectable 25 Gram(s) IV Push once  DULoxetine 60 milliGRAM(s) Oral daily  enoxaparin Injectable 40 milliGRAM(s) SubCutaneous at bedtime  fluticasone propionate 50 MICROgram(s)/spray Nasal Spray 1 Spray(s) Both Nostrils two times a day  folic acid 1 milliGRAM(s) Oral daily  gabapentin 300 milliGRAM(s) Oral three times a day  insulin lispro (HumaLOG) corrective regimen sliding scale   SubCutaneous before breakfast  lidocaine   Patch 1 Patch Transdermal every 24 hours  melatonin 3 milliGRAM(s) Oral at bedtime  multivitamin 1 Tablet(s) Oral daily  nicotine - 21 mG/24Hr(s) Patch 1 patch Transdermal daily  polyethylene glycol 3350 17 Gram(s) Oral two times a day  pregabalin 50 milliGRAM(s) Oral every 12 hours  senna 2 Tablet(s) Oral at bedtime    MEDICATIONS  (PRN):  artificial tears (preservative free) Ophthalmic Solution 1 Drop(s) Both EYES two times a day PRN Dry Eyes  clonazePAM  Tablet 0.5 milliGRAM(s) Oral every 12 hours PRN anxiety/panic attack  dextrose 40% Gel 15 Gram(s) Oral once PRN Blood Glucose LESS THAN 70 milliGRAM(s)/deciliter  glucagon  Injectable 1 milliGRAM(s) IntraMuscular once PRN Glucose LESS THAN 70 milligrams/deciliter  oxyCODONE    IR 5 milliGRAM(s) Oral every 4 hours PRN Moderate Pain (4 - 6)    Vital Signs Last 24 Hrs  T(C): 36.4 (11 Dec 2019 09:49), Max: 36.6 (10 Dec 2019 22:45)  T(F): 97.6 (11 Dec 2019 09:49), Max: 97.8 (10 Dec 2019 22:45)  HR: 82 (11 Dec 2019 09:49) (65 - 82)  BP: 129/80 (11 Dec 2019 09:49) (122/75 - 129/80)  BP(mean): --  RR: 14 (11 Dec 2019 09:49) (14 - 14)  SpO2: 94% (11 Dec 2019 09:49) (94% - 94%)                    CAPILLARY BLOOD GLUCOSE      POCT Blood Glucose.: 105 mg/dL (10 Dec 2019 07:41)        PHYSICAL EXAM:   Gen - NAD, Comfortable  Abdomen - Soft, NT/ND, +BS  Extremities - No C/C/E, No calf tenderness  Neuro-     Cognitive - AAOx3     Motor -                     LEFT    UE - ShAB 5/5, EF 5/5, EE 5/5, WE 5/5,  4/5                    RIGHT UE - ShAB 5/5, EF 5/5, EE 5/5, WE 5/5,  5/5                    LEFT    LE - HF 5/5, KE 5/5, DF 5/5, PF 5/5                    RIGHT LE - HF 5/5, KE 5/5, DF 5/5, PF 5/5     Psychiatric - Mood stable, Affect WNL    Wounds: None Present

## 2019-12-11 NOTE — PROGRESS NOTE ADULT - ASSESSMENT
ASSESSMENT/PLAN  BRITTNEE POSADA is a 63yo Male with a PMH of CVA who was found to have subacute SDH along R frontal lobe and large mass in third ventricle and is now with Gait Instability, ADL impairments and Functional impairments.    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  - Gait Instability, ADL impairments and Functional impairments: Continue Comprehensive Rehab Program of PT/OT with focus on RUE/RLE strengthening, balance and coordination training     #hypokalemia - RESOLVED    #HTN  - cont norvasc 5 qd    #HLD  - cont lipitor 10 qhs    #Anxiety  - cont klonopin 0.5 q12h prn    #sleep  - melatonin 3 qhs    #Pain Mgmt   - Tylenol PRN, Oxycodone PRN  - cymbalta 60 qd  - gabapentin 300 TID  - 12/10 increased lyrica 50 BID  - lidocaine patch    #tobacco use disorder  - cont nicotine patch    #GI/Bowel Mgmt   - Continent c/w Colace, Senna, Miralax PRN    #/Bladder Mgmt   - Continent    #FEN   - Diet - Regular + Thins  [[facility]]      #Precautions / PROPHYLAXIS:   - Falls, Cardiac  - ortho: Weight bearing status: WBAT   - Lungs: Aspiration, Incentive Spirometer   - Pressure injury/Skin: Turn Q2hrs while in bed, OOB to Chair, PT/OT    - DVT: Lovenox, SCDs    Team Conference 12/5/19  PT: mod a for gait, min a for transfers  OT: min a for transfers, LE dressing  barriers to dc: lives with son, but alone all day, poor safety awareness

## 2019-12-11 NOTE — PROGRESS NOTE ADULT - SUBJECTIVE AND OBJECTIVE BOX
Patient seen and examined today. OOB/Chair doing well -c/o mild lower back pain stating "it may be just muscle spasm I have not exercised x 10 years -can they check my kidney function-" PMH: inclusive for  CVA w/ residual L sided weakness and paresthesias, brain "cyst" s/p removal c/b hydrocephalus s/p  shunt placement in 1980 in [place], HTN, pw worsening L sided paresthesias and transferred from [facility] for new CT findings. Pt found to have subacute SDH along R frontal lobe and large mass in third ventricle. Pt denies any falls or recent head trauma to explain the SDH. 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  Hospital course complicated  with leukocytosis during admission, workup ongoing.  Pt received IV fluids for hypotension, home anti htn medications held.        Review of Systems: per hpi            · 	nystatin 100,000 units/g topical powder: 1 application topically 3 times a day  · 	melatonin 3 mg oral tablet: 1 tab(s) orally once a day (at bedtime)  · 	clonazePAM 0.5 mg oral tablet: 1 tab(s) orally every 12 hours, As needed, anxiety/panic attack  · 	simvastatin 20 mg oral tablet: 1 tab(s) orally once a day (at bedtime)  · 	DULoxetine 60 mg oral delayed release capsule: 1 cap(s) orally once a day  · 	gabapentin 300 mg oral capsule: 1 cap(s) orally 3 times a day  · 	enoxaparin: 40 milligram(s) subcutaneous once a day (at bedtime)  · 	oxyCODONE 5 mg oral tablet: 1 tab(s) orally every 4 hours, As needed, Moderate Pain (4 - 6)  · 	acetaminophen 325 mg oral tablet: 2 tab(s) orally every 6 hours, As needed, Mild Pain (1 - 3)  · 	amLODIPine 5 mg oral tablet: 1 tab(s) orally once a day  · 	metFORMIN: orally 3 times a day after meals?    .Patient History:   Past Medical, Past Surgical, and Family History:  PAST MEDICAL HISTORY:  Diabetes     H/O brain tumor     H/O hydrocephalus     Hypertension     Peripheral neuropathy     Stroke.     PAST SURGICAL HISTORY:  H/O craniotomy     S/P ventriculoperitoneal shunt.    FH- Non contributory    Social History: denies smoking or etoh          GENERAL- NAD  EAR/NOSE/MOUTH/THROAT - no pharyngeal exudates, no oral leisions,  MMM  EYES- CORINNE, conjunctiva and Sclera clear  NECK- supple  RESPIRATORY-  clear to auscultation bilaterally  CARDIOVASCULAR - SIS2, RRR  GI - soft NT BS present  EXTREMITIES- no pedal edema  NEUROLOGY- no gross focal deficits  SKIN- no rashes, warm to touch  PSYCHIATRY- AAO X 3  MUSCULOSKELETAL- ROM normal Patient seen and examined today. OOB/Chair doing well -c/o mild lower back pain stating "it may be just muscle spasm I have not exercised x 10 years -can they check my kidney function-" PMH: inclusive for  CVA w/ residual L sided weakness and paresthesias, brain "cyst" s/p removal c/b hydrocephalus s/p  shunt placement in 1980 in [place], HTN, pw worsening L sided paresthesias and transferred from [facility] for new CT findings. Pt found to have subacute SDH along R frontal lobe and large mass in third ventricle. Pt denies any falls or recent head trauma to explain the SDH. 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  Hospital course complicated  with leukocytosis during admission, workup ongoing.  Pt received IV fluids for hypotension, home anti htn medications held.        Review of Systems: per hpi            · 	nystatin 100,000 units/g topical powder: 1 application topically 3 times a day  · 	melatonin 3 mg oral tablet: 1 tab(s) orally once a day (at bedtime)  · 	clonazePAM 0.5 mg oral tablet: 1 tab(s) orally every 12 hours, As needed, anxiety/panic attack  · 	simvastatin 20 mg oral tablet: 1 tab(s) orally once a day (at bedtime)  · 	DULoxetine 60 mg oral delayed release capsule: 1 cap(s) orally once a day  · 	gabapentin 300 mg oral capsule: 1 cap(s) orally 3 times a day  · 	enoxaparin: 40 milligram(s) subcutaneous once a day (at bedtime)  · 	oxyCODONE 5 mg oral tablet: 1 tab(s) orally every 4 hours, As needed, Moderate Pain (4 - 6)  · 	acetaminophen 325 mg oral tablet: 2 tab(s) orally every 6 hours, As needed, Mild Pain (1 - 3)  · 	amLODIPine 5 mg oral tablet: 1 tab(s) orally once a day  · 	metFORMIN: orally 3 times a day after meals?    .Patient History:   Past Medical, Past Surgical, and Family History:  PAST MEDICAL HISTORY:  Diabetes     H/O brain tumor     H/O hydrocephalus     Hypertension     Peripheral neuropathy     Stroke.     PAST SURGICAL HISTORY:  H/O craniotomy     S/P ventriculoperitoneal shunt.              GENERAL- NAD  EAR/NOSE/MOUTH/THROAT - no pharyngeal exudates, no oral leisions,  MMM  EYES- CORINNE, conjunctiva and Sclera clear  NECK- supple  RESPIRATORY-  clear to auscultation bilaterally  CARDIOVASCULAR - SIS2, RRR  GI - soft NT BS present  EXTREMITIES- no pedal edema  NEUROLOGY- no gross focal deficits  SKIN- no rashes, warm to touch  PSYCHIATRY- AAO X 3  MUSCULOSKELETAL- ROM normal

## 2019-12-11 NOTE — PROGRESS NOTE ADULT - SUBJECTIVE AND OBJECTIVE BOX
64y m PMhx prior CVA w/ residual L sided weakness and paresthesias, brain "cyst" s/p removal c/b hydrocephalus s/p  shunt placement in 1980 in [place], HTN, pw worsening L sided paresthesias and transferred from [facility] for new CT findings. Pt found to have subacute SDH along R frontal lobe and large mass in third ventricle. Pt denies any falls or recent head trauma to explain the SDH. 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  Hospital course complicated  with leukocytosis during admission, workup ongoing.  Pt received IV fluids for hypotension, home anti htn medications held.      seen at the bedside, no new complaints,  left sided chronic pain due to neuropathy. no n/v, no sob      Vital Signs Last 24 Hrs  T(C): 36.4 (11 Dec 2019 09:49), Max: 36.6 (10 Dec 2019 22:45)  T(F): 97.6 (11 Dec 2019 09:49), Max: 97.8 (10 Dec 2019 22:45)  HR: 82 (11 Dec 2019 09:49) (65 - 82)  BP: 129/80 (11 Dec 2019 09:49) (122/75 - 129/80)  BP(mean): --  RR: 14 (11 Dec 2019 09:49) (14 - 14)  SpO2: 94% (11 Dec 2019 09:49) (94% - 94%)      GENERAL- NAD  EAR/NOSE/MOUTH/THROAT - no pharyngeal exudates, no oral lesions  MMM  EYES- CORINNE, conjunctiva and Sclera clear  NECK- supple  RESPIRATORY-  clear to auscultation bilaterally  CARDIOVASCULAR - SIS2, RRR  GI - soft NT BS present  EXTREMITIES- no pedal edema  NEUROLOGY- no gross focal deficits  SKIN- no rashes, warm to touch  PSYCHIATRY- AAO X 3  MUSCULOSKELETAL- ROM normal                    x                    138  | 25   | 28           x     >-----------< x       ------------------------< 105                   x                    4.0  | 104  | 1.41                                         Ca 9.0   Mg x     Ph x

## 2019-12-11 NOTE — PROGRESS NOTE ADULT - ASSESSMENT
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      RA- NOTED, encourgae po hydration, consider iv fluids if gfr worsens    dm2- ISS Accu-Cheks,  was on metformin at home, A1C 6.4, diabetic diet, repeat bmp noted, encourage po hydration, repeat bmp before d/c, consider d/c home on metformin 250 bid if gfr improves.    HTN-- norvasc     HLD- Statin     insomnia- melatonin     Anxiety- clonazepam prn    DVT: Lovenox    former smoker- nicotine patch Transdermal daily    will follow, c/w current care

## 2019-12-11 NOTE — PROGRESS NOTE ADULT - ASSESSMENT
ASSESSMENT/PLAN  64 year-old Man with a PMH of CVA who was found to have subacute SDH along R frontal lobe and large mass in third ventricle and is now with Gait Instability, ADL impairments and Functional impairments.    COMORBIDITIES / ACTIVE MEDICAL ISSUES     Gait Instability, ADL impairments and Functional impairments 2/2 SDH and Brain Mass :   - start Comprehensive Rehab Program of PT/OT   - Pain Mgmt - Tylenol PRN, Oxycodone PRN, gabapentin, cymbalta   - GI/Bowel Mgmt -  Senna,  Dulcolax PRN, Miralax PRN,  - /Bladder Mgmt -  PVR  - sleep - melatonin     Cardio  - HTN/HLD   - norvasc , Statin   - Home BP meds had been held due to initial hypotension. BP now improved, Norvasc added on 11/30. Monitor.    Anxiety  - prn clonazepam     FEN   - Diet - Regular + Thins  ([facility])

## 2019-12-12 LAB
ANION GAP SERPL CALC-SCNC: 9 MMOL/L — SIGNIFICANT CHANGE UP (ref 5–17)
BUN SERPL-MCNC: 26 MG/DL — HIGH (ref 7–23)
CALCIUM SERPL-MCNC: 9.1 MG/DL — SIGNIFICANT CHANGE UP (ref 8.4–10.5)
CHLORIDE SERPL-SCNC: 104 MMOL/L — SIGNIFICANT CHANGE UP (ref 96–108)
CO2 SERPL-SCNC: 28 MMOL/L — SIGNIFICANT CHANGE UP (ref 22–31)
CREAT SERPL-MCNC: 1.26 MG/DL — SIGNIFICANT CHANGE UP (ref 0.5–1.3)
GLUCOSE BLDC GLUCOMTR-MCNC: 113 MG/DL — HIGH (ref 70–99)
GLUCOSE SERPL-MCNC: 105 MG/DL — HIGH (ref 70–99)
HCT VFR BLD CALC: 40.9 % — SIGNIFICANT CHANGE UP (ref 39–50)
HGB BLD-MCNC: 13.1 G/DL — SIGNIFICANT CHANGE UP (ref 13–17)
MCHC RBC-ENTMCNC: 26.3 PG — LOW (ref 27–34)
MCHC RBC-ENTMCNC: 32 GM/DL — SIGNIFICANT CHANGE UP (ref 32–36)
MCV RBC AUTO: 82.1 FL — SIGNIFICANT CHANGE UP (ref 80–100)
NRBC # BLD: 0 /100 WBCS — SIGNIFICANT CHANGE UP (ref 0–0)
PLATELET # BLD AUTO: 369 K/UL — SIGNIFICANT CHANGE UP (ref 150–400)
POTASSIUM SERPL-MCNC: 3.6 MMOL/L — SIGNIFICANT CHANGE UP (ref 3.5–5.3)
POTASSIUM SERPL-SCNC: 3.6 MMOL/L — SIGNIFICANT CHANGE UP (ref 3.5–5.3)
RBC # BLD: 4.98 M/UL — SIGNIFICANT CHANGE UP (ref 4.2–5.8)
RBC # FLD: 14.4 % — SIGNIFICANT CHANGE UP (ref 10.3–14.5)
SODIUM SERPL-SCNC: 141 MMOL/L — SIGNIFICANT CHANGE UP (ref 135–145)
WBC # BLD: 7.02 K/UL — SIGNIFICANT CHANGE UP (ref 3.8–10.5)
WBC # FLD AUTO: 7.02 K/UL — SIGNIFICANT CHANGE UP (ref 3.8–10.5)

## 2019-12-12 PROCEDURE — 99232 SBSQ HOSP IP/OBS MODERATE 35: CPT

## 2019-12-12 RX ADMIN — Medication 1 PATCH: at 07:11

## 2019-12-12 RX ADMIN — Medication 650 MILLIGRAM(S): at 05:43

## 2019-12-12 RX ADMIN — POLYETHYLENE GLYCOL 3350 17 GRAM(S): 17 POWDER, FOR SOLUTION ORAL at 05:44

## 2019-12-12 RX ADMIN — Medication 650 MILLIGRAM(S): at 13:13

## 2019-12-12 RX ADMIN — ATORVASTATIN CALCIUM 10 MILLIGRAM(S): 80 TABLET, FILM COATED ORAL at 21:26

## 2019-12-12 RX ADMIN — Medication 1 TABLET(S): at 12:16

## 2019-12-12 RX ADMIN — Medication 1 MILLIGRAM(S): at 12:13

## 2019-12-12 RX ADMIN — DULOXETINE HYDROCHLORIDE 60 MILLIGRAM(S): 30 CAPSULE, DELAYED RELEASE ORAL at 12:13

## 2019-12-12 RX ADMIN — GABAPENTIN 300 MILLIGRAM(S): 400 CAPSULE ORAL at 15:03

## 2019-12-12 RX ADMIN — GABAPENTIN 300 MILLIGRAM(S): 400 CAPSULE ORAL at 21:26

## 2019-12-12 RX ADMIN — Medication 650 MILLIGRAM(S): at 12:13

## 2019-12-12 RX ADMIN — Medication 1 SPRAY(S): at 05:44

## 2019-12-12 RX ADMIN — Medication 1 SPRAY(S): at 17:51

## 2019-12-12 RX ADMIN — GABAPENTIN 300 MILLIGRAM(S): 400 CAPSULE ORAL at 05:43

## 2019-12-12 RX ADMIN — Medication 650 MILLIGRAM(S): at 17:50

## 2019-12-12 RX ADMIN — Medication 1 PATCH: at 12:15

## 2019-12-12 RX ADMIN — Medication 0.5 MILLIGRAM(S): at 19:58

## 2019-12-12 RX ADMIN — Medication 3 MILLIGRAM(S): at 21:26

## 2019-12-12 RX ADMIN — LIDOCAINE 1 PATCH: 4 CREAM TOPICAL at 12:13

## 2019-12-12 RX ADMIN — ENOXAPARIN SODIUM 40 MILLIGRAM(S): 100 INJECTION SUBCUTANEOUS at 21:26

## 2019-12-12 RX ADMIN — SENNA PLUS 2 TABLET(S): 8.6 TABLET ORAL at 21:26

## 2019-12-12 RX ADMIN — Medication 650 MILLIGRAM(S): at 19:38

## 2019-12-12 RX ADMIN — Medication 1 PATCH: at 12:00

## 2019-12-12 RX ADMIN — LIDOCAINE 1 PATCH: 4 CREAM TOPICAL at 00:39

## 2019-12-12 RX ADMIN — AMLODIPINE BESYLATE 5 MILLIGRAM(S): 2.5 TABLET ORAL at 05:43

## 2019-12-12 RX ADMIN — LIDOCAINE 1 PATCH: 4 CREAM TOPICAL at 19:54

## 2019-12-12 RX ADMIN — Medication 50 MILLIGRAM(S): at 17:50

## 2019-12-12 RX ADMIN — Medication 1 PATCH: at 19:54

## 2019-12-12 RX ADMIN — Medication 50 MILLIGRAM(S): at 05:44

## 2019-12-12 RX ADMIN — POLYETHYLENE GLYCOL 3350 17 GRAM(S): 17 POWDER, FOR SOLUTION ORAL at 17:51

## 2019-12-12 NOTE — PROGRESS NOTE ADULT - SUBJECTIVE AND OBJECTIVE BOX
Patient is a 64y old  Male who presents with a chief complaint of SDH (11 Dec 2019 10:57)      HPI:  64y m PMhx prior CVA w/ residual L sided weakness and paresthesias, brain "cyst" s/p removal c/b hydrocephalus s/p  shunt placement in 1980 in [place], HTN, pw worsening L sided paresthesias and transferred from [facility] for new CT findings. Pt found to have subacute SDH along R frontal lobe and large mass in third ventricle. Pt denies any falls or recent head trauma to explain the SDH. 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  Hospital course complicated  with leukocytosis during admission, workup ongoing.  Pt received IV fluids for hypotension, home anti htn medications held. (03 Dec 2019 18:14)        SUBJECTIVE: Patient seen and examined. No acute overnight events, slept well.   No other complaints.       REVIEW OF SYSTEMS  Constitutional: No fever, No Chills, No fatigue  Pulm: No cough,  No shortness of breath  Cardio: No chest pain, No palpitations  GI:  No abdominal pain, No nausea, No vomiting, No diarrhea, No constipation  : No dysuria, No frequency, No hematuria  Neuro: No headaches, No memory loss, No loss of strength, No numbness, No tremors  MSK: No joint pain, No joint swelling, No muscle pain, No Neck,  +back pain  Psych:   +depression, No anxiety      MEDICATIONS  (STANDING):  acetaminophen   Tablet .. 650 milliGRAM(s) Oral every 6 hours  amLODIPine   Tablet 5 milliGRAM(s) Oral daily  atorvastatin 10 milliGRAM(s) Oral at bedtime  dextrose 5%. 1000 milliLiter(s) (50 mL/Hr) IV Continuous <Continuous>  dextrose 50% Injectable 12.5 Gram(s) IV Push once  dextrose 50% Injectable 25 Gram(s) IV Push once  dextrose 50% Injectable 25 Gram(s) IV Push once  DULoxetine 60 milliGRAM(s) Oral daily  enoxaparin Injectable 40 milliGRAM(s) SubCutaneous at bedtime  fluticasone propionate 50 MICROgram(s)/spray Nasal Spray 1 Spray(s) Both Nostrils two times a day  folic acid 1 milliGRAM(s) Oral daily  gabapentin 300 milliGRAM(s) Oral three times a day  insulin lispro (HumaLOG) corrective regimen sliding scale   SubCutaneous before breakfast  lidocaine   Patch 1 Patch Transdermal every 24 hours  melatonin 3 milliGRAM(s) Oral at bedtime  multivitamin 1 Tablet(s) Oral daily  nicotine - 21 mG/24Hr(s) Patch 1 patch Transdermal daily  polyethylene glycol 3350 17 Gram(s) Oral two times a day  pregabalin 50 milliGRAM(s) Oral every 12 hours  senna 2 Tablet(s) Oral at bedtime    MEDICATIONS  (PRN):  artificial tears (preservative free) Ophthalmic Solution 1 Drop(s) Both EYES two times a day PRN Dry Eyes  clonazePAM  Tablet 0.5 milliGRAM(s) Oral every 12 hours PRN anxiety/panic attack  dextrose 40% Gel 15 Gram(s) Oral once PRN Blood Glucose LESS THAN 70 milliGRAM(s)/deciliter  glucagon  Injectable 1 milliGRAM(s) IntraMuscular once PRN Glucose LESS THAN 70 milligrams/deciliter  oxyCODONE    IR 5 milliGRAM(s) Oral every 4 hours PRN Moderate Pain (4 - 6)          VITALS  64y  Vital Signs Last 24 Hrs  T(C): 36.8 (11 Dec 2019 21:09), Max: 36.8 (11 Dec 2019 21:09)  T(F): 98.2 (11 Dec 2019 21:09), Max: 98.2 (11 Dec 2019 21:09)  HR: 68 (12 Dec 2019 05:48) (68 - 82)  BP: 128/78 (12 Dec 2019 05:48) (128/78 - 132/79)  BP(mean): --  RR: 14 (11 Dec 2019 21:09) (14 - 14)  SpO2: 95% (11 Dec 2019 21:09) (94% - 95%)  Daily     Daily       RECENT LABS:                          13.1   7.02  )-----------( 369      ( 12 Dec 2019 07:12 )             40.9     12-12    141  |  104  |  26<H>  ----------------------------<  105<H>  3.6   |  28  |  1.26    Ca    9.1      12 Dec 2019 07:12                CAPILLARY BLOOD GLUCOSE      POCT Blood Glucose.: 113 mg/dL (12 Dec 2019 07:49)        PHYSICAL EXAM:   Gen - NAD, Comfortable  Abdomen - Soft, NT/ND, +BS  Extremities - No C/C/E, No calf tenderness  Neuro-     Cognitive - AAOx3     Motor -                     LEFT    UE - ShAB 5/5, EF 5/5, EE 5/5, WE 5/5,  4/5                    RIGHT UE - ShAB 5/5, EF 5/5, EE 5/5, WE 5/5,  5/5                    LEFT    LE - HF 5/5, KE 5/5, DF 5/5, PF 5/5                    RIGHT LE - HF 5/5, KE 5/5, DF 5/5, PF 5/5     Psychiatric - Mood stable, Affect WNL    Wounds: None Present        FUNCTIONAL STATUS: Patient is a 64y old  Male who presents with a chief complaint of SDH       HPI:  64y m PMhx prior CVA w/ residual L sided weakness and paresthesias, brain "cyst" s/p removal c/b hydrocephalus s/p  shunt placement in 1980 in [place], HTN, pw worsening L sided paresthesias and transferred from [facility] for new CT findings. Pt found to have subacute SDH along R frontal lobe and large mass in third ventricle. Pt denies any falls or recent head trauma to explain the SDH. 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  Hospital course complicated  with leukocytosis during admission, workup ongoing.  Pt received IV fluids for hypotension, home anti htn medications held. (03 Dec 2019 18:14)        SUBJECTIVE: Patient seen and examined. No acute overnight events, slept well.   No other complaints.       REVIEW OF SYSTEMS  Constitutional: No fever, No Chills, No fatigue  Pulm: No cough,  No shortness of breath  Cardio: No chest pain, No palpitations  GI:  No abdominal pain, No nausea, No vomiting, No diarrhea, No constipation  : No dysuria, No frequency, No hematuria  Neuro: No headaches, No memory loss, No loss of strength, No numbness, No tremors  MSK: No joint pain, No joint swelling, No muscle pain, No Neck,  +back pain  Psych:   +depression, No anxiety      MEDICATIONS  (STANDING):  acetaminophen   Tablet .. 650 milliGRAM(s) Oral every 6 hours  amLODIPine   Tablet 5 milliGRAM(s) Oral daily  atorvastatin 10 milliGRAM(s) Oral at bedtime  dextrose 5%. 1000 milliLiter(s) (50 mL/Hr) IV Continuous <Continuous>  dextrose 50% Injectable 12.5 Gram(s) IV Push once  dextrose 50% Injectable 25 Gram(s) IV Push once  dextrose 50% Injectable 25 Gram(s) IV Push once  DULoxetine 60 milliGRAM(s) Oral daily  enoxaparin Injectable 40 milliGRAM(s) SubCutaneous at bedtime  fluticasone propionate 50 MICROgram(s)/spray Nasal Spray 1 Spray(s) Both Nostrils two times a day  folic acid 1 milliGRAM(s) Oral daily  gabapentin 300 milliGRAM(s) Oral three times a day  insulin lispro (HumaLOG) corrective regimen sliding scale   SubCutaneous before breakfast  lidocaine   Patch 1 Patch Transdermal every 24 hours  melatonin 3 milliGRAM(s) Oral at bedtime  multivitamin 1 Tablet(s) Oral daily  nicotine - 21 mG/24Hr(s) Patch 1 patch Transdermal daily  polyethylene glycol 3350 17 Gram(s) Oral two times a day  pregabalin 50 milliGRAM(s) Oral every 12 hours  senna 2 Tablet(s) Oral at bedtime    MEDICATIONS  (PRN):  artificial tears (preservative free) Ophthalmic Solution 1 Drop(s) Both EYES two times a day PRN Dry Eyes  clonazePAM  Tablet 0.5 milliGRAM(s) Oral every 12 hours PRN anxiety/panic attack  dextrose 40% Gel 15 Gram(s) Oral once PRN Blood Glucose LESS THAN 70 milliGRAM(s)/deciliter  glucagon  Injectable 1 milliGRAM(s) IntraMuscular once PRN Glucose LESS THAN 70 milligrams/deciliter  oxyCODONE    IR 5 milliGRAM(s) Oral every 4 hours PRN Moderate Pain (4 - 6)          VITALS  64y  Vital Signs Last 24 Hrs  T(C): 36.8 (11 Dec 2019 21:09), Max: 36.8 (11 Dec 2019 21:09)  T(F): 98.2 (11 Dec 2019 21:09), Max: 98.2 (11 Dec 2019 21:09)  HR: 68 (12 Dec 2019 05:48) (68 - 82)  BP: 128/78 (12 Dec 2019 05:48) (128/78 - 132/79)  BP(mean): --  RR: 14 (11 Dec 2019 21:09) (14 - 14)  SpO2: 95% (11 Dec 2019 21:09) (94% - 95%)  Daily     Daily       RECENT LABS:                          13.1   7.02  )-----------( 369      ( 12 Dec 2019 07:12 )             40.9     12-12    141  |  104  |  26<H>  ----------------------------<  105<H>  3.6   |  28  |  1.26    Ca    9.1      12 Dec 2019 07:12                CAPILLARY BLOOD GLUCOSE      POCT Blood Glucose.: 113 mg/dL (12 Dec 2019 07:49)        PHYSICAL EXAM:   Gen - NAD, Comfortable  Abdomen - Soft, NT/ND, +BS  Extremities - No C/C/E, No calf tenderness  Neuro-     Cognitive - AAOx3     Motor -                     LEFT    UE - ShAB 5/5, EF 5/5, EE 5/5, WE 5/5,  4/5                    RIGHT UE - ShAB 5/5, EF 5/5, EE 5/5, WE 5/5,  5/5                    LEFT    LE - HF 5/5, KE 5/5, DF 5/5, PF 5/5                    RIGHT LE - HF 5/5, KE 5/5, DF 5/5, PF 5/5     Psychiatric - Mood stable, Affect WNL Patient is a 64y old  Male who presents with a chief complaint of SDH       HPI:  64y m PMhx prior CVA w/ residual L sided weakness and paresthesias, brain "cyst" s/p removal c/b hydrocephalus s/p  shunt placement in 1980 in [place], HTN, pw worsening L sided paresthesias and transferred from [facility] for new CT findings. Pt found to have subacute SDH along R frontal lobe and large mass in third ventricle. Pt denies any falls or recent head trauma to explain the SDH. 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  Hospital course complicated  with leukocytosis during admission, workup ongoing.  Pt received IV fluids for hypotension, home anti htn medications held. (03 Dec 2019 18:14)        SUBJECTIVE: Patient seen and examined. No acute overnight events, slept well. Denies chest pain, sob, palpitations, chills, fever, no abdominal pain  No other complaints.       REVIEW OF SYSTEMS  Constitutional: No fever, No Chills, No fatigue  Pulm: No cough,  No shortness of breath  Cardio: No chest pain, No palpitations  GI:  No abdominal pain, No nausea, No vomiting, No diarrhea, No constipation  : No dysuria, No frequency, No hematuria  Neuro: No headaches, No memory loss, No loss of strength, No numbness, No tremors  MSK: No joint pain, No joint swelling, No muscle pain, No Neck,  +back pain  Psych:   +depression, No anxiety      MEDICATIONS  (STANDING):  acetaminophen   Tablet .. 650 milliGRAM(s) Oral every 6 hours  amLODIPine   Tablet 5 milliGRAM(s) Oral daily  atorvastatin 10 milliGRAM(s) Oral at bedtime  DULoxetine 60 milliGRAM(s) Oral daily  enoxaparin Injectable 40 milliGRAM(s) SubCutaneous at bedtime  fluticasone propionate 50 MICROgram(s)/spray Nasal Spray 1 Spray(s) Both Nostrils two times a day  folic acid 1 milliGRAM(s) Oral daily  gabapentin 300 milliGRAM(s) Oral three times a day  insulin lispro (HumaLOG) corrective regimen sliding scale   SubCutaneous before breakfast  lidocaine   Patch 1 Patch Transdermal every 24 hours  melatonin 3 milliGRAM(s) Oral at bedtime  multivitamin 1 Tablet(s) Oral daily  nicotine - 21 mG/24Hr(s) Patch 1 patch Transdermal daily  polyethylene glycol 3350 17 Gram(s) Oral two times a day  pregabalin 50 milliGRAM(s) Oral every 12 hours  senna 2 Tablet(s) Oral at bedtime    MEDICATIONS  (PRN):  artificial tears (preservative free) Ophthalmic Solution 1 Drop(s) Both EYES two times a day PRN Dry Eyes  clonazePAM  Tablet 0.5 milliGRAM(s) Oral every 12 hours PRN anxiety/panic attack  dextrose 40% Gel 15 Gram(s) Oral once PRN Blood Glucose LESS THAN 70 milliGRAM(s)/deciliter  glucagon  Injectable 1 milliGRAM(s) IntraMuscular once PRN Glucose LESS THAN 70 milligrams/deciliter  oxyCODONE    IR 5 milliGRAM(s) Oral every 4 hours PRN Moderate Pain (4 - 6)        Vital Signs Last 24 Hrs  T(C): 36.6 (12 Dec 2019 09:07), Max: 36.8 (11 Dec 2019 21:09)  T(F): 97.8 (12 Dec 2019 09:07), Max: 98.2 (11 Dec 2019 21:09)  HR: 75 (12 Dec 2019 09:07) (68 - 76)  BP: 125/88 (12 Dec 2019 09:07) (125/88 - 132/79)  BP(mean): --  RR: 14 (12 Dec 2019 09:07) (14 - 14)  SpO2: 97% (12 Dec 2019 09:07) (95% - 97%)      RECENT LABS:                          13.1   7.02  )-----------( 369      ( 12 Dec 2019 07:12 )             40.9     12-12    141  |  104  |  26<H>  ----------------------------<  105<H>  3.6   |  28  |  1.26    Ca    9.1      12 Dec 2019 07:12                CAPILLARY BLOOD GLUCOSE      POCT Blood Glucose.: 113 mg/dL (12 Dec 2019 07:49)                25  |  1.41<H>    Ca    9.0      10 Dec 2019 05:40                CAPILLARY BLOOD GLUCOSE      POCT Blood Glucose.: 105 mg/dL (10 Dec 2019 07:41)        PHYSICAL EXAM:   Gen - NAD, Comfortable  HEENT - NCAT, EOMI, MMM  Pulm - CTAB, No wheeze  Cardiovascular - RRR, S1S2  Abdomen - Soft, NT/ND, +BS  Extremities - No C/C/E, No calf tenderness  Neuro-     Cognitive - AAOx3     Motor -                     LEFT    UE - ShAB 5/5, EF 5/5, EE 5/5, WE 5/5,  4/5                    RIGHT UE - ShAB 5/5, EF 5/5, EE 5/5, WE 5/5,  5/5                    LEFT    LE - HF 5/5, KE 5/5, DF 5/5, PF 5/5                    RIGHT LE - HF 5/5, KE 5/5, DF 5/5, PF 5/5     Psychiatric - Mood stable, Affect WNL Patient is a 64y old  Male who presents with a chief complaint of SDH       HPI:  64y m PMhx prior CVA w/ residual L sided weakness and paresthesias, brain "cyst" s/p removal c/b hydrocephalus s/p  shunt placement in 1980 in [place], HTN, pw worsening L sided paresthesias and transferred from [facility] for [facility] CT findings. Pt found to have subacute SDH along R frontal lobe and large mass in third ventricle. Pt denies any falls or recent head trauma to explain the SDH. 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  Hospital course complicated  with leukocytosis during admission, workup ongoing.  Pt received IV fluids for hypotension, home anti htn medications held. (03 Dec 2019 18:14)        SUBJECTIVE: Patient seen and examined. No acute overnight events, slept well. Denies chest pain, sob, palpitations, chills, fever, no abdominal pain  No other complaints.       REVIEW OF SYSTEMS  Constitutional: No fever, No Chills, No fatigue  Pulm: No cough,  No shortness of breath  Cardio: No chest pain, No palpitations  GI:  No abdominal pain, No nausea, No vomiting, No diarrhea, No constipation  : No dysuria, No frequency, No hematuria  Neuro: No headaches, No memory loss, No loss of strength, No numbness, No tremors  MSK: right side dysesthesias since stroke,  +back pain  Psych:   +depression, No anxiety      MEDICATIONS  (STANDING):  acetaminophen   Tablet .. 650 milliGRAM(s) Oral every 6 hours  amLODIPine   Tablet 5 milliGRAM(s) Oral daily  atorvastatin 10 milliGRAM(s) Oral at bedtime  DULoxetine 60 milliGRAM(s) Oral daily  enoxaparin Injectable 40 milliGRAM(s) SubCutaneous at bedtime  fluticasone propionate 50 MICROgram(s)/spray Nasal Spray 1 Spray(s) Both Nostrils two times a day  folic acid 1 milliGRAM(s) Oral daily  gabapentin 300 milliGRAM(s) Oral three times a day  insulin lispro (HumaLOG) corrective regimen sliding scale   SubCutaneous before breakfast  lidocaine   Patch 1 Patch Transdermal every 24 hours  melatonin 3 milliGRAM(s) Oral at bedtime  multivitamin 1 Tablet(s) Oral daily  nicotine - 21 mG/24Hr(s) Patch 1 patch Transdermal daily  polyethylene glycol 3350 17 Gram(s) Oral two times a day  pregabalin 50 milliGRAM(s) Oral every 12 hours  senna 2 Tablet(s) Oral at bedtime    MEDICATIONS  (PRN):  artificial tears (preservative free) Ophthalmic Solution 1 Drop(s) Both EYES two times a day PRN Dry Eyes  clonazePAM  Tablet 0.5 milliGRAM(s) Oral every 12 hours PRN anxiety/panic attack  dextrose 40% Gel 15 Gram(s) Oral once PRN Blood Glucose LESS THAN 70 milliGRAM(s)/deciliter  glucagon  Injectable 1 milliGRAM(s) IntraMuscular once PRN Glucose LESS THAN 70 milligrams/deciliter  oxyCODONE    IR 5 milliGRAM(s) Oral every 4 hours PRN Moderate Pain (4 - 6)        Vital Signs Last 24 Hrs  T(C): 36.6 (12 Dec 2019 09:07), Max: 36.8 (11 Dec 2019 21:09)  T(F): 97.8 (12 Dec 2019 09:07), Max: 98.2 (11 Dec 2019 21:09)  HR: 75 (12 Dec 2019 09:07) (68 - 76)  BP: 125/88 (12 Dec 2019 09:07) (125/88 - 132/79)  BP(mean): --  RR: 14 (12 Dec 2019 09:07) (14 - 14)  SpO2: 97% (12 Dec 2019 09:07) (95% - 97%)      RECENT LABS:                          13.1   7.02  )-----------( 369      ( 12 Dec 2019 07:12 )             40.9     12-12    141  |  104  |  26<H>  ----------------------------<  105<H>  3.6   |  28  |  1.26    Ca    9.1      12 Dec 2019 07:12                CAPILLARY BLOOD GLUCOSE      POCT Blood Glucose.: 113 mg/dL (12 Dec 2019 07:49)                25  |  1.41<H>    Ca    9.0      10 Dec 2019 05:40                CAPILLARY BLOOD GLUCOSE      POCT Blood Glucose.: 105 mg/dL (10 Dec 2019 07:41)        PHYSICAL EXAM:   Gen - NAD, Comfortable but when asked about pain focuses on somatic complaints. Otherwise sitting comfortably, wheeling himself on unit mod indepnednetly in wheelchair    HEENT - NCAT, EOMI, MMM  Pulm - CTAB, No wheeze  Cardiovascular - RRR, S1S2  Abdomen - Soft, NT/ND, +BS  Extremities - No C/C/E, No calf tenderness  inconsistent exam no spasm palpated no soft tissue swelilng low back  Neuro-     Cognitive - AAOx3     Motor -                     LEFT    UE - ShAB 5/5, EF 5/5, EE 5/5, WE 5/5,  4/5                    RIGHT UE - ShAB 5/5, EF 5/5, EE 5/5, WE 5/5,  5/5                    LEFT    LE - HF 5/5, KE 5/5, DF 5/5, PF 5/5                    RIGHT LE - HF 5/5, KE 5/5, DF 5/5, PF 5/5     Psychiatric - Mood stable, Affect WNL

## 2019-12-12 NOTE — PROGRESS NOTE ADULT - SUBJECTIVE AND OBJECTIVE BOX
64y m PMhx prior CVA w/ residual L sided weakness and paresthesias, brain "cyst" s/p removal c/b hydrocephalus s/p  shunt placement in 1980 in [place], HTN, pw worsening L sided paresthesias and transferred from [facility] for new CT findings. Pt found to have subacute SDH along R frontal lobe and large mass in third ventricle. Pt denies any falls or recent head trauma to explain the SDH. 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 Hospital course complicated  with leukocytosis during admission, workup ongoing.  Pt received IV fluids for hypotension, home anti htn medications held.      seen at the bedside, no new complaints, left sided chronic pain due to neuropathy, no n/v, no sob    Vital Signs Last 24 Hrs  T(C): 36.8 (11 Dec 2019 21:09), Max: 36.8 (11 Dec 2019 21:09)  T(F): 98.2 (11 Dec 2019 21:09), Max: 98.2 (11 Dec 2019 21:09)  HR: 68 (12 Dec 2019 05:48) (68 - 76)  BP: 128/78 (12 Dec 2019 05:48) (128/78 - 132/79)  BP(mean): --  RR: 14 (11 Dec 2019 21:09) (14 - 14)  SpO2: 95% (11 Dec 2019 21:09) (95% - 95%)      GENERAL- NAD  EAR/NOSE/MOUTH/THROAT - no pharyngeal exudates, no oral lesions  MMM  EYES- CORINNE, conjunctiva and Sclera clear  NECK- supple  RESPIRATORY-  clear to auscultation bilaterally  CARDIOVASCULAR - SIS2, RRR  GI - soft NT BS present  EXTREMITIES- no pedal edema  NEUROLOGY- no gross focal deficits  SKIN- no rashes, warm to touch  PSYCHIATRY- AAO X 3  MUSCULOSKELETAL- ROM normal                  13.1                 141  | 28   | 26           7.02  >-----------< 369     ------------------------< 105                   40.9                 3.6  | 104  | 1.26                                         Ca 9.1   Mg x     Ph x

## 2019-12-12 NOTE — PROGRESS NOTE ADULT - ASSESSMENT
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  - Gait Instability, ADL impairments and Functional impairments: Continue Comprehensive Rehab Program of PT/OT with focus on RUE/RLE strengthening, balance and coordination training     #hypokalemia - RESOLVED    #HTN  - cont norvasc 5 qd    #HLD  - cont lipitor 10 qhs    #Anxiety  - cont klonopin 0.5 q12h prn    #sleep  - melatonin 3 qhs    #Pain Mgmt   - Tylenol PRN, Oxycodone PRN  - cymbalta 60 qd  - gabapentin 300 TID  - 12/10 increased lyrica 50 BID  - lidocaine patch    #tobacco use disorder  - cont nicotine patch    #GI/Bowel Mgmt   - Continent c/w, Senna, Miralax PRN    #/Bladder Mgmt   - Continent    #FEN   - Diet - Regular + Thins  [CCHO]      #Precautions / PROPHYLAXIS:   - Falls, Cardiac  - ortho: Weight bearing status: WBAT   - Lungs: Aspiration, Incentive Spirometer   - Pressure injury/Skin: Turn Q2hrs while in bed, OOB to Chair, PT/OT    - DVT: Lovenox, SCDs    Team Conference 12/5/19  PT: mod a for gait, min a for transfers  OT: min a for transfers, LE dressing  barriers to dc: lives with son, but alone all day, poor safety awareness ASSESSMENT/PLAN  BRITTNEE POSADA is a 65yo Male with a PMH of CVA who was found to have subacute SDH along R frontal lobe and large mass in third ventricle and is now with Gait Instability, ADL impairments and Functional impairments.    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  - Gait Instability, ADL impairments and Functional impairments: Continue Comprehensive Rehab Program of PT/OT with focus on RUE/RLE strengthening, balance and coordination training     #hypokalemia - RESOLVED    #HTN  - cont norvasc 5 qd    #HLD  - cont lipitor 10 qhs    #Anxiety  - cont klonopin 0.5 q12h prn    #sleep  - melatonin 3 qhs    #Pain Mgmt   - Tylenol PRN, Oxycodone PRN  - cymbalta 60 qd  - gabapentin 300 TID  - 12/10 increased lyrica 50 BID  - lidocaine patch    #tobacco use disorder  - cont nicotine patch    #GI/Bowel Mgmt   - Continent c/w, Senna, Miralax PRN    #/Bladder Mgmt   - Continent    #FEN   - Diet - Regular + Thins  [CCHO]      #Precautions / PROPHYLAXIS:   - Falls, Cardiac  - ortho: Weight bearing status: WBAT   - Lungs: Aspiration, Incentive Spirometer   - Pressure injury/Skin: Turn Q2hrs while in bed, OOB to Chair, PT/OT    - DVT: Lovenox, SCDs    Team Conference 12/12/19  PT: mod a for gait, min a for transfers  OT: min a for transfers, LE dressing  barriers to dc: lives with son, but alone all day, poor safety awareness ASSESSMENT/PLAN   65yo Male with a PMH of CVA who was found to have subacute SDH along R frontal lobe and large mass in third ventricle and is now with Gait Instability, ADL impairments and Functional impairments.    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  - Gait Instability, ADL impairments and Functional impairments: Continue Comprehensive Rehab Program of PT/OT with focus on RUE/RLE strengthening, balance and coordination training     #hypokalemia - RESOLVED    #HTN  - cont norvasc 5 qd    #HLD  - cont lipitor 10 qhs    #Anxiety  - cont klonopin 0.5 q12h prn    #sleep  - melatonin 3 qhs    #Pain Mgmt   - Tylenol PRN, Oxycodone PRN  - cymbalta 60 qd  - gabapentin 300 TID  - 12/10 increased lyrica 50 BID  - lidocaine patch    #tobacco use disorder  - cont nicotine patch    #GI/Bowel Mgmt   - Continent c/w, Senna, Miralax PRN    #/Bladder Mgmt   - Continent    #FEN   - Diet - Regular + Thins  [CCHO]      #Precautions / PROPHYLAXIS:   - Falls, Cardiac  - ortho: Weight bearing status: WBAT   - Lungs: Aspiration, Incentive Spirometer   - Pressure injury/Skin: Turn Q2hrs while in bed, OOB to Chair, PT/OT    - DVT: Lovenox, SCDs    Team Conference 12/12/19  PT: mod a for gait, min a for transfers  OT: min a for transfers, LE dressing  barriers to dc: lives with son, but alone all day, poor safety awareness ASSESSMENT/PLAN   63yo Male with a PMH of CVA who was found to have subacute SDH along R frontal lobe and large mass in third ventricle and is now with Gait Instability, ADL impairments and Functional impairments.    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  - Gait Instability, ADL impairments and Functional impairments: Continue Comprehensive Rehab Program of PT/OT with focus on RUE/RLE strengthening, balance and coordination training   -psychological support. pain management techniques    #hypokalemia - RESOLVED    #HTN  - cont norvasc 5 qd  controlled    #HLD  - cont lipitor 10 qhs    #Anxiety  - cont klonopin 0.5 q12h prn    #sleep  - melatonin 3 qhs    #Pain Mgmt   - Tylenol PRN, Oxycodone PRN  - cymbalta 60 qd  - gabapentin 300 TID  - 12/10 increased lyrica 50 BID. tolerating no sedation  - lidocaine patch    #tobacco use disorder  - cont nicotine patch    #GI/Bowel Mgmt   - Continent c/w, Senna, Miralax PRN    #/Bladder Mgmt   - Continent    #FEN   - Diet - Regular + Thins  [CCHO]      # DVT PPX:   Lovenox, SCDs    Team Conference 12/12/19  PT: mod a for gait, min a for transfers  OT: min a for transfers, LE dressing  barriers to dc: lives with son, but alone all day, poor safety awareness

## 2019-12-13 LAB — GLUCOSE BLDC GLUCOMTR-MCNC: 110 MG/DL — HIGH (ref 70–99)

## 2019-12-13 PROCEDURE — 99232 SBSQ HOSP IP/OBS MODERATE 35: CPT

## 2019-12-13 PROCEDURE — 99232 SBSQ HOSP IP/OBS MODERATE 35: CPT | Mod: GC

## 2019-12-13 RX ORDER — LIDOCAINE 4 G/100G
1 CREAM TOPICAL DAILY
Refills: 0 | Status: DISCONTINUED | OUTPATIENT
Start: 2019-12-13 | End: 2019-12-17

## 2019-12-13 RX ADMIN — GABAPENTIN 300 MILLIGRAM(S): 400 CAPSULE ORAL at 21:53

## 2019-12-13 RX ADMIN — Medication 1 SPRAY(S): at 17:16

## 2019-12-13 RX ADMIN — Medication 50 MILLIGRAM(S): at 14:44

## 2019-12-13 RX ADMIN — Medication 1 PATCH: at 12:24

## 2019-12-13 RX ADMIN — Medication 1 PATCH: at 08:23

## 2019-12-13 RX ADMIN — LIDOCAINE 1 PATCH: 4 CREAM TOPICAL at 12:23

## 2019-12-13 RX ADMIN — LIDOCAINE 1 PATCH: 4 CREAM TOPICAL at 01:01

## 2019-12-13 RX ADMIN — Medication 650 MILLIGRAM(S): at 12:25

## 2019-12-13 RX ADMIN — SENNA PLUS 2 TABLET(S): 8.6 TABLET ORAL at 21:53

## 2019-12-13 RX ADMIN — Medication 1 PATCH: at 12:22

## 2019-12-13 RX ADMIN — DULOXETINE HYDROCHLORIDE 60 MILLIGRAM(S): 30 CAPSULE, DELAYED RELEASE ORAL at 12:24

## 2019-12-13 RX ADMIN — ATORVASTATIN CALCIUM 10 MILLIGRAM(S): 80 TABLET, FILM COATED ORAL at 21:53

## 2019-12-13 RX ADMIN — Medication 1 MILLIGRAM(S): at 12:24

## 2019-12-13 RX ADMIN — Medication 3 MILLIGRAM(S): at 21:53

## 2019-12-13 RX ADMIN — Medication 650 MILLIGRAM(S): at 17:17

## 2019-12-13 RX ADMIN — GABAPENTIN 300 MILLIGRAM(S): 400 CAPSULE ORAL at 05:29

## 2019-12-13 RX ADMIN — Medication 1 TABLET(S): at 12:24

## 2019-12-13 RX ADMIN — Medication 50 MILLIGRAM(S): at 21:53

## 2019-12-13 RX ADMIN — Medication 1 DROP(S): at 19:11

## 2019-12-13 RX ADMIN — POLYETHYLENE GLYCOL 3350 17 GRAM(S): 17 POWDER, FOR SOLUTION ORAL at 17:17

## 2019-12-13 RX ADMIN — AMLODIPINE BESYLATE 5 MILLIGRAM(S): 2.5 TABLET ORAL at 05:29

## 2019-12-13 RX ADMIN — Medication 50 MILLIGRAM(S): at 05:29

## 2019-12-13 RX ADMIN — Medication 650 MILLIGRAM(S): at 05:28

## 2019-12-13 RX ADMIN — Medication 1 PATCH: at 21:58

## 2019-12-13 RX ADMIN — Medication 650 MILLIGRAM(S): at 18:15

## 2019-12-13 RX ADMIN — Medication 650 MILLIGRAM(S): at 01:00

## 2019-12-13 RX ADMIN — Medication 650 MILLIGRAM(S): at 01:58

## 2019-12-13 RX ADMIN — Medication 1 SPRAY(S): at 05:29

## 2019-12-13 RX ADMIN — Medication 650 MILLIGRAM(S): at 12:28

## 2019-12-13 RX ADMIN — LIDOCAINE 1 PATCH: 4 CREAM TOPICAL at 21:58

## 2019-12-13 RX ADMIN — ENOXAPARIN SODIUM 40 MILLIGRAM(S): 100 INJECTION SUBCUTANEOUS at 21:54

## 2019-12-13 RX ADMIN — GABAPENTIN 300 MILLIGRAM(S): 400 CAPSULE ORAL at 14:45

## 2019-12-13 RX ADMIN — POLYETHYLENE GLYCOL 3350 17 GRAM(S): 17 POWDER, FOR SOLUTION ORAL at 05:29

## 2019-12-13 NOTE — PROGRESS NOTE ADULT - SUBJECTIVE AND OBJECTIVE BOX
Patient is a 64y old  Male who presents with a chief complaint of SDH (12 Dec 2019 10:39)  Patient seen and examined at bedside.  Pt. c/o "pain in my obliques, I have a stitch in my side".      ALLERGIES:  No Known Allergies    MEDICATIONS  (STANDING):  acetaminophen   Tablet .. 650 milliGRAM(s) Oral every 6 hours  amLODIPine   Tablet 5 milliGRAM(s) Oral daily  atorvastatin 10 milliGRAM(s) Oral at bedtime  dextrose 5%. 1000 milliLiter(s) (50 mL/Hr) IV Continuous <Continuous>  dextrose 50% Injectable 12.5 Gram(s) IV Push once  dextrose 50% Injectable 25 Gram(s) IV Push once  dextrose 50% Injectable 25 Gram(s) IV Push once  DULoxetine 60 milliGRAM(s) Oral daily  enoxaparin Injectable 40 milliGRAM(s) SubCutaneous at bedtime  fluticasone propionate 50 MICROgram(s)/spray Nasal Spray 1 Spray(s) Both Nostrils two times a day  folic acid 1 milliGRAM(s) Oral daily  gabapentin 300 milliGRAM(s) Oral three times a day  insulin lispro (HumaLOG) corrective regimen sliding scale   SubCutaneous before breakfast  lidocaine   Patch 1 Patch Transdermal daily  lidocaine   Patch 1 Patch Transdermal every 24 hours  melatonin 3 milliGRAM(s) Oral at bedtime  multivitamin 1 Tablet(s) Oral daily  nicotine - 21 mG/24Hr(s) Patch 1 patch Transdermal daily  polyethylene glycol 3350 17 Gram(s) Oral two times a day  pregabalin 50 milliGRAM(s) Oral every 8 hours  senna 2 Tablet(s) Oral at bedtime    MEDICATIONS  (PRN):  artificial tears (preservative free) Ophthalmic Solution 1 Drop(s) Both EYES two times a day PRN Dry Eyes  clonazePAM  Tablet 0.5 milliGRAM(s) Oral every 12 hours PRN anxiety/panic attack  dextrose 40% Gel 15 Gram(s) Oral once PRN Blood Glucose LESS THAN 70 milliGRAM(s)/deciliter  glucagon  Injectable 1 milliGRAM(s) IntraMuscular once PRN Glucose LESS THAN 70 milligrams/deciliter  oxyCODONE    IR 5 milliGRAM(s) Oral every 4 hours PRN Moderate Pain (4 - 6)    Vital Signs Last 24 Hrs  T(F): 98.4 (13 Dec 2019 09:41), Max: 98.4 (13 Dec 2019 09:41)  HR: 75 (13 Dec 2019 09:41) (75 - 81)  BP: 117/72 (13 Dec 2019 09:41) (117/72 - 137/57)  RR: 14 (13 Dec 2019 09:41) (14 - 14)  SpO2: 98% (13 Dec 2019 09:41) (96% - 98%)  I&O's Summary    PHYSICAL EXAM:  General: NAD, Alert, sitting in chair.  Neck: Supple, No JVD  Lungs: good air entry, breathing not labored, clear to auscultation bilaterally  Cardio: RRR, S1/S2, No murmurs  Abdomen: Soft, Nontender, Nondistended; Bowel sounds present  Extremities: No calf tenderness, No pitting edema  Neuro:  mild left sided weakness    LABS:                        13.1   7.02  )-----------( 369      ( 12 Dec 2019 07:12 )             40.9     12-12    141  |  104  |  26  ----------------------------<  105  3.6   |  28  |  1.26    Ca    9.1      12 Dec 2019 07:12      eGFR if Non African American: 59 mL/min/1.73M2 (12-12-19 @ 07:12)  eGFR if African American: 68 mL/min/1.73M2 (12-12-19 @ 07:12)      POCT Blood Glucose.: 110 mg/dL (13 Dec 2019 07:42)    11-29 [id number] 6.4          RADIOLOGY & ADDITIONAL TESTS:    Care Discussed with Consultants/Other Providers:

## 2019-12-13 NOTE — PROGRESS NOTE ADULT - ASSESSMENT
64 year-old Man with a PMH of CVA who was found to have subacute SDH along R frontal lobe and large mass in third ventricle and is now with Gait Instability, ADL impairments and Functional impairments.    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    #RA- Creat improved; continue to monitor bmp, encourage oral hydration    #DM2- HgbA1c - 6.4  -was on metformin at home, d/екатерина due to renal function; continue ISS, BS very well controlled    #HTN:  -systolic ranging 120>130's; continue with Norvasc     #HLD- Statin     #Insomnia- melatonin     #Anxiety- clonazepam prn    #Tobacco abuse (former smoker)- nicotine patch daily

## 2019-12-13 NOTE — PROGRESS NOTE ADULT - ASSESSMENT
ASSESSMENT/PLAN   65yo Male with a PMH of CVA who was found to have subacute SDH along R frontal lobe and large mass in third ventricle and is now with Gait Instability, ADL impairments and Functional impairments.    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  - Gait Instability, ADL impairments and Functional impairments: Continue Comprehensive Rehab Program of PT/OT with focus on RUE/RLE strengthening, balance and coordination training     #hypokalemia - RESOLVED    #HTN  - cont norvasc 5 qd    #HLD  - cont lipitor 10 qhs    #Anxiety  - cont klonopin 0.5 q12h prn    #sleep  - melatonin 3 qhs    #Pain Mgmt   - Tylenol PRN, Oxycodone PRN  - cymbalta 60 qd  - gabapentin 300 TID  - 12/13 increased lyrica 50 TID  - lidocaine patch    #tobacco use disorder  - cont nicotine patch    #GI/Bowel Mgmt   - Continent c/w, Senna, Miralax PRN    #/Bladder Mgmt   - Continent    #FEN   - Diet - Regular + Thins  [CCHO]      #Precautions / PROPHYLAXIS:   - Falls, Cardiac  - ortho: Weight bearing status: WBAT   - Lungs: Aspiration, Incentive Spirometer   - Pressure injury/Skin: Turn Q2hrs while in bed, OOB to Chair, PT/OT    - DVT: Lovenox, SCDs    Team Conference 12/12/19  PT: min a for gait, min a-CG for transfers however requires MAX verbal cueing  OT: min a for transfers, LE dressing  barriers to dc: lives with son, but alone all day, poor safety awareness, impulsivity, decreased coordination.   target dc date 12/17 - will require contact with family to determine family training and addressing safety issues

## 2019-12-13 NOTE — PROGRESS NOTE ADULT - SUBJECTIVE AND OBJECTIVE BOX
Patient is a 64y old  Male who presents with a chief complaint of SDH (12 Dec 2019 10:39)      HPI:  64y m PMhx prior CVA w/ residual L sided weakness and paresthesias, brain "cyst" s/p removal c/b hydrocephalus s/p  shunt placement in 1980 in [place], HTN, pw worsening L sided paresthesias and transferred from [facility] for new CT findings. Pt found to have subacute SDH along R frontal lobe and large mass in third ventricle. Pt denies any falls or recent head trauma to explain the SDH. 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  Hospital course complicated  with leukocytosis during admission, workup ongoing.  Pt received IV fluids for hypotension, home anti htn medications held. (03 Dec 2019 18:14)        SUBJECTIVE: Patient seen and examined. No acute overnight events, slept well. Feels some soreness at his bilateral obliques after doing core exercises in therapy yesterday. Otherwise, states mood is greatly improved and is very appreciative of staff. No other complaints.       REVIEW OF SYSTEMS  Constitutional: No fever, No Chills, No fatigue  Pulm: No cough,  No shortness of breath  Cardio: No chest pain, No palpitations  GI:  No abdominal pain, No nausea, No constipation  : No dysuria, No frequency, No hematuria  Neuro: No headaches, No memory loss, No loss of strength, +left sided numbness and tingling stable, No tremors  MSK: No joint pain, No joint swelling, No muscle pain, No Neck,  +back/obliques pain  Psych:   +depression improving, No anxiety      MEDICATIONS  (STANDING):  acetaminophen   Tablet .. 650 milliGRAM(s) Oral every 6 hours  amLODIPine   Tablet 5 milliGRAM(s) Oral daily  atorvastatin 10 milliGRAM(s) Oral at bedtime  dextrose 5%. 1000 milliLiter(s) (50 mL/Hr) IV Continuous <Continuous>  dextrose 50% Injectable 12.5 Gram(s) IV Push once  dextrose 50% Injectable 25 Gram(s) IV Push once  dextrose 50% Injectable 25 Gram(s) IV Push once  DULoxetine 60 milliGRAM(s) Oral daily  enoxaparin Injectable 40 milliGRAM(s) SubCutaneous at bedtime  fluticasone propionate 50 MICROgram(s)/spray Nasal Spray 1 Spray(s) Both Nostrils two times a day  folic acid 1 milliGRAM(s) Oral daily  gabapentin 300 milliGRAM(s) Oral three times a day  insulin lispro (HumaLOG) corrective regimen sliding scale   SubCutaneous before breakfast  lidocaine   Patch 1 Patch Transdermal daily  lidocaine   Patch 1 Patch Transdermal every 24 hours  melatonin 3 milliGRAM(s) Oral at bedtime  multivitamin 1 Tablet(s) Oral daily  nicotine - 21 mG/24Hr(s) Patch 1 patch Transdermal daily  polyethylene glycol 3350 17 Gram(s) Oral two times a day  pregabalin 50 milliGRAM(s) Oral every 8 hours  senna 2 Tablet(s) Oral at bedtime    MEDICATIONS  (PRN):  artificial tears (preservative free) Ophthalmic Solution 1 Drop(s) Both EYES two times a day PRN Dry Eyes  clonazePAM  Tablet 0.5 milliGRAM(s) Oral every 12 hours PRN anxiety/panic attack  dextrose 40% Gel 15 Gram(s) Oral once PRN Blood Glucose LESS THAN 70 milliGRAM(s)/deciliter  glucagon  Injectable 1 milliGRAM(s) IntraMuscular once PRN Glucose LESS THAN 70 milligrams/deciliter  oxyCODONE    IR 5 milliGRAM(s) Oral every 4 hours PRN Moderate Pain (4 - 6)          VITALS  64y  Vital Signs Last 24 Hrs  T(C): 36.7 (12 Dec 2019 21:30), Max: 36.7 (12 Dec 2019 21:30)  T(F): 98.1 (12 Dec 2019 21:30), Max: 98.1 (12 Dec 2019 21:30)  HR: 75 (13 Dec 2019 05:25) (75 - 81)  BP: 129/71 (13 Dec 2019 05:25) (129/71 - 137/57)  BP(mean): --  RR: 14 (13 Dec 2019 05:25) (14 - 14)  SpO2: 96% (13 Dec 2019 05:25) (96% - 98%)  Daily     Daily       RECENT LABS:                          13.1   7.02  )-----------( 369      ( 12 Dec 2019 07:12 )             40.9     12-12    141  |  104  |  26<H>  ----------------------------<  105<H>  3.6   |  28  |  1.26    Ca    9.1      12 Dec 2019 07:12                CAPILLARY BLOOD GLUCOSE      POCT Blood Glucose.: 110 mg/dL (13 Dec 2019 07:42)        PHYSICAL EXAM:   Gen - NAD, Comfortable  HEENT - NCAT, EOMI, MMM  Pulm - CTAB, No wheeze  Cardiovascular - RRR, S1S2  Abdomen - Soft, NT/ND, +BS  Extremities - No C/C/E, No calf tenderness  Neuro-     Cognitive - AAOx3     Motor -                     LEFT    UE - ShAB 5/5, EF 5/5, EE 5/5, WE 5/5,  4/5                    RIGHT UE - ShAB 5/5, EF 5/5, EE 5/5, WE 5/5,  5/5                    LEFT    LE - HF 5/5, KE 5/5, DF 5/5, PF 5/5                    RIGHT LE - HF 5/5, KE 5/5, DF 5/5, PF 5/5     Psychiatric - Mood stable, Affect WNL      FUNCTIONAL STATUS:  amb: 150ft RW Joceline  transfers: Joceline-CG requires max verbal cueing  ADLs: setup/Joceline

## 2019-12-14 LAB
APPEARANCE UR: CLEAR — SIGNIFICANT CHANGE UP
BACTERIA # UR AUTO: NEGATIVE /HPF — SIGNIFICANT CHANGE UP
BILIRUB UR-MCNC: NEGATIVE — SIGNIFICANT CHANGE UP
COLOR SPEC: YELLOW — SIGNIFICANT CHANGE UP
DIFF PNL FLD: NEGATIVE — SIGNIFICANT CHANGE UP
EPI CELLS # UR: SIGNIFICANT CHANGE UP
GLUCOSE BLDC GLUCOMTR-MCNC: 122 MG/DL — HIGH (ref 70–99)
GLUCOSE UR QL: NEGATIVE — SIGNIFICANT CHANGE UP
KETONES UR-MCNC: NEGATIVE — SIGNIFICANT CHANGE UP
LEUKOCYTE ESTERASE UR-ACNC: NEGATIVE — SIGNIFICANT CHANGE UP
NITRITE UR-MCNC: NEGATIVE — SIGNIFICANT CHANGE UP
PH UR: 5 — SIGNIFICANT CHANGE UP (ref 5–8)
PROT UR-MCNC: NEGATIVE — SIGNIFICANT CHANGE UP
RBC CASTS # UR COMP ASSIST: NEGATIVE /HPF — SIGNIFICANT CHANGE UP (ref 0–4)
SP GR SPEC: 1.01 — SIGNIFICANT CHANGE UP (ref 1.01–1.02)
UROBILINOGEN FLD QL: NEGATIVE — SIGNIFICANT CHANGE UP
WBC UR QL: NEGATIVE /HPF — SIGNIFICANT CHANGE UP (ref 0–5)

## 2019-12-14 PROCEDURE — 99232 SBSQ HOSP IP/OBS MODERATE 35: CPT

## 2019-12-14 RX ADMIN — LIDOCAINE 1 PATCH: 4 CREAM TOPICAL at 01:01

## 2019-12-14 RX ADMIN — Medication 1 PATCH: at 08:12

## 2019-12-14 RX ADMIN — Medication 1 PATCH: at 12:06

## 2019-12-14 RX ADMIN — Medication 50 MILLIGRAM(S): at 14:34

## 2019-12-14 RX ADMIN — Medication 650 MILLIGRAM(S): at 17:26

## 2019-12-14 RX ADMIN — Medication 650 MILLIGRAM(S): at 06:21

## 2019-12-14 RX ADMIN — Medication 3 MILLIGRAM(S): at 21:48

## 2019-12-14 RX ADMIN — OXYCODONE HYDROCHLORIDE 5 MILLIGRAM(S): 5 TABLET ORAL at 21:47

## 2019-12-14 RX ADMIN — DULOXETINE HYDROCHLORIDE 60 MILLIGRAM(S): 30 CAPSULE, DELAYED RELEASE ORAL at 12:02

## 2019-12-14 RX ADMIN — AMLODIPINE BESYLATE 5 MILLIGRAM(S): 2.5 TABLET ORAL at 06:21

## 2019-12-14 RX ADMIN — OXYCODONE HYDROCHLORIDE 5 MILLIGRAM(S): 5 TABLET ORAL at 01:13

## 2019-12-14 RX ADMIN — SENNA PLUS 2 TABLET(S): 8.6 TABLET ORAL at 21:48

## 2019-12-14 RX ADMIN — Medication 1 SPRAY(S): at 06:23

## 2019-12-14 RX ADMIN — LIDOCAINE 1 PATCH: 4 CREAM TOPICAL at 05:52

## 2019-12-14 RX ADMIN — Medication 650 MILLIGRAM(S): at 13:47

## 2019-12-14 RX ADMIN — LIDOCAINE 1 PATCH: 4 CREAM TOPICAL at 12:03

## 2019-12-14 RX ADMIN — ATORVASTATIN CALCIUM 10 MILLIGRAM(S): 80 TABLET, FILM COATED ORAL at 21:48

## 2019-12-14 RX ADMIN — Medication 650 MILLIGRAM(S): at 23:20

## 2019-12-14 RX ADMIN — ENOXAPARIN SODIUM 40 MILLIGRAM(S): 100 INJECTION SUBCUTANEOUS at 21:48

## 2019-12-14 RX ADMIN — GABAPENTIN 300 MILLIGRAM(S): 400 CAPSULE ORAL at 14:33

## 2019-12-14 RX ADMIN — Medication 1 SPRAY(S): at 17:23

## 2019-12-14 RX ADMIN — Medication 50 MILLIGRAM(S): at 06:21

## 2019-12-14 RX ADMIN — Medication 1 PATCH: at 19:35

## 2019-12-14 RX ADMIN — GABAPENTIN 300 MILLIGRAM(S): 400 CAPSULE ORAL at 06:21

## 2019-12-14 RX ADMIN — Medication 650 MILLIGRAM(S): at 01:41

## 2019-12-14 RX ADMIN — Medication 1 MILLIGRAM(S): at 12:02

## 2019-12-14 RX ADMIN — Medication 50 MILLIGRAM(S): at 21:47

## 2019-12-14 RX ADMIN — POLYETHYLENE GLYCOL 3350 17 GRAM(S): 17 POWDER, FOR SOLUTION ORAL at 06:22

## 2019-12-14 RX ADMIN — OXYCODONE HYDROCHLORIDE 5 MILLIGRAM(S): 5 TABLET ORAL at 13:54

## 2019-12-14 RX ADMIN — LIDOCAINE 1 PATCH: 4 CREAM TOPICAL at 19:34

## 2019-12-14 RX ADMIN — Medication 650 MILLIGRAM(S): at 12:02

## 2019-12-14 RX ADMIN — GABAPENTIN 300 MILLIGRAM(S): 400 CAPSULE ORAL at 21:48

## 2019-12-14 RX ADMIN — Medication 650 MILLIGRAM(S): at 01:13

## 2019-12-14 RX ADMIN — Medication 1 TABLET(S): at 12:02

## 2019-12-14 RX ADMIN — OXYCODONE HYDROCHLORIDE 5 MILLIGRAM(S): 5 TABLET ORAL at 01:41

## 2019-12-14 RX ADMIN — POLYETHYLENE GLYCOL 3350 17 GRAM(S): 17 POWDER, FOR SOLUTION ORAL at 17:25

## 2019-12-14 NOTE — PROGRESS NOTE ADULT - ASSESSMENT
ASSESSMENT/PLAN   63yo Male with a PMH of CVA who was found to have subacute SDH along R frontal lobe and large mass in third ventricle and is now with Gait Instability, ADL impairments and Functional impairments.    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  - Gait Instability, ADL impairments and Functional impairments: Continue Comprehensive Rehab Program of PT/OT with focus on RUE/RLE strengthening, balance and coordination training   -left knee discomfort ? patellar tendonitis. Order cold compress and ACE for standing activities, discussed with patient    #flank pain  -not consistent exam, however due to c/o dysuria, will order UA 12/14. OPatient agreeable    #hypokalemia - RESOLVED    #HTN  - cont norvasc 5 qd    #HLD  - cont lipitor 10 qhs    #Anxiety  - cont klonopin 0.5 q12h prn    #sleep  - melatonin 3 qhs    #Pain Mgmt   - Tylenol PRN, Oxycodone PRN  - cymbalta 60 qd  - gabapentin 300 TID  - 12/13 increased lyrica 50 TID  - lidocaine patch    #tobacco use disorder  - cont nicotine patch    #GI/Bowel Mgmt   - Continent c/w, Senna, Miralax PRN    #/Bladder Mgmt   - Continent    #FEN   - Diet - Regular + Thins  [CCHO]      - DVT PPx: Lovenox, SCDs    Team Conference 12/12/19  PT: min a for gait, min a-CG for transfers however requires MAX verbal cueing  OT: min a for transfers, LE dressing  barriers to dc: lives with son, but alone all day, poor safety awareness, impulsivity, decreased coordination.   target dc date 12/17 - will require contact with family to determine family training and addressing safety issues      LABS:  UA

## 2019-12-14 NOTE — PROGRESS NOTE ADULT - EXTREMITIES COMMENTS
back: no swelling, no spasm palpated, non specific TTP and distractable during exam  no calf swelling or pedal edema +soft, NT  left knee trace TTP infrapatellar ligament trac eeffusion no erythema or warmth

## 2019-12-14 NOTE — PROGRESS NOTE ADULT - SUBJECTIVE AND OBJECTIVE BOX
Patient is a 64y old  Male who presents with a chief complaint of SDH (13 Dec 2019 10:09)      HPI:  64y m PMhx prior CVA w/ residual L sided weakness and paresthesias, brain "cyst" s/p removal c/b hydrocephalus s/p  shunt placement in 1980 in [place], HTN, pw worsening L sided paresthesias and transferred from [facility] for new CT findings. Pt found to have subacute SDH along R frontal lobe and large mass in third ventricle. Pt denies any falls or recent head trauma to explain the SDH. 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  Hospital course complicated  with leukocytosis during admission, workup ongoing.  Pt received IV fluids for hypotension, home anti htn medications held. (03 Dec 2019 18:14)      PAST MEDICAL & SURGICAL HISTORY:  H/O hydrocephalus  H/O brain tumor  Peripheral neuropathy  Stroke  Diabetes  Hypertension  Depression  Seizure  HTN (hypertension)  CVA (cerebral vascular accident)  S/P ventriculoperitoneal shunt  H/O craniotomy      MEDICATIONS  (STANDING):  acetaminophen   Tablet .. 650 milliGRAM(s) Oral every 6 hours  amLODIPine   Tablet 5 milliGRAM(s) Oral daily  atorvastatin 10 milliGRAM(s) Oral at bedtime  dextrose 5%. 1000 milliLiter(s) (50 mL/Hr) IV Continuous <Continuous>  dextrose 50% Injectable 12.5 Gram(s) IV Push once  dextrose 50% Injectable 25 Gram(s) IV Push once  dextrose 50% Injectable 25 Gram(s) IV Push once  DULoxetine 60 milliGRAM(s) Oral daily  enoxaparin Injectable 40 milliGRAM(s) SubCutaneous at bedtime  fluticasone propionate 50 MICROgram(s)/spray Nasal Spray 1 Spray(s) Both Nostrils two times a day  folic acid 1 milliGRAM(s) Oral daily  gabapentin 300 milliGRAM(s) Oral three times a day  insulin lispro (HumaLOG) corrective regimen sliding scale   SubCutaneous before breakfast  lidocaine   Patch 1 Patch Transdermal daily  lidocaine   Patch 1 Patch Transdermal every 24 hours  melatonin 3 milliGRAM(s) Oral at bedtime  multivitamin 1 Tablet(s) Oral daily  nicotine - 21 mG/24Hr(s) Patch 1 patch Transdermal daily  polyethylene glycol 3350 17 Gram(s) Oral two times a day  pregabalin 50 milliGRAM(s) Oral every 8 hours  senna 2 Tablet(s) Oral at bedtime    MEDICATIONS  (PRN):  artificial tears (preservative free) Ophthalmic Solution 1 Drop(s) Both EYES two times a day PRN Dry Eyes  clonazePAM  Tablet 0.5 milliGRAM(s) Oral every 12 hours PRN anxiety/panic attack  dextrose 40% Gel 15 Gram(s) Oral once PRN Blood Glucose LESS THAN 70 milliGRAM(s)/deciliter  glucagon  Injectable 1 milliGRAM(s) IntraMuscular once PRN Glucose LESS THAN 70 milligrams/deciliter  oxyCODONE    IR 5 milliGRAM(s) Oral every 4 hours PRN Moderate Pain (4 - 6)      Allergies    No Known Allergies    Intolerances          VITALS  64y  Vital Signs Last 24 Hrs  T(C): 36.8 (14 Dec 2019 08:20), Max: 36.8 (14 Dec 2019 08:20)  T(F): 98.2 (14 Dec 2019 08:20), Max: 98.2 (14 Dec 2019 08:20)  HR: 71 (14 Dec 2019 08:20) (66 - 76)  BP: 127/76 (14 Dec 2019 08:20) (127/76 - 139/82)  BP(mean): --  RR: 14 (14 Dec 2019 08:20) (14 - 16)  SpO2: 96% (14 Dec 2019 08:20) (95% - 98%)  Daily     Daily         RECENT LABS:                      CAPILLARY BLOOD GLUCOSE      POCT Blood Glucose.: 122 mg/dL (14 Dec 2019 07:38)

## 2019-12-14 NOTE — PROGRESS NOTE ADULT - COMMENTS
Patient's BP and FS controlled. Complains of persistent back pain which he feels is his "kidneys" and left knee pain which worsenes with standing activtiy. No recent trauma. Has difficulty  describing back pain but states he does have some dysuria at times, no change in color or appearance urine, no fever

## 2019-12-15 LAB
GLUCOSE BLDC GLUCOMTR-MCNC: 130 MG/DL — HIGH (ref 70–99)
GLUCOSE BLDC GLUCOMTR-MCNC: 135 MG/DL — HIGH (ref 70–99)

## 2019-12-15 PROCEDURE — 99232 SBSQ HOSP IP/OBS MODERATE 35: CPT

## 2019-12-15 RX ADMIN — Medication 1 SPRAY(S): at 17:47

## 2019-12-15 RX ADMIN — Medication 1 PATCH: at 08:09

## 2019-12-15 RX ADMIN — DULOXETINE HYDROCHLORIDE 60 MILLIGRAM(S): 30 CAPSULE, DELAYED RELEASE ORAL at 11:06

## 2019-12-15 RX ADMIN — Medication 650 MILLIGRAM(S): at 11:06

## 2019-12-15 RX ADMIN — Medication 1 PATCH: at 11:06

## 2019-12-15 RX ADMIN — LIDOCAINE 1 PATCH: 4 CREAM TOPICAL at 21:36

## 2019-12-15 RX ADMIN — Medication 650 MILLIGRAM(S): at 06:02

## 2019-12-15 RX ADMIN — Medication 650 MILLIGRAM(S): at 17:47

## 2019-12-15 RX ADMIN — Medication 650 MILLIGRAM(S): at 21:33

## 2019-12-15 RX ADMIN — LIDOCAINE 1 PATCH: 4 CREAM TOPICAL at 00:22

## 2019-12-15 RX ADMIN — Medication 1 PATCH: at 11:09

## 2019-12-15 RX ADMIN — Medication 650 MILLIGRAM(S): at 11:19

## 2019-12-15 RX ADMIN — Medication 650 MILLIGRAM(S): at 00:23

## 2019-12-15 RX ADMIN — Medication 1 MILLIGRAM(S): at 11:06

## 2019-12-15 RX ADMIN — GABAPENTIN 300 MILLIGRAM(S): 400 CAPSULE ORAL at 13:55

## 2019-12-15 RX ADMIN — Medication 1 SPRAY(S): at 05:20

## 2019-12-15 RX ADMIN — AMLODIPINE BESYLATE 5 MILLIGRAM(S): 2.5 TABLET ORAL at 05:19

## 2019-12-15 RX ADMIN — LIDOCAINE 1 PATCH: 4 CREAM TOPICAL at 11:07

## 2019-12-15 RX ADMIN — OXYCODONE HYDROCHLORIDE 5 MILLIGRAM(S): 5 TABLET ORAL at 21:33

## 2019-12-15 RX ADMIN — GABAPENTIN 300 MILLIGRAM(S): 400 CAPSULE ORAL at 21:33

## 2019-12-15 RX ADMIN — GABAPENTIN 300 MILLIGRAM(S): 400 CAPSULE ORAL at 05:19

## 2019-12-15 RX ADMIN — Medication 0.5 MILLIGRAM(S): at 20:14

## 2019-12-15 RX ADMIN — OXYCODONE HYDROCHLORIDE 5 MILLIGRAM(S): 5 TABLET ORAL at 22:00

## 2019-12-15 RX ADMIN — Medication 1 TABLET(S): at 11:06

## 2019-12-15 RX ADMIN — POLYETHYLENE GLYCOL 3350 17 GRAM(S): 17 POWDER, FOR SOLUTION ORAL at 05:19

## 2019-12-15 RX ADMIN — Medication 50 MILLIGRAM(S): at 21:32

## 2019-12-15 RX ADMIN — Medication 3 MILLIGRAM(S): at 21:33

## 2019-12-15 RX ADMIN — Medication 50 MILLIGRAM(S): at 05:19

## 2019-12-15 RX ADMIN — Medication 650 MILLIGRAM(S): at 22:00

## 2019-12-15 RX ADMIN — LIDOCAINE 1 PATCH: 4 CREAM TOPICAL at 11:13

## 2019-12-15 RX ADMIN — Medication 650 MILLIGRAM(S): at 05:19

## 2019-12-15 RX ADMIN — Medication 50 MILLIGRAM(S): at 13:57

## 2019-12-15 RX ADMIN — ATORVASTATIN CALCIUM 10 MILLIGRAM(S): 80 TABLET, FILM COATED ORAL at 21:33

## 2019-12-15 RX ADMIN — ENOXAPARIN SODIUM 40 MILLIGRAM(S): 100 INJECTION SUBCUTANEOUS at 21:33

## 2019-12-15 NOTE — PROGRESS NOTE ADULT - SUBJECTIVE AND OBJECTIVE BOX
Patient is a 64y old  Male who presents with a chief complaint of SDH (13 Dec 2019 10:09)      HPI:  64y m PMhx prior CVA w/ residual L sided weakness and paresthesias, brain "cyst" s/p removal c/b hydrocephalus s/p  shunt placement in  in [place], HTN, pw worsening L sided paresthesias and transferred from [facility] for new CT findings. Pt found to have subacute SDH along R frontal lobe and large mass in third ventricle. Pt denies any falls or recent head trauma to explain the SDH. 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  Hospital course complicated  with leukocytosis during admission, workup ongoing.  Pt received IV fluids for hypotension, home anti htn medications held. (03 Dec 2019 18:14)      PAST MEDICAL & SURGICAL HISTORY:  H/O hydrocephalus  H/O brain tumor  Peripheral neuropathy  Stroke  Diabetes  Hypertension  Depression  Seizure  HTN (hypertension)  CVA (cerebral vascular accident)  S/P ventriculoperitoneal shunt  H/O craniotomy      MEDICATIONS  (STANDING):  acetaminophen   Tablet .. 650 milliGRAM(s) Oral every 6 hours  amLODIPine   Tablet 5 milliGRAM(s) Oral daily  atorvastatin 10 milliGRAM(s) Oral at bedtime  dextrose 5%. 1000 milliLiter(s) (50 mL/Hr) IV Continuous <Continuous>  dextrose 50% Injectable 12.5 Gram(s) IV Push once  dextrose 50% Injectable 25 Gram(s) IV Push once  dextrose 50% Injectable 25 Gram(s) IV Push once  DULoxetine 60 milliGRAM(s) Oral daily  enoxaparin Injectable 40 milliGRAM(s) SubCutaneous at bedtime  fluticasone propionate 50 MICROgram(s)/spray Nasal Spray 1 Spray(s) Both Nostrils two times a day  folic acid 1 milliGRAM(s) Oral daily  gabapentin 300 milliGRAM(s) Oral three times a day  insulin lispro (HumaLOG) corrective regimen sliding scale   SubCutaneous before breakfast  lidocaine   Patch 1 Patch Transdermal daily  lidocaine   Patch 1 Patch Transdermal every 24 hours  melatonin 3 milliGRAM(s) Oral at bedtime  multivitamin 1 Tablet(s) Oral daily  nicotine - 21 mG/24Hr(s) Patch 1 patch Transdermal daily  polyethylene glycol 3350 17 Gram(s) Oral two times a day  pregabalin 50 milliGRAM(s) Oral every 8 hours  senna 2 Tablet(s) Oral at bedtime    MEDICATIONS  (PRN):  artificial tears (preservative free) Ophthalmic Solution 1 Drop(s) Both EYES two times a day PRN Dry Eyes  clonazePAM  Tablet 0.5 milliGRAM(s) Oral every 12 hours PRN anxiety/panic attack  dextrose 40% Gel 15 Gram(s) Oral once PRN Blood Glucose LESS THAN 70 milliGRAM(s)/deciliter  glucagon  Injectable 1 milliGRAM(s) IntraMuscular once PRN Glucose LESS THAN 70 milligrams/deciliter  oxyCODONE    IR 5 milliGRAM(s) Oral every 4 hours PRN Moderate Pain (4 - 6)      Allergies    No Known Allergies    Intolerances          VITALS  64y  Vital Signs Last 24 Hrs  T(C): 36.9 (15 Dec 2019 09:42), Max: 37.2 (14 Dec 2019 19:35)  T(F): 98.5 (15 Dec 2019 09:42), Max: 99 (14 Dec 2019 19:35)  HR: 68 (15 Dec 2019 09:42) (68 - 89)  BP: 131/78 (15 Dec 2019 09:42) (123/79 - 145/75)  BP(mean): --  RR: 14 (15 Dec 2019 09:42) (14 - 16)  SpO2: 94% (15 Dec 2019 09:42) (94% - 97%)  Daily     Daily         RECENT LABS:                Urinalysis Basic - ( 14 Dec 2019 17:42 )    Color: Yellow / Appearance: Clear / S.015 / pH: x  Gluc: x / Ketone: Negative  / Bili: Negative / Urobili: Negative   Blood: x / Protein: Negative / Nitrite: Negative   Leuk Esterase: Negative / RBC: Negative /HPF / WBC Negative /HPF   Sq Epi: x / Non Sq Epi: Neg.-Few / Bacteria: Negative /HPF          CAPILLARY BLOOD GLUCOSE      POCT Blood Glucose.: 135 mg/dL (15 Dec 2019 07:41)

## 2019-12-15 NOTE — PROGRESS NOTE ADULT - COMMENTS
Patient states he is in constant pain since prior stroke left side, understands that he has had recent adjsutments to lyrica and that cymbalta is relatively new. Knows he can't be on morphine (states only thing that helped him before) and other narcotics haven't been helpful as well. Seen moving around bed, appears comfortable, NAD. DDoes report frequent urination and occasionally burning, UA NEGATIVE

## 2019-12-15 NOTE — PROGRESS NOTE ADULT - ASSESSMENT
ASSESSMENT/PLAN   65yo Male with a PMH of CVA who was found to have subacute SDH along R frontal lobe and large mass in third ventricle and is now with Gait Instability, ADL impairments and Functional impairments.    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  -Continue Comprehensive Rehab Program of PT/OT with focus on RUE/RLE strengthening, balance and coordination training   -neuropsychological support  -left knee discomfort ? patellar tendonitis. Order cold compress and ACE for standing activities, discussed with patient 12/14. No complaints of left knee pain today 12/15    #frequent urination, dysuria  -UA negative 12/14  consider  consult      #HTN  - cont norvasc 5 qd    #HLD  - cont lipitor 10 qhs    #Anxiety  - cont klonopin 0.5 q12h prn  -psychological support PRN    #sleep  - melatonin 3 qhs    #Pain Mgmt   - Tylenol PRN, Oxycodone PRN  - cymbalta 60 qd  - gabapentin 300 TID  - 12/13 increased lyrica 50 TID  - lidocaine patch  -reviewed with patient 12/15    #tobacco use disorder  - cont nicotine patch    #GI/Bowel Mgmt   - Continent c/w, Senna, Miralax PRN    #/Bladder Mgmt   - Continent    #FEN   - Diet - Regular + Thins  [[facility]]      - DVT PPx: Lovenox, SCDs    Team Conference 12/12/19  PT: min a for gait, min a-CG for transfers however requires MAX verbal cueing  OT: min a for transfers, LE dressing  barriers to dc: lives with son, but alone all day, poor safety awareness, impulsivity, decreased coordination.   target dc date 12/17 - will require contact with family to determine family training and addressing safety issues      LABS:

## 2019-12-16 LAB
ANION GAP SERPL CALC-SCNC: 11 MMOL/L — SIGNIFICANT CHANGE UP (ref 5–17)
BUN SERPL-MCNC: 31 MG/DL — HIGH (ref 7–23)
CALCIUM SERPL-MCNC: 9.1 MG/DL — SIGNIFICANT CHANGE UP (ref 8.4–10.5)
CHLORIDE SERPL-SCNC: 104 MMOL/L — SIGNIFICANT CHANGE UP (ref 96–108)
CO2 SERPL-SCNC: 24 MMOL/L — SIGNIFICANT CHANGE UP (ref 22–31)
CREAT SERPL-MCNC: 1.24 MG/DL — SIGNIFICANT CHANGE UP (ref 0.5–1.3)
GLUCOSE BLDC GLUCOMTR-MCNC: 113 MG/DL — HIGH (ref 70–99)
GLUCOSE SERPL-MCNC: 122 MG/DL — HIGH (ref 70–99)
HCT VFR BLD CALC: 38.3 % — LOW (ref 39–50)
HGB BLD-MCNC: 12.4 G/DL — LOW (ref 13–17)
MCHC RBC-ENTMCNC: 26.6 PG — LOW (ref 27–34)
MCHC RBC-ENTMCNC: 32.4 GM/DL — SIGNIFICANT CHANGE UP (ref 32–36)
MCV RBC AUTO: 82 FL — SIGNIFICANT CHANGE UP (ref 80–100)
NRBC # BLD: 0 /100 WBCS — SIGNIFICANT CHANGE UP (ref 0–0)
PLATELET # BLD AUTO: 291 K/UL — SIGNIFICANT CHANGE UP (ref 150–400)
POTASSIUM SERPL-MCNC: 3.7 MMOL/L — SIGNIFICANT CHANGE UP (ref 3.5–5.3)
POTASSIUM SERPL-SCNC: 3.7 MMOL/L — SIGNIFICANT CHANGE UP (ref 3.5–5.3)
RBC # BLD: 4.67 M/UL — SIGNIFICANT CHANGE UP (ref 4.2–5.8)
RBC # FLD: 14.1 % — SIGNIFICANT CHANGE UP (ref 10.3–14.5)
SODIUM SERPL-SCNC: 139 MMOL/L — SIGNIFICANT CHANGE UP (ref 135–145)
WBC # BLD: 6.05 K/UL — SIGNIFICANT CHANGE UP (ref 3.8–10.5)
WBC # FLD AUTO: 6.05 K/UL — SIGNIFICANT CHANGE UP (ref 3.8–10.5)

## 2019-12-16 PROCEDURE — 99232 SBSQ HOSP IP/OBS MODERATE 35: CPT

## 2019-12-16 PROCEDURE — 99232 SBSQ HOSP IP/OBS MODERATE 35: CPT | Mod: GC

## 2019-12-16 RX ORDER — SIMVASTATIN 20 MG/1
1 TABLET, FILM COATED ORAL
Qty: 30 | Refills: 0
Start: 2019-12-16 | End: 2020-01-14

## 2019-12-16 RX ORDER — AMLODIPINE BESYLATE 2.5 MG/1
1 TABLET ORAL
Qty: 30 | Refills: 0
Start: 2019-12-16 | End: 2020-01-14

## 2019-12-16 RX ORDER — DULOXETINE HYDROCHLORIDE 30 MG/1
1 CAPSULE, DELAYED RELEASE ORAL
Qty: 30 | Refills: 0
Start: 2019-12-16 | End: 2020-01-14

## 2019-12-16 RX ORDER — AMLODIPINE BESYLATE 2.5 MG/1
1 TABLET ORAL
Qty: 0 | Refills: 0 | DISCHARGE

## 2019-12-16 RX ORDER — LIDOCAINE 4 G/100G
1 CREAM TOPICAL
Qty: 30 | Refills: 0
Start: 2019-12-16 | End: 2020-01-14

## 2019-12-16 RX ORDER — NICOTINE POLACRILEX 2 MG
24 GUM BUCCAL
Qty: 1 | Refills: 0
Start: 2019-12-16 | End: 2020-01-14

## 2019-12-16 RX ORDER — GABAPENTIN 400 MG/1
1 CAPSULE ORAL
Qty: 90 | Refills: 0
Start: 2019-12-16 | End: 2020-01-14

## 2019-12-16 RX ADMIN — Medication 50 MILLIGRAM(S): at 13:00

## 2019-12-16 RX ADMIN — Medication 1 MILLIGRAM(S): at 12:07

## 2019-12-16 RX ADMIN — Medication 650 MILLIGRAM(S): at 12:45

## 2019-12-16 RX ADMIN — Medication 1 SPRAY(S): at 17:15

## 2019-12-16 RX ADMIN — Medication 3 MILLIGRAM(S): at 21:44

## 2019-12-16 RX ADMIN — LIDOCAINE 1 PATCH: 4 CREAM TOPICAL at 17:18

## 2019-12-16 RX ADMIN — DULOXETINE HYDROCHLORIDE 60 MILLIGRAM(S): 30 CAPSULE, DELAYED RELEASE ORAL at 12:07

## 2019-12-16 RX ADMIN — Medication 1 PATCH: at 17:17

## 2019-12-16 RX ADMIN — AMLODIPINE BESYLATE 5 MILLIGRAM(S): 2.5 TABLET ORAL at 06:39

## 2019-12-16 RX ADMIN — ENOXAPARIN SODIUM 40 MILLIGRAM(S): 100 INJECTION SUBCUTANEOUS at 21:44

## 2019-12-16 RX ADMIN — Medication 1 PATCH: at 12:00

## 2019-12-16 RX ADMIN — Medication 1 PATCH: at 12:07

## 2019-12-16 RX ADMIN — ATORVASTATIN CALCIUM 10 MILLIGRAM(S): 80 TABLET, FILM COATED ORAL at 21:44

## 2019-12-16 RX ADMIN — LIDOCAINE 1 PATCH: 4 CREAM TOPICAL at 23:53

## 2019-12-16 RX ADMIN — Medication 650 MILLIGRAM(S): at 17:00

## 2019-12-16 RX ADMIN — Medication 1 SPRAY(S): at 06:38

## 2019-12-16 RX ADMIN — SENNA PLUS 2 TABLET(S): 8.6 TABLET ORAL at 21:44

## 2019-12-16 RX ADMIN — Medication 1 PATCH: at 07:38

## 2019-12-16 RX ADMIN — Medication 50 MILLIGRAM(S): at 06:39

## 2019-12-16 RX ADMIN — Medication 650 MILLIGRAM(S): at 12:07

## 2019-12-16 RX ADMIN — GABAPENTIN 300 MILLIGRAM(S): 400 CAPSULE ORAL at 13:00

## 2019-12-16 RX ADMIN — Medication 650 MILLIGRAM(S): at 17:15

## 2019-12-16 RX ADMIN — Medication 1 TABLET(S): at 12:07

## 2019-12-16 RX ADMIN — Medication 650 MILLIGRAM(S): at 18:00

## 2019-12-16 RX ADMIN — GABAPENTIN 300 MILLIGRAM(S): 400 CAPSULE ORAL at 07:37

## 2019-12-16 RX ADMIN — Medication 650 MILLIGRAM(S): at 23:57

## 2019-12-16 RX ADMIN — LIDOCAINE 1 PATCH: 4 CREAM TOPICAL at 12:08

## 2019-12-16 RX ADMIN — Medication 650 MILLIGRAM(S): at 06:39

## 2019-12-16 RX ADMIN — Medication 50 MILLIGRAM(S): at 21:44

## 2019-12-16 RX ADMIN — LIDOCAINE 1 PATCH: 4 CREAM TOPICAL at 06:41

## 2019-12-16 RX ADMIN — LIDOCAINE 1 PATCH: 4 CREAM TOPICAL at 06:40

## 2019-12-16 RX ADMIN — GABAPENTIN 300 MILLIGRAM(S): 400 CAPSULE ORAL at 21:44

## 2019-12-16 RX ADMIN — Medication 1 PATCH: at 06:41

## 2019-12-16 NOTE — PROGRESS NOTE ADULT - REASON FOR ADMISSION
Traumatic subdural hemorrhage
SDH
Traumatic subdural Hemorrhage
SDH

## 2019-12-16 NOTE — PROGRESS NOTE ADULT - ASSESSMENT
64 year-old Man with a PMH of CVA who was found to have subacute SDH along R frontal lobe and large mass in third ventricle and is now with Gait Instability, ADL impairments and Functional impairments.    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    #Frequent Urination:  UA negative  Reports he may have had BPH in the past  would obtain urology consult     #RA- Creat improved; BUN 31 today  encourage PO intake  continue to monitor bmp    #DM2- HgbA1c - 6.4  -was on metformin at home, d/екатерина due to renal function; continue ISS, BS very well controlled    #HTN:  -systolic ranging 120-130's; continue with Norvasc     #HLD- Statin     #Insomnia- melatonin     #Anxiety- clonazepam prn    #Tobacco abuse (former smoker)- nicotine patch daily    DVT ppx: lovenox

## 2019-12-16 NOTE — PROGRESS NOTE ADULT - SUBJECTIVE AND OBJECTIVE BOX
HPI:   64y m PMhx prior CVA w/ residual L sided weakness and paresthesias, brain "cyst" s/p removal c/b hydrocephalus s/p  shunt placement in  in [place], HTN, pw worsening L sided paresthesias and transferred from [facility] for new CT findings. Pt found to have subacute SDH along R frontal lobe and large mass in third ventricle. Pt denies any falls or recent head trauma to explain the SDH. 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  Hospital course complicated  with leukocytosis during admission, workup ongoing.  Pt received IV fluids for hypotension, home anti htn medications held.     SUBJECTIVE / INTERVAL HPI: Patient seen and examined at bedside. Pt states he is doing better this morning. He continues to have oblique soreness during PT, but overall feels well.     REVIEW OF SYSTEMS:    CONSTITUTIONAL: No weakness, fevers or chills  RESPIRATORY: No cough, wheezing, or shortness of breath  CARDIOVASCULAR: No chest pain or palpitations  GASTROINTESTINAL: No abdominal or epigastric pain. No nausea or vomiting. No diarrhea or constipation.   GENITOURINARY: No dysuria, frequency or hematuria  NEUROLOGICAL: + numbness, no weakness, no headaches  SKIN: No itching, rashes  MSK: +back pain, no joint pain      VITAL SIGNS:  Vital Signs Last 24 Hrs  T(C): 36.7 (16 Dec 2019 07:36), Max: 36.7 (16 Dec 2019 07:36)  T(F): 98.1 (16 Dec 2019 07:36), Max: 98.1 (16 Dec 2019 07:36)  HR: 69 (16 Dec 2019 07:36) (68 - 81)  BP: 128/79 (16 Dec 2019 07:36) (126/75 - 128/79)  BP(mean): --  RR: 14 (16 Dec 2019 07:36) (14 - 14)  SpO2: 96% (16 Dec 2019 07:36) (96% - 97%)    PHYSICAL EXAM:    General: NAD, sitting up comfortably in chair  HEENT: NC/AT; PERRL, anicteric sclera; MMM  Neck: supple  Cardiovascular: +S1/S2, RRR  Respiratory: CTA B/L; no W/R/R, no crackles  Gastrointestinal: soft, NT/ND; +BSx4  Extremities: warm, well perfused; no edema, clubbing or cyanosis  Vascular: 2+ radial, DP/PT pulses B/L  Neurological: AAOx3; no focal deficits    MEDICATIONS:  MEDICATIONS  (STANDING):  acetaminophen   Tablet .. 650 milliGRAM(s) Oral every 6 hours  amLODIPine   Tablet 5 milliGRAM(s) Oral daily  atorvastatin 10 milliGRAM(s) Oral at bedtime  dextrose 5%. 1000 milliLiter(s) (50 mL/Hr) IV Continuous <Continuous>  dextrose 50% Injectable 12.5 Gram(s) IV Push once  dextrose 50% Injectable 25 Gram(s) IV Push once  dextrose 50% Injectable 25 Gram(s) IV Push once  DULoxetine 60 milliGRAM(s) Oral daily  enoxaparin Injectable 40 milliGRAM(s) SubCutaneous at bedtime  fluticasone propionate 50 MICROgram(s)/spray Nasal Spray 1 Spray(s) Both Nostrils two times a day  folic acid 1 milliGRAM(s) Oral daily  gabapentin 300 milliGRAM(s) Oral three times a day  insulin lispro (HumaLOG) corrective regimen sliding scale   SubCutaneous before breakfast  lidocaine   Patch 1 Patch Transdermal daily  lidocaine   Patch 1 Patch Transdermal every 24 hours  melatonin 3 milliGRAM(s) Oral at bedtime  multivitamin 1 Tablet(s) Oral daily  nicotine - 21 mG/24Hr(s) Patch 1 patch Transdermal daily  polyethylene glycol 3350 17 Gram(s) Oral two times a day  pregabalin 50 milliGRAM(s) Oral every 8 hours  senna 2 Tablet(s) Oral at bedtime    MEDICATIONS  (PRN):  artificial tears (preservative free) Ophthalmic Solution 1 Drop(s) Both EYES two times a day PRN Dry Eyes  clonazePAM  Tablet 0.5 milliGRAM(s) Oral every 12 hours PRN anxiety/panic attack  dextrose 40% Gel 15 Gram(s) Oral once PRN Blood Glucose LESS THAN 70 milliGRAM(s)/deciliter  glucagon  Injectable 1 milliGRAM(s) IntraMuscular once PRN Glucose LESS THAN 70 milligrams/deciliter  oxyCODONE    IR 5 milliGRAM(s) Oral every 4 hours PRN Moderate Pain (4 - 6)      ALLERGIES:  Allergies    No Known Allergies    Intolerances        LABS:                        12.4   6.05  )-----------( 291      ( 16 Dec 2019 06:00 )             38.3     12-16    139  |  104  |  31<H>  ----------------------------<  122<H>  3.7   |  24  |  1.24    Ca    9.1      16 Dec 2019 06:00        Urinalysis Basic - ( 14 Dec 2019 17:42 )    Color: Yellow / Appearance: Clear / S.015 / pH: x  Gluc: x / Ketone: Negative  / Bili: Negative / Urobili: Negative   Blood: x / Protein: Negative / Nitrite: Negative   Leuk Esterase: Negative / RBC: Negative /HPF / WBC Negative /HPF   Sq Epi: x / Non Sq Epi: Neg.-Few / Bacteria: Negative /HPF      CAPILLARY BLOOD GLUCOSE      POCT Blood Glucose.: 113 mg/dL (16 Dec 2019 07:24)      RADIOLOGY & ADDITIONAL TESTS: Reviewed.

## 2019-12-16 NOTE — PROGRESS NOTE ADULT - ASSESSMENT
ASSESSMENT/PLAN   65yo Male with a PMH of CVA who was found to have subacute SDH along R frontal lobe and large mass in third ventricle and is now with Gait Instability, ADL impairments and Functional impairments.    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  - Gait Instability, ADL impairments and Functional impairments: Continue Comprehensive Rehab Program of PT/OT with focus on RUE/RLE strengthening, balance and coordination training     #HTN  - cont norvasc 5 qd    #HLD  - cont lipitor 10    #Anxiety  - cont klonopin 0.5 q12h prn    #sleep  - melatonin 3mg    #Pain Mgmt   - Tylenol PRN, Oxycodone PRN  - cymbalta 60 qd  - gabapentin 300mg TID  - lyrica 50 q8hrs  - lidocaine patch    #tobacco use disorder  - cont nicotine patch    #GI/Bowel Mgmt   - Continent c/w, Senna, Miralax PRN    #/Bladder Mgmt   - Continent    #FEN   - Diet - regular, consistent carb     #Precautions / PROPHYLAXIS:   - Falls, Cardiac  - ortho: Weight bearing status: WBAT   - Lungs: Aspiration, Incentive Spirometer   - Pressure injury/Skin: Turn Q2hrs while in bed, OOB to Chair, PT/OT    - DVT: Lovenox, SCDs    Team Conference 12/12/19  PT: min a for gait, min a-CG for transfers however requires MAX verbal cueing  OT: min a for transfers, LE dressing  barriers to dc: lives with son, but alone all day, poor safety awareness, impulsivity, decreased coordination.   target dc date 12/17 - will require contact with family to determine family training and addressing safety issues

## 2019-12-16 NOTE — PROGRESS NOTE ADULT - SUBJECTIVE AND OBJECTIVE BOX
Patient is a 64y old  Male who presents with a chief complaint of SDH (15 Dec 2019 10:49). Reports frequent urination and minimal dysuria.  Denies hematuria.  UA negative.      Patient seen and examined at bedside.    ALLERGIES:  No Known Allergies    MEDICATIONS  (STANDING):  acetaminophen   Tablet .. 650 milliGRAM(s) Oral every 6 hours  amLODIPine   Tablet 5 milliGRAM(s) Oral daily  atorvastatin 10 milliGRAM(s) Oral at bedtime  dextrose 5%. 1000 milliLiter(s) (50 mL/Hr) IV Continuous <Continuous>  dextrose 50% Injectable 12.5 Gram(s) IV Push once  dextrose 50% Injectable 25 Gram(s) IV Push once  dextrose 50% Injectable 25 Gram(s) IV Push once  DULoxetine 60 milliGRAM(s) Oral daily  enoxaparin Injectable 40 milliGRAM(s) SubCutaneous at bedtime  fluticasone propionate 50 MICROgram(s)/spray Nasal Spray 1 Spray(s) Both Nostrils two times a day  folic acid 1 milliGRAM(s) Oral daily  gabapentin 300 milliGRAM(s) Oral three times a day  insulin lispro (HumaLOG) corrective regimen sliding scale   SubCutaneous before breakfast  lidocaine   Patch 1 Patch Transdermal daily  lidocaine   Patch 1 Patch Transdermal every 24 hours  melatonin 3 milliGRAM(s) Oral at bedtime  multivitamin 1 Tablet(s) Oral daily  nicotine - 21 mG/24Hr(s) Patch 1 patch Transdermal daily  polyethylene glycol 3350 17 Gram(s) Oral two times a day  pregabalin 50 milliGRAM(s) Oral every 8 hours  senna 2 Tablet(s) Oral at bedtime    MEDICATIONS  (PRN):  artificial tears (preservative free) Ophthalmic Solution 1 Drop(s) Both EYES two times a day PRN Dry Eyes  clonazePAM  Tablet 0.5 milliGRAM(s) Oral every 12 hours PRN anxiety/panic attack  dextrose 40% Gel 15 Gram(s) Oral once PRN Blood Glucose LESS THAN 70 milliGRAM(s)/deciliter  glucagon  Injectable 1 milliGRAM(s) IntraMuscular once PRN Glucose LESS THAN 70 milligrams/deciliter  oxyCODONE    IR 5 milliGRAM(s) Oral every 4 hours PRN Moderate Pain (4 - 6)    Vital Signs Last 24 Hrs  T(F): 98.1 (16 Dec 2019 07:36), Max: 98.1 (16 Dec 2019 07:36)  HR: 69 (16 Dec 2019 07:36) (68 - 81)  BP: 128/79 (16 Dec 2019 07:36) (126/75 - 128/79)  RR: 14 (16 Dec 2019 07:36) (14 - 14)  SpO2: 96% (16 Dec 2019 07:36) (96% - 97%)  I&O's Summary      PHYSICAL EXAM:  General: NAD  Neuro: A+O x 3, L sided weakness  ENT: MMM  Neck: Supple, No JVD  Lungs: Clear to auscultation bilaterally  Cardio: RRR, S1/S2,   Abdomen: Soft, Nontender, Nondistended; Bowel sounds present  Extremities: No calf tenderness, No pitting edema    LABS:                        12.4   6.05  )-----------( 291      ( 16 Dec 2019 06:00 )             38.3           139  |  104  |  31  ----------------------------<  122  3.7   |  24  |  1.24    Ca    9.1      16 Dec 2019 06:00       eGFR if Non African American: 60 mL/min/1.73M2 (19 @ 06:00)  eGFR if African American: 70 mL/min/1.73M2 (19 @ 06:00)                           POCT Blood Glucose.: 113 mg/dL (16 Dec 2019 07:24)  POCT Blood Glucose.: 130 mg/dL (15 Dec 2019 20:10)    11-29 [id number] 6.4    Urinalysis Basic - ( 14 Dec 2019 17:42 )    Color: Yellow / Appearance: Clear / S.015 / pH: x  Gluc: x / Ketone: Negative  / Bili: Negative / Urobili: Negative   Blood: x / Protein: Negative / Nitrite: Negative   Leuk Esterase: Negative / RBC: Negative /HPF / WBC Negative /HPF   Sq Epi: x / Non Sq Epi: Neg.-Few / Bacteria: Negative /HPF          RADIOLOGY & ADDITIONAL TESTS:    Care Discussed with Consultants/Other Providers:

## 2019-12-17 ENCOUNTER — TRANSCRIPTION ENCOUNTER (OUTPATIENT)
Age: 64
End: 2019-12-17

## 2019-12-17 VITALS
DIASTOLIC BLOOD PRESSURE: 79 MMHG | HEART RATE: 65 BPM | OXYGEN SATURATION: 95 % | RESPIRATION RATE: 14 BRPM | SYSTOLIC BLOOD PRESSURE: 134 MMHG | TEMPERATURE: 98 F

## 2019-12-17 LAB — GLUCOSE BLDC GLUCOMTR-MCNC: 103 MG/DL — HIGH (ref 70–99)

## 2019-12-17 PROCEDURE — 80048 BASIC METABOLIC PNL TOTAL CA: CPT

## 2019-12-17 PROCEDURE — 83735 ASSAY OF MAGNESIUM: CPT

## 2019-12-17 PROCEDURE — 99232 SBSQ HOSP IP/OBS MODERATE 35: CPT | Mod: GC

## 2019-12-17 PROCEDURE — 84100 ASSAY OF PHOSPHORUS: CPT

## 2019-12-17 PROCEDURE — 81001 URINALYSIS AUTO W/SCOPE: CPT

## 2019-12-17 PROCEDURE — 85027 COMPLETE CBC AUTOMATED: CPT

## 2019-12-17 PROCEDURE — 94640 AIRWAY INHALATION TREATMENT: CPT

## 2019-12-17 PROCEDURE — 92523 SPEECH SOUND LANG COMPREHEN: CPT

## 2019-12-17 PROCEDURE — 80053 COMPREHEN METABOLIC PANEL: CPT

## 2019-12-17 PROCEDURE — 97116 GAIT TRAINING THERAPY: CPT

## 2019-12-17 PROCEDURE — 92507 TX SP LANG VOICE COMM INDIV: CPT

## 2019-12-17 PROCEDURE — 97535 SELF CARE MNGMENT TRAINING: CPT

## 2019-12-17 PROCEDURE — 97163 PT EVAL HIGH COMPLEX 45 MIN: CPT

## 2019-12-17 PROCEDURE — 97530 THERAPEUTIC ACTIVITIES: CPT

## 2019-12-17 PROCEDURE — 97167 OT EVAL HIGH COMPLEX 60 MIN: CPT

## 2019-12-17 PROCEDURE — G0515: CPT

## 2019-12-17 PROCEDURE — 97112 NEUROMUSCULAR REEDUCATION: CPT

## 2019-12-17 PROCEDURE — 36415 COLL VENOUS BLD VENIPUNCTURE: CPT

## 2019-12-17 PROCEDURE — 97110 THERAPEUTIC EXERCISES: CPT

## 2019-12-17 PROCEDURE — 82962 GLUCOSE BLOOD TEST: CPT

## 2019-12-17 RX ORDER — DEXTROSE 50 % IN WATER 50 %
50 SYRINGE (ML) INTRAVENOUS
Qty: 0 | Refills: 0 | DISCHARGE
Start: 2019-12-17

## 2019-12-17 RX ORDER — SENNA PLUS 8.6 MG/1
2 TABLET ORAL
Qty: 0 | Refills: 0 | DISCHARGE
Start: 2019-12-17

## 2019-12-17 RX ORDER — LIDOCAINE 4 G/100G
1 CREAM TOPICAL
Qty: 0 | Refills: 0 | DISCHARGE
Start: 2019-12-17

## 2019-12-17 RX ORDER — POLYETHYLENE GLYCOL 3350 17 G/17G
17 POWDER, FOR SOLUTION ORAL
Qty: 0 | Refills: 0 | DISCHARGE
Start: 2019-12-17

## 2019-12-17 RX ORDER — ACETAMINOPHEN 500 MG
2 TABLET ORAL
Qty: 0 | Refills: 0 | DISCHARGE
Start: 2019-12-17

## 2019-12-17 RX ORDER — GABAPENTIN 400 MG/1
1 CAPSULE ORAL
Qty: 0 | Refills: 0 | DISCHARGE
Start: 2019-12-17

## 2019-12-17 RX ORDER — NICOTINE POLACRILEX 2 MG
1 GUM BUCCAL
Qty: 0 | Refills: 0 | DISCHARGE
Start: 2019-12-17

## 2019-12-17 RX ORDER — METFORMIN HYDROCHLORIDE 850 MG/1
0 TABLET ORAL
Qty: 0 | Refills: 0 | DISCHARGE

## 2019-12-17 RX ORDER — FLUTICASONE PROPIONATE 50 MCG
1 SPRAY, SUSPENSION NASAL
Qty: 0 | Refills: 0 | DISCHARGE
Start: 2019-12-17

## 2019-12-17 RX ORDER — AMLODIPINE BESYLATE 2.5 MG/1
1 TABLET ORAL
Qty: 0 | Refills: 0 | DISCHARGE
Start: 2019-12-17

## 2019-12-17 RX ORDER — LANOLIN ALCOHOL/MO/W.PET/CERES
1 CREAM (GRAM) TOPICAL
Qty: 0 | Refills: 0 | DISCHARGE
Start: 2019-12-17

## 2019-12-17 RX ORDER — DULOXETINE HYDROCHLORIDE 30 MG/1
1 CAPSULE, DELAYED RELEASE ORAL
Qty: 0 | Refills: 0 | DISCHARGE
Start: 2019-12-17

## 2019-12-17 RX ORDER — ENOXAPARIN SODIUM 100 MG/ML
40 INJECTION SUBCUTANEOUS
Qty: 0 | Refills: 0 | DISCHARGE
Start: 2019-12-17

## 2019-12-17 RX ORDER — DEXTROSE 50 % IN WATER 50 %
25 SYRINGE (ML) INTRAVENOUS
Qty: 0 | Refills: 0 | DISCHARGE
Start: 2019-12-17

## 2019-12-17 RX ORDER — DEXTROSE 50 % IN WATER 50 %
1 SYRINGE (ML) INTRAVENOUS
Qty: 0 | Refills: 0 | DISCHARGE
Start: 2019-12-17

## 2019-12-17 RX ORDER — ATORVASTATIN CALCIUM 80 MG/1
1 TABLET, FILM COATED ORAL
Qty: 0 | Refills: 0 | DISCHARGE
Start: 2019-12-17

## 2019-12-17 RX ADMIN — Medication 1 PATCH: at 12:23

## 2019-12-17 RX ADMIN — AMLODIPINE BESYLATE 5 MILLIGRAM(S): 2.5 TABLET ORAL at 05:36

## 2019-12-17 RX ADMIN — GABAPENTIN 300 MILLIGRAM(S): 400 CAPSULE ORAL at 05:36

## 2019-12-17 RX ADMIN — POLYETHYLENE GLYCOL 3350 17 GRAM(S): 17 POWDER, FOR SOLUTION ORAL at 05:36

## 2019-12-17 RX ADMIN — LIDOCAINE 1 PATCH: 4 CREAM TOPICAL at 12:24

## 2019-12-17 RX ADMIN — Medication 50 MILLIGRAM(S): at 05:36

## 2019-12-17 RX ADMIN — Medication 650 MILLIGRAM(S): at 12:23

## 2019-12-17 RX ADMIN — Medication 650 MILLIGRAM(S): at 05:36

## 2019-12-17 RX ADMIN — Medication 1 TABLET(S): at 12:23

## 2019-12-17 RX ADMIN — Medication 1 SPRAY(S): at 05:36

## 2019-12-17 RX ADMIN — Medication 650 MILLIGRAM(S): at 06:33

## 2019-12-17 RX ADMIN — Medication 1 MILLIGRAM(S): at 12:23

## 2019-12-17 RX ADMIN — DULOXETINE HYDROCHLORIDE 60 MILLIGRAM(S): 30 CAPSULE, DELAYED RELEASE ORAL at 12:23

## 2019-12-17 RX ADMIN — Medication 650 MILLIGRAM(S): at 01:36

## 2019-12-17 NOTE — PROGRESS NOTE ADULT - ASSESSMENT
ASSESSMENT/PLAN   65yo Male with a PMH of CVA who was found to have subacute SDH along R frontal lobe and large mass in third ventricle and is now with Gait Instability, ADL impairments and Functional impairments.    - Patient for discharge to [facility] today    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  - Gait Instability, ADL impairments and Functional impairments: Continue Comprehensive Rehab Program of PT/OT with focus on RUE/RLE strengthening, balance and coordination training     #HTN  - cont norvasc 5 qd    #HLD  - cont lipitor 10    #Anxiety  - cont klonopin 0.5 q12h prn    #sleep  - melatonin 3mg    #Pain Mgmt   - Tylenol PRN, Oxycodone PRN  - cymbalta 60 qd  - gabapentin 300mg TID  - lyrica 50 q8hrs  - lidocaine patch    #tobacco use disorder  - cont nicotine patch    #GI/Bowel Mgmt   - Continent c/w, Senna, Miralax PRN    #/Bladder Mgmt   - Continent    #FEN   - Diet - regular, consistent carb     #Precautions / PROPHYLAXIS:   - Falls, Cardiac  - ortho: Weight bearing status: WBAT   - Lungs: Aspiration, Incentive Spirometer   - Pressure injury/Skin: Turn Q2hrs while in bed, OOB to Chair, PT/OT    - DVT: Lovenox, SCDs    Team Conference 12/12/19  PT: min a for gait, min a-CG for transfers however requires MAX verbal cueing  OT: min a for transfers, LE dressing  barriers to dc: lives with son, but alone all day, poor safety awareness, impulsivity, decreased coordination.   target dc date 12/17 - will require contact with family to determine family training and addressing safety issues

## 2019-12-17 NOTE — PROGRESS NOTE ADULT - SUBJECTIVE AND OBJECTIVE BOX
HPI:  64y m PMhx prior CVA w/ residual L sided weakness and paresthesias, brain "cyst" s/p removal c/b hydrocephalus s/p  shunt placement in 1980 in [place], HTN, pw worsening L sided paresthesias and transferred from [facility] for new CT findings. Pt found to have subacute SDH along R frontal lobe and large mass in third ventricle. Pt denies any falls or recent head trauma to explain the SDH. 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  Hospital course complicated  with leukocytosis during admission.      SUBJECTIVE / INTERVAL HPI: Patient seen and examined in PT gym. He is doing well today and is ready for discharge. He has no complaints this morning and is feeling well. Soreness is a little better after working in therapy.     REVIEW OF SYSTEMS:    CONSTITUTIONAL: No weakness, fevers or chills  EYES/ENT: No visual changes;  No vertigo or throat pain   NECK: No pain or stiffness  RESPIRATORY: No cough, wheezing, or shortness of breath  CARDIOVASCULAR: No chest pain or palpitations  GASTROINTESTINAL: No abdominal or epigastric pain. No nausea or vomiting. No diarrhea or constipation.   GENITOURINARY: No dysuria, frequency or hematuria  NEUROLOGICAL: + numbness, no headaches or weakness  SKIN: No itching, rashes  MSK: +back soreness      VITAL SIGNS:  Vital Signs Last 24 Hrs  T(C): 36.7 (17 Dec 2019 07:46), Max: 36.7 (17 Dec 2019 07:46)  T(F): 98 (17 Dec 2019 07:46), Max: 98 (17 Dec 2019 07:46)  HR: 65 (17 Dec 2019 07:46) (65 - 91)  BP: 134/79 (17 Dec 2019 07:46) (134/79 - 140/83)  BP(mean): --  RR: 14 (17 Dec 2019 07:46) (14 - 14)  SpO2: 95% (17 Dec 2019 07:46) (94% - 95%)    PHYSICAL EXAM:    General: NAD, comfortably sitting in wheelchair in PT gym  Cardiovascular: warm and well perfused  Respiratory: no respiratory distress  Gastrointestinal: soft, NT/ND  Extremities: no edema, clubbing or cyanosis  Neurological: AAOx3; no focal deficits    MEDICATIONS:  MEDICATIONS  (STANDING):  acetaminophen   Tablet .. 650 milliGRAM(s) Oral every 6 hours  amLODIPine   Tablet 5 milliGRAM(s) Oral daily  atorvastatin 10 milliGRAM(s) Oral at bedtime  dextrose 5%. 1000 milliLiter(s) (50 mL/Hr) IV Continuous <Continuous>  dextrose 50% Injectable 12.5 Gram(s) IV Push once  dextrose 50% Injectable 25 Gram(s) IV Push once  dextrose 50% Injectable 25 Gram(s) IV Push once  DULoxetine 60 milliGRAM(s) Oral daily  enoxaparin Injectable 40 milliGRAM(s) SubCutaneous at bedtime  fluticasone propionate 50 MICROgram(s)/spray Nasal Spray 1 Spray(s) Both Nostrils two times a day  folic acid 1 milliGRAM(s) Oral daily  gabapentin 300 milliGRAM(s) Oral three times a day  insulin lispro (HumaLOG) corrective regimen sliding scale   SubCutaneous before breakfast  lidocaine   Patch 1 Patch Transdermal daily  lidocaine   Patch 1 Patch Transdermal every 24 hours  melatonin 3 milliGRAM(s) Oral at bedtime  multivitamin 1 Tablet(s) Oral daily  nicotine - 21 mG/24Hr(s) Patch 1 patch Transdermal daily  polyethylene glycol 3350 17 Gram(s) Oral two times a day  pregabalin 50 milliGRAM(s) Oral every 8 hours  senna 2 Tablet(s) Oral at bedtime    MEDICATIONS  (PRN):  artificial tears (preservative free) Ophthalmic Solution 1 Drop(s) Both EYES two times a day PRN Dry Eyes  dextrose 40% Gel 15 Gram(s) Oral once PRN Blood Glucose LESS THAN 70 milliGRAM(s)/deciliter  glucagon  Injectable 1 milliGRAM(s) IntraMuscular once PRN Glucose LESS THAN 70 milligrams/deciliter      ALLERGIES:  Allergies    No Known Allergies    Intolerances        LABS:                        12.4   6.05  )-----------( 291      ( 16 Dec 2019 06:00 )             38.3     12-16    139  |  104  |  31<H>  ----------------------------<  122<H>  3.7   |  24  |  1.24    Ca    9.1      16 Dec 2019 06:00          CAPILLARY BLOOD GLUCOSE      POCT Blood Glucose.: 103 mg/dL (17 Dec 2019 07:34)      RADIOLOGY & ADDITIONAL TESTS: Reviewed.

## 2019-12-17 NOTE — DISCHARGE NOTE NURSING/CASE MANAGEMENT/SOCIAL WORK - PATIENT PORTAL LINK FT
You can access the FollowMyHealth Patient Portal offered by [facility] by registering at the following website: [email or web address]. By joining [facility]’s FollowMyHealth portal, you will also be able to view your health information using other applications (apps) compatible with our system.

## 2019-12-17 NOTE — DISCHARGE NOTE NURSING/CASE MANAGEMENT/SOCIAL WORK - NSDCFUADDAPPT_GEN_ALL_CORE_FT
Please call your Neurosurgeon to schedule surgery for the end of December if he has not contacted you,    [facility].

## 2019-12-26 RX ORDER — SODIUM CHLORIDE 9 MG/ML
3 INJECTION INTRAMUSCULAR; INTRAVENOUS; SUBCUTANEOUS EVERY 8 HOURS
Refills: 0 | Status: DISCONTINUED | OUTPATIENT
Start: 2020-02-12 | End: 2020-02-12

## 2019-12-27 ENCOUNTER — APPOINTMENT (OUTPATIENT)
Dept: NEUROSURGERY | Facility: HOSPITAL | Age: 64
End: 2019-12-27

## 2020-02-06 ENCOUNTER — OUTPATIENT (OUTPATIENT)
Dept: OUTPATIENT SERVICES | Facility: HOSPITAL | Age: 65
LOS: 1 days | End: 2020-02-06
Payer: MEDICARE

## 2020-02-06 VITALS
HEIGHT: 70 IN | SYSTOLIC BLOOD PRESSURE: 112 MMHG | TEMPERATURE: 98 F | OXYGEN SATURATION: 97 % | WEIGHT: 212.97 LBS | RESPIRATION RATE: 14 BRPM | HEART RATE: 70 BPM | DIASTOLIC BLOOD PRESSURE: 76 MMHG

## 2020-02-06 DIAGNOSIS — Z01.818 ENCOUNTER FOR OTHER PREPROCEDURAL EXAMINATION: ICD-10-CM

## 2020-02-06 DIAGNOSIS — D49.6 NEOPLASM OF UNSPECIFIED BEHAVIOR OF BRAIN: ICD-10-CM

## 2020-02-06 DIAGNOSIS — Z71.89 OTHER SPECIFIED COUNSELING: ICD-10-CM

## 2020-02-06 DIAGNOSIS — Z98.2 PRESENCE OF CEREBROSPINAL FLUID DRAINAGE DEVICE: Chronic | ICD-10-CM

## 2020-02-06 DIAGNOSIS — E11.9 TYPE 2 DIABETES MELLITUS WITHOUT COMPLICATIONS: ICD-10-CM

## 2020-02-06 DIAGNOSIS — Z98.890 OTHER SPECIFIED POSTPROCEDURAL STATES: Chronic | ICD-10-CM

## 2020-02-06 DIAGNOSIS — I67.1 CEREBRAL ANEURYSM, NONRUPTURED: ICD-10-CM

## 2020-02-06 DIAGNOSIS — Z29.9 ENCOUNTER FOR PROPHYLACTIC MEASURES, UNSPECIFIED: ICD-10-CM

## 2020-02-06 DIAGNOSIS — G47.33 OBSTRUCTIVE SLEEP APNEA (ADULT) (PEDIATRIC): ICD-10-CM

## 2020-02-06 LAB
ANION GAP SERPL CALC-SCNC: 12 MMOL/L — SIGNIFICANT CHANGE UP (ref 5–17)
BLD GP AB SCN SERPL QL: NEGATIVE — SIGNIFICANT CHANGE UP
BUN SERPL-MCNC: 26 MG/DL — HIGH (ref 7–23)
CALCIUM SERPL-MCNC: 9.8 MG/DL — SIGNIFICANT CHANGE UP (ref 8.4–10.5)
CHLORIDE SERPL-SCNC: 102 MMOL/L — SIGNIFICANT CHANGE UP (ref 96–108)
CO2 SERPL-SCNC: 23 MMOL/L — SIGNIFICANT CHANGE UP (ref 22–31)
CREAT SERPL-MCNC: 1.09 MG/DL — SIGNIFICANT CHANGE UP (ref 0.5–1.3)
GLUCOSE SERPL-MCNC: 174 MG/DL — HIGH (ref 70–99)
HBA1C BLD-MCNC: 6 % — HIGH (ref 4–5.6)
MRSA PCR RESULT.: SIGNIFICANT CHANGE UP
POTASSIUM SERPL-MCNC: 4.3 MMOL/L — SIGNIFICANT CHANGE UP (ref 3.5–5.3)
POTASSIUM SERPL-SCNC: 4.3 MMOL/L — SIGNIFICANT CHANGE UP (ref 3.5–5.3)
RH IG SCN BLD-IMP: POSITIVE — SIGNIFICANT CHANGE UP
S AUREUS DNA NOSE QL NAA+PROBE: SIGNIFICANT CHANGE UP
SODIUM SERPL-SCNC: 137 MMOL/L — SIGNIFICANT CHANGE UP (ref 135–145)

## 2020-02-06 PROCEDURE — 87640 STAPH A DNA AMP PROBE: CPT

## 2020-02-06 PROCEDURE — 86850 RBC ANTIBODY SCREEN: CPT

## 2020-02-06 PROCEDURE — 83036 HEMOGLOBIN GLYCOSYLATED A1C: CPT

## 2020-02-06 PROCEDURE — 86901 BLOOD TYPING SEROLOGIC RH(D): CPT

## 2020-02-06 PROCEDURE — 86900 BLOOD TYPING SEROLOGIC ABO: CPT

## 2020-02-06 PROCEDURE — 87641 MR-STAPH DNA AMP PROBE: CPT

## 2020-02-06 PROCEDURE — G0463: CPT

## 2020-02-06 PROCEDURE — 80048 BASIC METABOLIC PNL TOTAL CA: CPT

## 2020-02-06 RX ORDER — CHLORHEXIDINE GLUCONATE 213 G/1000ML
1 SOLUTION TOPICAL ONCE
Refills: 0 | Status: DISCONTINUED | OUTPATIENT
Start: 2020-02-12 | End: 2020-02-12

## 2020-02-06 RX ORDER — LIDOCAINE HCL 20 MG/ML
0.2 VIAL (ML) INJECTION ONCE
Refills: 0 | Status: DISCONTINUED | OUTPATIENT
Start: 2020-02-12 | End: 2020-02-12

## 2020-02-06 RX ORDER — CEFAZOLIN SODIUM 1 G
2000 VIAL (EA) INJECTION ONCE
Refills: 0 | Status: DISCONTINUED | OUTPATIENT
Start: 2020-02-12 | End: 2020-02-14

## 2020-02-06 RX ORDER — SODIUM CHLORIDE 9 MG/ML
3 INJECTION INTRAMUSCULAR; INTRAVENOUS; SUBCUTANEOUS EVERY 8 HOURS
Refills: 0 | Status: DISCONTINUED | OUTPATIENT
Start: 2020-02-12 | End: 2020-02-12

## 2020-02-06 NOTE — H&P PST ADULT - ACTIVITY
activity limited by left sided paresthesia and weakness, currently in rehab.  able to dust, wash dishes

## 2020-02-06 NOTE — H&P PST ADULT - ASSESSMENT
CAPRINI SCORE    AGE RELATED RISK FACTORS                                                       MOBILITY RELATED FACTORS  [ ] Age 41-60 years                                            (1 Point)                  [ ] Bed rest                                                        (1 Point)  [x ] Age: 61-74 years                                           (2 Points)                [ ] Plaster cast                                                   (2 Points)  [ ] Age= 75 years                                              (3 Points)                 [ ] Bed bound for more than 72 hours                   (2 Points)    DISEASE RELATED RISK FACTORS                                               GENDER SPECIFIC FACTORS  [ ] Edema in the lower extremities                       (1 Point)                  [ ] Pregnancy                                                     (1 Point)  [ ] Varicose veins                                               (1 Point)                  [ ] Post-partum < 6 weeks                                   (1 Point)             [x ] BMI > 25 Kg/m2                                            (1 Point)                  [ ] Hormonal therapy  or oral contraception            (1 Point)                 [ ] Sepsis (in the previous month)                        (1 Point)                  [ ] History of pregnancy complications  [ ] Pneumonia or serious lung disease                                               [ ] Unexplained or recurrent                       (1 Point)           (in the previous month)                               (1 Point)  [ ] Abnormal pulmonary function test                     (1 Point)                 SURGERY RELATED RISK FACTORS  [ ] Acute myocardial infarction                              (1 Point)                 [ ]  Section                                            (1 Point)  [ ] Congestive heart failure (in the previous month)  (1 Point)                 [ ] Minor surgery                                                 (1 Point)   [ ] Inflammatory bowel disease                             (1 Point)                 [ ] Arthroscopic surgery                                        (2 Points)  [ ] Central venous access                                    (2 Points)                [ x] General surgery lasting more than 45 minutes   (2 Points)       [ ] Stroke (in the previous month)                          (5 Points)               [ ] Elective arthroplasty                                        (5 Points)                                                                                                                                               HEMATOLOGY RELATED FACTORS                                                 TRAUMA RELATED RISK FACTORS  [ ] Prior episodes of VTE                                     (3 Points)                 [ ] Fracture of the hip, pelvis, or leg                       (5 Points)  [ ] Positive family history for VTE                         (3 Points)                 [ ] Acute spinal cord injury (in the previous month)  (5 Points)  [ ] Prothrombin 76692 A                                      (3 Points)                 [ ] Paralysis  (less than 1 month)                          (5 Points)  [ ] Factor V Leiden                                             (3 Points)                 [ ] Multiple Trauma within 1 month                         (5 Points)  [ ] Lupus anticoagulants                                     (3 Points)                                                           [ ] Anticardiolipin antibodies                                (3 Points)                                                       [ ] High homocysteine in the blood                      (3 Points)                                             [ ] Other congenital or acquired thrombophilia       (3 Points)                                                [ ] Heparin induced thrombocytopenia                  (3 Points)                                          Total Score [     5     ]

## 2020-02-06 NOTE — H&P PST ADULT - HISTORY OF PRESENT ILLNESS
63 yo male. 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  c/o worsening left sided paresthesia in December, hospitalized at [facility] Dec 3-17, 2019, found to have subacute SDH along R frontal lobe and large mass in third ventricle. pt was referred to neurosurgery consult and transferred to rehab facility, now presents to PST scheduled for craniotomy surgery for removal of brain mass.  ***contacted surgeon, Dr. Manley's office, s/w  Elida who had consulted with mac Manley to hold aspirin 1 week preop.***

## 2020-02-06 NOTE — H&P PST ADULT - NSICDXPROBLEM_GEN_ALL_CORE_FT
PROBLEM DIAGNOSES  Problem: Need for prophylactic measure  Assessment and Plan: The Caprini score indicates this patient is at risk for a VTE event (score 3-5).  Most surgical patients in this group would benefit from pharmacologic prophylaxis.  The surgical team will determine the balance between VTE risk and bleeding risk      Problem: FELICE on CPAP  Assessment and Plan: FELICE precautions  OR booking notified     Problem: Type 2 diabetes mellitus  Assessment and Plan: hold metformin 24h preop  check finger stick DOS    Problem: Brain tumor  Assessment and Plan: right craniotomy  transcallosal approach for 3rd ventricle

## 2020-02-06 NOTE — H&P PST ADULT - NSICDXPASTMEDICALHX_GEN_ALL_CORE_FT
PAST MEDICAL HISTORY:  CVA (cerebral vascular accident) 2008    Depression     Diabetes type 2    H/O brain tumor     H/O hydrocephalus 2008, s/p  shunt    HTN (hypertension)     Hypertension     FELICE (obstructive sleep apnea)     Peripheral neuropathy     Seizure post craniotomy surgery , had seizure, was placed on phenytoin which eventually discontinued, pt had not had any seizure after 2008    Stroke 2008, 12/2019 PAST MEDICAL HISTORY:  CVA (cerebral vascular accident) 2008    Depression     Diabetes type 2    H/O brain tumor     H/O hydrocephalus 2008, s/p  shunt    HTN (hypertension)     Hypertension     FELICE (obstructive sleep apnea)     Peripheral neuropathy     Seizure post craniotomy surgery , placed on phenytoin which was eventually discontinued, pt had not had any seizure since 2008    Stroke 2008, 12/2019

## 2020-02-06 NOTE — H&P PST ADULT - PSY GEN HX ROS MEA POS PC
Plan of care reviewed with patient, patient verbalizes understanding. Pt arrived to us for us guided paracentesis. Pt oriented to unit and staff. Comfort measures utilized. Pt safely transferred from stretcher to procedural table. Fall risk reviewed with patient, fall risk interventions maintained. Safety strap applied, positioner pillows utilized to minimize pressure points. Blankets applied. Pt prepped and draped utilizing standard sterile technique. Patient placed on continuous monitoring, as required by sedation policy. Timeouts completed utilizing standard universal time-out, per department and facility policy. RN to remain at bedside, continuous monitoring maintained. Pt resting comfortably. Denies pain/discomfort. Will continue to monitor. See flow sheets for monitoring, medication administration, and updates.   
depression/managed with medication

## 2020-02-06 NOTE — H&P PST ADULT - NSICDXPASTSURGICALHX_GEN_ALL_CORE_FT
PAST SURGICAL HISTORY:  H/O craniotomy 2008 secondary to tumor in third ventricle    S/P ventriculoperitoneal shunt 2008

## 2020-02-06 NOTE — H&P PST ADULT - NEUROLOGICAL COMMENTS
left sided paresthesia (numbness/tingling pain- takes lyrica and gabapentin with minor pain relief), left sided weakness, pt is currently a resident of a rehab facility

## 2020-02-06 NOTE — H&P PST ADULT - NSANTHOSAYNRD_GEN_A_CORE
No. FELICE screening performed.  STOP BANG Legend: 0-2 = LOW Risk; 3-4 = INTERMEDIATE Risk; 5-8 = HIGH Risk/PMH FELICE with CPAP, pt lost "40 pounds" and I don't think I have FELICE anymore

## 2020-02-11 ENCOUNTER — TRANSCRIPTION ENCOUNTER (OUTPATIENT)
Age: 65
End: 2020-02-11

## 2020-02-12 ENCOUNTER — APPOINTMENT (OUTPATIENT)
Dept: NEUROSURGERY | Facility: CLINIC | Age: 65
End: 2020-02-12

## 2020-02-12 ENCOUNTER — RESULT REVIEW (OUTPATIENT)
Age: 65
End: 2020-02-12

## 2020-02-12 ENCOUNTER — INPATIENT (INPATIENT)
Facility: HOSPITAL | Age: 65
LOS: 20 days | Discharge: NURSING, REHAB OR LONG-TERM CARE | DRG: 25 | End: 2020-03-04
Attending: NEUROLOGICAL SURGERY | Admitting: NEUROLOGICAL SURGERY
Payer: MEDICARE

## 2020-02-12 VITALS
WEIGHT: 212.97 LBS | SYSTOLIC BLOOD PRESSURE: 148 MMHG | HEIGHT: 70 IN | RESPIRATION RATE: 18 BRPM | TEMPERATURE: 98 F | DIASTOLIC BLOOD PRESSURE: 83 MMHG | HEART RATE: 63 BPM | OXYGEN SATURATION: 99 %

## 2020-02-12 DIAGNOSIS — Z98.890 OTHER SPECIFIED POSTPROCEDURAL STATES: Chronic | ICD-10-CM

## 2020-02-12 DIAGNOSIS — Z98.2 PRESENCE OF CEREBROSPINAL FLUID DRAINAGE DEVICE: Chronic | ICD-10-CM

## 2020-02-12 DIAGNOSIS — Z01.818 ENCOUNTER FOR OTHER PREPROCEDURAL EXAMINATION: ICD-10-CM

## 2020-02-12 DIAGNOSIS — I67.1 CEREBRAL ANEURYSM, NONRUPTURED: ICD-10-CM

## 2020-02-12 LAB
APTT BLD: 36.2 SEC — SIGNIFICANT CHANGE UP (ref 27.5–36.3)
GAS PNL BLDA: SIGNIFICANT CHANGE UP
GAS PNL BLDA: SIGNIFICANT CHANGE UP
GLUCOSE BLDC GLUCOMTR-MCNC: 112 MG/DL — HIGH (ref 70–99)
INR BLD: 1.12 RATIO — SIGNIFICANT CHANGE UP (ref 0.88–1.16)
PROTHROM AB SERPL-ACNC: 12.9 SEC — SIGNIFICANT CHANGE UP (ref 10–12.9)

## 2020-02-12 PROCEDURE — 70460 CT HEAD/BRAIN W/DYE: CPT | Mod: 26

## 2020-02-12 PROCEDURE — 61516 CRNEC TREPH EXC CYST STTL: CPT | Mod: 22

## 2020-02-12 PROCEDURE — 88307 TISSUE EXAM BY PATHOLOGIST: CPT | Mod: 26

## 2020-02-12 PROCEDURE — 88333 PATH CONSLTJ SURG CYTO XM 1: CPT | Mod: 26

## 2020-02-12 PROCEDURE — 62200 ESTABLISH BRAIN CAVITY SHUNT: CPT

## 2020-02-12 PROCEDURE — 61781 SCAN PROC CRANIAL INTRA: CPT

## 2020-02-12 PROCEDURE — 88334 PATH CONSLTJ SURG CYTO XM EA: CPT | Mod: 26

## 2020-02-12 RX ORDER — LEVETIRACETAM 250 MG/1
500 TABLET, FILM COATED ORAL EVERY 12 HOURS
Refills: 0 | Status: DISCONTINUED | OUTPATIENT
Start: 2020-02-12 | End: 2020-02-12

## 2020-02-12 RX ORDER — SENNA PLUS 8.6 MG/1
2 TABLET ORAL AT BEDTIME
Refills: 0 | Status: DISCONTINUED | OUTPATIENT
Start: 2020-02-13 | End: 2020-03-04

## 2020-02-12 RX ORDER — ASPIRIN/CALCIUM CARB/MAGNESIUM 324 MG
1 TABLET ORAL
Qty: 0 | Refills: 0 | DISCHARGE

## 2020-02-12 RX ORDER — ACETAMINOPHEN 500 MG
650 TABLET ORAL EVERY 6 HOURS
Refills: 0 | Status: DISCONTINUED | OUTPATIENT
Start: 2020-02-12 | End: 2020-02-12

## 2020-02-12 RX ORDER — TETRAHYDROZOLINE HCL 0.05 %
1 DROPS OPHTHALMIC (EYE)
Qty: 0 | Refills: 0 | DISCHARGE

## 2020-02-12 RX ORDER — SODIUM CHLORIDE 9 MG/ML
1000 INJECTION INTRAMUSCULAR; INTRAVENOUS; SUBCUTANEOUS
Refills: 0 | Status: DISCONTINUED | OUTPATIENT
Start: 2020-02-12 | End: 2020-02-12

## 2020-02-12 RX ORDER — DULOXETINE HYDROCHLORIDE 30 MG/1
60 CAPSULE, DELAYED RELEASE ORAL DAILY
Refills: 0 | Status: DISCONTINUED | OUTPATIENT
Start: 2020-02-13 | End: 2020-02-26

## 2020-02-12 RX ORDER — FLUTICASONE PROPIONATE 50 MCG
1 SPRAY, SUSPENSION NASAL
Refills: 0 | Status: DISCONTINUED | OUTPATIENT
Start: 2020-02-13 | End: 2020-03-04

## 2020-02-12 RX ORDER — ATORVASTATIN CALCIUM 80 MG/1
10 TABLET, FILM COATED ORAL AT BEDTIME
Refills: 0 | Status: DISCONTINUED | OUTPATIENT
Start: 2020-02-13 | End: 2020-03-04

## 2020-02-12 RX ORDER — METFORMIN HYDROCHLORIDE 850 MG/1
1 TABLET ORAL
Qty: 0 | Refills: 0 | DISCHARGE

## 2020-02-12 RX ORDER — PANTOPRAZOLE SODIUM 20 MG/1
40 TABLET, DELAYED RELEASE ORAL
Refills: 0 | Status: DISCONTINUED | OUTPATIENT
Start: 2020-02-12 | End: 2020-02-12

## 2020-02-12 RX ORDER — GABAPENTIN 400 MG/1
300 CAPSULE ORAL THREE TIMES A DAY
Refills: 0 | Status: DISCONTINUED | OUTPATIENT
Start: 2020-02-13 | End: 2020-03-04

## 2020-02-12 RX ORDER — FOLIC ACID 0.8 MG
1 TABLET ORAL DAILY
Refills: 0 | Status: DISCONTINUED | OUTPATIENT
Start: 2020-02-13 | End: 2020-03-04

## 2020-02-12 RX ORDER — CEFAZOLIN SODIUM 1 G
2000 VIAL (EA) INJECTION EVERY 8 HOURS
Refills: 0 | Status: DISCONTINUED | OUTPATIENT
Start: 2020-02-12 | End: 2020-02-12

## 2020-02-12 RX ORDER — AMLODIPINE BESYLATE 2.5 MG/1
5 TABLET ORAL DAILY
Refills: 0 | Status: DISCONTINUED | OUTPATIENT
Start: 2020-02-13 | End: 2020-03-04

## 2020-02-12 RX ORDER — DEXAMETHASONE 0.5 MG/5ML
4 ELIXIR ORAL EVERY 6 HOURS
Refills: 0 | Status: DISCONTINUED | OUTPATIENT
Start: 2020-02-12 | End: 2020-02-12

## 2020-02-12 NOTE — PRE-ANESTHESIA EVALUATION ADULT - NSRADCARDRESULTSFT_GEN_ALL_CORE
TTE 12/2019: Mitral Valve: Mitral annular calcification, otherwise  normal mitral valve.  Aortic Valve/Aorta: Aortic valve not well visualized;  probably normal.  Aortic Root: 3.3 cm.  Left Atrium: Normal left atrium.  LA volume index = 16  cc/m2.  Left Ventricle: Normal left ventricular systolic function.  No segmental wall motion abnormalities. Normal left  ventricular internal dimensions and wall thicknesses.  Right Heart: Normal right atrium. Normal right ventricular  size and function. Normal tricuspid valve. Minimal  tricuspid regurgitation. Pulmonic valve not well  visualized, probably normal.  Pericardium/Pleura: Normal pericardium with no pericardial  effusion.  Hemodynamic: Estimated right atrial pressure is 8 mm Hg.  ------------------------------------------------------------------------  Conclusions:  1. Mitral annular calcification, otherwise normal mitral  valve.  2. Aortic valve not well visualized; probably normal.  3. Normal left ventricular systolic function. No segmental  wall motion abnormalities.  4. Normal right ventricular size and function.

## 2020-02-12 NOTE — PRE-ANESTHESIA EVALUATION ADULT - NSANTHPMHFT_GEN_ALL_CORE
65 yo male. 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  c/o worsening left sided paresthesia in December, hospitalized at [facility] Dec 3-17, 2019, found to have subacute SDH along R frontal lobe and large mass in third ventricle. Seizure post craniotomy surgery , placed on phenytoin which was eventually discontinued, pt had not had any seizure since 2008

## 2020-02-13 DIAGNOSIS — E78.5 HYPERLIPIDEMIA, UNSPECIFIED: ICD-10-CM

## 2020-02-13 DIAGNOSIS — E11.65 TYPE 2 DIABETES MELLITUS WITH HYPERGLYCEMIA: ICD-10-CM

## 2020-02-13 DIAGNOSIS — I10 ESSENTIAL (PRIMARY) HYPERTENSION: ICD-10-CM

## 2020-02-13 LAB
AMYLASE P1 CFR SERPL: 105 U/L — SIGNIFICANT CHANGE UP (ref 25–125)
ANION GAP SERPL CALC-SCNC: 13 MMOL/L — SIGNIFICANT CHANGE UP (ref 5–17)
ANION GAP SERPL CALC-SCNC: 17 MMOL/L — SIGNIFICANT CHANGE UP (ref 5–17)
ANION GAP SERPL CALC-SCNC: 21 MMOL/L — HIGH (ref 5–17)
ANION GAP SERPL CALC-SCNC: 23 MMOL/L — HIGH (ref 5–17)
B-OH-BUTYR SERPL-SCNC: 0.6 MMOL/L — HIGH
BUN SERPL-MCNC: 21 MG/DL — SIGNIFICANT CHANGE UP (ref 7–23)
BUN SERPL-MCNC: 22 MG/DL — SIGNIFICANT CHANGE UP (ref 7–23)
BUN SERPL-MCNC: 23 MG/DL — SIGNIFICANT CHANGE UP (ref 7–23)
BUN SERPL-MCNC: 25 MG/DL — HIGH (ref 7–23)
CALCIUM SERPL-MCNC: 9.3 MG/DL — SIGNIFICANT CHANGE UP (ref 8.4–10.5)
CALCIUM SERPL-MCNC: 9.3 MG/DL — SIGNIFICANT CHANGE UP (ref 8.4–10.5)
CALCIUM SERPL-MCNC: 9.6 MG/DL — SIGNIFICANT CHANGE UP (ref 8.4–10.5)
CALCIUM SERPL-MCNC: 9.8 MG/DL — SIGNIFICANT CHANGE UP (ref 8.4–10.5)
CHLORIDE SERPL-SCNC: 101 MMOL/L — SIGNIFICANT CHANGE UP (ref 96–108)
CHLORIDE SERPL-SCNC: 103 MMOL/L — SIGNIFICANT CHANGE UP (ref 96–108)
CHLORIDE SERPL-SCNC: 97 MMOL/L — SIGNIFICANT CHANGE UP (ref 96–108)
CHLORIDE SERPL-SCNC: 98 MMOL/L — SIGNIFICANT CHANGE UP (ref 96–108)
CO2 SERPL-SCNC: 15 MMOL/L — LOW (ref 22–31)
CO2 SERPL-SCNC: 16 MMOL/L — LOW (ref 22–31)
CO2 SERPL-SCNC: 18 MMOL/L — LOW (ref 22–31)
CO2 SERPL-SCNC: 20 MMOL/L — LOW (ref 22–31)
CREAT SERPL-MCNC: 0.98 MG/DL — SIGNIFICANT CHANGE UP (ref 0.5–1.3)
CREAT SERPL-MCNC: 1.03 MG/DL — SIGNIFICANT CHANGE UP (ref 0.5–1.3)
CREAT SERPL-MCNC: 1.17 MG/DL — SIGNIFICANT CHANGE UP (ref 0.5–1.3)
CREAT SERPL-MCNC: 1.25 MG/DL — SIGNIFICANT CHANGE UP (ref 0.5–1.3)
GAS PNL BLDA: SIGNIFICANT CHANGE UP
GLUCOSE BLDC GLUCOMTR-MCNC: 112 MG/DL — HIGH (ref 70–99)
GLUCOSE BLDC GLUCOMTR-MCNC: 116 MG/DL — HIGH (ref 70–99)
GLUCOSE BLDC GLUCOMTR-MCNC: 117 MG/DL — HIGH (ref 70–99)
GLUCOSE BLDC GLUCOMTR-MCNC: 124 MG/DL — HIGH (ref 70–99)
GLUCOSE BLDC GLUCOMTR-MCNC: 127 MG/DL — HIGH (ref 70–99)
GLUCOSE BLDC GLUCOMTR-MCNC: 127 MG/DL — HIGH (ref 70–99)
GLUCOSE BLDC GLUCOMTR-MCNC: 129 MG/DL — HIGH (ref 70–99)
GLUCOSE BLDC GLUCOMTR-MCNC: 134 MG/DL — HIGH (ref 70–99)
GLUCOSE BLDC GLUCOMTR-MCNC: 146 MG/DL — HIGH (ref 70–99)
GLUCOSE BLDC GLUCOMTR-MCNC: 185 MG/DL — HIGH (ref 70–99)
GLUCOSE BLDC GLUCOMTR-MCNC: 197 MG/DL — HIGH (ref 70–99)
GLUCOSE BLDC GLUCOMTR-MCNC: 235 MG/DL — HIGH (ref 70–99)
GLUCOSE BLDC GLUCOMTR-MCNC: 265 MG/DL — HIGH (ref 70–99)
GLUCOSE BLDC GLUCOMTR-MCNC: 297 MG/DL — HIGH (ref 70–99)
GLUCOSE BLDC GLUCOMTR-MCNC: 301 MG/DL — HIGH (ref 70–99)
GLUCOSE BLDC GLUCOMTR-MCNC: 308 MG/DL — HIGH (ref 70–99)
GLUCOSE BLDC GLUCOMTR-MCNC: 310 MG/DL — HIGH (ref 70–99)
GLUCOSE BLDC GLUCOMTR-MCNC: 331 MG/DL — HIGH (ref 70–99)
GLUCOSE BLDC GLUCOMTR-MCNC: 353 MG/DL — HIGH (ref 70–99)
GLUCOSE SERPL-MCNC: 122 MG/DL — HIGH (ref 70–99)
GLUCOSE SERPL-MCNC: 145 MG/DL — HIGH (ref 70–99)
GLUCOSE SERPL-MCNC: 297 MG/DL — HIGH (ref 70–99)
GLUCOSE SERPL-MCNC: 356 MG/DL — HIGH (ref 70–99)
HCT VFR BLD CALC: 38.1 % — LOW (ref 39–50)
HCT VFR BLD CALC: 43.2 % — SIGNIFICANT CHANGE UP (ref 39–50)
HCT VFR BLD CALC: 43.7 % — SIGNIFICANT CHANGE UP (ref 39–50)
HGB BLD-MCNC: 12.4 G/DL — LOW (ref 13–17)
HGB BLD-MCNC: 14.2 G/DL — SIGNIFICANT CHANGE UP (ref 13–17)
HGB BLD-MCNC: 14.6 G/DL — SIGNIFICANT CHANGE UP (ref 13–17)
LIDOCAIN IGE QN: 14 U/L — SIGNIFICANT CHANGE UP (ref 7–60)
MAGNESIUM SERPL-MCNC: 1.8 MG/DL — SIGNIFICANT CHANGE UP (ref 1.6–2.6)
MAGNESIUM SERPL-MCNC: 1.8 MG/DL — SIGNIFICANT CHANGE UP (ref 1.6–2.6)
MAGNESIUM SERPL-MCNC: 2 MG/DL — SIGNIFICANT CHANGE UP (ref 1.6–2.6)
MAGNESIUM SERPL-MCNC: 2.3 MG/DL — SIGNIFICANT CHANGE UP (ref 1.6–2.6)
MCHC RBC-ENTMCNC: 25.6 PG — LOW (ref 27–34)
MCHC RBC-ENTMCNC: 25.8 PG — LOW (ref 27–34)
MCHC RBC-ENTMCNC: 26.2 PG — LOW (ref 27–34)
MCHC RBC-ENTMCNC: 32.5 GM/DL — SIGNIFICANT CHANGE UP (ref 32–36)
MCHC RBC-ENTMCNC: 32.9 GM/DL — SIGNIFICANT CHANGE UP (ref 32–36)
MCHC RBC-ENTMCNC: 33.4 GM/DL — SIGNIFICANT CHANGE UP (ref 32–36)
MCV RBC AUTO: 78.3 FL — LOW (ref 80–100)
MCV RBC AUTO: 78.5 FL — LOW (ref 80–100)
MCV RBC AUTO: 78.6 FL — LOW (ref 80–100)
NRBC # BLD: 0 /100 WBCS — SIGNIFICANT CHANGE UP (ref 0–0)
PHOSPHATE SERPL-MCNC: 1.4 MG/DL — LOW (ref 2.5–4.5)
PHOSPHATE SERPL-MCNC: 2.7 MG/DL — SIGNIFICANT CHANGE UP (ref 2.5–4.5)
PHOSPHATE SERPL-MCNC: 2.9 MG/DL — SIGNIFICANT CHANGE UP (ref 2.5–4.5)
PHOSPHATE SERPL-MCNC: 3.7 MG/DL — SIGNIFICANT CHANGE UP (ref 2.5–4.5)
PLATELET # BLD AUTO: 362 K/UL — SIGNIFICANT CHANGE UP (ref 150–400)
PLATELET # BLD AUTO: 367 K/UL — SIGNIFICANT CHANGE UP (ref 150–400)
PLATELET # BLD AUTO: 415 K/UL — HIGH (ref 150–400)
POTASSIUM SERPL-MCNC: 3.7 MMOL/L — SIGNIFICANT CHANGE UP (ref 3.5–5.3)
POTASSIUM SERPL-MCNC: 4 MMOL/L — SIGNIFICANT CHANGE UP (ref 3.5–5.3)
POTASSIUM SERPL-MCNC: 4.1 MMOL/L — SIGNIFICANT CHANGE UP (ref 3.5–5.3)
POTASSIUM SERPL-MCNC: 4.2 MMOL/L — SIGNIFICANT CHANGE UP (ref 3.5–5.3)
POTASSIUM SERPL-SCNC: 3.7 MMOL/L — SIGNIFICANT CHANGE UP (ref 3.5–5.3)
POTASSIUM SERPL-SCNC: 4 MMOL/L — SIGNIFICANT CHANGE UP (ref 3.5–5.3)
POTASSIUM SERPL-SCNC: 4.1 MMOL/L — SIGNIFICANT CHANGE UP (ref 3.5–5.3)
POTASSIUM SERPL-SCNC: 4.2 MMOL/L — SIGNIFICANT CHANGE UP (ref 3.5–5.3)
RBC # BLD: 4.85 M/UL — SIGNIFICANT CHANGE UP (ref 4.2–5.8)
RBC # BLD: 5.5 M/UL — SIGNIFICANT CHANGE UP (ref 4.2–5.8)
RBC # BLD: 5.58 M/UL — SIGNIFICANT CHANGE UP (ref 4.2–5.8)
RBC # FLD: 14.1 % — SIGNIFICANT CHANGE UP (ref 10.3–14.5)
RBC # FLD: 14.4 % — SIGNIFICANT CHANGE UP (ref 10.3–14.5)
RBC # FLD: 14.5 % — SIGNIFICANT CHANGE UP (ref 10.3–14.5)
SODIUM SERPL-SCNC: 134 MMOL/L — LOW (ref 135–145)
SODIUM SERPL-SCNC: 136 MMOL/L — SIGNIFICANT CHANGE UP (ref 135–145)
TROPONIN T, HIGH SENSITIVITY RESULT: 8 NG/L — SIGNIFICANT CHANGE UP (ref 0–51)
TROPONIN T, HIGH SENSITIVITY RESULT: 8 NG/L — SIGNIFICANT CHANGE UP (ref 0–51)
WBC # BLD: 18.51 K/UL — HIGH (ref 3.8–10.5)
WBC # BLD: 23.74 K/UL — HIGH (ref 3.8–10.5)
WBC # BLD: 25.44 K/UL — HIGH (ref 3.8–10.5)
WBC # FLD AUTO: 18.51 K/UL — HIGH (ref 3.8–10.5)
WBC # FLD AUTO: 23.74 K/UL — HIGH (ref 3.8–10.5)
WBC # FLD AUTO: 25.44 K/UL — HIGH (ref 3.8–10.5)

## 2020-02-13 PROCEDURE — 70250 X-RAY EXAM OF SKULL: CPT | Mod: 26

## 2020-02-13 PROCEDURE — 71045 X-RAY EXAM CHEST 1 VIEW: CPT | Mod: 26

## 2020-02-13 PROCEDURE — 93970 EXTREMITY STUDY: CPT | Mod: 26

## 2020-02-13 PROCEDURE — 99291 CRITICAL CARE FIRST HOUR: CPT

## 2020-02-13 PROCEDURE — 70450 CT HEAD/BRAIN W/O DYE: CPT | Mod: 26

## 2020-02-13 PROCEDURE — 99292 CRITICAL CARE ADDL 30 MIN: CPT

## 2020-02-13 PROCEDURE — 99223 1ST HOSP IP/OBS HIGH 75: CPT

## 2020-02-13 PROCEDURE — 93010 ELECTROCARDIOGRAM REPORT: CPT

## 2020-02-13 RX ORDER — SODIUM CHLORIDE 9 MG/ML
1000 INJECTION, SOLUTION INTRAVENOUS
Refills: 0 | Status: DISCONTINUED | OUTPATIENT
Start: 2020-02-13 | End: 2020-02-13

## 2020-02-13 RX ORDER — PANTOPRAZOLE SODIUM 20 MG/1
40 TABLET, DELAYED RELEASE ORAL
Refills: 0 | Status: DISCONTINUED | OUTPATIENT
Start: 2020-02-13 | End: 2020-02-13

## 2020-02-13 RX ORDER — ACETAMINOPHEN 500 MG
1000 TABLET ORAL ONCE
Refills: 0 | Status: COMPLETED | OUTPATIENT
Start: 2020-02-13 | End: 2020-02-13

## 2020-02-13 RX ORDER — NICARDIPINE HYDROCHLORIDE 30 MG/1
5 CAPSULE, EXTENDED RELEASE ORAL
Qty: 40 | Refills: 0 | Status: DISCONTINUED | OUTPATIENT
Start: 2020-02-13 | End: 2020-02-14

## 2020-02-13 RX ORDER — MAGNESIUM SULFATE 500 MG/ML
2 VIAL (ML) INJECTION ONCE
Refills: 0 | Status: COMPLETED | OUTPATIENT
Start: 2020-02-13 | End: 2020-02-13

## 2020-02-13 RX ORDER — INSULIN HUMAN 100 [IU]/ML
3 INJECTION, SOLUTION SUBCUTANEOUS
Qty: 100 | Refills: 0 | Status: DISCONTINUED | OUTPATIENT
Start: 2020-02-13 | End: 2020-02-15

## 2020-02-13 RX ORDER — BENZOCAINE AND MENTHOL 5; 1 G/100ML; G/100ML
1 LIQUID ORAL ONCE
Refills: 0 | Status: COMPLETED | OUTPATIENT
Start: 2020-02-13 | End: 2020-02-13

## 2020-02-13 RX ORDER — LEVETIRACETAM 250 MG/1
500 TABLET, FILM COATED ORAL EVERY 12 HOURS
Refills: 0 | Status: DISCONTINUED | OUTPATIENT
Start: 2020-02-13 | End: 2020-02-13

## 2020-02-13 RX ORDER — SCOPALAMINE 1 MG/3D
1 PATCH, EXTENDED RELEASE TRANSDERMAL ONCE
Refills: 0 | Status: COMPLETED | OUTPATIENT
Start: 2020-02-13 | End: 2020-02-17

## 2020-02-13 RX ORDER — SODIUM CHLORIDE 9 MG/ML
1000 INJECTION INTRAMUSCULAR; INTRAVENOUS; SUBCUTANEOUS ONCE
Refills: 0 | Status: COMPLETED | OUTPATIENT
Start: 2020-02-13 | End: 2020-02-13

## 2020-02-13 RX ORDER — POTASSIUM PHOSPHATE, MONOBASIC POTASSIUM PHOSPHATE, DIBASIC 236; 224 MG/ML; MG/ML
30 INJECTION, SOLUTION INTRAVENOUS ONCE
Refills: 0 | Status: COMPLETED | OUTPATIENT
Start: 2020-02-13 | End: 2020-02-13

## 2020-02-13 RX ORDER — METOCLOPRAMIDE HCL 10 MG
10 TABLET ORAL ONCE
Refills: 0 | Status: COMPLETED | OUTPATIENT
Start: 2020-02-13 | End: 2020-02-13

## 2020-02-13 RX ORDER — PANTOPRAZOLE SODIUM 20 MG/1
40 TABLET, DELAYED RELEASE ORAL AT BEDTIME
Refills: 0 | Status: DISCONTINUED | OUTPATIENT
Start: 2020-02-13 | End: 2020-02-18

## 2020-02-13 RX ORDER — ONDANSETRON 8 MG/1
4 TABLET, FILM COATED ORAL EVERY 8 HOURS
Refills: 0 | Status: DISCONTINUED | OUTPATIENT
Start: 2020-02-13 | End: 2020-02-13

## 2020-02-13 RX ORDER — ONDANSETRON 8 MG/1
4 TABLET, FILM COATED ORAL ONCE
Refills: 0 | Status: COMPLETED | OUTPATIENT
Start: 2020-02-13 | End: 2020-02-13

## 2020-02-13 RX ORDER — ENOXAPARIN SODIUM 100 MG/ML
40 INJECTION SUBCUTANEOUS
Refills: 0 | Status: DISCONTINUED | OUTPATIENT
Start: 2020-02-13 | End: 2020-03-04

## 2020-02-13 RX ORDER — LEVETIRACETAM 250 MG/1
500 TABLET, FILM COATED ORAL EVERY 12 HOURS
Refills: 0 | Status: DISCONTINUED | OUTPATIENT
Start: 2020-02-13 | End: 2020-02-18

## 2020-02-13 RX ORDER — SCOPALAMINE 1 MG/3D
1 PATCH, EXTENDED RELEASE TRANSDERMAL ONCE
Refills: 0 | Status: COMPLETED | OUTPATIENT
Start: 2020-02-13 | End: 2020-02-13

## 2020-02-13 RX ORDER — CALCIUM GLUCONATE 100 MG/ML
2 VIAL (ML) INTRAVENOUS ONCE
Refills: 0 | Status: COMPLETED | OUTPATIENT
Start: 2020-02-13 | End: 2020-02-13

## 2020-02-13 RX ORDER — INSULIN LISPRO 100/ML
VIAL (ML) SUBCUTANEOUS
Refills: 0 | Status: DISCONTINUED | OUTPATIENT
Start: 2020-02-13 | End: 2020-02-13

## 2020-02-13 RX ORDER — SODIUM CHLORIDE 9 MG/ML
1000 INJECTION INTRAMUSCULAR; INTRAVENOUS; SUBCUTANEOUS
Refills: 0 | Status: DISCONTINUED | OUTPATIENT
Start: 2020-02-13 | End: 2020-02-13

## 2020-02-13 RX ORDER — DEXAMETHASONE 0.5 MG/5ML
4 ELIXIR ORAL EVERY 6 HOURS
Refills: 0 | Status: DISCONTINUED | OUTPATIENT
Start: 2020-02-13 | End: 2020-02-13

## 2020-02-13 RX ORDER — DEXAMETHASONE 0.5 MG/5ML
4 ELIXIR ORAL EVERY 6 HOURS
Refills: 0 | Status: DISCONTINUED | OUTPATIENT
Start: 2020-02-13 | End: 2020-02-14

## 2020-02-13 RX ORDER — HYDRALAZINE HCL 50 MG
10 TABLET ORAL ONCE
Refills: 0 | Status: COMPLETED | OUTPATIENT
Start: 2020-02-13 | End: 2020-02-13

## 2020-02-13 RX ORDER — POTASSIUM CHLORIDE 20 MEQ
10 PACKET (EA) ORAL
Refills: 0 | Status: COMPLETED | OUTPATIENT
Start: 2020-02-13 | End: 2020-02-13

## 2020-02-13 RX ORDER — CEFAZOLIN SODIUM 1 G
2000 VIAL (EA) INJECTION EVERY 8 HOURS
Refills: 0 | Status: COMPLETED | OUTPATIENT
Start: 2020-02-13 | End: 2020-02-13

## 2020-02-13 RX ORDER — ACETAMINOPHEN 500 MG
650 TABLET ORAL EVERY 6 HOURS
Refills: 0 | Status: DISCONTINUED | OUTPATIENT
Start: 2020-02-13 | End: 2020-03-04

## 2020-02-13 RX ORDER — SODIUM CHLORIDE 9 MG/ML
1000 INJECTION, SOLUTION INTRAVENOUS
Refills: 0 | Status: DISCONTINUED | OUTPATIENT
Start: 2020-02-13 | End: 2020-02-14

## 2020-02-13 RX ORDER — CHLORHEXIDINE GLUCONATE 213 G/1000ML
1 SOLUTION TOPICAL
Refills: 0 | Status: DISCONTINUED | OUTPATIENT
Start: 2020-02-13 | End: 2020-02-19

## 2020-02-13 RX ORDER — DEXAMETHASONE 0.5 MG/5ML
6 ELIXIR ORAL EVERY 6 HOURS
Refills: 0 | Status: DISCONTINUED | OUTPATIENT
Start: 2020-02-13 | End: 2020-02-13

## 2020-02-13 RX ORDER — HYDRALAZINE HCL 50 MG
5 TABLET ORAL ONCE
Refills: 0 | Status: COMPLETED | OUTPATIENT
Start: 2020-02-13 | End: 2020-02-13

## 2020-02-13 RX ORDER — LABETALOL HCL 100 MG
10 TABLET ORAL ONCE
Refills: 0 | Status: COMPLETED | OUTPATIENT
Start: 2020-02-13 | End: 2020-02-13

## 2020-02-13 RX ADMIN — SODIUM CHLORIDE 100 MILLILITER(S): 9 INJECTION, SOLUTION INTRAVENOUS at 06:04

## 2020-02-13 RX ADMIN — Medication 200 GRAM(S): at 04:14

## 2020-02-13 RX ADMIN — SODIUM CHLORIDE 75 MILLILITER(S): 9 INJECTION INTRAMUSCULAR; INTRAVENOUS; SUBCUTANEOUS at 07:00

## 2020-02-13 RX ADMIN — Medication 1 TABLET(S): at 12:23

## 2020-02-13 RX ADMIN — Medication 6 MILLIGRAM(S): at 05:50

## 2020-02-13 RX ADMIN — GABAPENTIN 300 MILLIGRAM(S): 400 CAPSULE ORAL at 22:01

## 2020-02-13 RX ADMIN — Medication 50 MILLIGRAM(S): at 22:01

## 2020-02-13 RX ADMIN — PANTOPRAZOLE SODIUM 40 MILLIGRAM(S): 20 TABLET, DELAYED RELEASE ORAL at 22:00

## 2020-02-13 RX ADMIN — Medication 100 MILLIGRAM(S): at 05:50

## 2020-02-13 RX ADMIN — SODIUM CHLORIDE 1000 MILLILITER(S): 9 INJECTION INTRAMUSCULAR; INTRAVENOUS; SUBCUTANEOUS at 03:37

## 2020-02-13 RX ADMIN — POTASSIUM PHOSPHATE, MONOBASIC POTASSIUM PHOSPHATE, DIBASIC 83.33 MILLIMOLE(S): 236; 224 INJECTION, SOLUTION INTRAVENOUS at 20:08

## 2020-02-13 RX ADMIN — Medication 10 MILLIGRAM(S): at 03:22

## 2020-02-13 RX ADMIN — Medication 400 MILLIGRAM(S): at 16:06

## 2020-02-13 RX ADMIN — SODIUM CHLORIDE 100 MILLILITER(S): 9 INJECTION, SOLUTION INTRAVENOUS at 16:08

## 2020-02-13 RX ADMIN — Medication 50 GRAM(S): at 15:00

## 2020-02-13 RX ADMIN — ATORVASTATIN CALCIUM 10 MILLIGRAM(S): 80 TABLET, FILM COATED ORAL at 22:01

## 2020-02-13 RX ADMIN — Medication 100 MILLIGRAM(S): at 22:00

## 2020-02-13 RX ADMIN — Medication 1000 MILLIGRAM(S): at 09:00

## 2020-02-13 RX ADMIN — Medication 4 MILLIGRAM(S): at 17:28

## 2020-02-13 RX ADMIN — CHLORHEXIDINE GLUCONATE 1 APPLICATION(S): 213 SOLUTION TOPICAL at 22:01

## 2020-02-13 RX ADMIN — Medication 10 MILLIGRAM(S): at 06:53

## 2020-02-13 RX ADMIN — SODIUM CHLORIDE 100 MILLILITER(S): 9 INJECTION, SOLUTION INTRAVENOUS at 04:14

## 2020-02-13 RX ADMIN — INSULIN HUMAN 3 UNIT(S)/HR: 100 INJECTION, SOLUTION SUBCUTANEOUS at 04:15

## 2020-02-13 RX ADMIN — Medication 4 MILLIGRAM(S): at 12:31

## 2020-02-13 RX ADMIN — ENOXAPARIN SODIUM 40 MILLIGRAM(S): 100 INJECTION SUBCUTANEOUS at 17:29

## 2020-02-13 RX ADMIN — Medication 100 MILLIGRAM(S): at 14:00

## 2020-02-13 RX ADMIN — SODIUM CHLORIDE 1000 MILLILITER(S): 9 INJECTION INTRAMUSCULAR; INTRAVENOUS; SUBCUTANEOUS at 06:04

## 2020-02-13 RX ADMIN — Medication 100 MILLIEQUIVALENT(S): at 15:00

## 2020-02-13 RX ADMIN — Medication 10 MILLIGRAM(S): at 00:50

## 2020-02-13 RX ADMIN — LEVETIRACETAM 400 MILLIGRAM(S): 250 TABLET, FILM COATED ORAL at 05:50

## 2020-02-13 RX ADMIN — Medication 400 MILLIGRAM(S): at 08:30

## 2020-02-13 RX ADMIN — ONDANSETRON 4 MILLIGRAM(S): 8 TABLET, FILM COATED ORAL at 02:38

## 2020-02-13 RX ADMIN — SCOPALAMINE 1 PATCH: 1 PATCH, EXTENDED RELEASE TRANSDERMAL at 09:54

## 2020-02-13 RX ADMIN — Medication 1 SPRAY(S): at 22:05

## 2020-02-13 RX ADMIN — LEVETIRACETAM 400 MILLIGRAM(S): 250 TABLET, FILM COATED ORAL at 17:28

## 2020-02-13 RX ADMIN — Medication 5 MILLIGRAM(S): at 01:30

## 2020-02-13 RX ADMIN — BENZOCAINE AND MENTHOL 1 LOZENGE: 5; 1 LIQUID ORAL at 22:05

## 2020-02-13 RX ADMIN — Medication 200 GRAM(S): at 18:33

## 2020-02-13 RX ADMIN — Medication 1000 MILLIGRAM(S): at 01:35

## 2020-02-13 RX ADMIN — SCOPALAMINE 1 PATCH: 1 PATCH, EXTENDED RELEASE TRANSDERMAL at 06:03

## 2020-02-13 RX ADMIN — CHLORHEXIDINE GLUCONATE 1 APPLICATION(S): 213 SOLUTION TOPICAL at 01:22

## 2020-02-13 RX ADMIN — Medication 1000 MILLIGRAM(S): at 16:45

## 2020-02-13 RX ADMIN — ONDANSETRON 4 MILLIGRAM(S): 8 TABLET, FILM COATED ORAL at 01:59

## 2020-02-13 RX ADMIN — Medication 100 MILLIEQUIVALENT(S): at 13:30

## 2020-02-13 RX ADMIN — Medication 4 MILLIGRAM(S): at 23:23

## 2020-02-13 RX ADMIN — SENNA PLUS 2 TABLET(S): 8.6 TABLET ORAL at 22:00

## 2020-02-13 RX ADMIN — INSULIN HUMAN 3 UNIT(S)/HR: 100 INJECTION, SOLUTION SUBCUTANEOUS at 16:07

## 2020-02-13 RX ADMIN — Medication 400 MILLIGRAM(S): at 01:05

## 2020-02-13 RX ADMIN — SCOPALAMINE 1 PATCH: 1 PATCH, EXTENDED RELEASE TRANSDERMAL at 19:15

## 2020-02-13 RX ADMIN — Medication 1 SPRAY(S): at 05:50

## 2020-02-13 NOTE — PROGRESS NOTE ADULT - ASSESSMENT
POD#1 R crani 3rd ventricular tumor resection    xray shunt series  MRI post op  pain control  -150  cardiology following for prolonged QTc in 600's; avoid further prolonging meds  encourage incentive spirometry  NPO  cont insulin gtt  consider endo consult in am  lovenox ppx

## 2020-02-13 NOTE — PROGRESS NOTE ADULT - SUBJECTIVE AND OBJECTIVE BOX
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  C/o worsening left sided paresthesia in December, hospitalized at [facility] Dec 3-17, 2019, found to have subacute SDH along R frontal lobe and large mass in third ventricle. Pt was referred to neurosurgery consult and transferred to rehab facility, now presents to PST scheduled for craniotomy surgery for removal of brain mass.  Of note, Patient instructed to hold ASA for 1wk prior to surgery    OVERNIGHT EVENTS: Adm NSCU s/p rt crani, interhemis transcallosal approach to 3rd vent tumor resection, ETV  post op slow to wake, CTH showing slight increase in ventricular size and ventricular air            ICU Vital Signs Last 24 Hrs  T(C): 36.6 (13 Feb 2020 03:00), Max: 36.6 (13 Feb 2020 03:00)  T(F): 97.9 (13 Feb 2020 03:00), Max: 97.9 (13 Feb 2020 03:00)  HR: 87 (13 Feb 2020 06:00) (63 - 107)  BP: 148/83 (12 Feb 2020 15:11) (148/83 - 148/83)  BP(mean): --  ABP: 136/63 (13 Feb 2020 06:00) (136/63 - 176/90)  ABP(mean): 90 (13 Feb 2020 06:00) (90 - 127)  RR: 15 (13 Feb 2020 06:00) (15 - 22)  SpO2: 95% (13 Feb 2020 06:00) (93% - 100%)      02-12-20 @ 07:01  -  02-13-20 @ 06:50  --------------------------------------------------------  IN: 2609 mL / OUT: 2025 mL / NET: 584 mL        acetaminophen   Tablet .. 650 milliGRAM(s) Oral every 6 hours PRN  amLODIPine   Tablet 5 milliGRAM(s) Oral daily  atorvastatin 10 milliGRAM(s) Oral at bedtime  ceFAZolin   IVPB 2000 milliGRAM(s) IV Intermittent once  ceFAZolin   IVPB 2000 milliGRAM(s) IV Intermittent every 8 hours  chlorhexidine 4% Liquid 1 Application(s) Topical <User Schedule>  dexAMETHasone  Injectable 6 milliGRAM(s) IV Push every 6 hours  DULoxetine 60 milliGRAM(s) Oral daily  fluticasone propionate 50 MICROgram(s)/spray Nasal Spray 1 Spray(s) Both Nostrils two times a day  folic acid 1 milliGRAM(s) Oral daily  gabapentin 300 milliGRAM(s) Oral three times a day  hydrALAZINE Injectable 10 milliGRAM(s) IV Push once  insulin regular Infusion 3 Unit(s)/Hr (3 mL/Hr) IV Continuous <Continuous>  lactated ringers. 1000 milliLiter(s) (100 mL/Hr) IV Continuous <Continuous>  levETIRAcetam  IVPB 500 milliGRAM(s) IV Intermittent every 12 hours  multivitamin 1 Tablet(s) Oral daily  ondansetron Injectable 4 milliGRAM(s) IV Push every 8 hours PRN  pantoprazole  Injectable 40 milliGRAM(s) IV Push at bedtime  pregabalin 50 milliGRAM(s) Oral every 8 hours  scopolamine   Patch 1 Patch Transdermal once PRN  senna 2 Tablet(s) Oral at bedtime                            14.2   18.51 )-----------( 362      ( 13 Feb 2020 01:47 )             43.2     02-13    134<L>  |  97  |  22  ----------------------------<  356<H>  4.1   |  16<L>  |  0.98    Ca    9.3      13 Feb 2020 01:47  Phos  3.7     02-13  Mg     1.8     02-13        ABG - ( 13 Feb 2020 05:09 )  pH, Arterial: 7.43  pH, Blood: x     /  pCO2: 21    /  pO2: 111   / HCO3: 14    / Base Excess: -8.3  /  SaO2: 98              DEVICES:   [] Restraints [x] PIVs [] ET tube [] central line [] PICC [x] arterial line [x] wise [] NGT/OGT [] EVD [] LD [] MARIA E/HMV [] LiCOX [] ICP monitor [] Trach [] PEG [] Chest Tube [] other:    EXAMINATION:  General: No acute distress  HEENT: Anicteric sclerae  Cardiac: [id number]  Lungs: Clear  Abdomen: Soft, non-tender, +BS  Extremities: No c/c/e  Skin/Incision Site: Clean, dry and intact  Neurologic: Awake, alert, orientedx3, follows commands, R pupil 3mm sluggish, L pupil 3mm brisk, able to finger count b/l without glasses, downward gaze, face symmetric, tongue midline, no drift, BUE 4/5, BLE antigravity

## 2020-02-13 NOTE — CONSULT NOTE ADULT - ASSESSMENT
Prolong QT  due to antiemetic administration   avoid qt prolonging drugs  monitor electrolytes   monitor qtc     HTN  stable

## 2020-02-13 NOTE — CONSULT NOTE ADULT - SUBJECTIVE AND OBJECTIVE BOX
HPI:  63 yo male. 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  c/o worsening left sided paresthesia in December, hospitalized at [facility] Dec 3-17, 2019, found to have subacute SDH along R frontal lobe and large mass in third ventricle. pt was referred to neurosurgery consult and transferred to rehab facility, now presents to PST scheduled for craniotomy surgery for removal of brain mass.  ***contacted surgeon, Dr. Manley's office, s/w  Elida who had consulted with Vineet, pt to hold aspirin 1 week preop.*** (06 Feb 2020 10:22)      PAST MEDICAL & SURGICAL HISTORY:  FELICE (obstructive sleep apnea)  H/O hydrocephalus: 2008, s/p  shunt  H/O brain tumor  Peripheral neuropathy  Stroke: 2008, 12/2019  Diabetes: type 2  Hypertension  Depression  Seizure: post craniotomy surgery , placed on phenytoin which was eventually discontinued, pt had not had any seizure since 2008  HTN (hypertension)  CVA (cerebral vascular accident): 2008  S/P ventriculoperitoneal shunt: 2008  H/O craniotomy: 2008 secondary to tumor in third ventricle      FAMILY HISTORY:      Social History:    Outpatient Medications:    MEDICATIONS  (STANDING):  amLODIPine   Tablet 5 milliGRAM(s) Oral daily  atorvastatin 10 milliGRAM(s) Oral at bedtime  ceFAZolin   IVPB 2000 milliGRAM(s) IV Intermittent once  ceFAZolin   IVPB 2000 milliGRAM(s) IV Intermittent every 8 hours  chlorhexidine 4% Liquid 1 Application(s) Topical <User Schedule>  dexAMETHasone  Injectable 4 milliGRAM(s) IV Push every 6 hours  DULoxetine 60 milliGRAM(s) Oral daily  enoxaparin Injectable 40 milliGRAM(s) SubCutaneous <User Schedule>  fluticasone propionate 50 MICROgram(s)/spray Nasal Spray 1 Spray(s) Both Nostrils two times a day  folic acid 1 milliGRAM(s) Oral daily  gabapentin 300 milliGRAM(s) Oral three times a day  insulin regular Infusion 3 Unit(s)/Hr (3 mL/Hr) IV Continuous <Continuous>  lactated ringers. 1000 milliLiter(s) (100 mL/Hr) IV Continuous <Continuous>  levETIRAcetam  IVPB 500 milliGRAM(s) IV Intermittent every 12 hours  magnesium sulfate  IVPB 2 Gram(s) IV Intermittent once  multivitamin 1 Tablet(s) Oral daily  niCARdipine Infusion 5 mG/Hr (25 mL/Hr) IV Continuous <Continuous>  pantoprazole  Injectable 40 milliGRAM(s) IV Push at bedtime  potassium chloride  10 mEq/100 mL IVPB 10 milliEquivalent(s) IV Intermittent every 1 hour  pregabalin 50 milliGRAM(s) Oral every 8 hours  senna 2 Tablet(s) Oral at bedtime    MEDICATIONS  (PRN):  acetaminophen   Tablet .. 650 milliGRAM(s) Oral every 6 hours PRN Temp greater or equal to 38C (100.4F), Mild Pain (1 - 3)  scopolamine   Patch 1 Patch Transdermal once PRN Nausea and/or Vomiting      Allergies    No Known Allergies    Intolerances      Review of Systems:  Constitutional: No fever, No change in weight  Eyes: No blurry vision  Neuro: No headache, No paresthesias  HEENT: No throat pain  Cardiovascular: No chest pain  Respiratory: No SOB  GI: No nausea or vomiting  : No polyuria  Skin: no rash  Psych: no depression  Endocrine: No polydipsia, No heat or cold intolerance, rest as noted in HPI  Hem/lymph: no swelling    All other review of systems negative      PHYSICAL EXAM:  VITALS: T(C): 36.7 (02-13-20 @ 11:00)  T(F): 98 (02-13-20 @ 11:00), Max: 98 (02-13-20 @ 11:00)  HR: 83 (02-13-20 @ 12:00) (63 - 107)  BP: 148/83 (02-12-20 @ 15:11) (148/83 - 148/83)  RR:  (14 - 24)  SpO2:  (93% - 100%)  Wt(kg): --  GENERAL: NAD at this time  EYES: No proptosis, EOMI  HEENT:  Atraumatic, Normocephalic,   THYROID: Normal size, no palpable nodules  RESPIRATORY: Clear to auscultation bilaterally, full excursion, non-labored  CARDIOVASCULAR: Regular rhythm; No murmurs; no peripheral edema  GI: Soft, nontender, non distended, normal bowel sounds  SKIN: Dry, intact, No rashes or lesions  MUSCULOSKELETAL: normal strength  NEURO: follows commands, no tremor, normal reflexes  PSYCH: Alert and oriented x 3, normal affect, normal mood  CUSHING'S SIGNS: no striae      POCT Blood Glucose.: 185 mg/dL (02-13-20 @ 13:05)  POCT Blood Glucose.: 197 mg/dL (02-13-20 @ 12:05)  POCT Blood Glucose.: 235 mg/dL (02-13-20 @ 10:59)  POCT Blood Glucose.: 254 mg/dL (02-13-20 @ 09:56)  POCT Blood Glucose.: 265 mg/dL (02-13-20 @ 09:07)  POCT Blood Glucose.: 310 mg/dL (02-13-20 @ 07:54)  POCT Blood Glucose.: 301 mg/dL (02-13-20 @ 06:52)  POCT Blood Glucose.: 297 mg/dL (02-13-20 @ 05:54)  POCT Blood Glucose.: 308 mg/dL (02-13-20 @ 04:51)  POCT Blood Glucose.: 331 mg/dL (02-13-20 @ 04:09)  POCT Blood Glucose.: 353 mg/dL (02-13-20 @ 03:30)  POCT Blood Glucose.: 112 mg/dL (02-12-20 @ 11:40)                              14.6   23.74 )-----------( 415      ( 13 Feb 2020 09:52 )             43.7       02-13    136  |  98  |  21  ----------------------------<  297<H>  3.7   |  15<L>  |  1.03    EGFR if : 87  EGFR if non : 75    Ca    9.8      02-13  Mg     1.8     02-13  Phos  2.7     02-13      Thyroid Function Tests:      Hemoglobin A1C, Whole Blood: 6.0 % <H> [4.0 - 5.6] (02-06-20 @ 13:46)  Hemoglobin A1C, Whole Blood: 6.4 % <H> [4.0 - 5.6] (11-29-19 @ 08:25)        Radiology: HPI:  65 y/o M w/ hx of Type 2 DM x "many years" complicated by CVA. Takes metformin 500mg BID. Adherent to therapy. Checks glucose daily "values are good" No reported hypoglycemia or symptoms of hypoglycemia. Tries to monitor carbs. Now with some nausea and vomiting post-op. Started on decadron with hyperglycemia on insulin gtt.   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  c/o worsening left sided paresthesia in December, hospitalized at [facility] Dec 3-17, 2019, found to have subacute SDH along R frontal lobe and large mass in third ventricle. pt was referred to neurosurgery consult and transferred to rehab facility, now presents to PST scheduled for craniotomy surgery for removal of brain mass.      PAST MEDICAL & SURGICAL HISTORY:  FELICE (obstructive sleep apnea)  H/O hydrocephalus: 2008, s/p  shunt  H/O brain tumor  Peripheral neuropathy  Stroke: 2008, 12/2019  Diabetes: type 2  Hypertension  Depression  Seizure: post craniotomy surgery , placed on phenytoin which was eventually discontinued, pt had not had any seizure since 2008  HTN (hypertension)  CVA (cerebral vascular accident): 2008  S/P ventriculoperitoneal shunt: 2008  H/O craniotomy: 2008 secondary to tumor in third ventricle      FAMILY HISTORY: No family hx of diabetes in 1st degree relatives      Social History: Denies tobacco or alcohol use    Outpatient Medications:  · 	Tylenol 325 mg oral tablet: Last Dose Taken:  , 2 tab(s) orally every 6 hours, As Needed for mild pain  · 	Lyrica 50 mg oral capsule: Last Dose Taken:  , 1 cap(s) orally every 8 hours  · 	Cymbalta 60 mg oral delayed release capsule: Last Dose Taken:  , 1 cap(s) orally once a day  · 	nicotine 21 mg/24 hr transdermal film, extended release: Last Dose Taken:  , 1 patch transdermal once a day  · 	fluticasone 50 mcg/inh nasal spray: Last Dose Taken:  , 1 spray(s) nasal 2 times a day  · 	senna oral tablet: Last Dose Taken:  , 2 tab(s) orally once a day (at bedtime)  · 	polyethylene glycol 3350 oral powder for reconstitution: Last Dose Taken:  , 17 gram(s) orally 2 times a day  · 	amLODIPine 5 mg oral tablet: Last Dose Taken:  , 1 tab(s) orally once a day  · 	melatonin 3 mg oral tablet: Last Dose Taken:  , 1 tab(s) orally once a day (at bedtime)  · 	atorvastatin 10 mg oral tablet: Last Dose Taken:  , 1 tab(s) orally once a day (at bedtime)  · 	Neurontin 300 mg oral capsule: Last Dose Taken:  , 1 cap(s) orally 3 times a day  · 	folic acid 1 mg oral tablet: Last Dose Taken:  , 1 tab(s) orally once a day  · 	Multiple Vitamins oral tablet: Last Dose Taken:  , 1 tab(s) orally once a day  · 	clonazePAM 0.5 mg oral tablet: Last Dose Taken:  , 1 tab(s) orally every 12 hours, As needed, anxiety/panic attack  · 	aspirin 81 mg oral tablet: Last Dose Taken:  , 1 tab(s) orally once a day  · 	metFORMIN 500 mg oral tablet: Last Dose Taken:  , 1 tab(s) orally 2 times a day  · 	icy hot (menthol) 5% topical patch 1 patch bilateral knees and lower back daily: Last Dose Taken:    · 	Visine Advanced Relief 1%-0.05% ophthalmic drops: Last Dose Taken:  , 1 drop(s) to each affected eye 2 times a day    MEDICATIONS  (STANDING):  amLODIPine   Tablet 5 milliGRAM(s) Oral daily  atorvastatin 10 milliGRAM(s) Oral at bedtime  ceFAZolin   IVPB 2000 milliGRAM(s) IV Intermittent once  ceFAZolin   IVPB 2000 milliGRAM(s) IV Intermittent every 8 hours  chlorhexidine 4% Liquid 1 Application(s) Topical <User Schedule>  dexAMETHasone  Injectable 4 milliGRAM(s) IV Push every 6 hours  DULoxetine 60 milliGRAM(s) Oral daily  enoxaparin Injectable 40 milliGRAM(s) SubCutaneous <User Schedule>  fluticasone propionate 50 MICROgram(s)/spray Nasal Spray 1 Spray(s) Both Nostrils two times a day  folic acid 1 milliGRAM(s) Oral daily  gabapentin 300 milliGRAM(s) Oral three times a day  insulin regular Infusion 3 Unit(s)/Hr (3 mL/Hr) IV Continuous <Continuous>  lactated ringers. 1000 milliLiter(s) (100 mL/Hr) IV Continuous <Continuous>  levETIRAcetam  IVPB 500 milliGRAM(s) IV Intermittent every 12 hours  magnesium sulfate  IVPB 2 Gram(s) IV Intermittent once  multivitamin 1 Tablet(s) Oral daily  niCARdipine Infusion 5 mG/Hr (25 mL/Hr) IV Continuous <Continuous>  pantoprazole  Injectable 40 milliGRAM(s) IV Push at bedtime  potassium chloride  10 mEq/100 mL IVPB 10 milliEquivalent(s) IV Intermittent every 1 hour  pregabalin 50 milliGRAM(s) Oral every 8 hours  senna 2 Tablet(s) Oral at bedtime    MEDICATIONS  (PRN):  acetaminophen   Tablet .. 650 milliGRAM(s) Oral every 6 hours PRN Temp greater or equal to 38C (100.4F), Mild Pain (1 - 3)  scopolamine   Patch 1 Patch Transdermal once PRN Nausea and/or Vomiting      Allergies    No Known Allergies    Intolerances      Review of Systems:  Constitutional: No fever, +weight loss  Eyes: No blurry vision  Neuro: + headache, + paresthesias  HEENT: No throat pain  Cardiovascular: No chest pain  Respiratory: No SOB  GI: + nausea and vomiting  : No polyuria  Skin: no rash  Psych: no depression  Endocrine: No polydipsia, No heat or cold intolerance, rest as noted in HPI  Hem/lymph: no swelling    All other review of systems negative      PHYSICAL EXAM:  VITALS: T(C): 36.7 (02-13-20 @ 11:00)  T(F): 98 (02-13-20 @ 11:00), Max: 98 (02-13-20 @ 11:00)  HR: 83 (02-13-20 @ 12:00) (63 - 107)  BP: 148/83 (02-12-20 @ 15:11) (148/83 - 148/83)  RR:  (14 - 24)  SpO2:  (93% - 100%)  Wt(kg): --  GENERAL: in mild distress from pain, nausea and vomiting.  EYES: No proptosis, EOMI  HEENT:  +dressing around his head  THYROID: unable to assess at this time.   RESPIRATORY: Clear to auscultation bilaterally, full excursion, non-labored  CARDIOVASCULAR: Regular rhythm; No murmurs; no peripheral edema  GI: Soft, nontender, non distended, normal bowel sounds  SKIN: Dry, intact, No rashes or lesions  MUSCULOSKELETAL: normal strength  NEURO: follows commands,  PSYCH: normal affect, normal mood  CUSHING'S SIGNS: no striae      POCT Blood Glucose.: 185 mg/dL (02-13-20 @ 13:05)  POCT Blood Glucose.: 197 mg/dL (02-13-20 @ 12:05)  POCT Blood Glucose.: 235 mg/dL (02-13-20 @ 10:59)  POCT Blood Glucose.: 254 mg/dL (02-13-20 @ 09:56)  POCT Blood Glucose.: 265 mg/dL (02-13-20 @ 09:07)  POCT Blood Glucose.: 310 mg/dL (02-13-20 @ 07:54)  POCT Blood Glucose.: 301 mg/dL (02-13-20 @ 06:52)  POCT Blood Glucose.: 297 mg/dL (02-13-20 @ 05:54)  POCT Blood Glucose.: 308 mg/dL (02-13-20 @ 04:51)  POCT Blood Glucose.: 331 mg/dL (02-13-20 @ 04:09)  POCT Blood Glucose.: 353 mg/dL (02-13-20 @ 03:30)  POCT Blood Glucose.: 112 mg/dL (02-12-20 @ 11:40)                              14.6   23.74 )-----------( 415      ( 13 Feb 2020 09:52 )             43.7       02-13    136  |  98  |  21  ----------------------------<  297<H>  3.7   |  15<L>  |  1.03    EGFR if : 87  EGFR if non : 75    Ca    9.8      02-13  Mg     1.8     02-13  Phos  2.7     02-13      Thyroid Function Tests:      Hemoglobin A1C, Whole Blood: 6.0 % <H> [4.0 - 5.6] (02-06-20 @ 13:46)  Hemoglobin A1C, Whole Blood: 6.4 % <H> [4.0 - 5.6] (11-29-19 @ 08:25)        Radiology:

## 2020-02-13 NOTE — DIETITIAN INITIAL EVALUATION ADULT. - ADD RECOMMEND
1) NPO as ordered, continue IVF for fluid maintenance 2) Resume diet once medically feasible, consider carbohydrate controlled diet in the setting of elevated blood sugars 3) Provide diet education at follow up visit 4) Encourage oral intake and monitor need for oral nutrition supplements to maintain adequate nutritional intake 5) Will monitor GI distress, diet advancement, nutritional intake, labs, skin integrity and follow up per protocol

## 2020-02-13 NOTE — PROGRESS NOTE ADULT - ASSESSMENT
POD#0 R crani tumor resection, ETV    NEURO:  CT Head in AM, MRI post-op, Pain control  Of note, has VPS   keppra for seizure ppx  decadron for edema per neurosurgery  Activity: [x] OOB as tolerated [] Bedrest [x] PT [x] OT [] PMNR    PULM:  Incentive spirometry, mobilize as tolerated    CV:  -150mmHg, d/c a-line in AM    RENAL:  IVF until good PO intake, d/c wise in AM    GI:  Diet: Dysphagia screen and then advance diet as tolerated  GI prophylaxis [x] not indicated [] PPI [] other:  Bowel regimen [] colace [x] senna [] other:    ENDO:   Goal euglycemia (-180)    HEME/ONC:  VTE prophylaxis: [x] SCDs [] chemoprophylaxis [x] hold chemoprophylaxis due to: fresh post op [] high risk of DVT/PE on admission due to:    ID:  Cassidy-op antibiotics    MISC:    SOCIAL/FAMILY:  [x] awaiting [] updated at bedside [] family meeting    CODE STATUS:  [x] Full Code [] DNR [] DNI [] Palliative/Comfort Care    DISPOSITION:  [x] ICU [] Stroke Unit [] Floor [] EMU [] RCU [] PCU    [x] Patient is at high risk of neurologic deterioration/death due to: post op hemorrhage, hydrocephalus, brain compression

## 2020-02-13 NOTE — PROGRESS NOTE ADULT - SUBJECTIVE AND OBJECTIVE BOX
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  C/o worsening left sided paresthesia in December, hospitalized at [facility] Dec 3-17, 2019, found to have subacute SDH along R frontal lobe and large mass in third ventricle. Pt was referred to neurosurgery consult and transferred to rehab facility, now presents to PST scheduled for craniotomy surgery for removal of brain mass.  Of note, Patient instructed to hold ASA for 1wk prior to surgery    OVERNIGHT EVENTS: Adm NSCU s/p rt crani, interhemis transcallosal approach to 3rd vent tumor resection, ETV  post op slow to wake, CTH showing slight increase in ventricular size and ventricular air    VITALS: [x] Reviewed    IMAGING/DATA: [x] Reviewed    IVF FLUIDS/MEDICATIONS: [x] Reviewed    ALLERGIES: Allergies    No Known Allergies    Intolerances        DEVICES:   [] Restraints [x] PIVs [] ET tube [] central line [] PICC [x] arterial line [x] wise [] NGT/OGT [] EVD [] LD [] MARIA E/HMV [] LiCOX [] ICP monitor [] Trach [] PEG [] Chest Tube [] other:    EXAMINATION:  General: No acute distress  HEENT: Anicteric sclerae  Cardiac: [id number]  Lungs: Clear  Abdomen: Soft, non-tender, +BS  Extremities: No c/c/e  Skin/Incision Site: Clean, dry and intact  Neurologic: Awake, alert, orientedx3, follows commands, R pupil 3mm sluggish, L pupil 3mm brisk, able to finger count b/l without glasses, downward gaze, face symmetric, tongue midline, no drift, BUE 4/5, BLE antigravity

## 2020-02-13 NOTE — CONSULT NOTE ADULT - ASSESSMENT
63 y/o M w/ Type 2 DM now with severely uncontrolled hyperglycemia on high doses of steroids on insulin gtt admitted for craniotomy with hx of HTN and HLD (high risk patient for stroke given hyperglycemia on high risk medication via insulin gtt with high level decision-making).

## 2020-02-13 NOTE — CONSULT NOTE ADULT - PROBLEM SELECTOR RECOMMENDATION 9
Diabetes Education and Nutrition Eval  Given high insulin requirements with high dose steroids would continue with insulin gtt for now.   Goal glucose 100-180  Outpt. endo follow-up  Outpt. optho, podiatry, micro/cr  Plan to d/c on home dose of metformin if ff steroids. Diabetes Education and Nutrition Eval  Given high insulin requirements with high dose steroids would continue with insulin gtt for now. Will plan to transition tomorrow.   Please notify us of any change in steroid dosing.   Goal glucose 100-180  Outpt. endo follow-up  Outpt. optho, podiatry, micro/cr  Plan to d/c on home dose of metformin if ff steroids.

## 2020-02-13 NOTE — PROGRESS NOTE ADULT - ASSESSMENT
POD#1 R crani tumor resection, ETV    NEURO:  CT Head in AM, MRI post-op, Pain control  Of note, has VPS   keppra for seizure ppx  decadron for nausea  per neurosurgery  Activity: [x] OOB as tolerated [] Bedrest [x] PT [x] OT [] PMNR    PULM:  Incentive spirometry, mobilize as tolerated    CV:  -150mmHg, d/c a-line in AM    RENAL:  Mixed respiratory alkalosis,  with metabolic acidosis, with increased anion gap, elevated lactate, may be related to intra op Mannitol   elevated glucose on insulin gtt   s/p NS 1 Lit bolus x 2  c/w IVF , repeat ABG     GI:  Diet: Dysphagia screen and then advance diet as tolerated  GI prophylaxis [x] not indicated [] PPI [] other:  Bowel regimen [] colace [x] senna [] other:    ENDO:   Goal euglycemia (-180)    HEME/ONC:  VTE prophylaxis: [x] SCDs [] chemoprophylaxis [x] hold chemoprophylaxis due to: fresh post op [] high risk of DVT/PE on admission due to:    ID:  Cassidy-op antibiotics    MISC:    SOCIAL/FAMILY:  [x] awaiting [] updated at bedside [] family meeting    CODE STATUS:  [x] Full Code [] DNR [] DNI [] Palliative/Comfort Care    DISPOSITION:  [x] ICU [] Stroke Unit [] Floor [] EMU [] RCU [] PCU    [x] Patient is at high risk of neurologic deterioration/death due to: post op hemorrhage, hydrocephalus, brain compression POD#1 R crani tumor resection, ETV    NEURO:  CT Head in AM, MRI post-op, Pain control  Of note, has VPS   keppra for seizure ppx  decadron for nausea  per neurosurgery  Activity: [x] OOB as tolerated [] Bedrest [x] PT [x] OT [] PMNR    PULM:  Incentive spirometry, mobilize as tolerated    CV:  -150mmHg, d/c a-line in AM  Prolonged Qtc   RENAL:  Mixed respiratory alkalosis,  with metabolic acidosis, with increased anion gap, elevated lactate,  elevated glucose on insulin gtt   s/p NS 1 Lit bolus x 2  c/w IVF , repeat ABG     GI:  Diet: nausea, avoid Reglan and Zofran due to prolonged QTc   GI prophylaxis [x] not indicated [] PPI [] other:  Bowel regimen [] colace [x] senna [] other:    ENDO:   Goal euglycemia (-180)    HEME/ONC:  VTE prophylaxis: [x] SCDs [] chemoprophylaxis [x] hold chemoprophylaxis due to: fresh post op [] high risk of DVT/PE on admission due to:    ID:  Cassidy-op antibiotics    MISC:    SOCIAL/FAMILY:  [x] awaiting [] updated at bedside [] family meeting    CODE STATUS:  [x] Full Code [] DNR [] DNI [] Palliative/Comfort Care    DISPOSITION:  [x] ICU [] Stroke Unit [] Floor [] EMU [] RCU [] PCU    [x] Patient is at high risk of neurologic deterioration/death due to: post op hemorrhage, hydrocephalus, brain compression

## 2020-02-13 NOTE — PROGRESS NOTE ADULT - SUBJECTIVE AND OBJECTIVE BOX
lactic acidosis  hyperglycemia    vitals/labs/meds/imaging reviewed  alert, oriented x3, downward gaze, FC, LUE drift 4+/5 otherwise full strength throughout lactic acidosis  hyperglycemia    + vomiting  vitals/labs/meds/imaging reviewed  alert, oriented x3, downward gaze, FC, LUE drift 4+/5 otherwise full strength throughout, effort dependent

## 2020-02-13 NOTE — CONSULT NOTE ADULT - SUBJECTIVE AND OBJECTIVE BOX
CHIEF COMPLAINT:Patient is a 64y old  Male who presents with a chief complaint of brain tumor (06 Feb 2020 10:22)      HISTORY OF PRESENT ILLNESS:    64 male with history as below with subacute SDH fount to have brain mass  s/p craniotomy and resection  cardiology is called for prolong qt     PAST MEDICAL & SURGICAL HISTORY:  FELICE (obstructive sleep apnea)  H/O hydrocephalus: 2008, s/p  shunt  H/O brain tumor  Peripheral neuropathy  Stroke: 2008, 12/2019  Diabetes: type 2  Hypertension  Depression  Seizure: post craniotomy surgery , placed on phenytoin which was eventually discontinued, pt had not had any seizure since 2008  HTN (hypertension)  CVA (cerebral vascular accident): 2008  S/P ventriculoperitoneal shunt: 2008  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 650 milliGRAM(s) Oral every 6 hours PRN  acetaminophen  IVPB .. 1000 milliGRAM(s) IV Intermittent once  DULoxetine 60 milliGRAM(s) Oral daily  gabapentin 300 milliGRAM(s) Oral three times a day  levETIRAcetam  IVPB 500 milliGRAM(s) IV Intermittent every 12 hours  ondansetron Injectable 4 milliGRAM(s) IV Push every 8 hours PRN  pregabalin 50 milliGRAM(s) Oral every 8 hours  scopolamine   Patch 1 Patch Transdermal once PRN    pantoprazole  Injectable 40 milliGRAM(s) IV Push at bedtime  senna 2 Tablet(s) Oral at bedtime    atorvastatin 10 milliGRAM(s) Oral at bedtime  dexAMETHasone  Injectable 6 milliGRAM(s) IV Push every 6 hours  insulin regular Infusion 3 Unit(s)/Hr IV Continuous <Continuous>    chlorhexidine 4% Liquid 1 Application(s) Topical <User Schedule>  fluticasone propionate 50 MICROgram(s)/spray Nasal Spray 1 Spray(s) Both Nostrils two times a day  folic acid 1 milliGRAM(s) Oral daily  lactated ringers. 1000 milliLiter(s) IV Continuous <Continuous>  multivitamin 1 Tablet(s) Oral daily      FAMILY HISTORY:      Non-contributory    SOCIAL HISTORY:    No tobacco, drugs or etoh    Allergies    No Known Allergies    Intolerances    	    REVIEW OF SYSTEMS:  as above  The rest of the 14 points ROS reviewed and except above they are unremarkable.        PHYSICAL EXAM:  T(C): 36.6 (02-13-20 @ 08:00), Max: 36.6 (02-13-20 @ 03:00)  HR: 100 (02-13-20 @ 09:00) (63 - 107)  BP: 148/83 (02-12-20 @ 15:11) (148/83 - 148/83)  RR: 20 (02-13-20 @ 09:00) (15 - 24)  SpO2: 98% (02-13-20 @ 09:00) (93% - 100%)  Wt(kg): --  I&O's Summary    12 Feb 2020 07:01  -  13 Feb 2020 07:00  --------------------------------------------------------  IN: 2714 mL / OUT: 2350 mL / NET: 364 mL    13 Feb 2020 07:01  -  13 Feb 2020 09:52  --------------------------------------------------------  IN: 207 mL / OUT: 0 mL / NET: 207 mL        Appearance: Normal	  HEENT:   no gross abnormality   Cardiovascular: Normal S1 S2,    Murmur:   Neck: JVP normal  Respiratory: Lungs clear   Gastrointestinal:  Soft, Non-tender  Skin: normal   Neuro: No gross deficits.   Psychiatry:  Mood & affect flat  Ext: No edema    LABS/DATA:    TELEMETRY: 	    ECG:  	   	  CARDIAC MARKERS:                        8 <<== 02-13-20 @ 05:12                              14.2   18.51 )-----------( 362      ( 13 Feb 2020 01:47 )             43.2     02-13    134<L>  |  97  |  22  ----------------------------<  356<H>  4.1   |  16<L>  |  0.98    Ca    9.3      13 Feb 2020 01:47  Phos  3.7     02-13  Mg     1.8     02-13      proBNP:   Lipid Profile:   HgA1c:   TSH: CHIEF COMPLAINT:Patient is a 64y old  Male who presents with a chief complaint of brain tumor (06 Feb 2020 10:22)      HISTORY OF PRESENT ILLNESS:    64 male with history as below with subacute SDH fount to have brain mass  s/p craniotomy and resection  cardiology is called for prolong qt   he has been having significant n/v  currently sleeping , lethargic     PAST MEDICAL & SURGICAL HISTORY:  FELICE (obstructive sleep apnea)  H/O hydrocephalus: 2008, s/p  shunt  H/O brain tumor  Peripheral neuropathy  Stroke: 2008, 12/2019  Diabetes: type 2  Hypertension  Depression  Seizure: post craniotomy surgery , placed on phenytoin which was eventually discontinued, pt had not had any seizure since 2008  HTN (hypertension)  CVA (cerebral vascular accident): 2008  S/P ventriculoperitoneal shunt: 2008  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 650 milliGRAM(s) Oral every 6 hours PRN  acetaminophen  IVPB .. 1000 milliGRAM(s) IV Intermittent once  DULoxetine 60 milliGRAM(s) Oral daily  gabapentin 300 milliGRAM(s) Oral three times a day  levETIRAcetam  IVPB 500 milliGRAM(s) IV Intermittent every 12 hours  ondansetron Injectable 4 milliGRAM(s) IV Push every 8 hours PRN  pregabalin 50 milliGRAM(s) Oral every 8 hours  scopolamine   Patch 1 Patch Transdermal once PRN    pantoprazole  Injectable 40 milliGRAM(s) IV Push at bedtime  senna 2 Tablet(s) Oral at bedtime    atorvastatin 10 milliGRAM(s) Oral at bedtime  dexAMETHasone  Injectable 6 milliGRAM(s) IV Push every 6 hours  insulin regular Infusion 3 Unit(s)/Hr IV Continuous <Continuous>    chlorhexidine 4% Liquid 1 Application(s) Topical <User Schedule>  fluticasone propionate 50 MICROgram(s)/spray Nasal Spray 1 Spray(s) Both Nostrils two times a day  folic acid 1 milliGRAM(s) Oral daily  lactated ringers. 1000 milliLiter(s) IV Continuous <Continuous>  multivitamin 1 Tablet(s) Oral daily      FAMILY HISTORY:      Non-contributory    SOCIAL HISTORY:    No tobacco, drugs or etoh    Allergies    No Known Allergies    Intolerances    	    REVIEW OF SYSTEMS:  as above  The rest of the 14 points ROS reviewed and except above they are unremarkable.        PHYSICAL EXAM:  T(C): 36.6 (02-13-20 @ 08:00), Max: 36.6 (02-13-20 @ 03:00)  HR: 100 (02-13-20 @ 09:00) (63 - 107)  BP: 148/83 (02-12-20 @ 15:11) (148/83 - 148/83)  RR: 20 (02-13-20 @ 09:00) (15 - 24)  SpO2: 98% (02-13-20 @ 09:00) (93% - 100%)  Wt(kg): --  I&O's Summary    12 Feb 2020 07:01  -  13 Feb 2020 07:00  --------------------------------------------------------  IN: 2714 mL / OUT: 2350 mL / NET: 364 mL    13 Feb 2020 07:01  -  13 Feb 2020 09:52  --------------------------------------------------------  IN: 207 mL / OUT: 0 mL / NET: 207 mL      JVP: Normal  Neck: supple  Lung: clear   CV: S1 S2 , Murmur:  Abd: soft  Ext: No edema  neuro: Awake   Psych: flat affect  Skin: normal      LABS/DATA:    TELEMETRY: 	    ECG:  	   	  CARDIAC MARKERS:                        8 <<== 02-13-20 @ 05:12                              14.2   18.51 )-----------( 362      ( 13 Feb 2020 01:47 )             43.2     02-13    134<L>  |  97  |  22  ----------------------------<  356<H>  4.1   |  16<L>  |  0.98    Ca    9.3      13 Feb 2020 01:47  Phos  3.7     02-13  Mg     1.8     02-13      proBNP:   Lipid Profile:   HgA1c:   TSH:       < from: Transthoracic Echocardiogram (12.01.19 @ 08:02) >  ------------------------------------------------------------------------  Conclusions:  1. Mitral annular calcification, otherwise normal mitral  valve.  2. Aortic valve not well visualized; probably normal.  3. Normal left ventricular systolic function. No segmental  wall motion abnormalities.  4. Normal right ventricular size and function.  ------------------------------------------------------------------------  Confirmed on  12/1/2019 - 12:49:38 by Giselle Cha M.D.  ------------------------------------------------------------------------    < end of copied text >    < from: 12 Lead ECG (11.28.19 @ 02:28) >    Ventricular Rate 89 BPM    Atrial Rate 89 BPM    P-R Interval 188 ms    QRS Duration 82 ms    Q-T Interval 370 ms    QTC Calculation(Bezet) 450 ms    P Axis 35 degrees    R Axis 253 degrees    T Axis 32 degrees    Diagnosis Line NORMAL SINUS RHYTHM  LOW VOLTAGE QRS  POSSIBLE LATERAL INFARCT , AGE UNDETERMINED  INFERIOR INFARCT , AGE UNDETERMINED  ABNORMAL ECG    Confirmed by MD Cedeño Roy ([id number]) on 11/28/2019 6:20:41 PM    < end of copied text >

## 2020-02-14 LAB
ANION GAP SERPL CALC-SCNC: 13 MMOL/L — SIGNIFICANT CHANGE UP (ref 5–17)
BUN SERPL-MCNC: 25 MG/DL — HIGH (ref 7–23)
CALCIUM SERPL-MCNC: 8.1 MG/DL — LOW (ref 8.4–10.5)
CHLORIDE SERPL-SCNC: 100 MMOL/L — SIGNIFICANT CHANGE UP (ref 96–108)
CO2 SERPL-SCNC: 20 MMOL/L — LOW (ref 22–31)
CREAT SERPL-MCNC: 1.08 MG/DL — SIGNIFICANT CHANGE UP (ref 0.5–1.3)
GAS PNL BLDA: SIGNIFICANT CHANGE UP
GLUCOSE BLDC GLUCOMTR-MCNC: 114 MG/DL — HIGH (ref 70–99)
GLUCOSE BLDC GLUCOMTR-MCNC: 120 MG/DL — HIGH (ref 70–99)
GLUCOSE BLDC GLUCOMTR-MCNC: 121 MG/DL — HIGH (ref 70–99)
GLUCOSE BLDC GLUCOMTR-MCNC: 121 MG/DL — HIGH (ref 70–99)
GLUCOSE BLDC GLUCOMTR-MCNC: 125 MG/DL — HIGH (ref 70–99)
GLUCOSE BLDC GLUCOMTR-MCNC: 126 MG/DL — HIGH (ref 70–99)
GLUCOSE BLDC GLUCOMTR-MCNC: 128 MG/DL — HIGH (ref 70–99)
GLUCOSE BLDC GLUCOMTR-MCNC: 128 MG/DL — HIGH (ref 70–99)
GLUCOSE BLDC GLUCOMTR-MCNC: 138 MG/DL — HIGH (ref 70–99)
GLUCOSE BLDC GLUCOMTR-MCNC: 139 MG/DL — HIGH (ref 70–99)
GLUCOSE BLDC GLUCOMTR-MCNC: 141 MG/DL — HIGH (ref 70–99)
GLUCOSE BLDC GLUCOMTR-MCNC: 142 MG/DL — HIGH (ref 70–99)
GLUCOSE BLDC GLUCOMTR-MCNC: 151 MG/DL — HIGH (ref 70–99)
GLUCOSE BLDC GLUCOMTR-MCNC: 160 MG/DL — HIGH (ref 70–99)
GLUCOSE BLDC GLUCOMTR-MCNC: 173 MG/DL — HIGH (ref 70–99)
GLUCOSE BLDC GLUCOMTR-MCNC: 183 MG/DL — HIGH (ref 70–99)
GLUCOSE BLDC GLUCOMTR-MCNC: 184 MG/DL — HIGH (ref 70–99)
GLUCOSE BLDC GLUCOMTR-MCNC: 199 MG/DL — HIGH (ref 70–99)
GLUCOSE BLDC GLUCOMTR-MCNC: 203 MG/DL — HIGH (ref 70–99)
GLUCOSE BLDC GLUCOMTR-MCNC: 203 MG/DL — HIGH (ref 70–99)
GLUCOSE BLDC GLUCOMTR-MCNC: 214 MG/DL — HIGH (ref 70–99)
GLUCOSE SERPL-MCNC: 250 MG/DL — HIGH (ref 70–99)
HCT VFR BLD CALC: 33.4 % — LOW (ref 39–50)
HGB BLD-MCNC: 10.8 G/DL — LOW (ref 13–17)
MAGNESIUM SERPL-MCNC: 2.1 MG/DL — SIGNIFICANT CHANGE UP (ref 1.6–2.6)
MCHC RBC-ENTMCNC: 25.7 PG — LOW (ref 27–34)
MCHC RBC-ENTMCNC: 32.3 GM/DL — SIGNIFICANT CHANGE UP (ref 32–36)
MCV RBC AUTO: 79.3 FL — LOW (ref 80–100)
NRBC # BLD: 0 /100 WBCS — SIGNIFICANT CHANGE UP (ref 0–0)
PHOSPHATE SERPL-MCNC: 2.9 MG/DL — SIGNIFICANT CHANGE UP (ref 2.5–4.5)
PLATELET # BLD AUTO: 240 K/UL — SIGNIFICANT CHANGE UP (ref 150–400)
POTASSIUM SERPL-MCNC: 4.1 MMOL/L — SIGNIFICANT CHANGE UP (ref 3.5–5.3)
POTASSIUM SERPL-SCNC: 4.1 MMOL/L — SIGNIFICANT CHANGE UP (ref 3.5–5.3)
RBC # BLD: 4.21 M/UL — SIGNIFICANT CHANGE UP (ref 4.2–5.8)
RBC # FLD: 14.9 % — HIGH (ref 10.3–14.5)
SODIUM SERPL-SCNC: 133 MMOL/L — LOW (ref 135–145)
WBC # BLD: 18.23 K/UL — HIGH (ref 3.8–10.5)
WBC # FLD AUTO: 18.23 K/UL — HIGH (ref 3.8–10.5)

## 2020-02-14 PROCEDURE — 93010 ELECTROCARDIOGRAM REPORT: CPT

## 2020-02-14 PROCEDURE — 99232 SBSQ HOSP IP/OBS MODERATE 35: CPT

## 2020-02-14 PROCEDURE — 70450 CT HEAD/BRAIN W/O DYE: CPT | Mod: 26

## 2020-02-14 RX ORDER — INSULIN GLARGINE 100 [IU]/ML
15 INJECTION, SOLUTION SUBCUTANEOUS ONCE
Refills: 0 | Status: DISCONTINUED | OUTPATIENT
Start: 2020-02-14 | End: 2020-02-14

## 2020-02-14 RX ORDER — DEXAMETHASONE 0.5 MG/5ML
3 ELIXIR ORAL EVERY 6 HOURS
Refills: 0 | Status: DISCONTINUED | OUTPATIENT
Start: 2020-02-14 | End: 2020-02-15

## 2020-02-14 RX ORDER — INSULIN GLARGINE 100 [IU]/ML
15 INJECTION, SOLUTION SUBCUTANEOUS AT BEDTIME
Refills: 0 | Status: DISCONTINUED | OUTPATIENT
Start: 2020-02-14 | End: 2020-02-14

## 2020-02-14 RX ORDER — INSULIN GLARGINE 100 [IU]/ML
10 INJECTION, SOLUTION SUBCUTANEOUS ONCE
Refills: 0 | Status: COMPLETED | OUTPATIENT
Start: 2020-02-14 | End: 2020-02-14

## 2020-02-14 RX ORDER — INSULIN LISPRO 100/ML
5 VIAL (ML) SUBCUTANEOUS
Refills: 0 | Status: DISCONTINUED | OUTPATIENT
Start: 2020-02-14 | End: 2020-02-16

## 2020-02-14 RX ORDER — INSULIN LISPRO 100/ML
VIAL (ML) SUBCUTANEOUS
Refills: 0 | Status: DISCONTINUED | OUTPATIENT
Start: 2020-02-14 | End: 2020-02-14

## 2020-02-14 RX ORDER — SODIUM CHLORIDE 9 MG/ML
1000 INJECTION INTRAMUSCULAR; INTRAVENOUS; SUBCUTANEOUS
Refills: 0 | Status: DISCONTINUED | OUTPATIENT
Start: 2020-02-14 | End: 2020-02-14

## 2020-02-14 RX ORDER — INSULIN GLARGINE 100 [IU]/ML
12 INJECTION, SOLUTION SUBCUTANEOUS AT BEDTIME
Refills: 0 | Status: DISCONTINUED | OUTPATIENT
Start: 2020-02-14 | End: 2020-02-14

## 2020-02-14 RX ORDER — INSULIN GLARGINE 100 [IU]/ML
15 INJECTION, SOLUTION SUBCUTANEOUS AT BEDTIME
Refills: 0 | Status: DISCONTINUED | OUTPATIENT
Start: 2020-02-14 | End: 2020-02-15

## 2020-02-14 RX ORDER — INSULIN LISPRO 100/ML
VIAL (ML) SUBCUTANEOUS
Refills: 0 | Status: DISCONTINUED | OUTPATIENT
Start: 2020-02-14 | End: 2020-02-23

## 2020-02-14 RX ADMIN — Medication 50 MILLIGRAM(S): at 21:24

## 2020-02-14 RX ADMIN — AMLODIPINE BESYLATE 5 MILLIGRAM(S): 2.5 TABLET ORAL at 05:23

## 2020-02-14 RX ADMIN — SCOPALAMINE 1 PATCH: 1 PATCH, EXTENDED RELEASE TRANSDERMAL at 19:00

## 2020-02-14 RX ADMIN — CHLORHEXIDINE GLUCONATE 1 APPLICATION(S): 213 SOLUTION TOPICAL at 21:25

## 2020-02-14 RX ADMIN — Medication 3 MILLIGRAM(S): at 11:55

## 2020-02-14 RX ADMIN — GABAPENTIN 300 MILLIGRAM(S): 400 CAPSULE ORAL at 05:23

## 2020-02-14 RX ADMIN — ENOXAPARIN SODIUM 40 MILLIGRAM(S): 100 INJECTION SUBCUTANEOUS at 17:57

## 2020-02-14 RX ADMIN — Medication 1 SPRAY(S): at 05:23

## 2020-02-14 RX ADMIN — LEVETIRACETAM 400 MILLIGRAM(S): 250 TABLET, FILM COATED ORAL at 18:02

## 2020-02-14 RX ADMIN — Medication 650 MILLIGRAM(S): at 09:40

## 2020-02-14 RX ADMIN — SCOPALAMINE 1 PATCH: 1 PATCH, EXTENDED RELEASE TRANSDERMAL at 08:25

## 2020-02-14 RX ADMIN — DULOXETINE HYDROCHLORIDE 60 MILLIGRAM(S): 30 CAPSULE, DELAYED RELEASE ORAL at 13:11

## 2020-02-14 RX ADMIN — INSULIN GLARGINE 10 UNIT(S): 100 INJECTION, SOLUTION SUBCUTANEOUS at 18:02

## 2020-02-14 RX ADMIN — INSULIN HUMAN 3 UNIT(S)/HR: 100 INJECTION, SOLUTION SUBCUTANEOUS at 07:00

## 2020-02-14 RX ADMIN — INSULIN GLARGINE 15 UNIT(S): 100 INJECTION, SOLUTION SUBCUTANEOUS at 22:25

## 2020-02-14 RX ADMIN — Medication 4: at 21:25

## 2020-02-14 RX ADMIN — ATORVASTATIN CALCIUM 10 MILLIGRAM(S): 80 TABLET, FILM COATED ORAL at 21:24

## 2020-02-14 RX ADMIN — LEVETIRACETAM 400 MILLIGRAM(S): 250 TABLET, FILM COATED ORAL at 05:59

## 2020-02-14 RX ADMIN — Medication 4 MILLIGRAM(S): at 05:23

## 2020-02-14 RX ADMIN — GABAPENTIN 300 MILLIGRAM(S): 400 CAPSULE ORAL at 21:24

## 2020-02-14 RX ADMIN — GABAPENTIN 300 MILLIGRAM(S): 400 CAPSULE ORAL at 13:12

## 2020-02-14 RX ADMIN — Medication 1 SPRAY(S): at 17:58

## 2020-02-14 RX ADMIN — PANTOPRAZOLE SODIUM 40 MILLIGRAM(S): 20 TABLET, DELAYED RELEASE ORAL at 21:25

## 2020-02-14 RX ADMIN — Medication 650 MILLIGRAM(S): at 10:10

## 2020-02-14 RX ADMIN — SODIUM CHLORIDE 75 MILLILITER(S): 9 INJECTION INTRAMUSCULAR; INTRAVENOUS; SUBCUTANEOUS at 11:57

## 2020-02-14 RX ADMIN — Medication 50 MILLIGRAM(S): at 05:23

## 2020-02-14 RX ADMIN — Medication 50 MILLIGRAM(S): at 13:11

## 2020-02-14 RX ADMIN — Medication 1 MILLIGRAM(S): at 11:57

## 2020-02-14 RX ADMIN — Medication 1 TABLET(S): at 11:56

## 2020-02-14 RX ADMIN — SENNA PLUS 2 TABLET(S): 8.6 TABLET ORAL at 21:24

## 2020-02-14 RX ADMIN — Medication 3 MILLIGRAM(S): at 17:57

## 2020-02-14 NOTE — PROGRESS NOTE ADULT - SUBJECTIVE AND OBJECTIVE BOX
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  C/o worsening left sided paresthesia in December, hospitalized at [facility] Dec 3-17, 2019, found to have subacute SDH along R frontal lobe and large mass in third ventricle. Pt was referred to neurosurgery consult and transferred to rehab facility, now presents to PST scheduled for craniotomy surgery for removal of brain mass.  Of note, Patient instructed to hold ASA for 1wk prior to surgery    2/13 EVENTS: Adm NSCU s/p rt crani, interhemis transcallosal approach to 3rd vent tumor resection, ETV  post op slow to wake, CTH showing slight increase in ventricular size and ventricular air  2/14: on insulin drip            REVIEW OF SYSTEMS: [ ] Unable to Assess due to neurologic exam   [x] All ROS addressed below are non-contributory, except:  Neuro: [ ] Headache [ ] Back pain [ ] Numbness [ ] Weakness [ ] Ataxia [ ] Dizziness [ ] Aphasia [ ] Dysarthria [ ] Visual disturbance  Resp: [ ] Shortness of breath/dyspnea, [ ] Orthopnea [ ] Cough  CV: [ ] Chest pain [ ] Palpitation [ ] Lightheadedness [ ] Syncope  Renal: [ ] Thirst [ ] Edema  GI: [ ] Nausea [ ] Emesis [ ] Abdominal pain [ ] Constipation [ ] Diarrhea  Hem: [ ] Hematemesis [ ] bright red blood per rectum  ID: [ ] Fever [ ] Chills [ ] Dysuria  ENT: [ ] Rhinorrhea    PAST MEDICAL & SURGICAL HISTORY:  FELICE (obstructive sleep apnea)  H/O hydrocephalus: 2008, s/p  shunt  H/O brain tumor  Peripheral neuropathy  Stroke: 2008, 12/2019  Diabetes: type 2  Hypertension  Depression  Seizure: post craniotomy surgery , placed on phenytoin which was eventually discontinued, pt had not had any seizure since 2008  HTN (hypertension)  CVA (cerebral vascular accident): 2008  S/P ventriculoperitoneal shunt: 2008  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 650 milliGRAM(s) Oral every 6 hours PRN  amLODIPine   Tablet 5 milliGRAM(s) Oral daily  atorvastatin 10 milliGRAM(s) Oral at bedtime  ceFAZolin   IVPB 2000 milliGRAM(s) IV Intermittent once  chlorhexidine 4% Liquid 1 Application(s) Topical <User Schedule>  dexAMETHasone  Injectable 3 milliGRAM(s) IV Push every 6 hours  DULoxetine 60 milliGRAM(s) Oral daily  enoxaparin Injectable 40 milliGRAM(s) SubCutaneous <User Schedule>  fluticasone propionate 50 MICROgram(s)/spray Nasal Spray 1 Spray(s) Both Nostrils two times a day  folic acid 1 milliGRAM(s) Oral daily  gabapentin 300 milliGRAM(s) Oral three times a day  insulin regular Infusion 3 Unit(s)/Hr IV Continuous <Continuous>  lactated ringers. 1000 milliLiter(s) IV Continuous <Continuous>  levETIRAcetam  IVPB 500 milliGRAM(s) IV Intermittent every 12 hours  multivitamin 1 Tablet(s) Oral daily  niCARdipine Infusion 5 mG/Hr IV Continuous <Continuous>  pantoprazole  Injectable 40 milliGRAM(s) IV Push at bedtime  pregabalin 50 milliGRAM(s) Oral every 8 hours  scopolamine   Patch 1 Patch Transdermal once PRN  senna 2 Tablet(s) Oral at bedtime      DEVICES:   [] Restraints [x] PIVs [] ET tube [] central line [] PICC [x] arterial line [x] wise [] NGT/OGT [] EVD [] LD [] MARIA E/HMV [] LiCOX [] ICP monitor [] Trach [] PEG [] Chest Tube [] other:    EXAMINATION:  General: No acute distress  HEENT: Anicteric sclerae  Cardiac: [id number]  Lungs: Clear  Abdomen: Soft, non-tender, +BS  Extremities: No c/c/e  Skin/Incision Site: Clean, dry and intact  Neurologic: Awake, alert, orientedx3, follows commands, R pupil 3mm sluggish, L pupil 3mm brisk, able to finger count b/l without glasses, limited upward gaze, face symmetric, tongue midline, left UE  drift, RUE 5/5, RLE 5/5, left UE 4/5, left LE 5/5       LABS:  Na: 136 (02-13 @ 21:45), 136 (02-13 @ 16:58), 136 (02-13 @ 09:54), 134 (02-13 @ 01:47)  K: 4.2 (02-13 @ 21:45), 4.0 (02-13 @ 16:58), 3.7 (02-13 @ 09:54), 4.1 (02-13 @ 01:47)  Cl: 103 (02-13 @ 21:45), 101 (02-13 @ 16:58), 98 (02-13 @ 09:54), 97 (02-13 @ 01:47)  CO2: 20 (02-13 @ 21:45), 18 (02-13 @ 16:58), 15 (02-13 @ 09:54), 16 (02-13 @ 01:47)  BUN: 25 (02-13 @ 21:45), 23 (02-13 @ 16:58), 21 (02-13 @ 09:54), 22 (02-13 @ 01:47)  Cr: 1.25 (02-13 @ 21:45), 1.17 (02-13 @ 16:58), 1.03 (02-13 @ 09:54), 0.98 (02-13 @ 01:47)  Glu: 122(02-13 @ 21:45), 145(02-13 @ 16:58), 297(02-13 @ 09:54), 356(02-13 @ 01:47)    Hgb: 12.4 (02-13 @ 21:45), 14.6 (02-13 @ 09:52), 14.2 (02-13 @ 01:47)  Hct: 38.1 (02-13 @ 21:45), 43.7 (02-13 @ 09:52), 43.2 (02-13 @ 01:47)  WBC: 25.44 (02-13 @ 21:45), 23.74 (02-13 @ 09:52), 18.51 (02-13 @ 01:47)  Plt: 367 (02-13 @ 21:45), 415 (02-13 @ 09:52), 362 (02-13 @ 01:47)    INR: 1.12 02-12-20 @ 12:15  PTT: 36.2 02-12-20 @ 12:15        EXAM:  CT BRAIN                            PROCEDURE DATE:  02/14/2020            INTERPRETATION:  Noncontrast CT of the brain.    CLINICAL INDICATION:  s/p Right crani for transcallosal mass    TECHNIQUE : Axial CT scanning of the brain was obtained from the skull base to the vertex without the administration of intravenous contrast.      COMPARISON: CT brain 2/13/2020.     FINDINGS:      Redemonstration of high midline frontoparietal craniotomy. Redilatation of right frontal approach ventriculostomy catheter in unchanged position.     Decreased pneumocephalus.    Similar bilateral lateral convexity subdural collections.    Similar small hemorrhage layering in the right parasagittal frontal surgical bed.    Similar moderate ventricular dilatation. Similar small hemorrhage layering in the occipital horns.    No parenchymal hemorrhage or CT evidence of acute territorial infarct. Similar 2.6 x 1.7 cm mass in the third ventricle demonstrating intralesional calcium and fat.    IMPRESSION:    No significant interval change from 2/13/2020.        Assessment and Plan:   · Assessment	  POD#1 R crani tumor resection, ETV  has small IVH, small subdural collections   NEURO:  CT Head in AM stable   Of note, has VPS   keppra for seizure ppx pe rNS   decadron per NS  Activity: [x] OOB as tolerated [] Bedrest [x] PT [x] OT [] PMNR    PULM:  Incentive spirometry, mobilize as tolerated    CV:  -150mmHg, d/c a-line in AM  Prolonged Qtc repeat ECG     RENAL:  creat increasing, monitor   decrease NS to75 ml /hr   avoid nephrotoxic meds   elevated glucose on insulin gtt     GI:  Diet: nausea, avoid Reglan and Zofran due to prolonged QTc   GI prophylaxis [] not indicated [x PPI [] other:  Bowel regimen [] colace [x] senna [] other:    ENDO:   Goal euglycemia (-180)  hyperglycemia  endo on consult ,on insulin drip     HEME/ONC:  VTE prophylaxis: [x] SCDs [] chemoprophylaxis [] hold chemoprophylaxis due to: lovenox 40 m sc qhs     ID:  Cassidy-op antibiotics    MISC:    SOCIAL/FAMILY:  [x] awaiting [] updated at bedside [] family meeting    CODE STATUS:  [x] Full Code [] DNR [] DNI [] Palliative/Comfort Care    DISPOSITION:  [x] ICU [] Stroke Unit [] Floor [] EMU [] RCU [] PCU    [x] Patient is at high risk of neurologic deterioration/death due to: post op hemorrhage, hydrocephalus, brain compression    moderate complexity · Subjective and Objective:   SUMMARY: per H/P, 63yo man PMH HTN, T2DM (A1C=6.4), FELICE (pt used CPAP in the past, however he stated he lost weight and no longer has symptoms and does not use CPAP anymore), 2008- CVA with left sided paresthesia residual, 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  C/o worsening left sided paresthesia in December, hospitalized at [facility] Dec 3-17, 2019, found to have subacute SDH along R frontal lobe and large mass in third ventricle. Pt was referred to neurosurgery consult and transferred to rehab facility, now presents to PST scheduled for craniotomy surgery for removal of brain mass.  Of note, Patient instructed to hold ASA for 1wk prior to surgery    2/13 EVENTS: Adm NSCU s/p rt crani, interhemis transcallosal approach to 3rd vent tumor resection, ETV  post op slow to wake, CTH showing slight increase in ventricular size and ventricular air  2/14: on insulin drip            REVIEW OF SYSTEMS: [ ] Unable to Assess due to neurologic exam   [x] All ROS addressed below are non-contributory, except:  Neuro: [ ] Headache [ ] Back pain [ ] Numbness [ ] Weakness [ ] Ataxia [ ] Dizziness [ ] Aphasia [ ] Dysarthria [ ] Visual disturbance  Resp: [ ] Shortness of breath/dyspnea, [ ] Orthopnea [ ] Cough  CV: [ ] Chest pain [ ] Palpitation [ ] Lightheadedness [ ] Syncope  Renal: [ ] Thirst [ ] Edema  GI: [ ] Nausea [ ] Emesis [ ] Abdominal pain [ ] Constipation [ ] Diarrhea  Hem: [ ] Hematemesis [ ] bright red blood per rectum  ID: [ ] Fever [ ] Chills [ ] Dysuria  ENT: [ ] Rhinorrhea    PAST MEDICAL & SURGICAL HISTORY:  FELICE (obstructive sleep apnea)  H/O hydrocephalus: 2008, s/p  shunt  H/O brain tumor  Peripheral neuropathy  Stroke: 2008, 12/2019  Diabetes: type 2  Hypertension  Depression  Seizure: post craniotomy surgery , placed on phenytoin which was eventually discontinued, pt had not had any seizure since 2008  HTN (hypertension)  CVA (cerebral vascular accident): 2008  S/P ventriculoperitoneal shunt: 2008  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 650 milliGRAM(s) Oral every 6 hours PRN  amLODIPine   Tablet 5 milliGRAM(s) Oral daily  atorvastatin 10 milliGRAM(s) Oral at bedtime  ceFAZolin   IVPB 2000 milliGRAM(s) IV Intermittent once  chlorhexidine 4% Liquid 1 Application(s) Topical <User Schedule>  dexAMETHasone  Injectable 3 milliGRAM(s) IV Push every 6 hours  DULoxetine 60 milliGRAM(s) Oral daily  enoxaparin Injectable 40 milliGRAM(s) SubCutaneous <User Schedule>  fluticasone propionate 50 MICROgram(s)/spray Nasal Spray 1 Spray(s) Both Nostrils two times a day  folic acid 1 milliGRAM(s) Oral daily  gabapentin 300 milliGRAM(s) Oral three times a day  insulin regular Infusion 3 Unit(s)/Hr IV Continuous <Continuous>  lactated ringers. 1000 milliLiter(s) IV Continuous <Continuous>  levETIRAcetam  IVPB 500 milliGRAM(s) IV Intermittent every 12 hours  multivitamin 1 Tablet(s) Oral daily  niCARdipine Infusion 5 mG/Hr IV Continuous <Continuous>  pantoprazole  Injectable 40 milliGRAM(s) IV Push at bedtime  pregabalin 50 milliGRAM(s) Oral every 8 hours  scopolamine   Patch 1 Patch Transdermal once PRN  senna 2 Tablet(s) Oral at bedtime      DEVICES:   [] Restraints [x] PIVs [] ET tube [] central line [] PICC [x] arterial line [x] wise [] NGT/OGT [] EVD [] LD [] MARIA E/HMV [] LiCOX [] ICP monitor [] Trach [] PEG [] Chest Tube [] other:    EXAMINATION:  General: No acute distress  HEENT: Anicteric sclerae  Cardiac: [id number]  Lungs: Clear  Abdomen: Soft, non-tender, +BS  Extremities: No c/c/e  Skin/Incision Site: Clean, dry and intact  Neurologic: Awake, alert, orientedx3, follows commands, R pupil 3mm sluggish, L pupil 3mm brisk, able to finger count b/l without glasses, limited upward gaze, face symmetric, tongue midline, left UE  drift, RUE 5/5, RLE 5/5, left UE 4/5, left LE 5/5       LABS:  Na: 136 (02-13 @ 21:45), 136 (02-13 @ 16:58), 136 (02-13 @ 09:54), 134 (02-13 @ 01:47)  K: 4.2 (02-13 @ 21:45), 4.0 (02-13 @ 16:58), 3.7 (02-13 @ 09:54), 4.1 (02-13 @ 01:47)  Cl: 103 (02-13 @ 21:45), 101 (02-13 @ 16:58), 98 (02-13 @ 09:54), 97 (02-13 @ 01:47)  CO2: 20 (02-13 @ 21:45), 18 (02-13 @ 16:58), 15 (02-13 @ 09:54), 16 (02-13 @ 01:47)  BUN: 25 (02-13 @ 21:45), 23 (02-13 @ 16:58), 21 (02-13 @ 09:54), 22 (02-13 @ 01:47)  Cr: 1.25 (02-13 @ 21:45), 1.17 (02-13 @ 16:58), 1.03 (02-13 @ 09:54), 0.98 (02-13 @ 01:47)  Glu: 122(02-13 @ 21:45), 145(02-13 @ 16:58), 297(02-13 @ 09:54), 356(02-13 @ 01:47)    Hgb: 12.4 (02-13 @ 21:45), 14.6 (02-13 @ 09:52), 14.2 (02-13 @ 01:47)  Hct: 38.1 (02-13 @ 21:45), 43.7 (02-13 @ 09:52), 43.2 (02-13 @ 01:47)  WBC: 25.44 (02-13 @ 21:45), 23.74 (02-13 @ 09:52), 18.51 (02-13 @ 01:47)  Plt: 367 (02-13 @ 21:45), 415 (02-13 @ 09:52), 362 (02-13 @ 01:47)    INR: 1.12 02-12-20 @ 12:15  PTT: 36.2 02-12-20 @ 12:15        EXAM:  CT BRAIN                            PROCEDURE DATE:  02/14/2020            INTERPRETATION:  Noncontrast CT of the brain.    CLINICAL INDICATION:  s/p Right crani for transcallosal mass    TECHNIQUE : Axial CT scanning of the brain was obtained from the skull base to the vertex without the administration of intravenous contrast.      COMPARISON: CT brain 2/13/2020.     FINDINGS:      Redemonstration of high midline frontoparietal craniotomy. Redilatation of right frontal approach ventriculostomy catheter in unchanged position.     Decreased pneumocephalus.    Similar bilateral lateral convexity subdural collections.    Similar small hemorrhage layering in the right parasagittal frontal surgical bed.    Similar moderate ventricular dilatation. Similar small hemorrhage layering in the occipital horns.    No parenchymal hemorrhage or CT evidence of acute territorial infarct. Similar 2.6 x 1.7 cm mass in the third ventricle demonstrating intralesional calcium and fat.    IMPRESSION:    No significant interval change from 2/13/2020.        Assessment and Plan:   · Assessment	  POD#1 R crani tumor resection, ETV  has small IVH, small subdural collections   NEURO:  CT Head in AM stable   Of note, has VPS for obstructive hydrocephalus   keppra for seizure ppx pe rNS   decadron per NS  Activity: [x] OOB as tolerated [] Bedrest [x] PT [x] OT [] PMNR    PULM:  Incentive spirometry, mobilize as tolerated    CV:  HTN continue amlodipine   -150mmHg, d/c a-line in AM  Prolonged Qtc repeat ECG     RENAL:  creat increasing, monitor   decrease NS to75 ml /hr   avoid nephrotoxic meds   elevated glucose on insulin gtt     GI:  Diet: nausea, avoid Reglan and Zofran due to prolonged QTc   GI prophylaxis [] not indicated [x PPI [] other:  Bowel regimen [] colace [x] senna [] other:    ENDO:   Goal euglycemia (-180)  hyperglycemia  endo on consult ,on insulin drip     HEME/ONC:  VTE prophylaxis: [x] SCDs [] chemoprophylaxis [] hold chemoprophylaxis due to: lovenox 40 m sc qhs     ID:  Cassidy-op antibiotics    MISC:    SOCIAL/FAMILY:  [x] awaiting [] updated at bedside [] family meeting    CODE STATUS:  [x] Full Code [] DNR [] DNI [] Palliative/Comfort Care    DISPOSITION:  [x] ICU [] Stroke Unit [] Floor [] EMU [] RCU [] PCU    moderate complex medical decision     [x] Patient is at high risk of neurologic deterioration/death due to: post op hemorrhage, hydrocephalus, brain compression    moderate complexity

## 2020-02-14 NOTE — PROGRESS NOTE ADULT - SUBJECTIVE AND OBJECTIVE BOX
Subjective: Patient seen and examined. No new events except as noted.     SUBJECTIVE/ROS:  awake , alert       MEDICATIONS:  MEDICATIONS  (STANDING):  amLODIPine   Tablet 5 milliGRAM(s) Oral daily  atorvastatin 10 milliGRAM(s) Oral at bedtime  ceFAZolin   IVPB 2000 milliGRAM(s) IV Intermittent once  chlorhexidine 4% Liquid 1 Application(s) Topical <User Schedule>  dexAMETHasone  Injectable 4 milliGRAM(s) IV Push every 6 hours  DULoxetine 60 milliGRAM(s) Oral daily  enoxaparin Injectable 40 milliGRAM(s) SubCutaneous <User Schedule>  fluticasone propionate 50 MICROgram(s)/spray Nasal Spray 1 Spray(s) Both Nostrils two times a day  folic acid 1 milliGRAM(s) Oral daily  gabapentin 300 milliGRAM(s) Oral three times a day  insulin regular Infusion 3 Unit(s)/Hr (3 mL/Hr) IV Continuous <Continuous>  lactated ringers. 1000 milliLiter(s) (100 mL/Hr) IV Continuous <Continuous>  levETIRAcetam  IVPB 500 milliGRAM(s) IV Intermittent every 12 hours  multivitamin 1 Tablet(s) Oral daily  niCARdipine Infusion 5 mG/Hr (25 mL/Hr) IV Continuous <Continuous>  pantoprazole  Injectable 40 milliGRAM(s) IV Push at bedtime  pregabalin 50 milliGRAM(s) Oral every 8 hours  senna 2 Tablet(s) Oral at bedtime      PHYSICAL EXAM:  T(C): 37.1 (02-14-20 @ 07:00), Max: 37.2 (02-13-20 @ 15:00)  HR: 70 (02-14-20 @ 08:00) (64 - 105)  BP: 116/65 (02-14-20 @ 08:00) (100/67 - 123/66)  RR: 18 (02-14-20 @ 08:00) (13 - 24)  SpO2: 95% (02-14-20 @ 08:00) (90% - 98%)  Wt(kg): --  I&O's Summary    13 Feb 2020 07:01  -  14 Feb 2020 07:00  --------------------------------------------------------  IN: 3261 mL / OUT: 1975 mL / NET: 1286 mL    14 Feb 2020 07:01  -  14 Feb 2020 09:56  --------------------------------------------------------  IN: 102 mL / OUT: 0 mL / NET: 102 mL            JVP: Normal  Neck: supple  Lung: clear   CV: S1 S2 , Murmur:  Abd: soft  Ext: No edema  neuro: Awake / alert  Psych: flat affect  Skin: normal``    LABS/DATA:    CARDIAC MARKERS:                                12.4   25.44 )-----------( 367      ( 13 Feb 2020 21:45 )             38.1     02-13    136  |  103  |  25<H>  ----------------------------<  122<H>  4.2   |  20<L>  |  1.25    Ca    9.6      13 Feb 2020 21:45  Phos  2.9     02-13  Mg     2.3     02-13      proBNP:   Lipid Profile:   HgA1c:   TSH:     TELE:  EKG:

## 2020-02-14 NOTE — PROGRESS NOTE ADULT - ASSESSMENT
Prolong QT  due to antiemetic administration   avoid qt prolonging drugs  monitor electrolytes   monitor qtc   repeat EKG    HTN  on Nicardipine   can switch to NIfedipine

## 2020-02-14 NOTE — PROGRESS NOTE ADULT - ASSESSMENT
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

## 2020-02-14 NOTE — PROGRESS NOTE ADULT - SUBJECTIVE AND OBJECTIVE BOX
unchanged enlarged vents on CTH    vitals/labs/meds/imaging reviewed  alert, oriented x3, downward gaze, FC, LUE drift 4+/5 otherwise full strength throughout, effort dependent

## 2020-02-15 LAB
GLUCOSE BLDC GLUCOMTR-MCNC: 131 MG/DL — HIGH (ref 70–99)
GLUCOSE BLDC GLUCOMTR-MCNC: 153 MG/DL — HIGH (ref 70–99)
GLUCOSE BLDC GLUCOMTR-MCNC: 159 MG/DL — HIGH (ref 70–99)
GLUCOSE BLDC GLUCOMTR-MCNC: 165 MG/DL — HIGH (ref 70–99)
GLUCOSE BLDC GLUCOMTR-MCNC: 171 MG/DL — HIGH (ref 70–99)
GLUCOSE BLDC GLUCOMTR-MCNC: 174 MG/DL — HIGH (ref 70–99)
GLUCOSE BLDC GLUCOMTR-MCNC: 230 MG/DL — HIGH (ref 70–99)

## 2020-02-15 PROCEDURE — 99233 SBSQ HOSP IP/OBS HIGH 50: CPT

## 2020-02-15 RX ORDER — DEXAMETHASONE 0.5 MG/5ML
2 ELIXIR ORAL EVERY 6 HOURS
Refills: 0 | Status: DISCONTINUED | OUTPATIENT
Start: 2020-02-15 | End: 2020-02-19

## 2020-02-15 RX ORDER — INSULIN GLARGINE 100 [IU]/ML
25 INJECTION, SOLUTION SUBCUTANEOUS AT BEDTIME
Refills: 0 | Status: DISCONTINUED | OUTPATIENT
Start: 2020-02-15 | End: 2020-02-16

## 2020-02-15 RX ORDER — INSULIN GLARGINE 100 [IU]/ML
10 INJECTION, SOLUTION SUBCUTANEOUS AT BEDTIME
Refills: 0 | Status: DISCONTINUED | OUTPATIENT
Start: 2020-02-15 | End: 2020-02-15

## 2020-02-15 RX ADMIN — SCOPALAMINE 1 PATCH: 1 PATCH, EXTENDED RELEASE TRANSDERMAL at 19:02

## 2020-02-15 RX ADMIN — Medication 2: at 08:52

## 2020-02-15 RX ADMIN — Medication 1 SPRAY(S): at 17:17

## 2020-02-15 RX ADMIN — Medication 650 MILLIGRAM(S): at 23:55

## 2020-02-15 RX ADMIN — GABAPENTIN 300 MILLIGRAM(S): 400 CAPSULE ORAL at 05:09

## 2020-02-15 RX ADMIN — ATORVASTATIN CALCIUM 10 MILLIGRAM(S): 80 TABLET, FILM COATED ORAL at 21:41

## 2020-02-15 RX ADMIN — Medication 5 UNIT(S): at 08:51

## 2020-02-15 RX ADMIN — SCOPALAMINE 1 PATCH: 1 PATCH, EXTENDED RELEASE TRANSDERMAL at 08:28

## 2020-02-15 RX ADMIN — Medication 650 MILLIGRAM(S): at 22:55

## 2020-02-15 RX ADMIN — SENNA PLUS 2 TABLET(S): 8.6 TABLET ORAL at 21:42

## 2020-02-15 RX ADMIN — Medication 650 MILLIGRAM(S): at 01:00

## 2020-02-15 RX ADMIN — ENOXAPARIN SODIUM 40 MILLIGRAM(S): 100 INJECTION SUBCUTANEOUS at 17:17

## 2020-02-15 RX ADMIN — GABAPENTIN 300 MILLIGRAM(S): 400 CAPSULE ORAL at 13:32

## 2020-02-15 RX ADMIN — Medication 1 MILLIGRAM(S): at 11:04

## 2020-02-15 RX ADMIN — Medication 2 MILLIGRAM(S): at 17:17

## 2020-02-15 RX ADMIN — Medication 2 MILLIGRAM(S): at 11:04

## 2020-02-15 RX ADMIN — Medication 650 MILLIGRAM(S): at 11:04

## 2020-02-15 RX ADMIN — Medication 3 MILLIGRAM(S): at 00:00

## 2020-02-15 RX ADMIN — Medication 1 TABLET(S): at 11:04

## 2020-02-15 RX ADMIN — Medication 1 SPRAY(S): at 05:10

## 2020-02-15 RX ADMIN — AMLODIPINE BESYLATE 5 MILLIGRAM(S): 2.5 TABLET ORAL at 05:09

## 2020-02-15 RX ADMIN — Medication 650 MILLIGRAM(S): at 11:34

## 2020-02-15 RX ADMIN — LEVETIRACETAM 400 MILLIGRAM(S): 250 TABLET, FILM COATED ORAL at 17:17

## 2020-02-15 RX ADMIN — Medication 2: at 12:25

## 2020-02-15 RX ADMIN — Medication 4: at 21:42

## 2020-02-15 RX ADMIN — Medication 3 MILLIGRAM(S): at 05:08

## 2020-02-15 RX ADMIN — LEVETIRACETAM 400 MILLIGRAM(S): 250 TABLET, FILM COATED ORAL at 05:09

## 2020-02-15 RX ADMIN — Medication 650 MILLIGRAM(S): at 00:00

## 2020-02-15 RX ADMIN — Medication 50 MILLIGRAM(S): at 05:09

## 2020-02-15 RX ADMIN — DULOXETINE HYDROCHLORIDE 60 MILLIGRAM(S): 30 CAPSULE, DELAYED RELEASE ORAL at 11:05

## 2020-02-15 RX ADMIN — Medication 5 UNIT(S): at 12:25

## 2020-02-15 RX ADMIN — Medication 5 UNIT(S): at 17:18

## 2020-02-15 RX ADMIN — PANTOPRAZOLE SODIUM 40 MILLIGRAM(S): 20 TABLET, DELAYED RELEASE ORAL at 21:42

## 2020-02-15 RX ADMIN — INSULIN GLARGINE 10 UNIT(S): 100 INJECTION, SOLUTION SUBCUTANEOUS at 21:41

## 2020-02-15 RX ADMIN — GABAPENTIN 300 MILLIGRAM(S): 400 CAPSULE ORAL at 21:41

## 2020-02-15 NOTE — PHYSICAL THERAPY INITIAL EVALUATION ADULT - GENERAL OBSERVATIONS, REHAB EVAL
Pt adama 45 min eval fair. Pt presents s/p R crani for tumor removal. Pt rec'd in bed, A&Ox2, forgetful, NSCU monitoring, a-line, primafit. Seen with KRISTINE Marte.

## 2020-02-15 NOTE — PROGRESS NOTE ADULT - SUBJECTIVE AND OBJECTIVE BOX
Patient seen and examined at bedside.    --Anticoagulation--  enoxaparin Injectable 40 milliGRAM(s) SubCutaneous <User Schedule>    T(C): 37.2 (02-15-20 @ 03:00), Max: 37.3 (02-14-20 @ 23:00)  HR: 69 (02-15-20 @ 03:00) (61 - 75)  BP: 111/65 (02-14-20 @ 16:00) (103/55 - 121/60)  RR: 14 (02-15-20 @ 03:00) (13 - 24)  SpO2: 97% (02-15-20 @ 03:00) (90% - 98%)  Wt(kg): --    Exam:  AOx3, downward gaze, FC,   LUE drift 4+/5 otherwise full strength throughout, effort dependent

## 2020-02-15 NOTE — PROGRESS NOTE ADULT - ASSESSMENT
POD#2 R crani tumor resection, ETV  has small IVH, small subdural collections   NEURO:  CT Head in AM stable   Of note, has VPS for obstructive hydrocephalus   keppra for seizure ppx pe rNS   decadron per NS  Activity: [x] OOB as tolerated [] Bedrest [x] PT [x] OT [] PMNR    PULM:  Incentive spirometry, mobilize as tolerated    CV:  HTN continue amlodipine   -150mmHg, d/c a-line in AM  Prolonged Qtc repeat ECG     RENAL:  creat increasing, monitor   decrease NS to75 ml /hr   avoid nephrotoxic meds   elevated glucose on insulin gtt     GI:  Diet: nausea, avoid Reglan and Zofran due to prolonged QTc   GI prophylaxis [] not indicated [x PPI [] other:  Bowel regimen [] colace [x] senna [] other:    ENDO:   Goal euglycemia (-180)  hyperglycemia  endo on consult ,on insulin drip     HEME/ONC:  VTE prophylaxis: [x] SCDs [] chemoprophylaxis [] hold chemoprophylaxis due to: lovenox 40 m sc qhs     ID:  Cassidy-op antibiotics    MISC:    SOCIAL/FAMILY:  [x] awaiting [] updated at bedside [] family meeting    CODE STATUS:  [x] Full Code [] DNR [] DNI [] Palliative/Comfort Care    DISPOSITION:  [x] ICU [] Stroke Unit [] Floor [] EMU [] RCU [] PCU POD#2 R crani tumor resection, ETV  has small IVH, small subdural collections   NEURO:  Of note, has VPS for obstructive hydrocephalus   keppra for seizure ppx pe rNS   decadron per NS  Activity: [x] OOB as tolerated [] Bedrest [x] PT [x] OT [] PMNR    PULM:  Incentive spirometry, mobilize as tolerated    CV:  HTN continue amlodipine   -150mmHg, d/c a-line in AM  Prolonged Qtc improved on repeat ECG     RENAL:  creat stable  SIADH  IVL   restrict free water intake to < 1 L   avoid nephrotoxic meds   elevated glucose on insulin gtt     GI:  Diet: nausea, avoid Reglan and Zofran due to prolonged QTc which resolved   GI prophylaxis [] not indicated [x PPI [] other:  Bowel regimen [] colace [x] senna [] other:  last BM 2/14/20     ENDO:   Goal euglycemia (-180)  insulin drip changed to lantus and lispro, decrease lantus to 10 units   sugar well controlled  monitor once decadron gets weaned   endo on consult     HEME/ONC:  VTE prophylaxis: [x] SCDs [] chemoprophylaxis [] hold chemoprophylaxis due to: lovenox 40 m sc qhs     ID:  Cassidy-op antibiotics    MISC:    SOCIAL/FAMILY:  [x] awaiting [] updated at bedside [] family meeting    CODE STATUS:  [x] Full Code [] DNR [] DNI [] Palliative/Comfort Care    DISPOSITION:  [x] ICU [] Stroke Unit [] Floor [] EMU [] RCU [] PCU

## 2020-02-15 NOTE — OCCUPATIONAL THERAPY INITIAL EVALUATION ADULT - PLANNED THERAPY INTERVENTIONS, OT EVAL
ADL retraining/balance training/motor coordination training/cognitive, visual perceptual/neuromuscular re-education/bed mobility training/transfer training

## 2020-02-15 NOTE — PHYSICAL THERAPY INITIAL EVALUATION ADULT - PLANNED THERAPY INTERVENTIONS, PT EVAL
GOAL: Pt will negotiate 5 steps with unilateral handrail in a step-to pattern and supervision in 4 weeks/transfer training/gait training/strengthening/balance training/bed mobility training

## 2020-02-15 NOTE — OCCUPATIONAL THERAPY INITIAL EVALUATION ADULT - ADDITIONAL COMMENTS
CT Head 2/13/2020:  New right frontal pneumocephalus. New right parasagittal frontal surgical cavity with minimal hemorrhage posteriorly. Similar bilateral lateral convexity subdural collections. Persistent moderate ventricular dilatation. New hemorrhage layering in the occipital horns. 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  CT Head 2/13/2020:  New right frontal pneumocephalus. New right parasagittal frontal surgical cavity with minimal hemorrhage posteriorly. Similar bilateral lateral convexity subdural collections. Persistent moderate ventricular dilatation. New hemorrhage layering in the occipital horns.

## 2020-02-15 NOTE — OCCUPATIONAL THERAPY INITIAL EVALUATION ADULT - LIVES WITH, PROFILE
As per SW note, prior to hospitalization in December, pt was living with his son in a private home, 5 steps to enter, independent with ADLs and functional mobility with a straight cane. Since hospitalization, he was d/c'd to acute rehab, then subacute rehab placement.

## 2020-02-15 NOTE — OCCUPATIONAL THERAPY INITIAL EVALUATION ADULT - DIAGNOSIS, OT EVAL
Pt with impaired cognition, strength, coordination, balance impacting pt's ability to complete ADLs, functional mobility/transfers.

## 2020-02-15 NOTE — PROGRESS NOTE ADULT - SUBJECTIVE AND OBJECTIVE BOX
Subjective: Patient seen and examined. No new events except as noted.     SUBJECTIVE/ROS:  Due to altered mental status, subjective information were not able to be obtained from the patient. History was obtained, to the extent possible, from review of the chart and collateral sources of information.       MEDICATIONS:  MEDICATIONS  (STANDING):  amLODIPine   Tablet 5 milliGRAM(s) Oral daily  atorvastatin 10 milliGRAM(s) Oral at bedtime  chlorhexidine 4% Liquid 1 Application(s) Topical <User Schedule>  dexAMETHasone  Injectable 2 milliGRAM(s) IV Push every 6 hours  DULoxetine 60 milliGRAM(s) Oral daily  enoxaparin Injectable 40 milliGRAM(s) SubCutaneous <User Schedule>  fluticasone propionate 50 MICROgram(s)/spray Nasal Spray 1 Spray(s) Both Nostrils two times a day  folic acid 1 milliGRAM(s) Oral daily  gabapentin 300 milliGRAM(s) Oral three times a day  insulin glargine Injectable (LANTUS) 15 Unit(s) SubCutaneous at bedtime  insulin lispro (HumaLOG) corrective regimen sliding scale   SubCutaneous Before meals and at bedtime  insulin lispro Injectable (HumaLOG) 5 Unit(s) SubCutaneous three times a day before meals  levETIRAcetam  IVPB 500 milliGRAM(s) IV Intermittent every 12 hours  multivitamin 1 Tablet(s) Oral daily  pantoprazole  Injectable 40 milliGRAM(s) IV Push at bedtime  pregabalin 50 milliGRAM(s) Oral every 8 hours  senna 2 Tablet(s) Oral at bedtime      PHYSICAL EXAM:  T(C): 36.9 (02-15-20 @ 07:00), Max: 37.3 (02-14-20 @ 23:00)  HR: 58 (02-15-20 @ 07:00) (58 - 75)  BP: 111/65 (02-14-20 @ 16:00) (103/55 - 118/69)  RR: 10 (02-15-20 @ 07:00) (10 - 24)  SpO2: 99% (02-15-20 @ 07:00) (92% - 99%)  Wt(kg): --  I&O's Summary    14 Feb 2020 07:01  -  15 Feb 2020 07:00  --------------------------------------------------------  IN: 2962 mL / OUT: 2750 mL / NET: 212 mL            JVP: Normal  Neck: supple  Lung: clear   CV: S1 S2 , Murmur:  Abd: soft  Ext: No edema  neuro: Awake  Psych: flat affect  Skin: normal``    LABS/DATA:    CARDIAC MARKERS:                                10.8   18.23 )-----------( 240      ( 14 Feb 2020 21:50 )             33.4     02-14    133<L>  |  100  |  25<H>  ----------------------------<  250<H>  4.1   |  20<L>  |  1.08    Ca    8.1<L>      14 Feb 2020 21:50  Phos  2.9     02-14  Mg     2.1     02-14      proBNP:   Lipid Profile:   HgA1c:   TSH:     TELE:  EKG:

## 2020-02-15 NOTE — OCCUPATIONAL THERAPY INITIAL EVALUATION ADULT - PERTINENT HX OF CURRENT PROBLEM, REHAB EVAL
63 yo male. 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 Pt was referred to neurosurgery consult & transferred to rehab facility, presents to [facility] for craniotomy for removal of brain mass.

## 2020-02-15 NOTE — PROGRESS NOTE ADULT - SUBJECTIVE AND OBJECTIVE BOX
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  C/o worsening left sided paresthesia in December, hospitalized at [facility] Dec 3-17, 2019, found to have subacute SDH along R frontal lobe and large mass in third ventricle. Pt was referred to neurosurgery consult and transferred to rehab facility, now presents to [facility] scheduled for craniotomy surgery for removal of brain mass.  Of note, Patient instructed to hold ASA for 1wk prior to surgery    2/13 EVENTS: Adm NSCU s/p rt crani, interhemis transcallosal approach to 3rd vent tumor resection, ETV  post op slow to wake, CTH showing slight increase in ventricular size and ventricular air  2/14: on insulin drip    No acute overnight events      REVIEW OF SYSTEMS: [ ] Unable to Assess due to neurologic exam   [x] All ROS addressed below are non-contributory, except:  Neuro: [ ] Headache [ ] Back pain [ ] Numbness [ ] Weakness [ ] Ataxia [ ] Dizziness [ ] Aphasia [ ] Dysarthria [ ] Visual disturbance  Resp: [ ] Shortness of breath/dyspnea, [ ] Orthopnea [ ] Cough  CV: [ ] Chest pain [ ] Palpitation [ ] Lightheadedness [ ] Syncope  Renal: [ ] Thirst [ ] Edema  GI: [ ] Nausea [ ] Emesis [ ] Abdominal pain [ ] Constipation [ ] Diarrhea  Hem: [ ] Hematemesis [ ] bright red blood per rectum  ID: [ ] Fever [ ] Chills [ ] Dysuria  ENT: [ ] Rhinorrhea            ICU Vital Signs Last 24 Hrs  T(C): 37.2 (15 Feb 2020 03:00), Max: 37.3 (14 Feb 2020 23:00)  T(F): 99 (15 Feb 2020 03:00), Max: 99.2 (14 Feb 2020 23:00)  HR: 61 (15 Feb 2020 06:00) (60 - 75)  BP: 111/65 (14 Feb 2020 16:00) (103/55 - 118/69)  BP(mean): 80 (14 Feb 2020 16:00) (69 - 84)  ABP: 134/69 (15 Feb 2020 06:00) (104/49 - 153/101)  ABP(mean): 94 (15 Feb 2020 06:00) (69 - 120)  RR: 13 (15 Feb 2020 06:00) (12 - 24)  SpO2: 99% (15 Feb 2020 06:00) (92% - 99%)      02-14-20 @ 07:01  -  02-15-20 @ 07:00  --------------------------------------------------------  IN: 2962 mL / OUT: 2750 mL / NET: 212 mL            acetaminophen   Tablet .. 650 milliGRAM(s) Oral every 6 hours PRN  amLODIPine   Tablet 5 milliGRAM(s) Oral daily  atorvastatin 10 milliGRAM(s) Oral at bedtime  chlorhexidine 4% Liquid 1 Application(s) Topical <User Schedule>  dexAMETHasone  Injectable 2 milliGRAM(s) IV Push every 6 hours  DULoxetine 60 milliGRAM(s) Oral daily  enoxaparin Injectable 40 milliGRAM(s) SubCutaneous <User Schedule>  fluticasone propionate 50 MICROgram(s)/spray Nasal Spray 1 Spray(s) Both Nostrils two times a day  folic acid 1 milliGRAM(s) Oral daily  gabapentin 300 milliGRAM(s) Oral three times a day  insulin glargine Injectable (LANTUS) 15 Unit(s) SubCutaneous at bedtime  insulin lispro (HumaLOG) corrective regimen sliding scale   SubCutaneous Before meals and at bedtime  insulin lispro Injectable (HumaLOG) 5 Unit(s) SubCutaneous three times a day before meals  levETIRAcetam  IVPB 500 milliGRAM(s) IV Intermittent every 12 hours  multivitamin 1 Tablet(s) Oral daily  pantoprazole  Injectable 40 milliGRAM(s) IV Push at bedtime  pregabalin 50 milliGRAM(s) Oral every 8 hours  scopolamine   Patch 1 Patch Transdermal once PRN  senna 2 Tablet(s) Oral at bedtime      LABS:  Na: 133 (02-14 @ 21:50), 136 (02-13 @ 21:45), 136 (02-13 @ 16:58), 136 (02-13 @ 09:54), 134 (02-13 @ 01:47)  K: 4.1 (02-14 @ 21:50), 4.2 (02-13 @ 21:45), 4.0 (02-13 @ 16:58), 3.7 (02-13 @ 09:54), 4.1 (02-13 @ 01:47)  Cl: 100 (02-14 @ 21:50), 103 (02-13 @ 21:45), 101 (02-13 @ 16:58), 98 (02-13 @ 09:54), 97 (02-13 @ 01:47)  CO2: 20 (02-14 @ 21:50), 20 (02-13 @ 21:45), 18 (02-13 @ 16:58), 15 (02-13 @ 09:54), 16 (02-13 @ 01:47)  BUN: 25 (02-14 @ 21:50), 25 (02-13 @ 21:45), 23 (02-13 @ 16:58), 21 (02-13 @ 09:54), 22 (02-13 @ 01:47)  Cr: 1.08 (02-14 @ 21:50), 1.25 (02-13 @ 21:45), 1.17 (02-13 @ 16:58), 1.03 (02-13 @ 09:54), 0.98 (02-13 @ 01:47)  Glu: 250(02-14 @ 21:50), 122(02-13 @ 21:45), 145(02-13 @ 16:58), 297(02-13 @ 09:54), 356(02-13 @ 01:47)    Hgb: 10.8 (02-14 @ 21:50), 12.4 (02-13 @ 21:45), 14.6 (02-13 @ 09:52), 14.2 (02-13 @ 01:47)  Hct: 33.4 (02-14 @ 21:50), 38.1 (02-13 @ 21:45), 43.7 (02-13 @ 09:52), 43.2 (02-13 @ 01:47)  WBC: 18.23 (02-14 @ 21:50), 25.44 (02-13 @ 21:45), 23.74 (02-13 @ 09:52), 18.51 (02-13 @ 01:47)  Plt: 240 (02-14 @ 21:50), 367 (02-13 @ 21:45), 415 (02-13 @ 09:52), 362 (02-13 @ 01:47)    INR: 1.12 02-12-20 @ 12:15  PTT: 36.2 02-12-20 @ 12:15    ABG - ( 14 Feb 2020 05:33 )  pH, Arterial: 7.46  pH, Blood: x     /  pCO2: 29    /  pO2: 102   / HCO3: 20    / Base Excess: -2.1  /  SaO2: 98          EXAMINATION:  General: No acute distress  HEENT: Anicteric sclerae  Cardiac: [id number]  Lungs: Clear  Abdomen: Soft, non-tender, +BS  Extremities: No c/c/e  Skin/Incision Site: Clean, dry and intact  Neurologic: Awake, alert, orientedx3, follows commands, R pupil 3mm sluggish, L pupil 3mm brisk, able to finger count b/l without glasses, limited upward gaze, face symmetric, tongue midline, left UE  drift, RUE 5/5, RLE 5/5, left UE 4/5, left LE 5/5     moderate complex medical decision     [x] Patient is at high risk of neurologic deterioration/death due to: post op hemorrhage, hydrocephalus, brain compression    moderate complexity · Subjective and Objective:   SUMMARY: per H/P, 63yo man PMH HTN, T2DM (A1C=6.4), FELICE (pt used CPAP in the past, however he stated he lost weight and no longer has symptoms and does not use CPAP anymore), 2008- CVA with left sided paresthesia residual, 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  C/o worsening left sided paresthesia in December, hospitalized at [facility] Dec 3-17, 2019, found to have subacute SDH along R frontal lobe and large mass in third ventricle. Pt was referred to neurosurgery consult and transferred to rehab facility, now presents to PST scheduled for craniotomy surgery for removal of brain mass.  Of note, Patient instructed to hold ASA for 1wk prior to surgery      Allergies/Medications:   Allergies:        Allergies:  	No Known Allergies:     Home Medications:   * Patient Currently Takes Medications as of 06-Feb-2020 10:48 documented in Structured Notes  · 	Tylenol 325 mg oral tablet: Last Dose Taken:  , 2 tab(s) orally every 6 hours, As Needed for mild pain  · 	Lyrica 50 mg oral capsule: Last Dose Taken:  , 1 cap(s) orally every 8 hours  · 	Cymbalta 60 mg oral delayed release capsule: Last Dose Taken:  , 1 cap(s) orally once a day  · 	nicotine 21 mg/24 hr transdermal film, extended release: Last Dose Taken:  , 1 patch transdermal once a day  · 	fluticasone 50 mcg/inh nasal spray: Last Dose Taken:  , 1 spray(s) nasal 2 times a day  · 	senna oral tablet: Last Dose Taken:  , 2 tab(s) orally once a day (at bedtime)  · 	polyethylene glycol 3350 oral powder for reconstitution: Last Dose Taken:  , 17 gram(s) orally 2 times a day  · 	amLODIPine 5 mg oral tablet: Last Dose Taken:  , 1 tab(s) orally once a day  · 	melatonin 3 mg oral tablet: Last Dose Taken:  , 1 tab(s) orally once a day (at bedtime)  · 	atorvastatin 10 mg oral tablet: Last Dose Taken:  , 1 tab(s) orally once a day (at bedtime)  · 	Neurontin 300 mg oral capsule: Last Dose Taken:  , 1 cap(s) orally 3 times a day  · 	folic acid 1 mg oral tablet: Last Dose Taken:  , 1 tab(s) orally once a day  · 	Multiple Vitamins oral tablet: Last Dose Taken:  , 1 tab(s) orally once a day  · 	clonazePAM 0.5 mg oral tablet: Last Dose Taken:  , 1 tab(s) orally every 12 hours, As needed, anxiety/panic attack  · 	aspirin 81 mg oral tablet: Last Dose Taken:  , 1 tab(s) orally once a day  · 	metFORMIN 500 mg oral tablet: Last Dose Taken:  , 1 tab(s) orally 2 times a day  · 	icy hot (menthol) 5% topical patch 1 patch bilateral knees and lower back daily: Last Dose Taken:    · 	Visine Advanced Relief 1%-0.05% ophthalmic drops: Last Dose Taken:  , 1 drop(s) to each affected eye 2 times a day    PMH/PSH/FH/SH:    Past Medical, Past Surgical, and Family History:  PAST MEDICAL HISTORY:  CVA (cerebral vascular accident) 2008    Depression     Diabetes type 2    H/O brain tumor     H/O hydrocephalus 2008, s/p  shunt    HTN (hypertension)     Hypertension     FELICE (obstructive sleep apnea)     Peripheral neuropathy     Seizure post craniotomy surgery , placed on phenytoin which was eventually discontinued, pt had not had any seizure since 2008    Stroke 2008, 12/2019.     PAST SURGICAL HISTORY:  H/O craniotomy 2008 secondary to tumor in third ventricle    S/P ventriculoperitoneal shunt 2008.     Social History:  · Marital Status	  · Notes	currently a resident of a rehab facility     Substance Use History:  · Substance Use	caffeine  · Caffeine Type	coffee     Alcohol Use History:  · Have you ever consumed alcohol	never     Tobacco Usage:  · Tobacco Usage: Never smoker    Presurgical Screening:    Cardiovascular:  · Activity	activity limited by left sided paresthesia and weakness, currently in rehab.  able to dust, wash dishes  · Energy expenditure (mets)	DASI=4.06  · Symptoms	none     Cardiac Tests:  · Last Echocardiogram	12/2019 on chart  · Last Stress Test	years ago WNL per pt      2/13 EVENTS: Adm NSCU s/p rt crani, interhemis transcallosal approach to 3rd vent tumor resection, ETV  post op slow to wake, CTH showing slight increase in ventricular size and ventricular air  2/14: on insulin drip     overnight events: off insulin, hyponatremia      REVIEW OF SYSTEMS: [ ] Unable to Assess due to neurologic exam   [x] All ROS addressed below are non-contributory, except:  Neuro: [ ] Headache [ ] Back pain [ ] Numbness [ ] Weakness [ ] Ataxia [ ] Dizziness [ ] Aphasia [ ] Dysarthria [ ] Visual disturbance  Resp: [ ] Shortness of breath/dyspnea, [ ] Orthopnea [ ] Cough  CV: [ ] Chest pain [ ] Palpitation [ ] Lightheadedness [ ] Syncope  Renal: [ ] Thirst [ ] Edema  GI: [ ] Nausea [ ] Emesis [ ] Abdominal pain [ ] Constipation [ ] Diarrhea  Hem: [ ] Hematemesis [ ] bright red blood per rectum  ID: [ ] Fever [ ] Chills [ ] Dysuria  ENT: [ ] Rhinorrhea            ICU Vital Signs Last 24 Hrs  T(C): 37.2 (15 Feb 2020 03:00), Max: 37.3 (14 Feb 2020 23:00)  T(F): 99 (15 Feb 2020 03:00), Max: 99.2 (14 Feb 2020 23:00)  HR: 61 (15 Feb 2020 06:00) (60 - 75)  BP: 111/65 (14 Feb 2020 16:00) (103/55 - 118/69)  BP(mean): 80 (14 Feb 2020 16:00) (69 - 84)  ABP: 134/69 (15 Feb 2020 06:00) (104/49 - 153/101)  ABP(mean): 94 (15 Feb 2020 06:00) (69 - 120)  RR: 13 (15 Feb 2020 06:00) (12 - 24)  SpO2: 99% (15 Feb 2020 06:00) (92% - 99%)      02-14-20 @ 07:01  -  02-15-20 @ 07:00  --------------------------------------------------------  IN: 2962 mL / OUT: 2750 mL / NET: 212 mL            acetaminophen   Tablet .. 650 milliGRAM(s) Oral every 6 hours PRN  amLODIPine   Tablet 5 milliGRAM(s) Oral daily  atorvastatin 10 milliGRAM(s) Oral at bedtime  chlorhexidine 4% Liquid 1 Application(s) Topical <User Schedule>  dexAMETHasone  Injectable 2 milliGRAM(s) IV Push every 6 hours  DULoxetine 60 milliGRAM(s) Oral daily  enoxaparin Injectable 40 milliGRAM(s) SubCutaneous <User Schedule>  fluticasone propionate 50 MICROgram(s)/spray Nasal Spray 1 Spray(s) Both Nostrils two times a day  folic acid 1 milliGRAM(s) Oral daily  gabapentin 300 milliGRAM(s) Oral three times a day  insulin glargine Injectable (LANTUS) 15 Unit(s) SubCutaneous at bedtime  insulin lispro (HumaLOG) corrective regimen sliding scale   SubCutaneous Before meals and at bedtime  insulin lispro Injectable (HumaLOG) 5 Unit(s) SubCutaneous three times a day before meals  levETIRAcetam  IVPB 500 milliGRAM(s) IV Intermittent every 12 hours  multivitamin 1 Tablet(s) Oral daily  pantoprazole  Injectable 40 milliGRAM(s) IV Push at bedtime  pregabalin 50 milliGRAM(s) Oral every 8 hours  scopolamine   Patch 1 Patch Transdermal once PRN  senna 2 Tablet(s) Oral at bedtime      LABS:  Na: 133 (02-14 @ 21:50), 136 (02-13 @ 21:45), 136 (02-13 @ 16:58), 136 (02-13 @ 09:54), 134 (02-13 @ 01:47)  K: 4.1 (02-14 @ 21:50), 4.2 (02-13 @ 21:45), 4.0 (02-13 @ 16:58), 3.7 (02-13 @ 09:54), 4.1 (02-13 @ 01:47)  Cl: 100 (02-14 @ 21:50), 103 (02-13 @ 21:45), 101 (02-13 @ 16:58), 98 (02-13 @ 09:54), 97 (02-13 @ 01:47)  CO2: 20 (02-14 @ 21:50), 20 (02-13 @ 21:45), 18 (02-13 @ 16:58), 15 (02-13 @ 09:54), 16 (02-13 @ 01:47)  BUN: 25 (02-14 @ 21:50), 25 (02-13 @ 21:45), 23 (02-13 @ 16:58), 21 (02-13 @ 09:54), 22 (02-13 @ 01:47)  Cr: 1.08 (02-14 @ 21:50), 1.25 (02-13 @ 21:45), 1.17 (02-13 @ 16:58), 1.03 (02-13 @ 09:54), 0.98 (02-13 @ 01:47)  Glu: 250(02-14 @ 21:50), 122(02-13 @ 21:45), 145(02-13 @ 16:58), 297(02-13 @ 09:54), 356(02-13 @ 01:47)    Hgb: 10.8 (02-14 @ 21:50), 12.4 (02-13 @ 21:45), 14.6 (02-13 @ 09:52), 14.2 (02-13 @ 01:47)  Hct: 33.4 (02-14 @ 21:50), 38.1 (02-13 @ 21:45), 43.7 (02-13 @ 09:52), 43.2 (02-13 @ 01:47)  WBC: 18.23 (02-14 @ 21:50), 25.44 (02-13 @ 21:45), 23.74 (02-13 @ 09:52), 18.51 (02-13 @ 01:47)  Plt: 240 (02-14 @ 21:50), 367 (02-13 @ 21:45), 415 (02-13 @ 09:52), 362 (02-13 @ 01:47)    INR: 1.12 02-12-20 @ 12:15  PTT: 36.2 02-12-20 @ 12:15    ABG - ( 14 Feb 2020 05:33 )  pH, Arterial: 7.46  pH, Blood: x     /  pCO2: 29    /  pO2: 102   / HCO3: 20    / Base Excess: -2.1  /  SaO2: 98          EXAMINATION:  General: No acute distress  HEENT: Anicteric sclerae  Cardiac: [id number]  Lungs: Clear  Abdomen: Soft, non-tender, +BS  Extremities: No c/c/e  Skin/Incision Site: Clean, dry and intact  Neurologic: Awake, alert, orientedx3, follows commands, R pupil 3mm sluggish, L pupil 3mm brisk, able to finger count b/l without glasses, limited upward gaze, face symmetric, tongue midline, left UE  drift, RUE 5/5, RLE 5/5, left UE 4/5, left LE 5/5

## 2020-02-15 NOTE — PHYSICAL THERAPY INITIAL EVALUATION ADULT - ADDITIONAL COMMENTS
Pt admitted from rehab. Per chart review, prior to hospitalization in December pt lives with son and family in a private home with 5 steps to enter and 5 steps inside. Pt was independent with functional mobility prior to December admission. Pt was ambulatory with a tripod cane per pt at rehab.

## 2020-02-15 NOTE — PROGRESS NOTE ADULT - ASSESSMENT
POD#3 R crani 3rd ventricular tumor resection    xray shunt series  MRI post op  pain control  -150  cardiology following for prolonged QTc --improved  encourage incentive spirometry  advance diet as tolerated  lovenox ppx    got 10units of lantus at 1800 then 15units of lantus at 22:00 on 2/14, FS>150 throughout the day, would dose 25units lantus tonight  cont premeals and sliding scale    awaiting floor transfer

## 2020-02-16 LAB
ANION GAP SERPL CALC-SCNC: 15 MMOL/L — SIGNIFICANT CHANGE UP (ref 5–17)
BUN SERPL-MCNC: 27 MG/DL — HIGH (ref 7–23)
CALCIUM SERPL-MCNC: 8.6 MG/DL — SIGNIFICANT CHANGE UP (ref 8.4–10.5)
CHLORIDE SERPL-SCNC: 98 MMOL/L — SIGNIFICANT CHANGE UP (ref 96–108)
CO2 SERPL-SCNC: 18 MMOL/L — LOW (ref 22–31)
CREAT SERPL-MCNC: 0.96 MG/DL — SIGNIFICANT CHANGE UP (ref 0.5–1.3)
GLUCOSE BLDC GLUCOMTR-MCNC: 142 MG/DL — HIGH (ref 70–99)
GLUCOSE BLDC GLUCOMTR-MCNC: 157 MG/DL — HIGH (ref 70–99)
GLUCOSE BLDC GLUCOMTR-MCNC: 196 MG/DL — HIGH (ref 70–99)
GLUCOSE BLDC GLUCOMTR-MCNC: 254 MG/DL — HIGH (ref 70–99)
GLUCOSE SERPL-MCNC: 206 MG/DL — HIGH (ref 70–99)
POTASSIUM SERPL-MCNC: 4.2 MMOL/L — SIGNIFICANT CHANGE UP (ref 3.5–5.3)
POTASSIUM SERPL-SCNC: 4.2 MMOL/L — SIGNIFICANT CHANGE UP (ref 3.5–5.3)
SODIUM SERPL-SCNC: 131 MMOL/L — LOW (ref 135–145)

## 2020-02-16 PROCEDURE — 99232 SBSQ HOSP IP/OBS MODERATE 35: CPT

## 2020-02-16 PROCEDURE — 93010 ELECTROCARDIOGRAM REPORT: CPT | Mod: 76

## 2020-02-16 PROCEDURE — 70450 CT HEAD/BRAIN W/O DYE: CPT | Mod: 26

## 2020-02-16 RX ORDER — INSULIN LISPRO 100/ML
8 VIAL (ML) SUBCUTANEOUS
Refills: 0 | Status: DISCONTINUED | OUTPATIENT
Start: 2020-02-16 | End: 2020-02-21

## 2020-02-16 RX ORDER — INSULIN GLARGINE 100 [IU]/ML
25 INJECTION, SOLUTION SUBCUTANEOUS AT BEDTIME
Refills: 0 | Status: DISCONTINUED | OUTPATIENT
Start: 2020-02-16 | End: 2020-02-20

## 2020-02-16 RX ORDER — INSULIN GLARGINE 100 [IU]/ML
15 INJECTION, SOLUTION SUBCUTANEOUS AT BEDTIME
Refills: 0 | Status: DISCONTINUED | OUTPATIENT
Start: 2020-02-16 | End: 2020-02-16

## 2020-02-16 RX ADMIN — Medication 6: at 12:23

## 2020-02-16 RX ADMIN — Medication 2 MILLIGRAM(S): at 11:30

## 2020-02-16 RX ADMIN — CHLORHEXIDINE GLUCONATE 1 APPLICATION(S): 213 SOLUTION TOPICAL at 23:10

## 2020-02-16 RX ADMIN — LEVETIRACETAM 400 MILLIGRAM(S): 250 TABLET, FILM COATED ORAL at 17:26

## 2020-02-16 RX ADMIN — SCOPALAMINE 1 PATCH: 1 PATCH, EXTENDED RELEASE TRANSDERMAL at 05:50

## 2020-02-16 RX ADMIN — Medication 650 MILLIGRAM(S): at 07:00

## 2020-02-16 RX ADMIN — CHLORHEXIDINE GLUCONATE 1 APPLICATION(S): 213 SOLUTION TOPICAL at 00:00

## 2020-02-16 RX ADMIN — Medication 650 MILLIGRAM(S): at 16:15

## 2020-02-16 RX ADMIN — Medication 1 SPRAY(S): at 05:45

## 2020-02-16 RX ADMIN — Medication 1 MILLIGRAM(S): at 11:30

## 2020-02-16 RX ADMIN — GABAPENTIN 300 MILLIGRAM(S): 400 CAPSULE ORAL at 21:58

## 2020-02-16 RX ADMIN — Medication 2 MILLIGRAM(S): at 17:28

## 2020-02-16 RX ADMIN — SENNA PLUS 2 TABLET(S): 8.6 TABLET ORAL at 21:59

## 2020-02-16 RX ADMIN — LEVETIRACETAM 400 MILLIGRAM(S): 250 TABLET, FILM COATED ORAL at 05:46

## 2020-02-16 RX ADMIN — GABAPENTIN 300 MILLIGRAM(S): 400 CAPSULE ORAL at 14:32

## 2020-02-16 RX ADMIN — ATORVASTATIN CALCIUM 10 MILLIGRAM(S): 80 TABLET, FILM COATED ORAL at 21:59

## 2020-02-16 RX ADMIN — GABAPENTIN 300 MILLIGRAM(S): 400 CAPSULE ORAL at 05:45

## 2020-02-16 RX ADMIN — Medication 5 UNIT(S): at 09:51

## 2020-02-16 RX ADMIN — PANTOPRAZOLE SODIUM 40 MILLIGRAM(S): 20 TABLET, DELAYED RELEASE ORAL at 23:11

## 2020-02-16 RX ADMIN — Medication 650 MILLIGRAM(S): at 06:26

## 2020-02-16 RX ADMIN — Medication 1 SPRAY(S): at 17:28

## 2020-02-16 RX ADMIN — Medication 10 MILLIGRAM(S): at 22:00

## 2020-02-16 RX ADMIN — DULOXETINE HYDROCHLORIDE 60 MILLIGRAM(S): 30 CAPSULE, DELAYED RELEASE ORAL at 11:30

## 2020-02-16 RX ADMIN — Medication 2: at 09:51

## 2020-02-16 RX ADMIN — Medication 8 UNIT(S): at 12:23

## 2020-02-16 RX ADMIN — INSULIN GLARGINE 15 UNIT(S): 100 INJECTION, SOLUTION SUBCUTANEOUS at 01:52

## 2020-02-16 RX ADMIN — ENOXAPARIN SODIUM 40 MILLIGRAM(S): 100 INJECTION SUBCUTANEOUS at 17:27

## 2020-02-16 RX ADMIN — Medication 2: at 21:58

## 2020-02-16 RX ADMIN — Medication 8 UNIT(S): at 17:46

## 2020-02-16 RX ADMIN — AMLODIPINE BESYLATE 5 MILLIGRAM(S): 2.5 TABLET ORAL at 05:45

## 2020-02-16 RX ADMIN — Medication 1 TABLET(S): at 11:30

## 2020-02-16 RX ADMIN — Medication 2 MILLIGRAM(S): at 00:00

## 2020-02-16 RX ADMIN — Medication 650 MILLIGRAM(S): at 15:30

## 2020-02-16 RX ADMIN — Medication 2 MILLIGRAM(S): at 05:45

## 2020-02-16 RX ADMIN — INSULIN GLARGINE 25 UNIT(S): 100 INJECTION, SOLUTION SUBCUTANEOUS at 21:59

## 2020-02-16 RX ADMIN — Medication 2 MILLIGRAM(S): at 23:11

## 2020-02-16 NOTE — PROGRESS NOTE ADULT - SUBJECTIVE AND OBJECTIVE BOX
Subjective: Patient seen and examined. No new events except as noted.     SUBJECTIVE/ROS:  Due to altered mental status, subjective information were not able to be obtained from the patient. History was obtained, to the extent possible, from review of the chart and collateral sources of information.       MEDICATIONS:  MEDICATIONS  (STANDING):  amLODIPine   Tablet 5 milliGRAM(s) Oral daily  atorvastatin 10 milliGRAM(s) Oral at bedtime  chlorhexidine 4% Liquid 1 Application(s) Topical <User Schedule>  dexAMETHasone  Injectable 2 milliGRAM(s) IV Push every 6 hours  DULoxetine 60 milliGRAM(s) Oral daily  enoxaparin Injectable 40 milliGRAM(s) SubCutaneous <User Schedule>  fluticasone propionate 50 MICROgram(s)/spray Nasal Spray 1 Spray(s) Both Nostrils two times a day  folic acid 1 milliGRAM(s) Oral daily  gabapentin 300 milliGRAM(s) Oral three times a day  insulin glargine Injectable (LANTUS) 15 Unit(s) SubCutaneous at bedtime  insulin glargine Injectable (LANTUS) 25 Unit(s) SubCutaneous at bedtime  insulin lispro (HumaLOG) corrective regimen sliding scale   SubCutaneous Before meals and at bedtime  insulin lispro Injectable (HumaLOG) 5 Unit(s) SubCutaneous three times a day before meals  levETIRAcetam  IVPB 500 milliGRAM(s) IV Intermittent every 12 hours  multivitamin 1 Tablet(s) Oral daily  pantoprazole  Injectable 40 milliGRAM(s) IV Push at bedtime  senna 2 Tablet(s) Oral at bedtime      PHYSICAL EXAM:  T(C): 37 (02-16-20 @ 03:00), Max: 37 (02-15-20 @ 11:00)  HR: 57 (02-16-20 @ 07:00) (57 - 76)  BP: 140/71 (02-16-20 @ 07:00) (115/70 - 140/71)  RR: 11 (02-16-20 @ 07:00) (11 - 18)  SpO2: 96% (02-16-20 @ 07:00) (94% - 99%)  Wt(kg): --  I&O's Summary    15 Feb 2020 07:01  -  16 Feb 2020 07:00  --------------------------------------------------------  IN: 800 mL / OUT: 1650 mL / NET: -850 mL            JVP: Normal  Neck: supple  Lung: clear   CV: S1 S2 , Murmur:  Abd: soft  Ext: No edema  neuro: Awake lethargic   Psych: flat affect  Skin: normal``    LABS/DATA:    CARDIAC MARKERS:                                10.8   18.23 )-----------( 240      ( 14 Feb 2020 21:50 )             33.4     02-16    131<L>  |  98  |  27<H>  ----------------------------<  206<H>  4.2   |  18<L>  |  0.96    Ca    8.6      16 Feb 2020 01:39  Phos  2.9     02-14  Mg     2.1     02-14      proBNP:   Lipid Profile:   HgA1c:   TSH:     TELE:  EKG:

## 2020-02-16 NOTE — PROGRESS NOTE ADULT - SUBJECTIVE AND OBJECTIVE BOX
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  C/o worsening left sided paresthesia in December, hospitalized at [facility] Dec 3-17, 2019, found to have subacute SDH along R frontal lobe and large mass in third ventricle. Pt was referred to neurosurgery consult and transferred to rehab facility, now presents to PST scheduled for craniotomy surgery for removal of brain mass.  Of note, Patient instructed to hold ASA for 1wk prior to surgery      Allergies/Medications:   Allergies:        Allergies:  	No Known Allergies:     Home Medications:   * Patient Currently Takes Medications as of 06-Feb-2020 10:48 documented in Structured Notes  · 	Tylenol 325 mg oral tablet: Last Dose Taken:  , 2 tab(s) orally every 6 hours, As Needed for mild pain  · 	Lyrica 50 mg oral capsule: Last Dose Taken:  , 1 cap(s) orally every 8 hours  · 	Cymbalta 60 mg oral delayed release capsule: Last Dose Taken:  , 1 cap(s) orally once a day  · 	nicotine 21 mg/24 hr transdermal film, extended release: Last Dose Taken:  , 1 patch transdermal once a day  · 	fluticasone 50 mcg/inh nasal spray: Last Dose Taken:  , 1 spray(s) nasal 2 times a day  · 	senna oral tablet: Last Dose Taken:  , 2 tab(s) orally once a day (at bedtime)  · 	polyethylene glycol 3350 oral powder for reconstitution: Last Dose Taken:  , 17 gram(s) orally 2 times a day  · 	amLODIPine 5 mg oral tablet: Last Dose Taken:  , 1 tab(s) orally once a day  · 	melatonin 3 mg oral tablet: Last Dose Taken:  , 1 tab(s) orally once a day (at bedtime)  · 	atorvastatin 10 mg oral tablet: Last Dose Taken:  , 1 tab(s) orally once a day (at bedtime)  · 	Neurontin 300 mg oral capsule: Last Dose Taken:  , 1 cap(s) orally 3 times a day  · 	folic acid 1 mg oral tablet: Last Dose Taken:  , 1 tab(s) orally once a day  · 	Multiple Vitamins oral tablet: Last Dose Taken:  , 1 tab(s) orally once a day  · 	clonazePAM 0.5 mg oral tablet: Last Dose Taken:  , 1 tab(s) orally every 12 hours, As needed, anxiety/panic attack  · 	aspirin 81 mg oral tablet: Last Dose Taken:  , 1 tab(s) orally once a day  · 	metFORMIN 500 mg oral tablet: Last Dose Taken:  , 1 tab(s) orally 2 times a day  · 	icy hot (menthol) 5% topical patch 1 patch bilateral knees and lower back daily: Last Dose Taken:    · 	Visine Advanced Relief 1%-0.05% ophthalmic drops: Last Dose Taken:  , 1 drop(s) to each affected eye 2 times a day    PMH/PSH/FH/SH:    Past Medical, Past Surgical, and Family History:  PAST MEDICAL HISTORY:  CVA (cerebral vascular accident) 2008    Depression     Diabetes type 2    H/O brain tumor     H/O hydrocephalus 2008, s/p  shunt    HTN (hypertension)     Hypertension     FELICE (obstructive sleep apnea)     Peripheral neuropathy     Seizure post craniotomy surgery , placed on phenytoin which was eventually discontinued, pt had not had any seizure since 2008    Stroke 2008, 12/2019.     PAST SURGICAL HISTORY:  H/O craniotomy 2008 secondary to tumor in third ventricle    S/P ventriculoperitoneal shunt 2008.     Social History:  · Marital Status	  · Notes	currently a resident of a rehab facility     Substance Use History:  · Substance Use	caffeine  · Caffeine Type	coffee     Alcohol Use History:  · Have you ever consumed alcohol	never     Tobacco Usage:  · Tobacco Usage: Never smoker    Presurgical Screening:    Cardiovascular:  · Activity	activity limited by left sided paresthesia and weakness, currently in rehab.  able to dust, wash dishes  · Energy expenditure (mets)	DASI=4.06  · Symptoms	none     Cardiac Tests:  · Last Echocardiogram	12/2019 on chart  · Last Stress Test	years ago WNL per pt      2/13 EVENTS: Adm NSCU s/p rt crani, interhemis transcallosal approach to 3rd vent tumor resection, ETV  post op slow to wake, CTH showing slight increase in ventricular size and ventricular air  2/14: on insulin drip     overnight events: leaking of CSF from the wound       REVIEW OF SYSTEMS: [ ] Unable to Assess due to neurologic exam   [x] All ROS addressed below are non-contributory, except:  Neuro: [ ] Headache [ ] Back pain [ ] Numbness [ ] Weakness [ ] Ataxia [ ] Dizziness [ ] Aphasia [ ] Dysarthria [ ] Visual disturbance  Resp: [ ] Shortness of breath/dyspnea, [ ] Orthopnea [ ] Cough  CV: [ ] Chest pain [ ] Palpitation [ ] Lightheadedness [ ] Syncope  Renal: [ ] Thirst [ ] Edema  GI: [ ] Nausea [ ] Emesis [ ] Abdominal pain [ ] Constipation [ ] Diarrhea  Hem: [ ] Hematemesis [ ] bright red blood per rectum  ID: [ ] Fever [ ] Chills [ ] Dysuria  ENT: [ ] Rhinorrhea      T(C): 36.9 (02-16-20 @ 07:00), Max: 37 (02-15-20 @ 11:00)  HR: 57 (02-16-20 @ 07:00) (57 - 76)  BP: 140/71 (02-16-20 @ 07:00) (115/70 - 140/71)  RR: 11 (02-16-20 @ 07:00) (11 - 18)  SpO2: 96% (02-16-20 @ 07:00) (94% - 99%)  02-15-20 @ 07:01  -  02-16-20 @ 07:00  --------------------------------------------------------  IN: 800 mL / OUT: 1650 mL / NET: -850 mL    02-16-20 @ 07:01  -  02-16-20 @ 10:06  --------------------------------------------------------  IN: 50 mL / OUT: 0 mL / NET: 50 mL    acetaminophen   Tablet .. 650 milliGRAM(s) Oral every 6 hours PRN  amLODIPine   Tablet 5 milliGRAM(s) Oral daily  atorvastatin 10 milliGRAM(s) Oral at bedtime  chlorhexidine 4% Liquid 1 Application(s) Topical <User Schedule>  dexAMETHasone  Injectable 2 milliGRAM(s) IV Push every 6 hours  DULoxetine 60 milliGRAM(s) Oral daily  enoxaparin Injectable 40 milliGRAM(s) SubCutaneous <User Schedule>  fluticasone propionate 50 MICROgram(s)/spray Nasal Spray 1 Spray(s) Both Nostrils two times a day  folic acid 1 milliGRAM(s) Oral daily  gabapentin 300 milliGRAM(s) Oral three times a day  insulin glargine Injectable (LANTUS) 15 Unit(s) SubCutaneous at bedtime  insulin glargine Injectable (LANTUS) 25 Unit(s) SubCutaneous at bedtime  insulin lispro (HumaLOG) corrective regimen sliding scale   SubCutaneous Before meals and at bedtime  insulin lispro Injectable (HumaLOG) 5 Unit(s) SubCutaneous three times a day before meals  levETIRAcetam  IVPB 500 milliGRAM(s) IV Intermittent every 12 hours  multivitamin 1 Tablet(s) Oral daily  pantoprazole  Injectable 40 milliGRAM(s) IV Push at bedtime  scopolamine   Patch 1 Patch Transdermal once PRN  senna 2 Tablet(s) Oral at bedtime      INR: 1.12 02-12-20 @ 12:15  PTT: 36.2 02-12-20 @ 12:15    ABG - ( 14 Feb 2020 05:33 )  pH, Arterial: 7.46  pH, Blood: x     /  pCO2: 29    /  pO2: 102   / HCO3: 20    / Base Excess: -2.1  /  SaO2: 98          EXAMINATION:  General: No acute distress  HEENT: Anicteric sclerae  Cardiac: [id number]  Lungs: Clear  Abdomen: Soft, non-tender, +BS  Extremities: No c/c/e  Skin/Incision Site: Clean, dry and intact  Neurologic: Awake, alert, orientedx3, follows commands, R pupil 3mm sluggish, L pupil 3mm brisk, able to finger count b/l without glasses, limited upward gaze, face symmetric, tongue midline, left UE  drift, RUE 5/5, RLE 5/5, left UE 4/5, left LE 5/5       LABS:  Na: 131 (02-16 @ 01:39), 133 (02-14 @ 21:50), 136 (02-13 @ 21:45), 136 (02-13 @ 16:58)  K: 4.2 (02-16 @ 01:39), 4.1 (02-14 @ 21:50), 4.2 (02-13 @ 21:45), 4.0 (02-13 @ 16:58)  Cl: 98 (02-16 @ 01:39), 100 (02-14 @ 21:50), 103 (02-13 @ 21:45), 101 (02-13 @ 16:58)  CO2: 18 (02-16 @ 01:39), 20 (02-14 @ 21:50), 20 (02-13 @ 21:45), 18 (02-13 @ 16:58)  BUN: 27 (02-16 @ 01:39), 25 (02-14 @ 21:50), 25 (02-13 @ 21:45), 23 (02-13 @ 16:58)  Cr: 0.96 (02-16 @ 01:39), 1.08 (02-14 @ 21:50), 1.25 (02-13 @ 21:45), 1.17 (02-13 @ 16:58)  Glu: 206(02-16 @ 01:39), 250(02-14 @ 21:50), 122(02-13 @ 21:45), 145(02-13 @ 16:58)    Hgb: 10.8 (02-14 @ 21:50), 12.4 (02-13 @ 21:45)  Hct: 33.4 (02-14 @ 21:50), 38.1 (02-13 @ 21:45)  WBC: 18.23 (02-14 @ 21:50), 25.44 (02-13 @ 21:45)  Plt: 240 (02-14 @ 21:50), 367 (02-13 @ 21:45)    INR:   PTT:     CT head   IMPRESSION:  Similar-appearing subdural collection involving the right anterior interhemispheric fissure which extends to the craniotomy site with possible direct continuation into the extra calvarial soft tissues, likely representing a pseudomeningocele.    Similar-appearing third ventricular mass with unchanged intraventricular hemorrhage and ventricular dilation.    Assessment and Plan:   · Assessment	  	  POD#4 R crani tumor resection, ETV  neuro checks q4hr  has small IVH, small subdural collections   now with pseudomeningocele   NEURO:  VPS for obstructive hydrocephalus   keppra for seizure ppx pe rNS   decadron per NS  head wrapped  NS informed  Had CSF leak, monitor for  any signs of meningitis   was on aspirin at home, NS to clear when to start aspirin   Activity: [x] OOB as tolerated [] Bedrest [x] PT [x] OT [] PMNR    PULM:  Incentive spirometry, mobilize as tolerated    CV:  HTN continue amlodipine   -150mmHg, d/c a-line in AM  Prolonged Qtc improved on repeat ECG     RENAL:  creat stable  SIADH  IVL   restrict free water intake to < 1 L   avoid nephrotoxic meds     GI:  Diet: nausea, diabetic diet  GI prophylaxis [] not indicated [x PPI [] other:  Bowel regimen [] colace [x] senna [] other:  last BM 2/14/20     ENDO:   Goal euglycemia (-180)  on lantus 25 units and increase lispro to 8 units x3 a day    sugar elevated  monitor once decadron gets weaned   endo on consult     HEME/ONC:  VTE prophylaxis: [x] SCDs [] chemoprophylaxis [] hold chemoprophylaxis due to: lovenox 40 m sc qhs     ID:  Cassidy-op antibiotics    MISC:    SOCIAL/FAMILY:  [x] awaiting [] updated at bedside [] family meeting    CODE STATUS:  [x] Full Code [] DNR [] DNI [] Palliative/Comfort Care    DISPOSITION:  [] ICU [] Stroke Unit [] Floor [] EMU [] RCU [] PCU    moderate complex medical decision

## 2020-02-16 NOTE — PROGRESS NOTE ADULT - ASSESSMENT
POD#4 R crani 3rd ventricular tumor resection    xray shunt series  MRI post op  pain control  -150  cardiology following for prolonged QTc --improved  encourage incentive spirometry  advance diet as tolerated  lovenox ppx  lantus, premeals, sliding scale

## 2020-02-16 NOTE — PROGRESS NOTE ADULT - SUBJECTIVE AND OBJECTIVE BOX
CSF leak, head wrapped per neurosurgery    vitals/labs/meds/imaging reviewed  alert, oriented x3, downward gaze, FC, LUE drift 4+/5 otherwise full strength throughout, effort dependent

## 2020-02-17 LAB
ALBUMIN SERPL ELPH-MCNC: 4.1 G/DL — SIGNIFICANT CHANGE UP (ref 3.3–5)
ALP SERPL-CCNC: 85 U/L — SIGNIFICANT CHANGE UP (ref 40–120)
ALT FLD-CCNC: 80 U/L — HIGH (ref 10–45)
ANION GAP SERPL CALC-SCNC: 13 MMOL/L — SIGNIFICANT CHANGE UP (ref 5–17)
ANION GAP SERPL CALC-SCNC: 15 MMOL/L — SIGNIFICANT CHANGE UP (ref 5–17)
APTT BLD: 34.9 SEC — SIGNIFICANT CHANGE UP (ref 27.5–36.3)
AST SERPL-CCNC: 50 U/L — HIGH (ref 10–40)
BILIRUB SERPL-MCNC: 0.7 MG/DL — SIGNIFICANT CHANGE UP (ref 0.2–1.2)
BUN SERPL-MCNC: 30 MG/DL — HIGH (ref 7–23)
BUN SERPL-MCNC: 32 MG/DL — HIGH (ref 7–23)
CALCIUM SERPL-MCNC: 8.7 MG/DL — SIGNIFICANT CHANGE UP (ref 8.4–10.5)
CALCIUM SERPL-MCNC: 9 MG/DL — SIGNIFICANT CHANGE UP (ref 8.4–10.5)
CHLORIDE SERPL-SCNC: 96 MMOL/L — SIGNIFICANT CHANGE UP (ref 96–108)
CHLORIDE SERPL-SCNC: 99 MMOL/L — SIGNIFICANT CHANGE UP (ref 96–108)
CO2 SERPL-SCNC: 20 MMOL/L — LOW (ref 22–31)
CO2 SERPL-SCNC: 22 MMOL/L — SIGNIFICANT CHANGE UP (ref 22–31)
CREAT SERPL-MCNC: 0.99 MG/DL — SIGNIFICANT CHANGE UP (ref 0.5–1.3)
CREAT SERPL-MCNC: 1 MG/DL — SIGNIFICANT CHANGE UP (ref 0.5–1.3)
GLUCOSE BLDC GLUCOMTR-MCNC: 142 MG/DL — HIGH (ref 70–99)
GLUCOSE BLDC GLUCOMTR-MCNC: 147 MG/DL — HIGH (ref 70–99)
GLUCOSE BLDC GLUCOMTR-MCNC: 273 MG/DL — HIGH (ref 70–99)
GLUCOSE BLDC GLUCOMTR-MCNC: 99 MG/DL — SIGNIFICANT CHANGE UP (ref 70–99)
GLUCOSE SERPL-MCNC: 147 MG/DL — HIGH (ref 70–99)
GLUCOSE SERPL-MCNC: 171 MG/DL — HIGH (ref 70–99)
HCT VFR BLD CALC: 42.6 % — SIGNIFICANT CHANGE UP (ref 39–50)
HGB BLD-MCNC: 13.7 G/DL — SIGNIFICANT CHANGE UP (ref 13–17)
INR BLD: 1.19 RATIO — HIGH (ref 0.88–1.16)
MAGNESIUM SERPL-MCNC: 2 MG/DL — SIGNIFICANT CHANGE UP (ref 1.6–2.6)
MAGNESIUM SERPL-MCNC: 2.1 MG/DL — SIGNIFICANT CHANGE UP (ref 1.6–2.6)
MCHC RBC-ENTMCNC: 25.3 PG — LOW (ref 27–34)
MCHC RBC-ENTMCNC: 32.2 GM/DL — SIGNIFICANT CHANGE UP (ref 32–36)
MCV RBC AUTO: 78.7 FL — LOW (ref 80–100)
NRBC # BLD: 0 /100 WBCS — SIGNIFICANT CHANGE UP (ref 0–0)
PHOSPHATE SERPL-MCNC: 2.9 MG/DL — SIGNIFICANT CHANGE UP (ref 2.5–4.5)
PHOSPHATE SERPL-MCNC: 3.1 MG/DL — SIGNIFICANT CHANGE UP (ref 2.5–4.5)
PLATELET # BLD AUTO: 284 K/UL — SIGNIFICANT CHANGE UP (ref 150–400)
POTASSIUM SERPL-MCNC: 4.1 MMOL/L — SIGNIFICANT CHANGE UP (ref 3.5–5.3)
POTASSIUM SERPL-MCNC: 4.2 MMOL/L — SIGNIFICANT CHANGE UP (ref 3.5–5.3)
POTASSIUM SERPL-SCNC: 4.1 MMOL/L — SIGNIFICANT CHANGE UP (ref 3.5–5.3)
POTASSIUM SERPL-SCNC: 4.2 MMOL/L — SIGNIFICANT CHANGE UP (ref 3.5–5.3)
PROT SERPL-MCNC: 7.8 G/DL — SIGNIFICANT CHANGE UP (ref 6–8.3)
PROTHROM AB SERPL-ACNC: 13.8 SEC — HIGH (ref 10–12.9)
RBC # BLD: 5.41 M/UL — SIGNIFICANT CHANGE UP (ref 4.2–5.8)
RBC # FLD: 14 % — SIGNIFICANT CHANGE UP (ref 10.3–14.5)
SODIUM SERPL-SCNC: 131 MMOL/L — LOW (ref 135–145)
SODIUM SERPL-SCNC: 134 MMOL/L — LOW (ref 135–145)
WBC # BLD: 12.54 K/UL — HIGH (ref 3.8–10.5)
WBC # FLD AUTO: 12.54 K/UL — HIGH (ref 3.8–10.5)

## 2020-02-17 PROCEDURE — 99232 SBSQ HOSP IP/OBS MODERATE 35: CPT

## 2020-02-17 PROCEDURE — 70450 CT HEAD/BRAIN W/O DYE: CPT | Mod: 26

## 2020-02-17 PROCEDURE — 99233 SBSQ HOSP IP/OBS HIGH 50: CPT

## 2020-02-17 RX ORDER — LANOLIN ALCOHOL/MO/W.PET/CERES
5 CREAM (GRAM) TOPICAL ONCE
Refills: 0 | Status: DISCONTINUED | OUTPATIENT
Start: 2020-02-17 | End: 2020-02-18

## 2020-02-17 RX ORDER — POLYETHYLENE GLYCOL 3350 17 G/17G
17 POWDER, FOR SOLUTION ORAL
Refills: 0 | Status: DISCONTINUED | OUTPATIENT
Start: 2020-02-17 | End: 2020-03-03

## 2020-02-17 RX ORDER — LANOLIN ALCOHOL/MO/W.PET/CERES
5 CREAM (GRAM) TOPICAL AT BEDTIME
Refills: 0 | Status: DISCONTINUED | OUTPATIENT
Start: 2020-02-17 | End: 2020-02-17

## 2020-02-17 RX ORDER — SODIUM CHLORIDE 5 G/100ML
1000 INJECTION, SOLUTION INTRAVENOUS
Refills: 0 | Status: DISCONTINUED | OUTPATIENT
Start: 2020-02-17 | End: 2020-02-18

## 2020-02-17 RX ADMIN — INSULIN GLARGINE 25 UNIT(S): 100 INJECTION, SOLUTION SUBCUTANEOUS at 21:43

## 2020-02-17 RX ADMIN — Medication 650 MILLIGRAM(S): at 21:45

## 2020-02-17 RX ADMIN — CHLORHEXIDINE GLUCONATE 1 APPLICATION(S): 213 SOLUTION TOPICAL at 21:44

## 2020-02-17 RX ADMIN — SCOPALAMINE 1 PATCH: 1 PATCH, EXTENDED RELEASE TRANSDERMAL at 16:41

## 2020-02-17 RX ADMIN — Medication 650 MILLIGRAM(S): at 14:53

## 2020-02-17 RX ADMIN — Medication 8 UNIT(S): at 17:45

## 2020-02-17 RX ADMIN — ENOXAPARIN SODIUM 40 MILLIGRAM(S): 100 INJECTION SUBCUTANEOUS at 17:01

## 2020-02-17 RX ADMIN — GABAPENTIN 300 MILLIGRAM(S): 400 CAPSULE ORAL at 05:16

## 2020-02-17 RX ADMIN — POLYETHYLENE GLYCOL 3350 17 GRAM(S): 17 POWDER, FOR SOLUTION ORAL at 21:43

## 2020-02-17 RX ADMIN — LEVETIRACETAM 400 MILLIGRAM(S): 250 TABLET, FILM COATED ORAL at 17:01

## 2020-02-17 RX ADMIN — PANTOPRAZOLE SODIUM 40 MILLIGRAM(S): 20 TABLET, DELAYED RELEASE ORAL at 21:44

## 2020-02-17 RX ADMIN — Medication 6: at 12:38

## 2020-02-17 RX ADMIN — SCOPALAMINE 1 PATCH: 1 PATCH, EXTENDED RELEASE TRANSDERMAL at 19:03

## 2020-02-17 RX ADMIN — Medication 2 MILLIGRAM(S): at 11:13

## 2020-02-17 RX ADMIN — Medication 650 MILLIGRAM(S): at 06:17

## 2020-02-17 RX ADMIN — GABAPENTIN 300 MILLIGRAM(S): 400 CAPSULE ORAL at 14:22

## 2020-02-17 RX ADMIN — Medication 1 SPRAY(S): at 05:17

## 2020-02-17 RX ADMIN — GABAPENTIN 300 MILLIGRAM(S): 400 CAPSULE ORAL at 21:44

## 2020-02-17 RX ADMIN — ATORVASTATIN CALCIUM 10 MILLIGRAM(S): 80 TABLET, FILM COATED ORAL at 21:44

## 2020-02-17 RX ADMIN — Medication 650 MILLIGRAM(S): at 22:15

## 2020-02-17 RX ADMIN — LEVETIRACETAM 400 MILLIGRAM(S): 250 TABLET, FILM COATED ORAL at 05:17

## 2020-02-17 RX ADMIN — Medication 8 UNIT(S): at 12:37

## 2020-02-17 RX ADMIN — Medication 650 MILLIGRAM(S): at 05:17

## 2020-02-17 RX ADMIN — Medication 650 MILLIGRAM(S): at 14:23

## 2020-02-17 RX ADMIN — SENNA PLUS 2 TABLET(S): 8.6 TABLET ORAL at 21:44

## 2020-02-17 RX ADMIN — Medication 1 MILLIGRAM(S): at 11:13

## 2020-02-17 RX ADMIN — Medication 2 MILLIGRAM(S): at 17:01

## 2020-02-17 RX ADMIN — AMLODIPINE BESYLATE 5 MILLIGRAM(S): 2.5 TABLET ORAL at 05:17

## 2020-02-17 RX ADMIN — Medication 2 MILLIGRAM(S): at 05:17

## 2020-02-17 RX ADMIN — Medication 1 TABLET(S): at 11:13

## 2020-02-17 RX ADMIN — DULOXETINE HYDROCHLORIDE 60 MILLIGRAM(S): 30 CAPSULE, DELAYED RELEASE ORAL at 11:13

## 2020-02-17 NOTE — PROGRESS NOTE ADULT - ASSESSMENT
63 y/o M w/ Type 2 DM currently uncontrolled on high doses of steroids admitted for craniotomy, now on basal bolus insulin    T2DM with hyperglycemia   - Pt with elevated glucose after breakfast today.   He had been NPO, then procedure was canceled and he ate breakfast prior to receiving insulin   - Continue current doses of insulin for now, Decadron was decreased,  monitor for decreasing glucose values and consider decreasing insulin doses   - Consider discharge on oral agents    HTN: COntinue amlodipine    Hyperlipidemia: continue atorvastatin

## 2020-02-17 NOTE — PROGRESS NOTE ADULT - ASSESSMENT
POD#4 R crani tumor resection, ETV  neuro checks q4hr  has small IVH, small subdural collections   now with pseudomeningocele   NEURO:  VPS for obstructive hydrocephalus   keppra for seizure ppx   dexAMETHasone  Injectable 2 milliGRAM(s) IV Push every 6 delphine  head wrapped  Had CSF leak, monitor for  any signs of meningitis , Neurosurgery aware   was on aspirin at home, NS to clear when to start aspirin   Activity: [x] OOB as tolerated [] Bedrest [x] PT [x] OT [] PMNR    PULM:  Incentive spirometry, mobilize as tolerated    CV:  HTN continue amlodipine   -150mmHg  Prolonged Qtc improved on repeat ECG     RENAL:  creat stable  SIADH  IVL   restrict free water intake to < 1 L   avoid nephrotoxic meds     GI:  Diet: nausea, diabetic diet  GI prophylaxis [] not indicated [x PPI [] other:  Bowel regimen [] colace [x] senna [] other:  last BM 2/14/20     ENDO:   Goal euglycemia (-180)  insulin glargine Injectable (LANTUS) 25 Unit(s) SubCutaneous at bedtime  insulin lispro (HumaLOG) corrective regimen sliding scale   SubCutaneous Before meals and at bedtime  insulin lispro Injectable (HumaLOG) 8 Unit(s) SubCutaneous three times a day before meals  monitor once decadron gets weaned   endo on consult     HEME/ONC:  VTE prophylaxis: [x] SCDs [] chemoprophylaxis [] hold chemoprophylaxis due to: lovenox 40 m sc qhs     ID:  Cassidy-op antibiotics    MISC:    SOCIAL/FAMILY:  [x] awaiting [] updated at bedside [] family meeting    CODE STATUS:  [x] Full Code [] DNR [] DNI [] Palliative/Comfort Care    DISPOSITION:  [] ICU [] Stroke Unit [x] Floor [] EMU [] RCU [] PCU    moderate complex medical decision POD#4 R crani tumor resection, ETV  neuro checks q4hr  has small IVH, small subdural collections   has pseudomeningocele     NEURO:  VPS for obstructive hydrocephalus   keppra for seizure ppx   dexAMETHasone  Injectable 2 milliGRAM(s) IV Push every 6 hour  head wrapped  Had CSF leak, monitor for  any signs of meningitis , Neurosurgery aware  May need EVD/ New VPS   was on aspirin at home, NS to clear when to start aspirin   Activity: [x] OOB as tolerated [] Bedrest [x] PT [x] OT [] PMNR    PULM:  Incentive spirometry, mobilize as tolerated    CV:  HTN continue amlodipine   -150mmHg  Prolonged Qtc improved on repeat ECG     RENAL:  cr stable  Hyponatremia   start Na 2% at 75 ml/hr   BMP Q 8 hours   avoid nephrotoxic meds     GI:  Diet: nausea, diabetic diet  GI prophylaxis [] not indicated [x PPI [] other:  Bowel regimen [] colace [x] senna [] other:  last BM 2/14/20     ENDO:   Goal euglycemia (-180)  insulin glargine Injectable (LANTUS) 25 Unit(s) SubCutaneous at bedtime  insulin lispro (HumaLOG) corrective regimen sliding scale   SubCutaneous Before meals and at bedtime  insulin lispro Injectable (HumaLOG) 8 Unit(s) SubCutaneous three times a day before meals  monitor once decadron gets weaned   endo on consult     HEME/ONC:  VTE prophylaxis: [x] SCDs [] chemoprophylaxis [] hold chemoprophylaxis due to: lovenox 40 m sc qhs     ID:  Afebrile   MISC:    SOCIAL/FAMILY:  [x] awaiting [] updated at bedside [] family meeting    CODE STATUS:  [x] Full Code [] DNR [] DNI [] Palliative/Comfort Care    DISPOSITION:  [] ICU [] Stroke Unit [x] Floor [] EMU [] RCU [] PCU    moderate complex medical decision

## 2020-02-17 NOTE — PROGRESS NOTE ADULT - SUBJECTIVE AND OBJECTIVE BOX
Chief complaint:   Patient is a 64y old  Male who presents with a chief complaint of Craniotomy (13 Feb 2020 13:45)    HPI:  63 yo male. 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  c/o worsening left sided paresthesia in December, hospitalized at [facility] Dec 3-17, 2019, found to have subacute SDH along R frontal lobe and large mass in third ventricle. pt was referred to neurosurgery consult and transferred to rehab facility, now presents to PST scheduled for craniotomy surgery for removal of brain mass.  ***contacted surgeon, Dr. Manley's office, s/w  Elida who had consulted with Vineet, pt to hold aspirin 1 week preop.*** (06 Feb 2020 10:22)      Stay Summary:      DAY EVENTS:      ROS: [ ]  Unable to assess due to mental status   All other systems negative    -----------------------------------------------------------------------------------------------------------------------------------------------------------------------------------  ICU Vital Signs Last 24 Hrs  T(C): 36.8 (17 Feb 2020 19:00), Max: 37.2 (16 Feb 2020 23:00)  T(F): 98.2 (17 Feb 2020 19:00), Max: 99 (16 Feb 2020 23:00)  HR: 65 (17 Feb 2020 19:00) (56 - 66)  BP: 123/64 (17 Feb 2020 19:00) (123/64 - 153/91)  BP(mean): 82 (17 Feb 2020 19:00) (82 - 108)  ABP: --  ABP(mean): --  RR: 14 (17 Feb 2020 19:00) (12 - 15)  SpO2: 99% (17 Feb 2020 19:00) (96% - 99%)      I&O's Summary    16 Feb 2020 07:01  -  17 Feb 2020 07:00  --------------------------------------------------------  IN: 1295 mL / OUT: 2970 mL / NET: -1675 mL    17 Feb 2020 07:01  -  17 Feb 2020 19:47  --------------------------------------------------------  IN: 1425 mL / OUT: 400 mL / NET: 1025 mL        MEDICATIONS  (STANDING):  amLODIPine   Tablet 5 milliGRAM(s) Oral daily  atorvastatin 10 milliGRAM(s) Oral at bedtime  chlorhexidine 4% Liquid 1 Application(s) Topical <User Schedule>  dexAMETHasone  Injectable 2 milliGRAM(s) IV Push every 6 hours  DULoxetine 60 milliGRAM(s) Oral daily  enoxaparin Injectable 40 milliGRAM(s) SubCutaneous <User Schedule>  fluticasone propionate 50 MICROgram(s)/spray Nasal Spray 1 Spray(s) Both Nostrils two times a day  folic acid 1 milliGRAM(s) Oral daily  gabapentin 300 milliGRAM(s) Oral three times a day  insulin glargine Injectable (LANTUS) 25 Unit(s) SubCutaneous at bedtime  insulin lispro (HumaLOG) corrective regimen sliding scale   SubCutaneous Before meals and at bedtime  insulin lispro Injectable (HumaLOG) 8 Unit(s) SubCutaneous three times a day before meals  levETIRAcetam  IVPB 500 milliGRAM(s) IV Intermittent every 12 hours  multivitamin 1 Tablet(s) Oral daily  pantoprazole  Injectable 40 milliGRAM(s) IV Push at bedtime  senna 2 Tablet(s) Oral at bedtime  sodium chloride 2% . 1000 milliLiter(s) (75 mL/Hr) IV Continuous <Continuous>      RESPIRATORY:        NEUROIMAGING:   Recent imaging studies were reviewed.    LAB RESULTS:                          13.7   12.54 )-----------( 284      ( 17 Feb 2020 07:48 )             42.6       PT/INR - ( 17 Feb 2020 07:48 )   PT: 13.8 sec;   INR: 1.19 ratio         PTT - ( 17 Feb 2020 07:48 )  PTT:34.9 sec    02-17    134<L>  |  99  |  32<H>  ----------------------------<  147<H>  4.1   |  20<L>  |  0.99    Ca    8.7      17 Feb 2020 15:34  Phos  2.9     02-17  Mg     2.0     02-17    TPro  7.8  /  Alb  4.1  /  TBili  0.7  /  DBili  x   /  AST  50<H>  /  ALT  80<H>  /  AlkPhos  85  02-17    -----------------------------------------------------------------------------------------------------------------------------------------------------------------------------------    PHYSICAL EXAM:  General: Calm, laying in bed  HEENT: MMM  Neuro:  -Mental status- No acute distress, AOx3, conversational, following commands  -CN- Pupils R 3mm sluggish, L 3mm brisk, limited upward gaze, tongue midline, face symmetric  -Motor-  RUE 5/5  RLE 5/5  LUE 4/5 drift  LLE 5/5  -Sensation- intact to LT   -Coordination- no dysmetria noted    CV: RRR  Pulm: Clear to auscultation  Abd: Soft, nontender, nondistended  Ext: No edema  Skin: warm, dry Chief complaint:   Patient is a 64y old  Male who presents with a chief complaint of Craniotomy (13 Feb 2020 13:45)    HPI:  65 yo male. 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  c/o worsening left sided paresthesia in December, hospitalized at [facility] Dec 3-17, 2019, found to have subacute SDH along R frontal lobe and large mass in third ventricle. pt was referred to neurosurgery consult and transferred to rehab facility, now presents to PST scheduled for craniotomy surgery for removal of brain mass.  ***contacted surgeon, Dr. Manley's office, s/w  Elida who had consulted with Vineet, pt to hold aspirin 1 week preop.*** (06 Feb 2020 10:22)    DAY EVENTS: more lethargic this morning, poor sleep overnight    ROS: headache improved with tylenol  All other systems negative    -----------------------------------------------------------------------------------------------------------------------------------------------------------------------------------  ICU Vital Signs Last 24 Hrs  T(C): 36.8 (17 Feb 2020 19:00), Max: 37.2 (16 Feb 2020 23:00)  T(F): 98.2 (17 Feb 2020 19:00), Max: 99 (16 Feb 2020 23:00)  HR: 65 (17 Feb 2020 19:00) (56 - 66)  BP: 123/64 (17 Feb 2020 19:00) (123/64 - 153/91)  BP(mean): 82 (17 Feb 2020 19:00) (82 - 108)  ABP: --  ABP(mean): --  RR: 14 (17 Feb 2020 19:00) (12 - 15)  SpO2: 99% (17 Feb 2020 19:00) (96% - 99%)      I&O's Summary    16 Feb 2020 07:01  -  17 Feb 2020 07:00  --------------------------------------------------------  IN: 1295 mL / OUT: 2970 mL / NET: -1675 mL    17 Feb 2020 07:01  -  17 Feb 2020 19:47  --------------------------------------------------------  IN: 1425 mL / OUT: 400 mL / NET: 1025 mL        MEDICATIONS  (STANDING):  amLODIPine   Tablet 5 milliGRAM(s) Oral daily  atorvastatin 10 milliGRAM(s) Oral at bedtime  chlorhexidine 4% Liquid 1 Application(s) Topical <User Schedule>  dexAMETHasone  Injectable 2 milliGRAM(s) IV Push every 6 hours  DULoxetine 60 milliGRAM(s) Oral daily  enoxaparin Injectable 40 milliGRAM(s) SubCutaneous <User Schedule>  fluticasone propionate 50 MICROgram(s)/spray Nasal Spray 1 Spray(s) Both Nostrils two times a day  folic acid 1 milliGRAM(s) Oral daily  gabapentin 300 milliGRAM(s) Oral three times a day  insulin glargine Injectable (LANTUS) 25 Unit(s) SubCutaneous at bedtime  insulin lispro (HumaLOG) corrective regimen sliding scale   SubCutaneous Before meals and at bedtime  insulin lispro Injectable (HumaLOG) 8 Unit(s) SubCutaneous three times a day before meals  levETIRAcetam  IVPB 500 milliGRAM(s) IV Intermittent every 12 hours  multivitamin 1 Tablet(s) Oral daily  pantoprazole  Injectable 40 milliGRAM(s) IV Push at bedtime  senna 2 Tablet(s) Oral at bedtime  sodium chloride 2% . 1000 milliLiter(s) (75 mL/Hr) IV Continuous <Continuous>      NEUROIMAGING:   Recent imaging studies were reviewed.    LAB RESULTS:                          13.7   12.54 )-----------( 284      ( 17 Feb 2020 07:48 )             42.6       PT/INR - ( 17 Feb 2020 07:48 )   PT: 13.8 sec;   INR: 1.19 ratio         PTT - ( 17 Feb 2020 07:48 )  PTT:34.9 sec    02-17    134<L>  |  99  |  32<H>  ----------------------------<  147<H>  4.1   |  20<L>  |  0.99    Ca    8.7      17 Feb 2020 15:34  Phos  2.9     02-17  Mg     2.0     02-17    TPro  7.8  /  Alb  4.1  /  TBili  0.7  /  DBili  x   /  AST  50<H>  /  ALT  80<H>  /  AlkPhos  85  02-17    -----------------------------------------------------------------------------------------------------------------------------------------------------------------------------------    PHYSICAL EXAM:  General: Calm, laying in bed  HEENT: MMM  Neuro:  -Mental status- No acute distress, AOx3, conversational, following commands  -CN- Pupils R 3mm sluggish, L 3mm brisk, limited upward gaze, tongue midline, face symmetric  -Motor-  RUE 5/5  RLE 5/5  LUE 4/5 drift  LLE 5/5  -Sensation- intact to LT   -Coordination- no dysmetria noted    CV: RRR  Pulm: Clear to auscultation  Abd: Soft, nontender, nondistended  Ext: No edema  Skin: warm, dry

## 2020-02-17 NOTE — PROGRESS NOTE ADULT - SUBJECTIVE AND OBJECTIVE BOX
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  C/o worsening left sided paresthesia in December, hospitalized at [facility] Dec 3-17, 2019, found to have subacute SDH along R frontal lobe and large mass in third ventricle. Pt was referred to neurosurgery consult and transferred to rehab facility, now presents to PST scheduled for craniotomy surgery for removal of brain mass.  Of note, Patient instructed to hold ASA for 1wk prior to surgery      2/13 EVENTS: Adm NSCU s/p rt crani, interhemis transcallosal approach to 3rd vent tumor resection, ETV  post op slow to wake, CTH showing slight increase in ventricular size and ventricular air  2/14: on insulin drip    2/16:  leaking of CSF from the wound     REVIEW OF SYSTEMS: [ ] Unable to Assess due to neurologic exam   [x] All ROS addressed below are non-contributory, except:  Neuro: [ ] Headache [ ] Back pain [ ] Numbness [ ] Weakness [ ] Ataxia [ ] Dizziness [ ] Aphasia [ ] Dysarthria [ ] Visual disturbance  Resp: [ ] Shortness of breath/dyspnea, [ ] Orthopnea [ ] Cough  CV: [ ] Chest pain [ ] Palpitation [ ] Lightheadedness [ ] Syncope  Renal: [ ] Thirst [ ] Edema  GI: [ ] Nausea [ ] Emesis [ ] Abdominal pain [ ] Constipation [ ] Diarrhea  Hem: [ ] Hematemesis [ ] bright red blood per rectum  ID: [ ] Fever [ ] Chills [ ] Dysuria  ENT: [ ] Rhinorrhea    EXAMINATION:  General: No acute distress  HEENT: Anicteric sclerae  Cardiac: [id number]  Lungs: Clear  Abdomen: Soft, non-tender, +BS  Extremities: No c/c/e  Skin/Incision Site: Clean, dry and intact  Neurologic: Awake, alert, orientedx3, follows commands, R pupil 3mm sluggish, L pupil 3mm brisk, able to finger count b/l without glasses, limited upward gaze, face symmetric, tongue midline, left UE  drift, RUE 5/5, RLE 5/5, left UE 4/5, left LE 5/5       ICU Vital Signs Last 24 Hrs  T(C): 37.2 (17 Feb 2020 03:00), Max: 37.3 (16 Feb 2020 15:00)  T(F): 98.9 (17 Feb 2020 03:00), Max: 99.1 (16 Feb 2020 15:00)  HR: 56 (17 Feb 2020 03:00) (56 - 61)  BP: 149/81 (17 Feb 2020 03:00) (111/72 - 150/81)  BP(mean): 103 (17 Feb 2020 03:00) (81 - 103)  RR: 12 (17 Feb 2020 03:00) (11 - 19)  SpO2: 96% (17 Feb 2020 03:00) (95% - 100%)      02-15-20 @ 07:01  -  02-16-20 @ 07:00  --------------------------------------------------------  IN: 800 mL / OUT: 3250 mL / NET: -2450 mL    02-16-20 @ 07:01  -  02-17-20 @ 06:41  --------------------------------------------------------  IN: 1295 mL / OUT: 2970 mL / NET: -1675 mL      acetaminophen   Tablet .. 650 milliGRAM(s) Oral every 6 hours PRN  amLODIPine   Tablet 5 milliGRAM(s) Oral daily  atorvastatin 10 milliGRAM(s) Oral at bedtime  chlorhexidine 4% Liquid 1 Application(s) Topical <User Schedule>  dexAMETHasone  Injectable 2 milliGRAM(s) IV Push every 6 hours  DULoxetine 60 milliGRAM(s) Oral daily  enoxaparin Injectable 40 milliGRAM(s) SubCutaneous <User Schedule>  fluticasone propionate 50 MICROgram(s)/spray Nasal Spray 1 Spray(s) Both Nostrils two times a day  folic acid 1 milliGRAM(s) Oral daily  gabapentin 300 milliGRAM(s) Oral three times a day  insulin glargine Injectable (LANTUS) 25 Unit(s) SubCutaneous at bedtime  insulin lispro (HumaLOG) corrective regimen sliding scale   SubCutaneous Before meals and at bedtime  insulin lispro Injectable (HumaLOG) 8 Unit(s) SubCutaneous three times a day before meals  levETIRAcetam  IVPB 500 milliGRAM(s) IV Intermittent every 12 hours  multivitamin 1 Tablet(s) Oral daily  pantoprazole  Injectable 40 milliGRAM(s) IV Push at bedtime  scopolamine   Patch 1 Patch Transdermal once PRN  senna 2 Tablet(s) Oral at bedtime      LABS:  Na: 131 (02-16 @ 01:39), 133 (02-14 @ 21:50)  K: 4.2 (02-16 @ 01:39), 4.1 (02-14 @ 21:50)  Cl: 98 (02-16 @ 01:39), 100 (02-14 @ 21:50)  CO2: 18 (02-16 @ 01:39), 20 (02-14 @ 21:50)  BUN: 27 (02-16 @ 01:39), 25 (02-14 @ 21:50)  Cr: 0.96 (02-16 @ 01:39), 1.08 (02-14 @ 21:50)  Glu: 206(02-16 @ 01:39), 250(02-14 @ 21:50)    Hgb: 10.8 (02-14 @ 21:50)  Hct: 33.4 (02-14 @ 21:50)  WBC: 18.23 (02-14 @ 21:50)  Plt: 240 (02-14 @ 21:50)    INR:   PTT: · Subjective and Objective:   SUMMARY: per H/P, 63yo man PMH HTN, T2DM (A1C=6.4), FELICE (pt used CPAP in the past, however he stated he lost weight and no longer has symptoms and does not use CPAP anymore), 2008- CVA with left sided paresthesia residual, 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  C/o worsening left sided paresthesia in December, hospitalized at [facility] Dec 3-17, 2019, found to have subacute SDH along R frontal lobe and large mass in third ventricle. Pt was referred to neurosurgery consult and transferred to rehab facility, now presents to PST scheduled for craniotomy surgery for removal of brain mass.  Of note, Patient instructed to hold ASA for 1wk prior to surgery      2/13 EVENTS: Adm NSCU s/p rt crani, interhemis transcallosal approach to 3rd vent tumor resection, ETV  post op slow to wake, CTH showing slight increase in ventricular size and ventricular air  2/14: on insulin drip    2/16:  leaking of CSF from the wound     REVIEW OF SYSTEMS: [ ] Unable to Assess due to neurologic exam   [x] All ROS addressed below are non-contributory, except:  Neuro: [ ] Headache [ ] Back pain [ ] Numbness [ ] Weakness [ ] Ataxia [ ] Dizziness [ ] Aphasia [ ] Dysarthria [ ] Visual disturbance  Resp: [ ] Shortness of breath/dyspnea, [ ] Orthopnea [ ] Cough  CV: [ ] Chest pain [ ] Palpitation [ ] Lightheadedness [ ] Syncope  Renal: [ ] Thirst [ ] Edema  GI: [ ] Nausea [ ] Emesis [ ] Abdominal pain [ ] Constipation [ ] Diarrhea  Hem: [ ] Hematemesis [ ] bright red blood per rectum  ID: [ ] Fever [ ] Chills [ ] Dysuria  ENT: [ ] Rhinorrhea    EXAMINATION:  General: No acute distress  HEENT: Anicteric sclerae  Cardiac: [id number]  Lungs: Clear  Abdomen: Soft, non-tender, +BS  Extremities: No c/c/e  Skin/Incision Site: Clean, dry and intact  Neurologic: Awake, alert, orientedx3, follows commands, R pupil 3mm sluggish, L pupil 3mm brisk, able to finger count b/l without glasses, limited upward gaze, face symmetric, tongue midline, left UE  drift, RUE 5/5, RLE 5/5, left UE 4/5, left LE 5/5       ICU Vital Signs Last 24 Hrs  T(C): 37.2 (17 Feb 2020 03:00), Max: 37.3 (16 Feb 2020 15:00)  T(F): 98.9 (17 Feb 2020 03:00), Max: 99.1 (16 Feb 2020 15:00)  HR: 56 (17 Feb 2020 03:00) (56 - 61)  BP: 149/81 (17 Feb 2020 03:00) (111/72 - 150/81)  BP(mean): 103 (17 Feb 2020 03:00) (81 - 103)  RR: 12 (17 Feb 2020 03:00) (11 - 19)  SpO2: 96% (17 Feb 2020 03:00) (95% - 100%)      02-15-20 @ 07:01  -  02-16-20 @ 07:00  --------------------------------------------------------  IN: 800 mL / OUT: 3250 mL / NET: -2450 mL    02-16-20 @ 07:01  -  02-17-20 @ 06:41  --------------------------------------------------------  IN: 1295 mL / OUT: 2970 mL / NET: -1675 mL      acetaminophen   Tablet .. 650 milliGRAM(s) Oral every 6 hours PRN  amLODIPine   Tablet 5 milliGRAM(s) Oral daily  atorvastatin 10 milliGRAM(s) Oral at bedtime  chlorhexidine 4% Liquid 1 Application(s) Topical <User Schedule>  dexAMETHasone  Injectable 2 milliGRAM(s) IV Push every 6 hours  DULoxetine 60 milliGRAM(s) Oral daily  enoxaparin Injectable 40 milliGRAM(s) SubCutaneous <User Schedule>  fluticasone propionate 50 MICROgram(s)/spray Nasal Spray 1 Spray(s) Both Nostrils two times a day  folic acid 1 milliGRAM(s) Oral daily  gabapentin 300 milliGRAM(s) Oral three times a day  insulin glargine Injectable (LANTUS) 25 Unit(s) SubCutaneous at bedtime  insulin lispro (HumaLOG) corrective regimen sliding scale   SubCutaneous Before meals and at bedtime  insulin lispro Injectable (HumaLOG) 8 Unit(s) SubCutaneous three times a day before meals  levETIRAcetam  IVPB 500 milliGRAM(s) IV Intermittent every 12 hours  multivitamin 1 Tablet(s) Oral daily  pantoprazole  Injectable 40 milliGRAM(s) IV Push at bedtime  scopolamine   Patch 1 Patch Transdermal once PRN  senna 2 Tablet(s) Oral at bedtime      LABS:  Na: 131 (02-16 @ 01:39), 133 (02-14 @ 21:50)  K: 4.2 (02-16 @ 01:39), 4.1 (02-14 @ 21:50)  Cl: 98 (02-16 @ 01:39), 100 (02-14 @ 21:50)  CO2: 18 (02-16 @ 01:39), 20 (02-14 @ 21:50)  BUN: 27 (02-16 @ 01:39), 25 (02-14 @ 21:50)  Cr: 0.96 (02-16 @ 01:39), 1.08 (02-14 @ 21:50)  Glu: 206(02-16 @ 01:39), 250(02-14 @ 21:50)    Hgb: 10.8 (02-14 @ 21:50)  Hct: 33.4 (02-14 @ 21:50)  WBC: 18.23 (02-14 @ 21:50)  Plt: 240 (02-14 @ 21:50)    INR:   PTT:       < from: CT Head No Cont (02.17.20 @ 08:15) >  No significant interval change from the prior exam.    Hydrocephalus with intraventricular hemorrhage.    Interhemispheric low density collection contiguous with a subgaleal fluid collection which may represent a pseudomeningocele.    < end of copied text >

## 2020-02-17 NOTE — PROGRESS NOTE ADULT - ASSESSMENT
POD#5 R crani 3rd ventricular tumor resection  has small IVH, small subdural collections   has pseudomeningocele     VPS for obstructive hydrocephalus   keppra for seizure ppx   dexAMETHasone  Injectable 2 milliGRAM(s) IV Push every 6 hour  was on aspirin at home, NS to clear when to start aspirin   -150  encourage incentive spirometry  lovenox ppx  lantus, premeals, sliding scale  neuro checks q4hr POD#5 R crani 3rd ventricular tumor resection  small IVH, small subdural collections   has pseudomeningocele     VPS for obstructive hydrocephalus   keppra for seizure ppx   dexAMETHasone  Injectable 2 milliGRAM(s) IV Push every 6 hour  was on aspirin at home, Neurosurg to clear when to start aspirin   -150  encourage incentive spirometry  lovenox ppx  lantus, premeals, sliding scale  neuro checks q4hr  cont 2% goal Na 135-145  LBM PTA- advance bowel regimen POD#5 R crani 3rd ventricular tumor resection and ETV  small IVH, small subdural collections   has pseudomeningocele     VPS for obstructive hydrocephalus   keppra for seizure ppx   post-op MRI pending  dexAMETHasone  Injectable 2 milliGRAM(s) IV Push every 6 hour  was on aspirin at home, Neurosurg to clear when to start aspirin   -150- cont norvasc  encourage incentive spirometry  lovenox ppx  lantus, premeals, sliding scale  neuro checks q4hr  cont 2% goal Na 135-145  LBM PTA- advance bowel regimen

## 2020-02-17 NOTE — PROGRESS NOTE ADULT - SUBJECTIVE AND OBJECTIVE BOX
Diabetes  Follow up note    Interval History: Pt reporting headache.  No nausea or vomiting.  Good appetite    MEDICATIONS  (STANDING):  amLODIPine   Tablet 5 milliGRAM(s) Oral daily  atorvastatin 10 milliGRAM(s) Oral at bedtime  chlorhexidine 4% Liquid 1 Application(s) Topical <User Schedule>  dexAMETHasone  Injectable 2 milliGRAM(s) IV Push every 6 hours  DULoxetine 60 milliGRAM(s) Oral daily  enoxaparin Injectable 40 milliGRAM(s) SubCutaneous <User Schedule>  fluticasone propionate 50 MICROgram(s)/spray Nasal Spray 1 Spray(s) Both Nostrils two times a day  folic acid 1 milliGRAM(s) Oral daily  gabapentin 300 milliGRAM(s) Oral three times a day  insulin glargine Injectable (LANTUS) 25 Unit(s) SubCutaneous at bedtime  insulin lispro (HumaLOG) corrective regimen sliding scale   SubCutaneous Before meals and at bedtime  insulin lispro Injectable (HumaLOG) 8 Unit(s) SubCutaneous three times a day before meals  levETIRAcetam  IVPB 500 milliGRAM(s) IV Intermittent every 12 hours  multivitamin 1 Tablet(s) Oral daily  pantoprazole  Injectable 40 milliGRAM(s) IV Push at bedtime  senna 2 Tablet(s) Oral at bedtime  sodium chloride 2% . 1000 milliLiter(s) (75 mL/Hr) IV Continuous <Continuous>    MEDICATIONS  (PRN):  acetaminophen   Tablet .. 650 milliGRAM(s) Oral every 6 hours PRN Temp greater or equal to 38C (100.4F), Mild Pain (1 - 3)      Allergies    No Known Allergies    Intolerances          PHYSICAL EXAM:  VITALS: T(C): 37.2 (02-17-20 @ 15:00)  T(F): 98.9 (02-17-20 @ 15:00), Max: 99 (02-16-20 @ 19:00)  HR: 66 (02-17-20 @ 15:00) (56 - 66)  BP: 148/86 (02-17-20 @ 15:00) (125/76 - 153/91)  RR:  (12 - 19)  SpO2:  (95% - 98%)  GENERAL: NAD,   EYES: No proptosis,  HEENT:  Atraumatic, Normocephalic,   RESPIRATORY: Clear to auscultation bilaterally  CARDIOVASCULAR: Regular rhythm; No murmurs; no peripheral edema  GI: Soft, nontender, non distended, normal bowel sounds  SKIN: Dry, intact, No rashes or lesions  MUSCULOSKELETAL: normal strength      POCT Blood Glucose.: 273 mg/dL (02-17-20 @ 12:35)  POCT Blood Glucose.: 147 mg/dL (02-17-20 @ 07:49)  POCT Blood Glucose.: 196 mg/dL (02-16-20 @ 21:54)  POCT Blood Glucose.: 142 mg/dL (02-16-20 @ 17:41)  POCT Blood Glucose.: 254 mg/dL (02-16-20 @ 12:16)  POCT Blood Glucose.: 157 mg/dL (02-16-20 @ 09:17)  POCT Blood Glucose.: 230 mg/dL (02-15-20 @ 21:39)  POCT Blood Glucose.: 131 mg/dL (02-15-20 @ 17:14)  POCT Blood Glucose.: 174 mg/dL (02-15-20 @ 12:07)  POCT Blood Glucose.: 153 mg/dL (02-15-20 @ 08:49)  POCT Blood Glucose.: 159 mg/dL (02-15-20 @ 05:15)  POCT Blood Glucose.: 165 mg/dL (02-15-20 @ 03:14)  POCT Blood Glucose.: 171 mg/dL (02-15-20 @ 00:10)  POCT Blood Glucose.: 203 mg/dL (02-14-20 @ 21:07)  POCT Blood Glucose.: 203 mg/dL (02-14-20 @ 20:10)  POCT Blood Glucose.: 184 mg/dL (02-14-20 @ 19:11)  POCT Blood Glucose.: 151 mg/dL (02-14-20 @ 18:07)  POCT Blood Glucose.: 142 mg/dL (02-14-20 @ 17:45)        02-17    134<L>  |  99  |  32<H>  ----------------------------<  147<H>  4.1   |  20<L>  |  0.99    EGFR if : 92  EGFR if non : 79    Ca    8.7      02-17  Mg     2.0     02-17  Phos  2.9     02-17    TPro  7.8  /  Alb  4.1  /  TBili  0.7  /  DBili  x   /  AST  50<H>  /  ALT  80<H>  /  AlkPhos  85  02-17        Hemoglobin A1C, Whole Blood: 6.0 % <H> [4.0 - 5.6] (02-06-20 @ 13:46)  Hemoglobin A1C, Whole Blood: 6.4 % <H> [4.0 - 5.6] (11-29-19 @ 08:25)

## 2020-02-17 NOTE — PROGRESS NOTE ADULT - SUBJECTIVE AND OBJECTIVE BOX
Subjective: Patient seen and examined. No new events except as noted.     SUBJECTIVE/ROS:  feels ok   has HA      MEDICATIONS:  MEDICATIONS  (STANDING):  amLODIPine   Tablet 5 milliGRAM(s) Oral daily  atorvastatin 10 milliGRAM(s) Oral at bedtime  chlorhexidine 4% Liquid 1 Application(s) Topical <User Schedule>  dexAMETHasone  Injectable 2 milliGRAM(s) IV Push every 6 hours  DULoxetine 60 milliGRAM(s) Oral daily  enoxaparin Injectable 40 milliGRAM(s) SubCutaneous <User Schedule>  fluticasone propionate 50 MICROgram(s)/spray Nasal Spray 1 Spray(s) Both Nostrils two times a day  folic acid 1 milliGRAM(s) Oral daily  gabapentin 300 milliGRAM(s) Oral three times a day  insulin glargine Injectable (LANTUS) 25 Unit(s) SubCutaneous at bedtime  insulin lispro (HumaLOG) corrective regimen sliding scale   SubCutaneous Before meals and at bedtime  insulin lispro Injectable (HumaLOG) 8 Unit(s) SubCutaneous three times a day before meals  levETIRAcetam  IVPB 500 milliGRAM(s) IV Intermittent every 12 hours  multivitamin 1 Tablet(s) Oral daily  pantoprazole  Injectable 40 milliGRAM(s) IV Push at bedtime  senna 2 Tablet(s) Oral at bedtime  sodium chloride 2% . 1000 milliLiter(s) (75 mL/Hr) IV Continuous <Continuous>      PHYSICAL EXAM:  T(C): 37.1 (02-17-20 @ 07:00), Max: 37.3 (02-16-20 @ 15:00)  HR: 59 (02-17-20 @ 07:00) (56 - 61)  BP: 153/91 (02-17-20 @ 07:00) (111/72 - 153/91)  RR: 15 (02-17-20 @ 07:00) (12 - 19)  SpO2: 96% (02-17-20 @ 07:00) (95% - 100%)  Wt(kg): --  I&O's Summary    16 Feb 2020 07:01  -  17 Feb 2020 07:00  --------------------------------------------------------  IN: 1295 mL / OUT: 2970 mL / NET: -1675 mL            JVP: Normal  Neck: supple  Lung: clear   CV: S1 S2 , Murmur:  Abd: soft  Ext: No edema  neuro: Awake / alert  Psych: flat affect  Skin: normal``    LABS/DATA:    CARDIAC MARKERS:                                13.7   12.54 )-----------( 284      ( 17 Feb 2020 07:48 )             42.6     02-16    131<L>  |  98  |  27<H>  ----------------------------<  206<H>  4.2   |  18<L>  |  0.96    Ca    8.6      16 Feb 2020 01:39      proBNP:   Lipid Profile:   HgA1c:   TSH:     TELE:  EKG:

## 2020-02-18 LAB
ANION GAP SERPL CALC-SCNC: 10 MMOL/L — SIGNIFICANT CHANGE UP (ref 5–17)
ANION GAP SERPL CALC-SCNC: 11 MMOL/L — SIGNIFICANT CHANGE UP (ref 5–17)
ANION GAP SERPL CALC-SCNC: 13 MMOL/L — SIGNIFICANT CHANGE UP (ref 5–17)
BUN SERPL-MCNC: 28 MG/DL — HIGH (ref 7–23)
BUN SERPL-MCNC: 32 MG/DL — HIGH (ref 7–23)
BUN SERPL-MCNC: 32 MG/DL — HIGH (ref 7–23)
CALCIUM SERPL-MCNC: 7.9 MG/DL — LOW (ref 8.4–10.5)
CALCIUM SERPL-MCNC: 8 MG/DL — LOW (ref 8.4–10.5)
CALCIUM SERPL-MCNC: 8 MG/DL — LOW (ref 8.4–10.5)
CHLORIDE SERPL-SCNC: 103 MMOL/L — SIGNIFICANT CHANGE UP (ref 96–108)
CHLORIDE SERPL-SCNC: 104 MMOL/L — SIGNIFICANT CHANGE UP (ref 96–108)
CHLORIDE SERPL-SCNC: 105 MMOL/L — SIGNIFICANT CHANGE UP (ref 96–108)
CO2 SERPL-SCNC: 18 MMOL/L — LOW (ref 22–31)
CO2 SERPL-SCNC: 19 MMOL/L — LOW (ref 22–31)
CO2 SERPL-SCNC: 20 MMOL/L — LOW (ref 22–31)
CREAT SERPL-MCNC: 0.86 MG/DL — SIGNIFICANT CHANGE UP (ref 0.5–1.3)
CREAT SERPL-MCNC: 0.92 MG/DL — SIGNIFICANT CHANGE UP (ref 0.5–1.3)
CREAT SERPL-MCNC: 0.99 MG/DL — SIGNIFICANT CHANGE UP (ref 0.5–1.3)
CULTURE RESULTS: NO GROWTH — SIGNIFICANT CHANGE UP
CULTURE RESULTS: NO GROWTH — SIGNIFICANT CHANGE UP
GLUCOSE BLDC GLUCOMTR-MCNC: 135 MG/DL — HIGH (ref 70–99)
GLUCOSE BLDC GLUCOMTR-MCNC: 152 MG/DL — HIGH (ref 70–99)
GLUCOSE BLDC GLUCOMTR-MCNC: 158 MG/DL — HIGH (ref 70–99)
GLUCOSE SERPL-MCNC: 117 MG/DL — HIGH (ref 70–99)
GLUCOSE SERPL-MCNC: 145 MG/DL — HIGH (ref 70–99)
GLUCOSE SERPL-MCNC: 207 MG/DL — HIGH (ref 70–99)
HCT VFR BLD CALC: 37.1 % — LOW (ref 39–50)
HGB BLD-MCNC: 12.1 G/DL — LOW (ref 13–17)
MAGNESIUM SERPL-MCNC: 1.9 MG/DL — SIGNIFICANT CHANGE UP (ref 1.6–2.6)
MAGNESIUM SERPL-MCNC: 1.9 MG/DL — SIGNIFICANT CHANGE UP (ref 1.6–2.6)
MAGNESIUM SERPL-MCNC: 2 MG/DL — SIGNIFICANT CHANGE UP (ref 1.6–2.6)
MCHC RBC-ENTMCNC: 25.7 PG — LOW (ref 27–34)
MCHC RBC-ENTMCNC: 32.6 GM/DL — SIGNIFICANT CHANGE UP (ref 32–36)
MCV RBC AUTO: 78.9 FL — LOW (ref 80–100)
NRBC # BLD: 0 /100 WBCS — SIGNIFICANT CHANGE UP (ref 0–0)
PHOSPHATE SERPL-MCNC: 2.5 MG/DL — SIGNIFICANT CHANGE UP (ref 2.5–4.5)
PHOSPHATE SERPL-MCNC: 2.8 MG/DL — SIGNIFICANT CHANGE UP (ref 2.5–4.5)
PHOSPHATE SERPL-MCNC: 2.8 MG/DL — SIGNIFICANT CHANGE UP (ref 2.5–4.5)
PLATELET # BLD AUTO: 286 K/UL — SIGNIFICANT CHANGE UP (ref 150–400)
POTASSIUM SERPL-MCNC: 4 MMOL/L — SIGNIFICANT CHANGE UP (ref 3.5–5.3)
POTASSIUM SERPL-MCNC: 4.2 MMOL/L — SIGNIFICANT CHANGE UP (ref 3.5–5.3)
POTASSIUM SERPL-MCNC: 4.4 MMOL/L — SIGNIFICANT CHANGE UP (ref 3.5–5.3)
POTASSIUM SERPL-SCNC: 4 MMOL/L — SIGNIFICANT CHANGE UP (ref 3.5–5.3)
POTASSIUM SERPL-SCNC: 4.2 MMOL/L — SIGNIFICANT CHANGE UP (ref 3.5–5.3)
POTASSIUM SERPL-SCNC: 4.4 MMOL/L — SIGNIFICANT CHANGE UP (ref 3.5–5.3)
RBC # BLD: 4.7 M/UL — SIGNIFICANT CHANGE UP (ref 4.2–5.8)
RBC # FLD: 13.9 % — SIGNIFICANT CHANGE UP (ref 10.3–14.5)
SODIUM SERPL-SCNC: 134 MMOL/L — LOW (ref 135–145)
SODIUM SERPL-SCNC: 134 MMOL/L — LOW (ref 135–145)
SODIUM SERPL-SCNC: 135 MMOL/L — SIGNIFICANT CHANGE UP (ref 135–145)
SPECIMEN SOURCE: SIGNIFICANT CHANGE UP
SPECIMEN SOURCE: SIGNIFICANT CHANGE UP
SURGICAL PATHOLOGY STUDY: SIGNIFICANT CHANGE UP
WBC # BLD: 12.14 K/UL — HIGH (ref 3.8–10.5)
WBC # FLD AUTO: 12.14 K/UL — HIGH (ref 3.8–10.5)

## 2020-02-18 PROCEDURE — 99233 SBSQ HOSP IP/OBS HIGH 50: CPT

## 2020-02-18 RX ORDER — OXYCODONE HYDROCHLORIDE 5 MG/1
10 TABLET ORAL EVERY 4 HOURS
Refills: 0 | Status: DISCONTINUED | OUTPATIENT
Start: 2020-02-18 | End: 2020-02-23

## 2020-02-18 RX ORDER — SODIUM CHLORIDE 9 MG/ML
2 INJECTION INTRAMUSCULAR; INTRAVENOUS; SUBCUTANEOUS EVERY 6 HOURS
Refills: 0 | Status: DISCONTINUED | OUTPATIENT
Start: 2020-02-18 | End: 2020-03-02

## 2020-02-18 RX ORDER — TRAMADOL HYDROCHLORIDE 50 MG/1
25 TABLET ORAL ONCE
Refills: 0 | Status: DISCONTINUED | OUTPATIENT
Start: 2020-02-18 | End: 2020-02-18

## 2020-02-18 RX ORDER — LANOLIN ALCOHOL/MO/W.PET/CERES
5 CREAM (GRAM) TOPICAL AT BEDTIME
Refills: 0 | Status: DISCONTINUED | OUTPATIENT
Start: 2020-02-18 | End: 2020-03-04

## 2020-02-18 RX ORDER — PANTOPRAZOLE SODIUM 20 MG/1
40 TABLET, DELAYED RELEASE ORAL AT BEDTIME
Refills: 0 | Status: DISCONTINUED | OUTPATIENT
Start: 2020-02-18 | End: 2020-03-04

## 2020-02-18 RX ORDER — OXYCODONE HYDROCHLORIDE 5 MG/1
5 TABLET ORAL EVERY 4 HOURS
Refills: 0 | Status: DISCONTINUED | OUTPATIENT
Start: 2020-02-18 | End: 2020-02-23

## 2020-02-18 RX ORDER — LACTULOSE 10 G/15ML
10 SOLUTION ORAL EVERY 8 HOURS
Refills: 0 | Status: COMPLETED | OUTPATIENT
Start: 2020-02-18 | End: 2020-02-19

## 2020-02-18 RX ORDER — LEVETIRACETAM 250 MG/1
500 TABLET, FILM COATED ORAL
Refills: 0 | Status: DISCONTINUED | OUTPATIENT
Start: 2020-02-18 | End: 2020-02-25

## 2020-02-18 RX ADMIN — LEVETIRACETAM 500 MILLIGRAM(S): 250 TABLET, FILM COATED ORAL at 19:22

## 2020-02-18 RX ADMIN — TRAMADOL HYDROCHLORIDE 25 MILLIGRAM(S): 50 TABLET ORAL at 02:15

## 2020-02-18 RX ADMIN — Medication 2 MILLIGRAM(S): at 19:10

## 2020-02-18 RX ADMIN — GABAPENTIN 300 MILLIGRAM(S): 400 CAPSULE ORAL at 15:44

## 2020-02-18 RX ADMIN — SODIUM CHLORIDE 2 GRAM(S): 9 INJECTION INTRAMUSCULAR; INTRAVENOUS; SUBCUTANEOUS at 12:31

## 2020-02-18 RX ADMIN — SODIUM CHLORIDE 2 GRAM(S): 9 INJECTION INTRAMUSCULAR; INTRAVENOUS; SUBCUTANEOUS at 19:27

## 2020-02-18 RX ADMIN — POLYETHYLENE GLYCOL 3350 17 GRAM(S): 17 POWDER, FOR SOLUTION ORAL at 05:14

## 2020-02-18 RX ADMIN — SODIUM CHLORIDE 100 MILLILITER(S): 5 INJECTION, SOLUTION INTRAVENOUS at 21:01

## 2020-02-18 RX ADMIN — SODIUM CHLORIDE 100 MILLILITER(S): 5 INJECTION, SOLUTION INTRAVENOUS at 05:12

## 2020-02-18 RX ADMIN — Medication 1 SPRAY(S): at 05:13

## 2020-02-18 RX ADMIN — Medication 650 MILLIGRAM(S): at 08:42

## 2020-02-18 RX ADMIN — DULOXETINE HYDROCHLORIDE 60 MILLIGRAM(S): 30 CAPSULE, DELAYED RELEASE ORAL at 12:30

## 2020-02-18 RX ADMIN — ENOXAPARIN SODIUM 40 MILLIGRAM(S): 100 INJECTION SUBCUTANEOUS at 19:08

## 2020-02-18 RX ADMIN — Medication 8 UNIT(S): at 09:04

## 2020-02-18 RX ADMIN — SENNA PLUS 2 TABLET(S): 8.6 TABLET ORAL at 21:49

## 2020-02-18 RX ADMIN — GABAPENTIN 300 MILLIGRAM(S): 400 CAPSULE ORAL at 21:50

## 2020-02-18 RX ADMIN — SCOPALAMINE 1 PATCH: 1 PATCH, EXTENDED RELEASE TRANSDERMAL at 19:20

## 2020-02-18 RX ADMIN — PANTOPRAZOLE SODIUM 40 MILLIGRAM(S): 20 TABLET, DELAYED RELEASE ORAL at 21:56

## 2020-02-18 RX ADMIN — SODIUM CHLORIDE 100 MILLILITER(S): 5 INJECTION, SOLUTION INTRAVENOUS at 19:27

## 2020-02-18 RX ADMIN — Medication 650 MILLIGRAM(S): at 21:30

## 2020-02-18 RX ADMIN — LACTULOSE 10 GRAM(S): 10 SOLUTION ORAL at 21:49

## 2020-02-18 RX ADMIN — Medication 1 SPRAY(S): at 19:24

## 2020-02-18 RX ADMIN — ATORVASTATIN CALCIUM 10 MILLIGRAM(S): 80 TABLET, FILM COATED ORAL at 21:51

## 2020-02-18 RX ADMIN — Medication 2 MILLIGRAM(S): at 05:12

## 2020-02-18 RX ADMIN — Medication 650 MILLIGRAM(S): at 21:00

## 2020-02-18 RX ADMIN — TRAMADOL HYDROCHLORIDE 25 MILLIGRAM(S): 50 TABLET ORAL at 02:45

## 2020-02-18 RX ADMIN — GABAPENTIN 300 MILLIGRAM(S): 400 CAPSULE ORAL at 05:12

## 2020-02-18 RX ADMIN — LACTULOSE 10 GRAM(S): 10 SOLUTION ORAL at 15:45

## 2020-02-18 RX ADMIN — Medication 650 MILLIGRAM(S): at 09:30

## 2020-02-18 RX ADMIN — Medication 2 MILLIGRAM(S): at 12:29

## 2020-02-18 RX ADMIN — POLYETHYLENE GLYCOL 3350 17 GRAM(S): 17 POWDER, FOR SOLUTION ORAL at 19:23

## 2020-02-18 RX ADMIN — Medication 8 UNIT(S): at 17:45

## 2020-02-18 RX ADMIN — INSULIN GLARGINE 25 UNIT(S): 100 INJECTION, SOLUTION SUBCUTANEOUS at 21:49

## 2020-02-18 RX ADMIN — CHLORHEXIDINE GLUCONATE 1 APPLICATION(S): 213 SOLUTION TOPICAL at 21:49

## 2020-02-18 RX ADMIN — SCOPALAMINE 1 PATCH: 1 PATCH, EXTENDED RELEASE TRANSDERMAL at 07:21

## 2020-02-18 RX ADMIN — Medication 1 MILLIGRAM(S): at 12:30

## 2020-02-18 RX ADMIN — LEVETIRACETAM 400 MILLIGRAM(S): 250 TABLET, FILM COATED ORAL at 05:12

## 2020-02-18 RX ADMIN — Medication 2: at 18:00

## 2020-02-18 RX ADMIN — Medication 2 MILLIGRAM(S): at 00:57

## 2020-02-18 RX ADMIN — Medication 1 TABLET(S): at 12:31

## 2020-02-18 RX ADMIN — Medication 2: at 12:31

## 2020-02-18 RX ADMIN — AMLODIPINE BESYLATE 5 MILLIGRAM(S): 2.5 TABLET ORAL at 05:12

## 2020-02-18 RX ADMIN — Medication 8 UNIT(S): at 12:32

## 2020-02-18 NOTE — PROGRESS NOTE ADULT - SUBJECTIVE AND OBJECTIVE BOX
Chief complaint:   Patient is a 64y old  Male who presents with a chief complaint of tumor resection (17 Feb 2020 19:47)    HPI:  65 yo male. 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  c/o worsening left sided paresthesia in December, hospitalized at [facility] Dec 3-17, 2019, found to have subacute SDH along R frontal lobe and large mass in third ventricle. pt was referred to neurosurgery consult and transferred to rehab facility, now presents to PST scheduled for craniotomy surgery for removal of brain mass.  ***contacted surgeon, Dr. Manley's office, s/w  Elida who had consulted with Vineet, pt to hold aspirin 1 week preop.*** (06 Feb 2020 10:22)    DAY EVENTS:      ROS: [ ]  Unable to assess due to mental status   All other systems negative    -----------------------------------------------------------------------------------------------------------------------------------------------------------------------------------  ICU Vital Signs Last 24 Hrs  T(C): 36.7 (18 Feb 2020 19:00), Max: 37.1 (18 Feb 2020 03:00)  T(F): 98 (18 Feb 2020 19:00), Max: 98.8 (18 Feb 2020 03:00)  HR: 60 (18 Feb 2020 19:00) (52 - 81)  BP: 126/74 (18 Feb 2020 19:00) (101/76 - 142/78)  BP(mean): 91 (18 Feb 2020 19:00) (81 - 97)  ABP: --  ABP(mean): --  RR: 13 (18 Feb 2020 19:00) (10 - 17)  SpO2: 99% (18 Feb 2020 19:00) (95% - 100%)      I&O's Summary    17 Feb 2020 07:01  -  18 Feb 2020 07:00  --------------------------------------------------------  IN: 2675 mL / OUT: 1550 mL / NET: 1125 mL    18 Feb 2020 07:01  -  18 Feb 2020 21:01  --------------------------------------------------------  IN: 2340 mL / OUT: 1450 mL / NET: 890 mL        MEDICATIONS  (STANDING):  amLODIPine   Tablet 5 milliGRAM(s) Oral daily  atorvastatin 10 milliGRAM(s) Oral at bedtime  chlorhexidine 4% Liquid 1 Application(s) Topical <User Schedule>  dexAMETHasone  Injectable 2 milliGRAM(s) IV Push every 6 hours  DULoxetine 60 milliGRAM(s) Oral daily  enoxaparin Injectable 40 milliGRAM(s) SubCutaneous <User Schedule>  fluticasone propionate 50 MICROgram(s)/spray Nasal Spray 1 Spray(s) Both Nostrils two times a day  folic acid 1 milliGRAM(s) Oral daily  gabapentin 300 milliGRAM(s) Oral three times a day  insulin glargine Injectable (LANTUS) 25 Unit(s) SubCutaneous at bedtime  insulin lispro (HumaLOG) corrective regimen sliding scale   SubCutaneous Before meals and at bedtime  insulin lispro Injectable (HumaLOG) 8 Unit(s) SubCutaneous three times a day before meals  lactulose Syrup 10 Gram(s) Oral every 8 hours  levETIRAcetam 500 milliGRAM(s) Oral two times a day  multivitamin 1 Tablet(s) Oral daily  pantoprazole    Tablet 40 milliGRAM(s) Oral at bedtime  polyethylene glycol 3350 17 Gram(s) Oral two times a day  senna 2 Tablet(s) Oral at bedtime  sodium chloride 2 Gram(s) Oral every 6 hours  sodium chloride 2% . 1000 milliLiter(s) (100 mL/Hr) IV Continuous <Continuous>      NEUROIMAGING:   Recent imaging studies were reviewed.    LAB RESULTS:                          12.1   12.14 )-----------( 286      ( 17 Feb 2020 23:59 )             37.1       PT/INR - ( 17 Feb 2020 07:48 )   PT: 13.8 sec;   INR: 1.19 ratio         PTT - ( 17 Feb 2020 07:48 )  PTT:34.9 sec    02-18    134<L>  |  104  |  32<H>  ----------------------------<  207<H>  4.4   |  19<L>  |  0.99    Ca    8.0<L>      18 Feb 2020 16:13  Phos  2.5     02-18  Mg     1.9     02-18    TPro  7.8  /  Alb  4.1  /  TBili  0.7  /  DBili  x   /  AST  50<H>  /  ALT  80<H>  /  AlkPhos  85  02-17      -----------------------------------------------------------------------------------------------------------------------------------------------------------------------------------    PHYSICAL EXAM:  General: Calm, laying in bed  HEENT: MMM  Neuro:  -Mental status- No acute distress, AOx3, conversational, following commands  -CN- Pupils R 3mm sluggish, L 3mm brisk, limited upward gaze, tongue midline, face symmetric  -Motor-  RUE 5/5  RLE 5/5  LUE 4/5 drift  LLE 5/5  -Sensation- intact to LT   -Coordination- no dysmetria noted    CV: RRR  Pulm: Clear to auscultation  Abd: Soft, nontender, nondistended  Ext: No edema  Skin: warm, dry Chief complaint:   Patient is a 64y old  Male who presents with a chief complaint of tumor resection (17 Feb 2020 19:47)    HPI:  63 yo male. 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  c/o worsening left sided paresthesia in December, hospitalized at [facility] Dec 3-17, 2019, found to have subacute SDH along R frontal lobe and large mass in third ventricle. pt was referred to neurosurgery consult and transferred to rehab facility, now presents to PST scheduled for craniotomy surgery for removal of brain mass.  ***contacted surgeon, Dr. Manley's office, s/w  Elida who had consulted with Vineet, pt to hold aspirin 1 week preop.*** (06 Feb 2020 10:22)    DAY EVENTS:       ROS: [ ]  Unable to assess due to mental status   All other systems negative    -----------------------------------------------------------------------------------------------------------------------------------------------------------------------------------  ICU Vital Signs Last 24 Hrs  T(C): 36.7 (18 Feb 2020 19:00), Max: 37.1 (18 Feb 2020 03:00)  T(F): 98 (18 Feb 2020 19:00), Max: 98.8 (18 Feb 2020 03:00)  HR: 60 (18 Feb 2020 19:00) (52 - 81)  BP: 126/74 (18 Feb 2020 19:00) (101/76 - 142/78)  BP(mean): 91 (18 Feb 2020 19:00) (81 - 97)  ABP: --  ABP(mean): --  RR: 13 (18 Feb 2020 19:00) (10 - 17)  SpO2: 99% (18 Feb 2020 19:00) (95% - 100%)      I&O's Summary    17 Feb 2020 07:01  -  18 Feb 2020 07:00  --------------------------------------------------------  IN: 2675 mL / OUT: 1550 mL / NET: 1125 mL    18 Feb 2020 07:01  -  18 Feb 2020 21:01  --------------------------------------------------------  IN: 2340 mL / OUT: 1450 mL / NET: 890 mL        MEDICATIONS  (STANDING):  amLODIPine   Tablet 5 milliGRAM(s) Oral daily  atorvastatin 10 milliGRAM(s) Oral at bedtime  chlorhexidine 4% Liquid 1 Application(s) Topical <User Schedule>  dexAMETHasone  Injectable 2 milliGRAM(s) IV Push every 6 hours  DULoxetine 60 milliGRAM(s) Oral daily  enoxaparin Injectable 40 milliGRAM(s) SubCutaneous <User Schedule>  fluticasone propionate 50 MICROgram(s)/spray Nasal Spray 1 Spray(s) Both Nostrils two times a day  folic acid 1 milliGRAM(s) Oral daily  gabapentin 300 milliGRAM(s) Oral three times a day  insulin glargine Injectable (LANTUS) 25 Unit(s) SubCutaneous at bedtime  insulin lispro (HumaLOG) corrective regimen sliding scale   SubCutaneous Before meals and at bedtime  insulin lispro Injectable (HumaLOG) 8 Unit(s) SubCutaneous three times a day before meals  lactulose Syrup 10 Gram(s) Oral every 8 hours  levETIRAcetam 500 milliGRAM(s) Oral two times a day  multivitamin 1 Tablet(s) Oral daily  pantoprazole    Tablet 40 milliGRAM(s) Oral at bedtime  polyethylene glycol 3350 17 Gram(s) Oral two times a day  senna 2 Tablet(s) Oral at bedtime  sodium chloride 2 Gram(s) Oral every 6 hours  sodium chloride 2% . 1000 milliLiter(s) (100 mL/Hr) IV Continuous <Continuous>      NEUROIMAGING:   Recent imaging studies were reviewed.    LAB RESULTS:                          12.1   12.14 )-----------( 286      ( 17 Feb 2020 23:59 )             37.1       PT/INR - ( 17 Feb 2020 07:48 )   PT: 13.8 sec;   INR: 1.19 ratio         PTT - ( 17 Feb 2020 07:48 )  PTT:34.9 sec    02-18    134<L>  |  104  |  32<H>  ----------------------------<  207<H>  4.4   |  19<L>  |  0.99    Ca    8.0<L>      18 Feb 2020 16:13  Phos  2.5     02-18  Mg     1.9     02-18    TPro  7.8  /  Alb  4.1  /  TBili  0.7  /  DBili  x   /  AST  50<H>  /  ALT  80<H>  /  AlkPhos  85  02-17      -----------------------------------------------------------------------------------------------------------------------------------------------------------------------------------    PHYSICAL EXAM:  General: Calm, laying in bed  HEENT: MMM  Neuro:  -Mental status- No acute distress, AOx3, conversational, following commands  -CN- Pupils R 3mm sluggish, L 3mm brisk, limited upward gaze, tongue midline, face symmetric  -Motor-  RUE 5/5  RLE 5/5  LUE 4/5 drift  LLE 5/5  -Sensation- intact to LT   -Coordination- no dysmetria noted    CV: RRR  Pulm: Clear to auscultation  Abd: Soft, nontender, nondistended  Ext: No edema  Skin: warm, dry Chief complaint:   Patient is a 64y old  Male who presents with a chief complaint of tumor resection (17 Feb 2020 19:47)    HPI:  63 yo male. 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  c/o worsening left sided paresthesia in December, hospitalized at [facility] Dec 3-17, 2019, found to have subacute SDH along R frontal lobe and large mass in third ventricle. pt was referred to neurosurgery consult and transferred to rehab facility, now presents to PST scheduled for craniotomy surgery for removal of brain mass.  ***contacted surgeon, Dr. Manley's office, s/w  Elida who had consulted with Vineet, pt to hold aspirin 1 week preop.*** (06 Feb 2020 10:22)    DAY EVENTS:   no issues    ROS: headache  All other systems negative    -----------------------------------------------------------------------------------------------------------------------------------------------------------------------------------  ICU Vital Signs Last 24 Hrs  T(C): 36.7 (18 Feb 2020 19:00), Max: 37.1 (18 Feb 2020 03:00)  T(F): 98 (18 Feb 2020 19:00), Max: 98.8 (18 Feb 2020 03:00)  HR: 60 (18 Feb 2020 19:00) (52 - 81)  BP: 126/74 (18 Feb 2020 19:00) (101/76 - 142/78)  BP(mean): 91 (18 Feb 2020 19:00) (81 - 97)  ABP: --  ABP(mean): --  RR: 13 (18 Feb 2020 19:00) (10 - 17)  SpO2: 99% (18 Feb 2020 19:00) (95% - 100%)      I&O's Summary    17 Feb 2020 07:01  -  18 Feb 2020 07:00  --------------------------------------------------------  IN: 2675 mL / OUT: 1550 mL / NET: 1125 mL    18 Feb 2020 07:01  -  18 Feb 2020 21:01  --------------------------------------------------------  IN: 2340 mL / OUT: 1450 mL / NET: 890 mL        MEDICATIONS  (STANDING):  amLODIPine   Tablet 5 milliGRAM(s) Oral daily  atorvastatin 10 milliGRAM(s) Oral at bedtime  chlorhexidine 4% Liquid 1 Application(s) Topical <User Schedule>  dexAMETHasone  Injectable 2 milliGRAM(s) IV Push every 6 hours  DULoxetine 60 milliGRAM(s) Oral daily  enoxaparin Injectable 40 milliGRAM(s) SubCutaneous <User Schedule>  fluticasone propionate 50 MICROgram(s)/spray Nasal Spray 1 Spray(s) Both Nostrils two times a day  folic acid 1 milliGRAM(s) Oral daily  gabapentin 300 milliGRAM(s) Oral three times a day  insulin glargine Injectable (LANTUS) 25 Unit(s) SubCutaneous at bedtime  insulin lispro (HumaLOG) corrective regimen sliding scale   SubCutaneous Before meals and at bedtime  insulin lispro Injectable (HumaLOG) 8 Unit(s) SubCutaneous three times a day before meals  lactulose Syrup 10 Gram(s) Oral every 8 hours  levETIRAcetam 500 milliGRAM(s) Oral two times a day  multivitamin 1 Tablet(s) Oral daily  pantoprazole    Tablet 40 milliGRAM(s) Oral at bedtime  polyethylene glycol 3350 17 Gram(s) Oral two times a day  senna 2 Tablet(s) Oral at bedtime  sodium chloride 2 Gram(s) Oral every 6 hours  sodium chloride 2% . 1000 milliLiter(s) (100 mL/Hr) IV Continuous <Continuous>      NEUROIMAGING:   Recent imaging studies were reviewed.    LAB RESULTS:                          12.1   12.14 )-----------( 286      ( 17 Feb 2020 23:59 )             37.1       PT/INR - ( 17 Feb 2020 07:48 )   PT: 13.8 sec;   INR: 1.19 ratio         PTT - ( 17 Feb 2020 07:48 )  PTT:34.9 sec    02-18    134<L>  |  104  |  32<H>  ----------------------------<  207<H>  4.4   |  19<L>  |  0.99    Ca    8.0<L>      18 Feb 2020 16:13  Phos  2.5     02-18  Mg     1.9     02-18    TPro  7.8  /  Alb  4.1  /  TBili  0.7  /  DBili  x   /  AST  50<H>  /  ALT  80<H>  /  AlkPhos  85  02-17      -----------------------------------------------------------------------------------------------------------------------------------------------------------------------------------    PHYSICAL EXAM:  General: Calm, laying in bed  HEENT: MMM  Neuro:  -Mental status- No acute distress, AOx3, conversational, following commands  -CN- Pupils R 3mm sluggish, L 3mm brisk, limited upward gaze, tongue midline, face symmetric  -Motor-  RUE 5/5  RLE 5/5  LUE 4/5 drift  LLE 5/5  -Sensation- intact to LT   -Coordination- no dysmetria noted    CV: RRR  Pulm: Clear to auscultation  Abd: Soft, nontender, nondistended  Ext: No edema  Skin: warm, dry

## 2020-02-18 NOTE — PROGRESS NOTE ADULT - ASSESSMENT
POD#4 R crani tumor resection, ETV  neuro checks q4hr  has small IVH, small subdural collections   has pseudomeningocele     NEURO:  has VPS for obstructive hydrocephalus   keppra for seizure ppx   dexAMETHasone  Injectable 2 milliGRAM(s) IV Push every 6 hour  head wrapped, exam remained stable    was on aspirin at home, NS to clear when to start aspirin   Activity: [x] OOB as tolerated [] Bedrest [x] PT [x] OT [] PMNR    PULM:  Incentive spirometry, mobilize as tolerated    CV:  HTN continue amlodipine   -150mmHg  Prolonged Qtc improved on repeat ECG     RENAL:  cr stable  Hyponatremia   sodium chloride 2% . 1000 milliLiter(s) (100 mL/Hr) IV Continuous  BMP Q 8 hours   avoid nephrotoxic meds     GI:  Diet: diabetic diet  GI prophylaxis [] not indicated [x PPI [] other:  Bowel regimen [] colace [x] senna [] other:  last BM 2/14/20     ENDO:   Goal euglycemia (-180)  insulin glargine Injectable (LANTUS) 25 Unit(s) SubCutaneous at bedtime  insulin lispro (HumaLOG) corrective regimen sliding scale   SubCutaneous Before meals and at bedtime  insulin lispro Injectable (HumaLOG) 8 Unit(s) SubCutaneous three times a day before meals  monitor once decadron gets weaned   endo on consult     HEME/ONC:  VTE prophylaxis: [x] SCDs [] chemoprophylaxis [] hold chemoprophylaxis due to: lovenox 40 m sc qhs     ID:  Afebrile   MISC:    SOCIAL/FAMILY:  [x] awaiting [] updated at bedside [] family meeting    CODE STATUS:  [x] Full Code [] DNR [] DNI [] Palliative/Comfort Care    DISPOSITION:  [] ICU [] Stroke Unit [x] Floor [] EMU [] RCU [] PCU    moderate complex medical decision POD#6 R crani tumor resection, ETV  neuro checks q4hr  has small IVH, small subdural collections   has pseudomeningocele     NEURO:  has VPS for obstructive hydrocephalus   keppra for seizure ppx   dexAMETHasone  Injectable 2 milliGRAM(s) IV Push every 6 hour  head wrapped, exam remained stable    was on aspirin at home, NS to clear when to start aspirin   Activity: [x] OOB as tolerated [] Bedrest [x] PT [x] OT [] PMNR    PULM:  Incentive spirometry, mobilize as tolerated    CV:  HTN continue amlodipine   -150mmHg  Prolonged Qtc improved on repeat ECG     RENAL:  cr stable  Hyponatremia   sodium chloride 2% . 1000 milliLiter(s) (100 mL/Hr) IV Continuous  start Salt tabs   BMP Q 8 hours   avoid nephrotoxic meds     GI:  Diet: diabetic diet  GI prophylaxis [] not indicated [x PPI [] other:  Bowel regimen [] colace [x] senna [] other:  last BM 2/14/20 : start lactulose Q 4hour until BM     ENDO:   Goal euglycemia (-180)  insulin glargine Injectable (LANTUS) 25 Unit(s) SubCutaneous at bedtime  insulin lispro (HumaLOG) corrective regimen sliding scale   SubCutaneous Before meals and at bedtime  insulin lispro Injectable (HumaLOG) 8 Unit(s) SubCutaneous three times a day before meals  monitor once decadron gets weaned   endo on consult     HEME/ONC:  VTE prophylaxis: [x] SCDs [] chemoprophylaxis [] hold chemoprophylaxis due to: lovenox 40 m sc qhs     ID:  Afebrile   MISC:    SOCIAL/FAMILY:  [x] awaiting [] updated at bedside [] family meeting    CODE STATUS:  [x] Full Code [] DNR [] DNI [] Palliative/Comfort Care    DISPOSITION:  [] ICU [] Stroke Unit [x] Floor [] EMU [] RCU [] PCU    moderate complex medical decision

## 2020-02-18 NOTE — PROVIDER CONTACT NOTE (OTHER) - ASSESSMENT
Pt states mass has been there prior to admission and denies pain. Bowel sounds present and normoactive. Stomach obese but not distended. Abdomen remains soft.

## 2020-02-18 NOTE — PROGRESS NOTE ADULT - ASSESSMENT
POD#6 R crani 3rd ventricular tumor resection and ETV  small IVH, small subdural collections   has pseudomeningocele     VPS for obstructive hydrocephalus   keppra for seizure ppx   post-op MRI pending  dexAMETHasone  Injectable 2 milliGRAM(s) IV Push every 6 hour  was on aspirin at home, Neurosurg to clear when to start aspirin   -150- cont norvasc  encourage incentive spirometry  lovenox ppx  lantus, premeals, sliding scale  neuro checks q4hr  cont 2% goal Na 135-145, salt tabs  LBM PTA- advance bowel regimen POD#6 R crani 3rd ventricular tumor resection and ETV  small IVH, small subdural collections   has pseudomeningocele     VPS for obstructive hydrocephalus   keppra for seizure ppx   post-op MRI pending  dexAMETHasone  Injectable 2 milliGRAM(s) IV Push every 6 hour  was on aspirin at home, Neurosurg to clear when to start aspirin   -150- cont norvasc  encourage incentive spirometry  lovenox ppx  lantus, premeals, sliding scale  neuro checks q4hr  goal Na 135-145, salt tabs  LBM PTA- advance bowel regimen, lactulose started  start melatonin

## 2020-02-18 NOTE — PROVIDER CONTACT NOTE (OTHER) - ACTION/TREATMENT ORDERED:
Will monitor for hydro. CT scan in am
Continue to monitor as pt denies any discomfort or pain at this time. Administer Miralax at this time and increase bowel regimen meds if no bowel movement.

## 2020-02-18 NOTE — PROGRESS NOTE ADULT - ASSESSMENT
Prolong QT  due to antiemetic administration   resolved   stable on tele     HTN  labile  cont current meds

## 2020-02-18 NOTE — PROGRESS NOTE ADULT - SUBJECTIVE AND OBJECTIVE BOX
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  C/o worsening left sided paresthesia in December, hospitalized at [facility] Dec 3-17, 2019, found to have subacute SDH along R frontal lobe and large mass in third ventricle. Pt was referred to neurosurgery consult and transferred to rehab facility, now presents to PST scheduled for craniotomy surgery for removal of brain mass.  Of note, Patient instructed to hold ASA for 1wk prior to surgery      2/13 EVENTS: Adm NSCU s/p rt crani, interhemis transcallosal approach to 3rd vent tumor resection, ETV  post op slow to wake, CTH showing slight increase in ventricular size and ventricular air  2/14: on insulin drip    2/16:  leaking of CSF from the wound   No acute events overnight     REVIEW OF SYSTEMS: [ ] Unable to Assess due to neurologic exam   [x] All ROS addressed below are non-contributory, except:  Neuro: [ ] Headache [ ] Back pain [ ] Numbness [ ] Weakness [ ] Ataxia [ ] Dizziness [ ] Aphasia [ ] Dysarthria [ ] Visual disturbance  Resp: [ ] Shortness of breath/dyspnea, [ ] Orthopnea [ ] Cough  CV: [ ] Chest pain [ ] Palpitation [ ] Lightheadedness [ ] Syncope  Renal: [ ] Thirst [ ] Edema  GI: [ ] Nausea [ ] Emesis [ ] Abdominal pain [ ] Constipation [ ] Diarrhea  Hem: [ ] Hematemesis [ ] bright red blood per rectum  ID: [ ] Fever [ ] Chills [ ] Dysuria  ENT: [ ] Rhinorrhea    EXAMINATION:  General: No acute distress  HEENT: Anicteric sclerae  Cardiac: [id number]  Lungs: Clear  Abdomen: Soft, non-tender, +BS  Extremities: No c/c/e  Skin/Incision Site: Clean, dry and intact  Neurologic: Awake, alert, orientedx3, follows commands, R pupil 3mm sluggish, L pupil 3mm brisk, able to finger count b/l without glasses, limited upward gaze, face symmetric, tongue midline, left UE  drift, RUE 5/5, RLE 5/5, left UE 4/5, left LE 5/5             ICU Vital Signs Last 24 Hrs  T(C): 37.1 (18 Feb 2020 03:00), Max: 37.2 (17 Feb 2020 15:00)  T(F): 98.8 (18 Feb 2020 03:00), Max: 98.9 (17 Feb 2020 15:00)  HR: 53 (18 Feb 2020 06:00) (53 - 66)  BP: 139/77 (18 Feb 2020 06:00) (123/64 - 153/91)  BP(mean): 96 (18 Feb 2020 06:00) (82 - 108)  RR: 10 (18 Feb 2020 06:00) (10 - 15)  SpO2: 99% (18 Feb 2020 06:00) (96% - 99%)      02-16-20 @ 07:01  -  02-17-20 @ 07:00  --------------------------------------------------------  IN: 1295 mL / OUT: 2970 mL / NET: -1675 mL    02-17-20 @ 07:01  -  02-18-20 @ 06:48  --------------------------------------------------------  IN: 2675 mL / OUT: 1550 mL / NET: 1125 mL            acetaminophen   Tablet .. 650 milliGRAM(s) Oral every 6 hours PRN  amLODIPine   Tablet 5 milliGRAM(s) Oral daily  atorvastatin 10 milliGRAM(s) Oral at bedtime  chlorhexidine 4% Liquid 1 Application(s) Topical <User Schedule>  dexAMETHasone  Injectable 2 milliGRAM(s) IV Push every 6 hours  DULoxetine 60 milliGRAM(s) Oral daily  enoxaparin Injectable 40 milliGRAM(s) SubCutaneous <User Schedule>  fluticasone propionate 50 MICROgram(s)/spray Nasal Spray 1 Spray(s) Both Nostrils two times a day  folic acid 1 milliGRAM(s) Oral daily  gabapentin 300 milliGRAM(s) Oral three times a day  insulin glargine Injectable (LANTUS) 25 Unit(s) SubCutaneous at bedtime  insulin lispro (HumaLOG) corrective regimen sliding scale   SubCutaneous Before meals and at bedtime  insulin lispro Injectable (HumaLOG) 8 Unit(s) SubCutaneous three times a day before meals  levETIRAcetam  IVPB 500 milliGRAM(s) IV Intermittent every 12 hours  multivitamin 1 Tablet(s) Oral daily  pantoprazole  Injectable 40 milliGRAM(s) IV Push at bedtime  polyethylene glycol 3350 17 Gram(s) Oral two times a day  senna 2 Tablet(s) Oral at bedtime  sodium chloride 2% . 1000 milliLiter(s) (100 mL/Hr) IV Continuous <Continuous>      LABS:  Na: 134 (02-17 @ 23:59), 134 (02-17 @ 15:34), 131 (02-17 @ 07:48), 131 (02-16 @ 01:39)  K: 4.2 (02-17 @ 23:59), 4.1 (02-17 @ 15:34), 4.2 (02-17 @ 07:48), 4.2 (02-16 @ 01:39)  Cl: 103 (02-17 @ 23:59), 99 (02-17 @ 15:34), 96 (02-17 @ 07:48), 98 (02-16 @ 01:39)  CO2: 18 (02-17 @ 23:59), 20 (02-17 @ 15:34), 22 (02-17 @ 07:48), 18 (02-16 @ 01:39)  BUN: 32 (02-17 @ 23:59), 32 (02-17 @ 15:34), 30 (02-17 @ 07:48), 27 (02-16 @ 01:39)  Cr: 0.92 (02-17 @ 23:59), 0.99 (02-17 @ 15:34), 1.00 (02-17 @ 07:48), 0.96 (02-16 @ 01:39)  Glu: 145(02-17 @ 23:59), 147(02-17 @ 15:34), 171(02-17 @ 07:48), 206(02-16 @ 01:39)    Hgb: 12.1 (02-17 @ 23:59), 13.7 (02-17 @ 07:48)  Hct: 37.1 (02-17 @ 23:59), 42.6 (02-17 @ 07:48)  WBC: 12.14 (02-17 @ 23:59), 12.54 (02-17 @ 07:48)  Plt: 286 (02-17 @ 23:59), 284 (02-17 @ 07:48)    INR: 1.19 02-17-20 @ 07:48  PTT: 34.9 02-17-20 @ 07:48          LIVER FUNCTIONS - ( 17 Feb 2020 07:48 )  Alb: 4.1 g/dL / Pro: 7.8 g/dL / ALK PHOS: 85 U/L / ALT: 80 U/L / AST: 50 U/L / GGT: x

## 2020-02-18 NOTE — PROGRESS NOTE ADULT - SUBJECTIVE AND OBJECTIVE BOX
Subjective: Patient seen and examined. No new events except as noted.     SUBJECTIVE/ROS:  NAD      MEDICATIONS:  MEDICATIONS  (STANDING):  amLODIPine   Tablet 5 milliGRAM(s) Oral daily  atorvastatin 10 milliGRAM(s) Oral at bedtime  chlorhexidine 4% Liquid 1 Application(s) Topical <User Schedule>  dexAMETHasone  Injectable 2 milliGRAM(s) IV Push every 6 hours  DULoxetine 60 milliGRAM(s) Oral daily  enoxaparin Injectable 40 milliGRAM(s) SubCutaneous <User Schedule>  fluticasone propionate 50 MICROgram(s)/spray Nasal Spray 1 Spray(s) Both Nostrils two times a day  folic acid 1 milliGRAM(s) Oral daily  gabapentin 300 milliGRAM(s) Oral three times a day  insulin glargine Injectable (LANTUS) 25 Unit(s) SubCutaneous at bedtime  insulin lispro (HumaLOG) corrective regimen sliding scale   SubCutaneous Before meals and at bedtime  insulin lispro Injectable (HumaLOG) 8 Unit(s) SubCutaneous three times a day before meals  levETIRAcetam  IVPB 500 milliGRAM(s) IV Intermittent every 12 hours  multivitamin 1 Tablet(s) Oral daily  pantoprazole  Injectable 40 milliGRAM(s) IV Push at bedtime  polyethylene glycol 3350 17 Gram(s) Oral two times a day  senna 2 Tablet(s) Oral at bedtime  sodium chloride 2% . 1000 milliLiter(s) (100 mL/Hr) IV Continuous <Continuous>      PHYSICAL EXAM:  T(C): 37.1 (02-18-20 @ 03:00), Max: 37.2 (02-17-20 @ 15:00)  HR: 53 (02-18-20 @ 06:00) (53 - 66)  BP: 139/77 (02-18-20 @ 06:00) (123/64 - 148/86)  RR: 10 (02-18-20 @ 06:00) (10 - 14)  SpO2: 99% (02-18-20 @ 06:00) (97% - 99%)  Wt(kg): --  I&O's Summary    17 Feb 2020 07:01  -  18 Feb 2020 07:00  --------------------------------------------------------  IN: 2675 mL / OUT: 1550 mL / NET: 1125 mL            JVP: Normal  Neck: supple  Lung: clear   CV: S1 S2 , Murmur:  Abd: soft  Ext: No edema  neuro: Awake   Psych: flat affect  Skin: normal``    LABS/DATA:    CARDIAC MARKERS:                                12.1   12.14 )-----------( 286      ( 17 Feb 2020 23:59 )             37.1     02-17    134<L>  |  103  |  32<H>  ----------------------------<  145<H>  4.2   |  18<L>  |  0.92    Ca    7.9<L>      17 Feb 2020 23:59  Phos  2.8     02-17  Mg     2.0     02-17    TPro  7.8  /  Alb  4.1  /  TBili  0.7  /  DBili  x   /  AST  50<H>  /  ALT  80<H>  /  AlkPhos  85  02-17    proBNP:   Lipid Profile:   HgA1c:   TSH:     TELE:  EKG:

## 2020-02-19 LAB
GLUCOSE BLDC GLUCOMTR-MCNC: 109 MG/DL — HIGH (ref 70–99)
GLUCOSE BLDC GLUCOMTR-MCNC: 111 MG/DL — HIGH (ref 70–99)
GLUCOSE BLDC GLUCOMTR-MCNC: 156 MG/DL — HIGH (ref 70–99)
GLUCOSE BLDC GLUCOMTR-MCNC: 170 MG/DL — HIGH (ref 70–99)
GLUCOSE BLDC GLUCOMTR-MCNC: 183 MG/DL — HIGH (ref 70–99)
GLUCOSE BLDC GLUCOMTR-MCNC: 198 MG/DL — HIGH (ref 70–99)

## 2020-02-19 PROCEDURE — 93010 ELECTROCARDIOGRAM REPORT: CPT

## 2020-02-19 PROCEDURE — 99233 SBSQ HOSP IP/OBS HIGH 50: CPT

## 2020-02-19 RX ORDER — ONDANSETRON 8 MG/1
4 TABLET, FILM COATED ORAL ONCE
Refills: 0 | Status: COMPLETED | OUTPATIENT
Start: 2020-02-19 | End: 2020-02-19

## 2020-02-19 RX ORDER — DEXAMETHASONE 0.5 MG/5ML
2 ELIXIR ORAL EVERY 8 HOURS
Refills: 0 | Status: DISCONTINUED | OUTPATIENT
Start: 2020-02-19 | End: 2020-02-20

## 2020-02-19 RX ADMIN — SODIUM CHLORIDE 2 GRAM(S): 9 INJECTION INTRAMUSCULAR; INTRAVENOUS; SUBCUTANEOUS at 17:27

## 2020-02-19 RX ADMIN — GABAPENTIN 300 MILLIGRAM(S): 400 CAPSULE ORAL at 14:57

## 2020-02-19 RX ADMIN — Medication 2: at 18:31

## 2020-02-19 RX ADMIN — POLYETHYLENE GLYCOL 3350 17 GRAM(S): 17 POWDER, FOR SOLUTION ORAL at 17:27

## 2020-02-19 RX ADMIN — POLYETHYLENE GLYCOL 3350 17 GRAM(S): 17 POWDER, FOR SOLUTION ORAL at 05:23

## 2020-02-19 RX ADMIN — LEVETIRACETAM 500 MILLIGRAM(S): 250 TABLET, FILM COATED ORAL at 05:23

## 2020-02-19 RX ADMIN — Medication 1 TABLET(S): at 11:55

## 2020-02-19 RX ADMIN — OXYCODONE HYDROCHLORIDE 10 MILLIGRAM(S): 5 TABLET ORAL at 20:15

## 2020-02-19 RX ADMIN — ENOXAPARIN SODIUM 40 MILLIGRAM(S): 100 INJECTION SUBCUTANEOUS at 17:27

## 2020-02-19 RX ADMIN — Medication 2 MILLIGRAM(S): at 22:11

## 2020-02-19 RX ADMIN — Medication 2 MILLIGRAM(S): at 05:23

## 2020-02-19 RX ADMIN — Medication 8 UNIT(S): at 18:31

## 2020-02-19 RX ADMIN — Medication 5 MILLIGRAM(S): at 22:10

## 2020-02-19 RX ADMIN — GABAPENTIN 300 MILLIGRAM(S): 400 CAPSULE ORAL at 05:23

## 2020-02-19 RX ADMIN — LACTULOSE 10 GRAM(S): 10 SOLUTION ORAL at 05:23

## 2020-02-19 RX ADMIN — LEVETIRACETAM 500 MILLIGRAM(S): 250 TABLET, FILM COATED ORAL at 17:27

## 2020-02-19 RX ADMIN — Medication 2 MILLIGRAM(S): at 11:48

## 2020-02-19 RX ADMIN — OXYCODONE HYDROCHLORIDE 10 MILLIGRAM(S): 5 TABLET ORAL at 21:17

## 2020-02-19 RX ADMIN — PANTOPRAZOLE SODIUM 40 MILLIGRAM(S): 20 TABLET, DELAYED RELEASE ORAL at 22:11

## 2020-02-19 RX ADMIN — AMLODIPINE BESYLATE 5 MILLIGRAM(S): 2.5 TABLET ORAL at 05:23

## 2020-02-19 RX ADMIN — SODIUM CHLORIDE 2 GRAM(S): 9 INJECTION INTRAMUSCULAR; INTRAVENOUS; SUBCUTANEOUS at 11:48

## 2020-02-19 RX ADMIN — Medication 1 SPRAY(S): at 17:28

## 2020-02-19 RX ADMIN — LACTULOSE 10 GRAM(S): 10 SOLUTION ORAL at 14:56

## 2020-02-19 RX ADMIN — SODIUM CHLORIDE 2 GRAM(S): 9 INJECTION INTRAMUSCULAR; INTRAVENOUS; SUBCUTANEOUS at 00:13

## 2020-02-19 RX ADMIN — Medication 2 MILLIGRAM(S): at 17:27

## 2020-02-19 RX ADMIN — SCOPALAMINE 1 PATCH: 1 PATCH, EXTENDED RELEASE TRANSDERMAL at 07:00

## 2020-02-19 RX ADMIN — ONDANSETRON 4 MILLIGRAM(S): 8 TABLET, FILM COATED ORAL at 01:53

## 2020-02-19 RX ADMIN — INSULIN GLARGINE 25 UNIT(S): 100 INJECTION, SOLUTION SUBCUTANEOUS at 22:10

## 2020-02-19 RX ADMIN — Medication 1 SPRAY(S): at 05:23

## 2020-02-19 RX ADMIN — SODIUM CHLORIDE 2 GRAM(S): 9 INJECTION INTRAMUSCULAR; INTRAVENOUS; SUBCUTANEOUS at 22:11

## 2020-02-19 RX ADMIN — GABAPENTIN 300 MILLIGRAM(S): 400 CAPSULE ORAL at 22:11

## 2020-02-19 RX ADMIN — Medication 2: at 22:10

## 2020-02-19 RX ADMIN — ATORVASTATIN CALCIUM 10 MILLIGRAM(S): 80 TABLET, FILM COATED ORAL at 22:11

## 2020-02-19 RX ADMIN — Medication 1 MILLIGRAM(S): at 11:48

## 2020-02-19 RX ADMIN — Medication 2 MILLIGRAM(S): at 00:12

## 2020-02-19 RX ADMIN — Medication 5 MILLIGRAM(S): at 00:12

## 2020-02-19 RX ADMIN — SODIUM CHLORIDE 2 GRAM(S): 9 INJECTION INTRAMUSCULAR; INTRAVENOUS; SUBCUTANEOUS at 05:23

## 2020-02-19 RX ADMIN — DULOXETINE HYDROCHLORIDE 60 MILLIGRAM(S): 30 CAPSULE, DELAYED RELEASE ORAL at 11:48

## 2020-02-19 RX ADMIN — OXYCODONE HYDROCHLORIDE 5 MILLIGRAM(S): 5 TABLET ORAL at 00:42

## 2020-02-19 RX ADMIN — SENNA PLUS 2 TABLET(S): 8.6 TABLET ORAL at 22:11

## 2020-02-19 RX ADMIN — Medication 8 UNIT(S): at 12:38

## 2020-02-19 RX ADMIN — SCOPALAMINE 1 PATCH: 1 PATCH, EXTENDED RELEASE TRANSDERMAL at 20:00

## 2020-02-19 RX ADMIN — OXYCODONE HYDROCHLORIDE 5 MILLIGRAM(S): 5 TABLET ORAL at 00:12

## 2020-02-19 RX ADMIN — Medication 2: at 12:39

## 2020-02-19 NOTE — PROGRESS NOTE ADULT - SUBJECTIVE AND OBJECTIVE BOX
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  C/o worsening left sided paresthesia in December, hospitalized at [facility] Dec 3-17, 2019, found to have subacute SDH along R frontal lobe and large mass in third ventricle. Pt was referred to neurosurgery consult and transferred to rehab facility, now presents to PST scheduled for craniotomy surgery for removal of brain mass.  Of note, Patient instructed to hold ASA for 1wk prior to surgery      2/13 EVENTS: Adm NSCU s/p rt crani, interhemis transcallosal approach to 3rd vent tumor resection, ETV  post op slow to wake, CTH showing slight increase in ventricular size and ventricular air  2/14: on insulin drip  2/16:  leaking of CSF from the wound   No acute events overnight     REVIEW OF SYSTEMS: [ ] Unable to Assess due to neurologic exam   [x] All ROS addressed below are non-contributory, except:  Neuro: [ ] Headache [ ] Back pain [ ] Numbness [ ] Weakness [ ] Ataxia [ ] Dizziness [ ] Aphasia [ ] Dysarthria [ ] Visual disturbance  Resp: [ ] Shortness of breath/dyspnea, [ ] Orthopnea [ ] Cough  CV: [ ] Chest pain [ ] Palpitation [ ] Lightheadedness [ ] Syncope  Renal: [ ] Thirst [ ] Edema  GI: [ ] Nausea [ ] Emesis [ ] Abdominal pain [ ] Constipation [ ] Diarrhea  Hem: [ ] Hematemesis [ ] bright red blood per rectum  ID: [ ] Fever [ ] Chills [ ] Dysuria  ENT: [ ] Rhinorrhea            ICU Vital Signs Last 24 Hrs  T(C): 36.5 (19 Feb 2020 03:00), Max: 36.7 (18 Feb 2020 12:00)  T(F): 97.7 (19 Feb 2020 03:00), Max: 98.1 (18 Feb 2020 12:00)  HR: 59 (19 Feb 2020 03:00) (52 - 81)  BP: 156/85 (19 Feb 2020 03:00) (101/76 - 156/85)  BP(mean): 102 (19 Feb 2020 03:00) (81 - 102)  ABP: --  ABP(mean): --  RR: 11 (19 Feb 2020 03:00) (11 - 17)  SpO2: 97% (19 Feb 2020 03:00) (95% - 100%)      02-17-20 @ 07:01  -  02-18-20 @ 07:00  --------------------------------------------------------  IN: 2675 mL / OUT: 1550 mL / NET: 1125 mL    02-18-20 @ 07:01  -  02-19-20 @ 06:59  --------------------------------------------------------  IN: 3000 mL / OUT: 2250 mL / NET: 750 mL            acetaminophen   Tablet .. 650 milliGRAM(s) Oral every 6 hours PRN  amLODIPine   Tablet 5 milliGRAM(s) Oral daily  atorvastatin 10 milliGRAM(s) Oral at bedtime  chlorhexidine 4% Liquid 1 Application(s) Topical <User Schedule>  dexAMETHasone  Injectable 2 milliGRAM(s) IV Push every 6 hours  DULoxetine 60 milliGRAM(s) Oral daily  enoxaparin Injectable 40 milliGRAM(s) SubCutaneous <User Schedule>  fluticasone propionate 50 MICROgram(s)/spray Nasal Spray 1 Spray(s) Both Nostrils two times a day  folic acid 1 milliGRAM(s) Oral daily  gabapentin 300 milliGRAM(s) Oral three times a day  insulin glargine Injectable (LANTUS) 25 Unit(s) SubCutaneous at bedtime  insulin lispro (HumaLOG) corrective regimen sliding scale   SubCutaneous Before meals and at bedtime  insulin lispro Injectable (HumaLOG) 8 Unit(s) SubCutaneous three times a day before meals  lactulose Syrup 10 Gram(s) Oral every 8 hours  levETIRAcetam 500 milliGRAM(s) Oral two times a day  melatonin 5 milliGRAM(s) Oral at bedtime  multivitamin 1 Tablet(s) Oral daily  oxyCODONE    IR 5 milliGRAM(s) Oral every 4 hours PRN  oxyCODONE    IR 10 milliGRAM(s) Oral every 4 hours PRN  pantoprazole    Tablet 40 milliGRAM(s) Oral at bedtime  polyethylene glycol 3350 17 Gram(s) Oral two times a day  senna 2 Tablet(s) Oral at bedtime  sodium chloride 2 Gram(s) Oral every 6 hours      LABS:  Na: 134 (02-18 @ 16:13), 135 (02-18 @ 08:29), 134 (02-17 @ 23:59), 134 (02-17 @ 15:34), 131 (02-17 @ 07:48)  K: 4.4 (02-18 @ 16:13), 4.0 (02-18 @ 08:29), 4.2 (02-17 @ 23:59), 4.1 (02-17 @ 15:34), 4.2 (02-17 @ 07:48)  Cl: 104 (02-18 @ 16:13), 105 (02-18 @ 08:29), 103 (02-17 @ 23:59), 99 (02-17 @ 15:34), 96 (02-17 @ 07:48)  CO2: 19 (02-18 @ 16:13), 20 (02-18 @ 08:29), 18 (02-17 @ 23:59), 20 (02-17 @ 15:34), 22 (02-17 @ 07:48)  BUN: 32 (02-18 @ 16:13), 28 (02-18 @ 08:29), 32 (02-17 @ 23:59), 32 (02-17 @ 15:34), 30 (02-17 @ 07:48)  Cr: 0.99 (02-18 @ 16:13), 0.86 (02-18 @ 08:29), 0.92 (02-17 @ 23:59), 0.99 (02-17 @ 15:34), 1.00 (02-17 @ 07:48)  Glu: 207(02-18 @ 16:13), 117(02-18 @ 08:29), 145(02-17 @ 23:59), 147(02-17 @ 15:34), 171(02-17 @ 07:48)    Hgb: 12.1 (02-17 @ 23:59), 13.7 (02-17 @ 07:48)  Hct: 37.1 (02-17 @ 23:59), 42.6 (02-17 @ 07:48)  WBC: 12.14 (02-17 @ 23:59), 12.54 (02-17 @ 07:48)  Plt: 286 (02-17 @ 23:59), 284 (02-17 @ 07:48)    INR: 1.19 02-17-20 @ 07:48  PTT: 34.9 02-17-20 @ 07:48          LIVER FUNCTIONS - ( 17 Feb 2020 07:48 )  Alb: 4.1 g/dL / Pro: 7.8 g/dL / ALK PHOS: 85 U/L / ALT: 80 U/L / AST: 50 U/L / GGT: x                   EXAMINATION:  General: No acute distress  HEENT: Anicteric sclerae  Cardiac: [id number]  Lungs: Clear  Abdomen: Soft, non-tender, +BS  Extremities: No c/c/e  Skin/Incision Site: Clean, dry and intact  Neurologic: Awake, alert, orientedx3, follows commands, R pupil 3mm sluggish, L pupil 3mm brisk, able to finger count b/l without glasses, limited upward gaze, face symmetric, tongue midline, left UE  drift, RUE 5/5, RLE 5/5, left UE 4/5, left LE 5/5 · Subjective and Objective:   SUMMARY: per H/P, 63yo man PMH HTN, T2DM (A1C=6.4), FELICE (pt used CPAP in the past, however he stated he lost weight and no longer has symptoms and does not use CPAP anymore), 2008- CVA with left sided paresthesia residual, 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  C/o worsening left sided paresthesia in December, hospitalized at [facility] Dec 3-17, 2019, found to have subacute SDH along R frontal lobe and large mass in third ventricle. Pt was referred to neurosurgery consult and transferred to rehab facility, now presents to PST scheduled for craniotomy surgery for removal of brain mass.  Of note, Patient instructed to hold ASA for 1wk prior to surgery      2/13 EVENTS: Adm NSCU s/p rt crani, interhemis transcallosal approach to 3rd vent tumor resection, ETV  post op slow to wake, CTH showing slight increase in ventricular size and ventricular air  2/14: on insulin drip  2/16:  leaking of CSF from the wound   No acute events overnight     REVIEW OF SYSTEMS: [ ] Unable to Assess due to neurologic exam   [x] All ROS addressed below are non-contributory, except:  Neuro: [ ] Headache [ ] Back pain [ ] Numbness [ ] Weakness [ ] Ataxia [ ] Dizziness [ ] Aphasia [ ] Dysarthria [ ] Visual disturbance  Resp: [ ] Shortness of breath/dyspnea, [ ] Orthopnea [ ] Cough  CV: [ ] Chest pain [ ] Palpitation [ ] Lightheadedness [ ] Syncope  Renal: [ ] Thirst [ ] Edema  GI: [ ] Nausea [ ] Emesis [ ] Abdominal pain [ ] Constipation [ ] Diarrhea  Hem: [ ] Hematemesis [ ] bright red blood per rectum  ID: [ ] Fever [ ] Chills [ ] Dysuria  ENT: [ ] Rhinorrhea        ICU Vital Signs Last 24 Hrs    T(C): 36.5 (19 Feb 2020 03:00), Max: 36.7 (18 Feb 2020 12:00)  T(F): 97.7 (19 Feb 2020 03:00), Max: 98.1 (18 Feb 2020 12:00)  HR: 59 (19 Feb 2020 03:00) (52 - 81)  BP: 156/85 (19 Feb 2020 03:00) (101/76 - 156/85)  BP(mean): 102 (19 Feb 2020 03:00) (81 - 102)  RR: 11 (19 Feb 2020 03:00) (11 - 17)  SpO2: 97% (19 Feb 2020 03:00) (95% - 100%)      02-17-20 @ 07:01  -  02-18-20 @ 07:00  --------------------------------------------------------  IN: 2675 mL / OUT: 1550 mL / NET: 1125 mL    02-18-20 @ 07:01  -  02-19-20 @ 06:59  --------------------------------------------------------  IN: 3000 mL / OUT: 2250 mL / NET: 750 mL      acetaminophen   Tablet .. 650 milliGRAM(s) Oral every 6 hours PRN  amLODIPine   Tablet 5 milliGRAM(s) Oral daily  atorvastatin 10 milliGRAM(s) Oral at bedtime  chlorhexidine 4% Liquid 1 Application(s) Topical <User Schedule>  dexAMETHasone  Injectable 2 milliGRAM(s) IV Push every 6 hours  DULoxetine 60 milliGRAM(s) Oral daily  enoxaparin Injectable 40 milliGRAM(s) SubCutaneous <User Schedule>  fluticasone propionate 50 MICROgram(s)/spray Nasal Spray 1 Spray(s) Both Nostrils two times a day  folic acid 1 milliGRAM(s) Oral daily  gabapentin 300 milliGRAM(s) Oral three times a day  insulin glargine Injectable (LANTUS) 25 Unit(s) SubCutaneous at bedtime  insulin lispro (HumaLOG) corrective regimen sliding scale   SubCutaneous Before meals and at bedtime  insulin lispro Injectable (HumaLOG) 8 Unit(s) SubCutaneous three times a day before meals  lactulose Syrup 10 Gram(s) Oral every 8 hours  levETIRAcetam 500 milliGRAM(s) Oral two times a day  melatonin 5 milliGRAM(s) Oral at bedtime  multivitamin 1 Tablet(s) Oral daily  oxyCODONE    IR 5 milliGRAM(s) Oral every 4 hours PRN  oxyCODONE    IR 10 milliGRAM(s) Oral every 4 hours PRN  pantoprazole    Tablet 40 milliGRAM(s) Oral at bedtime  polyethylene glycol 3350 17 Gram(s) Oral two times a day  senna 2 Tablet(s) Oral at bedtime  sodium chloride 2 Gram(s) Oral every 6 hours      LABS:  Na: 134 (02-18 @ 16:13), 135 (02-18 @ 08:29), 134 (02-17 @ 23:59), 134 (02-17 @ 15:34), 131 (02-17 @ 07:48)  K: 4.4 (02-18 @ 16:13), 4.0 (02-18 @ 08:29), 4.2 (02-17 @ 23:59), 4.1 (02-17 @ 15:34), 4.2 (02-17 @ 07:48)  Cl: 104 (02-18 @ 16:13), 105 (02-18 @ 08:29), 103 (02-17 @ 23:59), 99 (02-17 @ 15:34), 96 (02-17 @ 07:48)  CO2: 19 (02-18 @ 16:13), 20 (02-18 @ 08:29), 18 (02-17 @ 23:59), 20 (02-17 @ 15:34), 22 (02-17 @ 07:48)  BUN: 32 (02-18 @ 16:13), 28 (02-18 @ 08:29), 32 (02-17 @ 23:59), 32 (02-17 @ 15:34), 30 (02-17 @ 07:48)  Cr: 0.99 (02-18 @ 16:13), 0.86 (02-18 @ 08:29), 0.92 (02-17 @ 23:59), 0.99 (02-17 @ 15:34), 1.00 (02-17 @ 07:48)  Glu: 207(02-18 @ 16:13), 117(02-18 @ 08:29), 145(02-17 @ 23:59), 147(02-17 @ 15:34), 171(02-17 @ 07:48)    Hgb: 12.1 (02-17 @ 23:59), 13.7 (02-17 @ 07:48)  Hct: 37.1 (02-17 @ 23:59), 42.6 (02-17 @ 07:48)  WBC: 12.14 (02-17 @ 23:59), 12.54 (02-17 @ 07:48)  Plt: 286 (02-17 @ 23:59), 284 (02-17 @ 07:48)    INR: 1.19 02-17-20 @ 07:48  PTT: 34.9 02-17-20 @ 07:48          LIVER FUNCTIONS - ( 17 Feb 2020 07:48 )  Alb: 4.1 g/dL / Pro: 7.8 g/dL / ALK PHOS: 85 U/L / ALT: 80 U/L / AST: 50 U/L / GGT: x                   EXAMINATION:  General: No acute distress  HEENT: Anicteric sclerae  Cardiac: [id number]  Lungs: Clear  Abdomen: Soft, non-tender, +BS  Extremities: No c/c/e  Skin/Incision Site: Clean, dry and intact  Neurologic: Awake, alert, orientedx3, follows commands, R pupil 3mm sluggish, L pupil 3mm brisk, able to finger count b/l without glasses, limited upward gaze, face symmetric, tongue midline, left UE  drift, RUE 5/5, RLE 5/5, left UE 4/5, left LE 5/5

## 2020-02-19 NOTE — PROGRESS NOTE ADULT - SUBJECTIVE AND OBJECTIVE BOX
Subjective: Patient seen and examined. No new events except as noted.     SUBJECTIVE/ROS:  NAD      MEDICATIONS:  MEDICATIONS  (STANDING):  amLODIPine   Tablet 5 milliGRAM(s) Oral daily  atorvastatin 10 milliGRAM(s) Oral at bedtime  chlorhexidine 4% Liquid 1 Application(s) Topical <User Schedule>  dexAMETHasone  Injectable 2 milliGRAM(s) IV Push every 6 hours  DULoxetine 60 milliGRAM(s) Oral daily  enoxaparin Injectable 40 milliGRAM(s) SubCutaneous <User Schedule>  fluticasone propionate 50 MICROgram(s)/spray Nasal Spray 1 Spray(s) Both Nostrils two times a day  folic acid 1 milliGRAM(s) Oral daily  gabapentin 300 milliGRAM(s) Oral three times a day  insulin glargine Injectable (LANTUS) 25 Unit(s) SubCutaneous at bedtime  insulin lispro (HumaLOG) corrective regimen sliding scale   SubCutaneous Before meals and at bedtime  insulin lispro Injectable (HumaLOG) 8 Unit(s) SubCutaneous three times a day before meals  lactulose Syrup 10 Gram(s) Oral every 8 hours  levETIRAcetam 500 milliGRAM(s) Oral two times a day  melatonin 5 milliGRAM(s) Oral at bedtime  multivitamin 1 Tablet(s) Oral daily  pantoprazole    Tablet 40 milliGRAM(s) Oral at bedtime  polyethylene glycol 3350 17 Gram(s) Oral two times a day  senna 2 Tablet(s) Oral at bedtime  sodium chloride 2 Gram(s) Oral every 6 hours      PHYSICAL EXAM:  T(C): 36.6 (02-19-20 @ 11:00), Max: 36.7 (02-18-20 @ 16:00)  HR: 60 (02-19-20 @ 11:00) (54 - 60)  BP: 123/72 (02-19-20 @ 11:00) (118/67 - 156/85)  RR: 13 (02-19-20 @ 11:00) (11 - 16)  SpO2: 97% (02-19-20 @ 11:00) (96% - 99%)  Wt(kg): --  I&O's Summary    18 Feb 2020 07:01  -  19 Feb 2020 07:00  --------------------------------------------------------  IN: 3000 mL / OUT: 2250 mL / NET: 750 mL    19 Feb 2020 07:01  -  19 Feb 2020 14:46  --------------------------------------------------------  IN: 360 mL / OUT: 0 mL / NET: 360 mL            JVP: Normal  Neck: supple  Lung: clear   CV: S1 S2 , Murmur:  Abd: soft  Ext: No edema  neuro: Awake / alert  Psych: flat affect  Skin: normal``    LABS/DATA:    CARDIAC MARKERS:                                12.1   12.14 )-----------( 286      ( 17 Feb 2020 23:59 )             37.1     02-18    134<L>  |  104  |  32<H>  ----------------------------<  207<H>  4.4   |  19<L>  |  0.99    Ca    8.0<L>      18 Feb 2020 16:13  Phos  2.5     02-18  Mg     1.9     02-18      proBNP:   Lipid Profile:   HgA1c:   TSH:     TELE:  EKG:

## 2020-02-19 NOTE — PROGRESS NOTE ADULT - SUBJECTIVE AND OBJECTIVE BOX
Chief complaint:   Patient is a 64y old  Male who presents with a chief complaint of craniotomy (18 Feb 2020 21:00)    HPI:  63 yo male. 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  c/o worsening left sided paresthesia in December, hospitalized at [facility] Dec 3-17, 2019, found to have subacute SDH along R frontal lobe and large mass in third ventricle. pt was referred to neurosurgery consult and transferred to rehab facility, now presents to PST scheduled for craniotomy surgery for removal of brain mass.  ***contacted surgeon, Dr. Manley's office, s/w  Elida who had consulted with Vineet, pt to hold aspirin 1 week preop.*** (06 Feb 2020 10:22)      Stay Summary:      OVERNIGHT EVENTS:      ROS: [ ]  Unable to assess due to mental status   All other systems negative    -----------------------------------------------------------------------------------------------------------------------------------------------------------------------------------  ICU Vital Signs Last 24 Hrs  T(C): 36.8 (19 Feb 2020 15:00), Max: 36.8 (19 Feb 2020 15:00)  T(F): 98.2 (19 Feb 2020 15:00), Max: 98.2 (19 Feb 2020 15:00)  HR: 58 (19 Feb 2020 15:00) (58 - 60)  BP: 127/67 (19 Feb 2020 15:00) (123/72 - 156/85)  BP(mean): 87 (19 Feb 2020 15:00) (84 - 102)  ABP: --  ABP(mean): --  RR: 12 (19 Feb 2020 15:00) (11 - 14)  SpO2: 97% (19 Feb 2020 15:00) (96% - 99%)      I&O's Summary    18 Feb 2020 07:01  -  19 Feb 2020 07:00  --------------------------------------------------------  IN: 3000 mL / OUT: 2250 mL / NET: 750 mL    19 Feb 2020 07:01  -  19 Feb 2020 18:22  --------------------------------------------------------  IN: 450 mL / OUT: 0 mL / NET: 450 mL        MEDICATIONS  (STANDING):  amLODIPine   Tablet 5 milliGRAM(s) Oral daily  atorvastatin 10 milliGRAM(s) Oral at bedtime  chlorhexidine 4% Liquid 1 Application(s) Topical <User Schedule>  DULoxetine 60 milliGRAM(s) Oral daily  enoxaparin Injectable 40 milliGRAM(s) SubCutaneous <User Schedule>  fluticasone propionate 50 MICROgram(s)/spray Nasal Spray 1 Spray(s) Both Nostrils two times a day  folic acid 1 milliGRAM(s) Oral daily  gabapentin 300 milliGRAM(s) Oral three times a day  insulin glargine Injectable (LANTUS) 25 Unit(s) SubCutaneous at bedtime  insulin lispro (HumaLOG) corrective regimen sliding scale   SubCutaneous Before meals and at bedtime  insulin lispro Injectable (HumaLOG) 8 Unit(s) SubCutaneous three times a day before meals  levETIRAcetam 500 milliGRAM(s) Oral two times a day  melatonin 5 milliGRAM(s) Oral at bedtime  multivitamin 1 Tablet(s) Oral daily  pantoprazole    Tablet 40 milliGRAM(s) Oral at bedtime  polyethylene glycol 3350 17 Gram(s) Oral two times a day  senna 2 Tablet(s) Oral at bedtime  sodium chloride 2 Gram(s) Oral every 6 hours      RESPIRATORY:        NEUROIMAGING:   Recent imaging studies were reviewed.    LAB RESULTS:                          12.1   12.14 )-----------( 286      ( 17 Feb 2020 23:59 )             37.1           02-18    134<L>  |  104  |  32<H>  ----------------------------<  207<H>  4.4   |  19<L>  |  0.99    Ca    8.0<L>      18 Feb 2020 16:13  Phos  2.5     02-18  Mg     1.9     02-18    -----------------------------------------------------------------------------------------------------------------------------------------------------------------------------------    PHYSICAL EXAM:  General: Calm, laying in bed  HEENT: MMM  Neuro:  -Mental status- No acute distress, AOx3, conversational, following commands  -CN- Pupils R 3mm sluggish, L 3mm brisk, limited upward gaze, tongue midline, face symmetric  -Motor-  RUE 5/5  RLE 5/5  LUE 4/5 drift  LLE 5/5  -Sensation- intact to LT   -Coordination- no dysmetria noted    CV: RRR  Pulm: Clear to auscultation  Abd: Soft, nontender, nondistended  Ext: No edema  Skin: warm, dry Chief complaint:   Patient is a 64y old  Male who presents with a chief complaint of craniotomy (18 Feb 2020 21:00)    HPI:  63 yo male. 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  c/o worsening left sided paresthesia in December, hospitalized at [facility] Dec 3-17, 2019, found to have subacute SDH along R frontal lobe and large mass in third ventricle. pt was referred to neurosurgery consult and transferred to rehab facility, now presents to PST scheduled for craniotomy surgery for removal of brain mass.  ***contacted surgeon, Dr. Manley's office, s/w  Elida who had consulted with Vineet, pt to hold aspirin 1 week preop.*** (06 Feb 2020 10:22)      ROS: headache  All other systems negative    -----------------------------------------------------------------------------------------------------------------------------------------------------------------------------------  ICU Vital Signs Last 24 Hrs  T(C): 36.8 (19 Feb 2020 15:00), Max: 36.8 (19 Feb 2020 15:00)  T(F): 98.2 (19 Feb 2020 15:00), Max: 98.2 (19 Feb 2020 15:00)  HR: 58 (19 Feb 2020 15:00) (58 - 60)  BP: 127/67 (19 Feb 2020 15:00) (123/72 - 156/85)  BP(mean): 87 (19 Feb 2020 15:00) (84 - 102)  ABP: --  ABP(mean): --  RR: 12 (19 Feb 2020 15:00) (11 - 14)  SpO2: 97% (19 Feb 2020 15:00) (96% - 99%)      I&O's Summary    18 Feb 2020 07:01  -  19 Feb 2020 07:00  --------------------------------------------------------  IN: 3000 mL / OUT: 2250 mL / NET: 750 mL    19 Feb 2020 07:01  -  19 Feb 2020 18:22  --------------------------------------------------------  IN: 450 mL / OUT: 0 mL / NET: 450 mL        MEDICATIONS  (STANDING):  amLODIPine   Tablet 5 milliGRAM(s) Oral daily  atorvastatin 10 milliGRAM(s) Oral at bedtime  chlorhexidine 4% Liquid 1 Application(s) Topical <User Schedule>  DULoxetine 60 milliGRAM(s) Oral daily  enoxaparin Injectable 40 milliGRAM(s) SubCutaneous <User Schedule>  fluticasone propionate 50 MICROgram(s)/spray Nasal Spray 1 Spray(s) Both Nostrils two times a day  folic acid 1 milliGRAM(s) Oral daily  gabapentin 300 milliGRAM(s) Oral three times a day  insulin glargine Injectable (LANTUS) 25 Unit(s) SubCutaneous at bedtime  insulin lispro (HumaLOG) corrective regimen sliding scale   SubCutaneous Before meals and at bedtime  insulin lispro Injectable (HumaLOG) 8 Unit(s) SubCutaneous three times a day before meals  levETIRAcetam 500 milliGRAM(s) Oral two times a day  melatonin 5 milliGRAM(s) Oral at bedtime  multivitamin 1 Tablet(s) Oral daily  pantoprazole    Tablet 40 milliGRAM(s) Oral at bedtime  polyethylene glycol 3350 17 Gram(s) Oral two times a day  senna 2 Tablet(s) Oral at bedtime  sodium chloride 2 Gram(s) Oral every 6 hours      NEUROIMAGING:   Recent imaging studies were reviewed.    LAB RESULTS:                          12.1   12.14 )-----------( 286      ( 17 Feb 2020 23:59 )             37.1           02-18    134<L>  |  104  |  32<H>  ----------------------------<  207<H>  4.4   |  19<L>  |  0.99    Ca    8.0<L>      18 Feb 2020 16:13  Phos  2.5     02-18  Mg     1.9     02-18    -----------------------------------------------------------------------------------------------------------------------------------------------------------------------------------    PHYSICAL EXAM:  General: Calm, laying in bed  HEENT: MMM  Neuro:  -Mental status- No acute distress, AOx3, conversational, following commands  -CN- Pupils R 3mm sluggish, L 3mm brisk, limited upward gaze, tongue midline, face symmetric  -Motor-  RUE 5/5  RLE 5/5  LUE 4/5 drift  LLE 5/5  -Sensation- intact to LT   -Coordination- no dysmetria noted    CV: RRR  Pulm: Clear to auscultation  Abd: Soft, nontender, nondistended  Ext: No edema  Skin: warm, dry

## 2020-02-19 NOTE — PROGRESS NOTE ADULT - ASSESSMENT
POD#7 R crani tumor resection, ETV  neuro checks q4hr  has small IVH, small subdural collections   has pseudomeningocele     NEURO:  has VPS for obstructive hydrocephalus   keppra for seizure ppx   dexAMETHasone  Injectable 2 milliGRAM(s) IV Push every 6 hour  taper steroids as per neurosurgery   was on aspirin at home, NS to clear when to start aspirin   Activity: [x] OOB as tolerated [] Bedrest [x] PT [x] OT [] PMNR    PULM:  Incentive spirometry, mobilize as tolerated    CV:  HTN continue amlodipine   -150mmHg  Prolonged Qtc improved on repeat ECG     RENAL:  cr stable  Hyponatremia   hypertonic Na dc ed   on  Salt tabs   BMP Q 812 hours   avoid nephrotoxic meds     GI:  Diet: diabetic diet  GI prophylaxis [] not indicated [x PPI [] other:  Bowel regimen [] colace [x] senna [] other:  last BM 2/14/20 : start lactulose Q 4hour until BM     ENDO:   Goal euglycemia (-180)  insulin glargine Injectable (LANTUS) 25 Unit(s) SubCutaneous at bedtime  insulin lispro (HumaLOG) corrective regimen sliding scale   SubCutaneous Before meals and at bedtime  insulin lispro Injectable (HumaLOG) 8 Unit(s) SubCutaneous three times a day before meals  monitor once decadron gets weaned   endo on consult     HEME/ONC:  VTE prophylaxis: [x] SCDs [] chemoprophylaxis [] hold chemoprophylaxis due to: lovenox 40 m sc qhs     ID:  Afebrile   MISC:    SOCIAL/FAMILY:  [x] awaiting [] updated at bedside [] family meeting    CODE STATUS:  [x] Full Code [] DNR [] DNI [] Palliative/Comfort Care    DISPOSITION:  [] ICU [] Stroke Unit [x] Floor [] EMU [] RCU [] PCU    moderate complex medical decision POD#7 R crani tumor resection, ETV  neuro checks q4hr  has small IVH, small subdural collections   has pseudomeningocele     NEURO:  has VPS for obstructive hydrocephalus   keppra for seizure ppx   dexAMETHasone  Injectable 2 milliGRAM(s) IV Push every 6 hour  taper steroids as per neurosurgery   was on aspirin at home, NS to clear when to start aspirin   Activity: [x] OOB as tolerated [] Bedrest [x] PT [x] OT [] PMNR    PULM:  Incentive spirometry, mobilize as tolerated    CV:  HTN continue amlodipine   -150mmHg  Prolonged Qtc improved on repeat ECG     RENAL:  cr stable  Hyponatremia   hypertonic Na dc ed   on  Salt tabs   BMP Q 812 hours   avoid nephrotoxic meds     GI:  Diet: diabetic diet  GI prophylaxis [] not indicated [x PPI [] other:  Bowel regimen [] colace [x] senna [] other:  last BM 2/14/20 : lactulose added     ENDO:   Goal euglycemia (-180)  insulin glargine Injectable (LANTUS) 25 Unit(s) SubCutaneous at bedtime  insulin lispro (HumaLOG) corrective regimen sliding scale   SubCutaneous Before meals and at bedtime  insulin lispro Injectable (HumaLOG) 8 Unit(s) SubCutaneous three times a day before meals  monitor once decadron gets weaned   endo on consult     HEME/ONC:  VTE prophylaxis: [x] SCDs [] chemoprophylaxis [] hold chemoprophylaxis due to: lovenox 40 m sc qhs     ID:  Afebrile   MISC:    SOCIAL/FAMILY:  [x] awaiting [] updated at bedside [] family meeting    CODE STATUS:  [x] Full Code [] DNR [] DNI [] Palliative/Comfort Care    DISPOSITION:  [] ICU [] Stroke Unit [x] Floor [] EMU [] RCU [] PCU    moderate complex medical decision

## 2020-02-19 NOTE — PROGRESS NOTE ADULT - ASSESSMENT
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

## 2020-02-20 DIAGNOSIS — E87.1 HYPO-OSMOLALITY AND HYPONATREMIA: ICD-10-CM

## 2020-02-20 DIAGNOSIS — I10 ESSENTIAL (PRIMARY) HYPERTENSION: ICD-10-CM

## 2020-02-20 LAB
ANION GAP SERPL CALC-SCNC: 12 MMOL/L — SIGNIFICANT CHANGE UP (ref 5–17)
BUN SERPL-MCNC: 34 MG/DL — HIGH (ref 7–23)
CALCIUM SERPL-MCNC: 8.6 MG/DL — SIGNIFICANT CHANGE UP (ref 8.4–10.5)
CHLORIDE SERPL-SCNC: 95 MMOL/L — LOW (ref 96–108)
CO2 SERPL-SCNC: 20 MMOL/L — LOW (ref 22–31)
CREAT SERPL-MCNC: 1.01 MG/DL — SIGNIFICANT CHANGE UP (ref 0.5–1.3)
GLUCOSE BLDC GLUCOMTR-MCNC: 109 MG/DL — HIGH (ref 70–99)
GLUCOSE BLDC GLUCOMTR-MCNC: 130 MG/DL — HIGH (ref 70–99)
GLUCOSE BLDC GLUCOMTR-MCNC: 170 MG/DL — HIGH (ref 70–99)
GLUCOSE BLDC GLUCOMTR-MCNC: 202 MG/DL — HIGH (ref 70–99)
GLUCOSE SERPL-MCNC: 114 MG/DL — HIGH (ref 70–99)
HCT VFR BLD CALC: 38.8 % — LOW (ref 39–50)
HGB BLD-MCNC: 13.1 G/DL — SIGNIFICANT CHANGE UP (ref 13–17)
LEVETIRACETAM SERPL-MCNC: 4.9 MCG/ML — LOW (ref 12–46)
MCHC RBC-ENTMCNC: 26.5 PG — LOW (ref 27–34)
MCHC RBC-ENTMCNC: 33.8 GM/DL — SIGNIFICANT CHANGE UP (ref 32–36)
MCV RBC AUTO: 78.4 FL — LOW (ref 80–100)
NRBC # BLD: 0 /100 WBCS — SIGNIFICANT CHANGE UP (ref 0–0)
PLATELET # BLD AUTO: 313 K/UL — SIGNIFICANT CHANGE UP (ref 150–400)
POTASSIUM SERPL-MCNC: 4.5 MMOL/L — SIGNIFICANT CHANGE UP (ref 3.5–5.3)
POTASSIUM SERPL-SCNC: 4.5 MMOL/L — SIGNIFICANT CHANGE UP (ref 3.5–5.3)
RBC # BLD: 4.95 M/UL — SIGNIFICANT CHANGE UP (ref 4.2–5.8)
RBC # FLD: 14.1 % — SIGNIFICANT CHANGE UP (ref 10.3–14.5)
SODIUM SERPL-SCNC: 127 MMOL/L — LOW (ref 135–145)
WBC # BLD: 17.18 K/UL — HIGH (ref 3.8–10.5)
WBC # FLD AUTO: 17.18 K/UL — HIGH (ref 3.8–10.5)

## 2020-02-20 PROCEDURE — 70553 MRI BRAIN STEM W/O & W/DYE: CPT | Mod: 26

## 2020-02-20 PROCEDURE — 99223 1ST HOSP IP/OBS HIGH 75: CPT

## 2020-02-20 RX ORDER — SODIUM CHLORIDE 5 G/100ML
1000 INJECTION, SOLUTION INTRAVENOUS
Refills: 0 | Status: DISCONTINUED | OUTPATIENT
Start: 2020-02-20 | End: 2020-02-23

## 2020-02-20 RX ORDER — DEXAMETHASONE 0.5 MG/5ML
2 ELIXIR ORAL
Refills: 0 | Status: DISCONTINUED | OUTPATIENT
Start: 2020-02-20 | End: 2020-02-25

## 2020-02-20 RX ORDER — INSULIN GLARGINE 100 [IU]/ML
22 INJECTION, SOLUTION SUBCUTANEOUS AT BEDTIME
Refills: 0 | Status: DISCONTINUED | OUTPATIENT
Start: 2020-02-20 | End: 2020-02-21

## 2020-02-20 RX ADMIN — Medication 4: at 13:13

## 2020-02-20 RX ADMIN — PANTOPRAZOLE SODIUM 40 MILLIGRAM(S): 20 TABLET, DELAYED RELEASE ORAL at 21:47

## 2020-02-20 RX ADMIN — ENOXAPARIN SODIUM 40 MILLIGRAM(S): 100 INJECTION SUBCUTANEOUS at 17:27

## 2020-02-20 RX ADMIN — OXYCODONE HYDROCHLORIDE 10 MILLIGRAM(S): 5 TABLET ORAL at 09:28

## 2020-02-20 RX ADMIN — LEVETIRACETAM 500 MILLIGRAM(S): 250 TABLET, FILM COATED ORAL at 17:27

## 2020-02-20 RX ADMIN — Medication 2 MILLIGRAM(S): at 17:26

## 2020-02-20 RX ADMIN — ATORVASTATIN CALCIUM 10 MILLIGRAM(S): 80 TABLET, FILM COATED ORAL at 21:47

## 2020-02-20 RX ADMIN — AMLODIPINE BESYLATE 5 MILLIGRAM(S): 2.5 TABLET ORAL at 06:02

## 2020-02-20 RX ADMIN — OXYCODONE HYDROCHLORIDE 10 MILLIGRAM(S): 5 TABLET ORAL at 08:58

## 2020-02-20 RX ADMIN — POLYETHYLENE GLYCOL 3350 17 GRAM(S): 17 POWDER, FOR SOLUTION ORAL at 17:28

## 2020-02-20 RX ADMIN — Medication 5 MILLIGRAM(S): at 21:47

## 2020-02-20 RX ADMIN — Medication 2 MILLIGRAM(S): at 06:01

## 2020-02-20 RX ADMIN — GABAPENTIN 300 MILLIGRAM(S): 400 CAPSULE ORAL at 13:14

## 2020-02-20 RX ADMIN — SODIUM CHLORIDE 2 GRAM(S): 9 INJECTION INTRAMUSCULAR; INTRAVENOUS; SUBCUTANEOUS at 11:44

## 2020-02-20 RX ADMIN — GABAPENTIN 300 MILLIGRAM(S): 400 CAPSULE ORAL at 06:02

## 2020-02-20 RX ADMIN — SENNA PLUS 2 TABLET(S): 8.6 TABLET ORAL at 21:47

## 2020-02-20 RX ADMIN — SODIUM CHLORIDE 2 GRAM(S): 9 INJECTION INTRAMUSCULAR; INTRAVENOUS; SUBCUTANEOUS at 17:28

## 2020-02-20 RX ADMIN — SCOPALAMINE 1 PATCH: 1 PATCH, EXTENDED RELEASE TRANSDERMAL at 07:13

## 2020-02-20 RX ADMIN — Medication 2: at 21:48

## 2020-02-20 RX ADMIN — POLYETHYLENE GLYCOL 3350 17 GRAM(S): 17 POWDER, FOR SOLUTION ORAL at 06:01

## 2020-02-20 RX ADMIN — LEVETIRACETAM 500 MILLIGRAM(S): 250 TABLET, FILM COATED ORAL at 06:02

## 2020-02-20 RX ADMIN — Medication 1 MILLIGRAM(S): at 11:43

## 2020-02-20 RX ADMIN — GABAPENTIN 300 MILLIGRAM(S): 400 CAPSULE ORAL at 21:47

## 2020-02-20 RX ADMIN — Medication 1 TABLET(S): at 11:45

## 2020-02-20 RX ADMIN — SCOPALAMINE 1 PATCH: 1 PATCH, EXTENDED RELEASE TRANSDERMAL at 16:41

## 2020-02-20 RX ADMIN — SODIUM CHLORIDE 2 GRAM(S): 9 INJECTION INTRAMUSCULAR; INTRAVENOUS; SUBCUTANEOUS at 06:01

## 2020-02-20 RX ADMIN — DULOXETINE HYDROCHLORIDE 60 MILLIGRAM(S): 30 CAPSULE, DELAYED RELEASE ORAL at 11:43

## 2020-02-20 RX ADMIN — Medication 8 UNIT(S): at 13:13

## 2020-02-20 RX ADMIN — INSULIN GLARGINE 22 UNIT(S): 100 INJECTION, SOLUTION SUBCUTANEOUS at 21:47

## 2020-02-20 NOTE — PROGRESS NOTE ADULT - SUBJECTIVE AND OBJECTIVE BOX
Subjective: Patient seen and examined. No new events except as noted.     SUBJECTIVE/ROS:  feels ok       MEDICATIONS:  MEDICATIONS  (STANDING):  amLODIPine   Tablet 5 milliGRAM(s) Oral daily  atorvastatin 10 milliGRAM(s) Oral at bedtime  dexAMETHasone  Injectable 2 milliGRAM(s) IV Push every 8 hours  DULoxetine 60 milliGRAM(s) Oral daily  enoxaparin Injectable 40 milliGRAM(s) SubCutaneous <User Schedule>  fluticasone propionate 50 MICROgram(s)/spray Nasal Spray 1 Spray(s) Both Nostrils two times a day  folic acid 1 milliGRAM(s) Oral daily  gabapentin 300 milliGRAM(s) Oral three times a day  insulin glargine Injectable (LANTUS) 25 Unit(s) SubCutaneous at bedtime  insulin lispro (HumaLOG) corrective regimen sliding scale   SubCutaneous Before meals and at bedtime  insulin lispro Injectable (HumaLOG) 8 Unit(s) SubCutaneous three times a day before meals  levETIRAcetam 500 milliGRAM(s) Oral two times a day  melatonin 5 milliGRAM(s) Oral at bedtime  multivitamin 1 Tablet(s) Oral daily  pantoprazole    Tablet 40 milliGRAM(s) Oral at bedtime  polyethylene glycol 3350 17 Gram(s) Oral two times a day  senna 2 Tablet(s) Oral at bedtime  sodium chloride 2 Gram(s) Oral every 6 hours      PHYSICAL EXAM:  T(C): 36.7 (02-20-20 @ 04:37), Max: 36.8 (02-19-20 @ 15:00)  HR: 51 (02-20-20 @ 04:37) (51 - 82)  BP: 138/84 (02-20-20 @ 04:37) (123/72 - 151/79)  RR: 17 (02-20-20 @ 04:37) (12 - 17)  SpO2: 96% (02-20-20 @ 04:37) (95% - 97%)  Wt(kg): --  I&O's Summary    19 Feb 2020 07:01  -  20 Feb 2020 07:00  --------------------------------------------------------  IN: 870 mL / OUT: 400 mL / NET: 470 mL            JVP: Normal  Neck: supple  Lung: clear   CV: S1 S2 , Murmur:  Abd: soft  Ext: No edema  neuro: Awake / alert  Psych: flat affect  Skin: normal``    LABS/DATA:    CARDIAC MARKERS:            02-18    134<L>  |  104  |  32<H>  ----------------------------<  207<H>  4.4   |  19<L>  |  0.99    Ca    8.0<L>      18 Feb 2020 16:13  Phos  2.5     02-18  Mg     1.9     02-18      proBNP:   Lipid Profile:   HgA1c:   TSH:     TELE:  EKG:

## 2020-02-20 NOTE — CONSULT NOTE ADULT - PROBLEM SELECTOR RECOMMENDATION 6
Transitions of Care Status:  1.  Name of PCP: Dr. Lana Mueller  2.  PCP Contacted on Admission: [ ] Y    [x] N    3.  PCP contacted at Discharge: [ ] Y    [ ] N    [ ] N/A  4.  Post-Discharge Appointment Date and Location:  5.  Summary of Handoff given to PCP:

## 2020-02-20 NOTE — PROGRESS NOTE ADULT - SUBJECTIVE AND OBJECTIVE BOX
SUBJECTIVE: Pt seen and examined, resting comfortably in bed    OVERNIGHT EVENTS: none    Vital Signs Last 24 Hrs  T(C): 36.6 (20 Feb 2020 08:17), Max: 36.8 (19 Feb 2020 15:00)  T(F): 97.9 (20 Feb 2020 08:17), Max: 98.2 (19 Feb 2020 15:00)  HR: 80 (20 Feb 2020 08:17) (51 - 82)  BP: 136/77 (20 Feb 2020 08:17) (127/67 - 151/79)  BP(mean): 87 (19 Feb 2020 15:00) (87 - 87)  RR: 18 (20 Feb 2020 08:17) (12 - 18)  SpO2: 96% (20 Feb 2020 08:17) (95% - 97%)    PHYSICAL EXAM:    General: No Acute Distress     Neurological: Awake, alert oriented to person, place and time, Following Commands, limited upward gaze,  Face Symmetrical, Speech Fluent, Moving all extremities, LUE 4+/5,  No Drift, Sensation to Light Touch Intact    Pulmonary: Clear to Auscultation, No Rales, No Rhonchi, No Wheezes     Cardiovascular: S1, S2, Regular Rate and Rhythm     Gastrointestinal: Soft, Nontender, Nondistended     Incision: healed crani incision c/d/i    LABS:                        13.1   17.18 )-----------( 313      ( 20 Feb 2020 07:07 )             38.8    02-20    127<L>  |  95<L>  |  34<H>  ----------------------------<  114<H>  4.5   |  20<L>  |  1.01    Ca    8.6      20 Feb 2020 06:57  Phos  2.5     02-18  Mg     1.9     02-18      Hemoglobin A1C, Whole Blood: 6.0 % (02-06 @ 13:46)      02-19 @ 07:01  -  02-20 @ 07:00  --------------------------------------------------------  IN: 870 mL / OUT: 400 mL / NET: 470 mL      DRAINS: none    MEDICATIONS:  Antibiotics:    Neuro:  acetaminophen   Tablet .. 650 milliGRAM(s) Oral every 6 hours PRN Temp greater or equal to 38C (100.4F), Mild Pain (1 - 3)  DULoxetine 60 milliGRAM(s) Oral daily  gabapentin 300 milliGRAM(s) Oral three times a day  levETIRAcetam 500 milliGRAM(s) Oral two times a day  melatonin 5 milliGRAM(s) Oral at bedtime  oxyCODONE    IR 5 milliGRAM(s) Oral every 4 hours PRN Moderate Pain (4 - 6)  oxyCODONE    IR 10 milliGRAM(s) Oral every 4 hours PRN Severe Pain (7 - 10)    Cardiac:  amLODIPine   Tablet 5 milliGRAM(s) Oral daily    Pulm:    GI/:  pantoprazole    Tablet 40 milliGRAM(s) Oral at bedtime  polyethylene glycol 3350 17 Gram(s) Oral two times a day  senna 2 Tablet(s) Oral at bedtime    Other:   atorvastatin 10 milliGRAM(s) Oral at bedtime  dexAMETHasone  Injectable 2 milliGRAM(s) IV Push every 8 hours  enoxaparin Injectable 40 milliGRAM(s) SubCutaneous <User Schedule>  fluticasone propionate 50 MICROgram(s)/spray Nasal Spray 1 Spray(s) Both Nostrils two times a day  folic acid 1 milliGRAM(s) Oral daily  insulin glargine Injectable (LANTUS) 25 Unit(s) SubCutaneous at bedtime  insulin lispro (HumaLOG) corrective regimen sliding scale   SubCutaneous Before meals and at bedtime  insulin lispro Injectable (HumaLOG) 8 Unit(s) SubCutaneous three times a day before meals  multivitamin 1 Tablet(s) Oral daily  sodium chloride 2 Gram(s) Oral every 6 hours    DIET: [x] Regular [] CCD [] Renal [] Puree [] Dysphagia [] Tube Feeds:     IMAGING:   < from: CT Head No Cont (02.17.20 @ 08:15) >    IMPRESSION:    No significant interval change from the prior exam.    Hydrocephalus with intraventricular hemorrhage.    Interhemispheric low density collection contiguous with a subgaleal fluid collection which may represent a pseudomeningocele.    < end of copied text >

## 2020-02-20 NOTE — PROGRESS NOTE ADULT - ASSESSMENT
65 yo male. 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 Hakim at 200,   c/o worsening left sided paresthesia in December, hospitalized at [facility] Dec 3-17, 2019, found to have subacute SDH along R frontal lobe and large mass in third ventricle. pt was referred to neurosurgery consult and transferred to rehab facility, now presents to PST scheduled for craniotomy surgery for removal of brain mass. ASA held 1 week prior to surgery.      On 2/12 s/p Right craniotomy interhemis transcallosal approach to 3rd vent tumor, ETV,  post op slow to wake up, CTH shows slight increase in ventricular size and ventricular air, Course c/b hyperglycemia likely steroid induced requiring insulin gtt, leaking of CSF from wound. Pathology is colloid cyst, has small IVH, small subdural collections, has pseudomeningocele              PLAN:  Neuro:   - post op MRI pending-  shunt (Hakim @ 200)  will need to be adjusted after MRI  - pathology reveals colloid cyst  - ?when to resume ASA  - continue keppra for seizure ppx  - decadron for vasogenic edema  - depression- continue cymbalta, melatonin for sleep  - pain control- gabapentin 300 tid   CV:  - HTN- on amlodipine   - on statin  Endocrine:   - DMT2- on lantus 25 u qhs, humalog 8 u pre meal  -Endocrine following   DVT ppx:   - venodynes, sq lovenox  PT/OT:   Acute rehab    Assessment:  Please Check When Present   []  GCS  E   V  M     Heart Failure: []Acute, [] acute on chronic , []chronic  Heart Failure:  [] Diastolic (HFpEF), [] Systolic (HFrEF), []Combined (HFpEF and HFrEF), [] RHF, [] Pulm HTN, [] Other    [] RA, [] ATN, [] AIN, [] other  [] CKD1, [] CKD2, [] CKD 3, [] CKD 4, [] CKD 5, []ESRD    Encephalopathy: [] Metabolic, [] Hepatic, [] toxic, [] Neurological, [] Other    Abnormal Nurtitional Status: [] malnurtition (see nutrition note), [ ]underweight: BMI < 19, [] morbid obesity: BMI >40, [] Cachexia    [] Sepsis  [] hypovolemic shock,[] cardiogenic shock, [] hemorrhagic shock, [] neuogenic shock  [] Acute Respiratory Failure  []Cerebral edema, [] Brain compression/ herniation,   [] Functional quadriplegia  [] Acute blood loss anemia    Spectralink # [id number]

## 2020-02-20 NOTE — CONSULT NOTE ADULT - PROBLEM SELECTOR RECOMMENDATION 2
c/w amlodipine
a1c 6.0  hyperglycemia due to decadron   c/w lantus and premeal humalog  monitor FS, ISS for coverage

## 2020-02-20 NOTE — CONSULT NOTE ADULT - ASSESSMENT
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     pmd Dr. Georgia Mueller

## 2020-02-20 NOTE — CONSULT NOTE ADULT - SUBJECTIVE AND OBJECTIVE BOX
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 required cardene gtt in NSCU.         PAST MEDICAL & SURGICAL HISTORY:  FELICE (obstructive sleep apnea)  H/O hydrocephalus: 2008, s/p  shunt  H/O brain tumor  Peripheral neuropathy  Stroke: 2008, 12/2019  Diabetes: type 2  Hypertension  Depression  Seizure: post craniotomy surgery , placed on phenytoin which was eventually discontinued, pt had not had any seizure since 2008  HTN (hypertension)  CVA (cerebral vascular accident): 2008  S/P ventriculoperitoneal shunt: 2008  H/O craniotomy: 2008 secondary to tumor in third ventricle      Review of Systems:   CONSTITUTIONAL: No fever  EYES: No eye pain, visual disturbances  ENMT:  No difficulty hearing,   NECK: No pain or stiffness  RESPIRATORY: No cough, No shortness of breath  CARDIOVASCULAR: No chest pain, palpitations,  GASTROINTESTINAL: No abdominal or epigastric pain. No nausea, vomiting,  GENITOURINARY: No dysuria,   NEUROLOGICAL: No headaches,   SKIN: No rashes  ENDOCRINE: No heat or cold intolerance  MUSCULOSKELETAL: No joint pain or swelling;   PSYCHIATRIC: No depression,   ALLERY AND IMMUNOLOGIC: No hives or eczema    Allergies    No Known Allergies      Social History:     FAMILY HISTORY:      MEDICATIONS  (STANDING):  amLODIPine   Tablet 5 milliGRAM(s) Oral daily  atorvastatin 10 milliGRAM(s) Oral at bedtime  dexAMETHasone  Injectable 2 milliGRAM(s) IV Push every 8 hours  DULoxetine 60 milliGRAM(s) Oral daily  enoxaparin Injectable 40 milliGRAM(s) SubCutaneous <User Schedule>  fluticasone propionate 50 MICROgram(s)/spray Nasal Spray 1 Spray(s) Both Nostrils two times a day  folic acid 1 milliGRAM(s) Oral daily  gabapentin 300 milliGRAM(s) Oral three times a day  insulin glargine Injectable (LANTUS) 25 Unit(s) SubCutaneous at bedtime  insulin lispro (HumaLOG) corrective regimen sliding scale   SubCutaneous Before meals and at bedtime  insulin lispro Injectable (HumaLOG) 8 Unit(s) SubCutaneous three times a day before meals  levETIRAcetam 500 milliGRAM(s) Oral two times a day  melatonin 5 milliGRAM(s) Oral at bedtime  multivitamin 1 Tablet(s) Oral daily  pantoprazole    Tablet 40 milliGRAM(s) Oral at bedtime  polyethylene glycol 3350 17 Gram(s) Oral two times a day  senna 2 Tablet(s) Oral at bedtime  sodium chloride 2 Gram(s) Oral every 6 hours    MEDICATIONS  (PRN):  acetaminophen   Tablet .. 650 milliGRAM(s) Oral every 6 hours PRN Temp greater or equal to 38C (100.4F), Mild Pain (1 - 3)  oxyCODONE    IR 5 milliGRAM(s) Oral every 4 hours PRN Moderate Pain (4 - 6)  oxyCODONE    IR 10 milliGRAM(s) Oral every 4 hours PRN Severe Pain (7 - 10)      Vital Signs Last 24 Hrs  T(C): 36.6 (20 Feb 2020 08:17), Max: 36.8 (19 Feb 2020 15:00)  T(F): 97.9 (20 Feb 2020 08:17), Max: 98.2 (19 Feb 2020 15:00)  HR: 80 (20 Feb 2020 08:17) (51 - 82)  BP: 136/77 (20 Feb 2020 08:17) (123/72 - 151/79)  BP(mean): 87 (19 Feb 2020 15:00) (84 - 87)  RR: 18 (20 Feb 2020 08:17) (12 - 18)  SpO2: 96% (20 Feb 2020 08:17) (95% - 97%)  CAPILLARY BLOOD GLUCOSE      POCT Blood Glucose.: 109 mg/dL (20 Feb 2020 09:00)  POCT Blood Glucose.: 156 mg/dL (19 Feb 2020 21:44)  POCT Blood Glucose.: 183 mg/dL (19 Feb 2020 17:59)  POCT Blood Glucose.: 170 mg/dL (19 Feb 2020 13:52)  POCT Blood Glucose.: 198 mg/dL (19 Feb 2020 12:34)    I&O's Summary    19 Feb 2020 07:01  -  20 Feb 2020 07:00  --------------------------------------------------------  IN: 870 mL / OUT: 400 mL / NET: 470 mL        PHYSICAL EXAM:  GENERAL: NAD, breathing normal  HEAD:  Atraumatic, Normocephalic  EYES: conjunctiva and sclera clear  NECK: supple, No JVD  CHEST/LUNG: CTA b/l  HEART: S1 S2 RRR  ABDOMEN: +BS Soft, NT/ND  EXTREMITIES:  2+ DP Pulses, No c/c/e  NEUROLOGY: AAOx3, no facial droop, no focal deficits   SKIN: No rashes or lesions      LABS:                        13.1   17.18 )-----------( 313      ( 20 Feb 2020 07:07 )             38.8     02-20    127<L>  |  95<L>  |  34<H>  ----------------------------<  114<H>  4.5   |  20<L>  |  1.01    Ca    8.6      20 Feb 2020 06:57  Phos  2.5     02-18  Mg     1.9     02-18                RADIOLOGY & ADDITIONAL TESTS:    Imaging Personally Reviewed: ct head 2/17 reviewed - No significant interval change from the prior exam.    Hydrocephalus with intraventricular hemorrhage.    Interhemispheric low density collection contiguous with a subgaleal fluid collection which may represent a pseudomeningocele.      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 required cardene gtt in NSCU. currently, denies any acute complaints         PAST MEDICAL & SURGICAL HISTORY:  FELICE (obstructive sleep apnea)  H/O hydrocephalus: 2008, s/p  shunt  H/O brain tumor  Peripheral neuropathy  Stroke: 2008, 12/2019  Diabetes: type 2  Hypertension  Depression  Seizure: post craniotomy surgery , placed on phenytoin which was eventually discontinued, pt had not had any seizure since 2008  HTN (hypertension)  CVA (cerebral vascular accident): 2008  S/P ventriculoperitoneal shunt: 2008  H/O craniotomy: 2008 secondary to tumor in third ventricle      Review of Systems:   CONSTITUTIONAL: No fever  EYES: No eye pain, visual disturbances  ENMT:  No difficulty hearing,   NECK: No pain or stiffness  RESPIRATORY: No cough, No shortness of breath  CARDIOVASCULAR: No chest pain, palpitations,  GASTROINTESTINAL: No abdominal or epigastric pain. No nausea, vomiting,  GENITOURINARY: No dysuria,   NEUROLOGICAL: No headaches,   SKIN: No rashes  ENDOCRINE: No heat or cold intolerance  MUSCULOSKELETAL: No joint pain or swelling;   PSYCHIATRIC: No depression,   ALLERY AND IMMUNOLOGIC: No hives or eczema    Allergies    No Known Allergies      Social History: denied smoking or etoh abuse    FAMILY HISTORY:  Father - dm2, htn  brother - brain cancer      Home medications;      MEDICATIONS  (STANDING):  amLODIPine   Tablet 5 milliGRAM(s) Oral daily  atorvastatin 10 milliGRAM(s) Oral at bedtime  dexAMETHasone  Injectable 2 milliGRAM(s) IV Push every 8 hours  DULoxetine 60 milliGRAM(s) Oral daily  enoxaparin Injectable 40 milliGRAM(s) SubCutaneous <User Schedule>  fluticasone propionate 50 MICROgram(s)/spray Nasal Spray 1 Spray(s) Both Nostrils two times a day  folic acid 1 milliGRAM(s) Oral daily  gabapentin 300 milliGRAM(s) Oral three times a day  insulin glargine Injectable (LANTUS) 25 Unit(s) SubCutaneous at bedtime  insulin lispro (HumaLOG) corrective regimen sliding scale   SubCutaneous Before meals and at bedtime  insulin lispro Injectable (HumaLOG) 8 Unit(s) SubCutaneous three times a day before meals  levETIRAcetam 500 milliGRAM(s) Oral two times a day  melatonin 5 milliGRAM(s) Oral at bedtime  multivitamin 1 Tablet(s) Oral daily  pantoprazole    Tablet 40 milliGRAM(s) Oral at bedtime  polyethylene glycol 3350 17 Gram(s) Oral two times a day  senna 2 Tablet(s) Oral at bedtime  sodium chloride 2 Gram(s) Oral every 6 hours    MEDICATIONS  (PRN):  acetaminophen   Tablet .. 650 milliGRAM(s) Oral every 6 hours PRN Temp greater or equal to 38C (100.4F), Mild Pain (1 - 3)  oxyCODONE    IR 5 milliGRAM(s) Oral every 4 hours PRN Moderate Pain (4 - 6)  oxyCODONE    IR 10 milliGRAM(s) Oral every 4 hours PRN Severe Pain (7 - 10)      Vital Signs Last 24 Hrs  T(C): 36.6 (20 Feb 2020 08:17), Max: 36.8 (19 Feb 2020 15:00)  T(F): 97.9 (20 Feb 2020 08:17), Max: 98.2 (19 Feb 2020 15:00)  HR: 80 (20 Feb 2020 08:17) (51 - 82)  BP: 136/77 (20 Feb 2020 08:17) (123/72 - 151/79)  BP(mean): 87 (19 Feb 2020 15:00) (84 - 87)  RR: 18 (20 Feb 2020 08:17) (12 - 18)  SpO2: 96% (20 Feb 2020 08:17) (95% - 97%)  CAPILLARY BLOOD GLUCOSE      POCT Blood Glucose.: 109 mg/dL (20 Feb 2020 09:00)  POCT Blood Glucose.: 156 mg/dL (19 Feb 2020 21:44)  POCT Blood Glucose.: 183 mg/dL (19 Feb 2020 17:59)  POCT Blood Glucose.: 170 mg/dL (19 Feb 2020 13:52)  POCT Blood Glucose.: 198 mg/dL (19 Feb 2020 12:34)    I&O's Summary    19 Feb 2020 07:01  -  20 Feb 2020 07:00  --------------------------------------------------------  IN: 870 mL / OUT: 400 mL / NET: 470 mL        PHYSICAL EXAM:  GENERAL: NAD, breathing normal  EYES: conjunctiva and sclera clear  NECK: supple, No JVD  CHEST/LUNG: CTA b/l  HEART: S1 S2 RRR  ABDOMEN: +BS Soft, NT/ND  EXTREMITIES:  2+ DP Pulses, No c/c. no LE edema  NEUROLOGY: AAOx3, no facial droop, 5/5 MS b/l UE and LE  SKIN: No rashes or lesions      LABS:                        13.1   17.18 )-----------( 313      ( 20 Feb 2020 07:07 )             38.8     02-20    127<L>  |  95<L>  |  34<H>  ----------------------------<  114<H>  4.5   |  20<L>  |  1.01    Ca    8.6      20 Feb 2020 06:57  Phos  2.5     02-18  Mg     1.9     02-18                RADIOLOGY & ADDITIONAL TESTS:    Imaging Personally Reviewed: ct head 2/17 reviewed - No significant interval change from the prior exam.    Hydrocephalus with intraventricular hemorrhage.    Interhemispheric low density collection contiguous with a subgaleal fluid collection which may represent a pseudomeningocele.      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 required cardene gtt in NSCU. currently, denies any acute complaints         PAST MEDICAL & SURGICAL HISTORY:  FELICE (obstructive sleep apnea)  H/O hydrocephalus: 2008, s/p  shunt  H/O brain tumor  Peripheral neuropathy  Stroke: 2008, 12/2019  Diabetes: type 2  Hypertension  Depression  Seizure: post craniotomy surgery , placed on phenytoin which was eventually discontinued, pt had not had any seizure since 2008  HTN (hypertension)  CVA (cerebral vascular accident): 2008  S/P ventriculoperitoneal shunt: 2008  H/O craniotomy: 2008 secondary to tumor in third ventricle      Review of Systems:   CONSTITUTIONAL: No fever  EYES: No eye pain, visual disturbances  ENMT:  No difficulty hearing,   NECK: No pain or stiffness  RESPIRATORY: No cough, No shortness of breath  CARDIOVASCULAR: No chest pain, palpitations,  GASTROINTESTINAL: No abdominal or epigastric pain. No nausea, vomiting,  GENITOURINARY: No dysuria,   NEUROLOGICAL: No headaches,   SKIN: No rashes  ENDOCRINE: No heat or cold intolerance  MUSCULOSKELETAL: No joint pain or swelling;   PSYCHIATRIC: No depression,   ALLERY AND IMMUNOLOGIC: No hives or eczema    Allergies    No Known Allergies      Social History: denied smoking or etoh abuse    FAMILY HISTORY:  Father - dm2, htn  brother - brain cancer      Home medications (reviewed with patient)  asa 81 qd  clonazepam 0.5mg bid  lyrica 50mg q8  neurontin 300mg tid  cymbalta 60mg qd  metformin 500mg bid  lipitor 10mg qhs  norvasc 5mg qd  fluticasone   melatonin  mvi  folic acid      MEDICATIONS  (STANDING):  amLODIPine   Tablet 5 milliGRAM(s) Oral daily  atorvastatin 10 milliGRAM(s) Oral at bedtime  dexAMETHasone  Injectable 2 milliGRAM(s) IV Push every 8 hours  DULoxetine 60 milliGRAM(s) Oral daily  enoxaparin Injectable 40 milliGRAM(s) SubCutaneous <User Schedule>  fluticasone propionate 50 MICROgram(s)/spray Nasal Spray 1 Spray(s) Both Nostrils two times a day  folic acid 1 milliGRAM(s) Oral daily  gabapentin 300 milliGRAM(s) Oral three times a day  insulin glargine Injectable (LANTUS) 25 Unit(s) SubCutaneous at bedtime  insulin lispro (HumaLOG) corrective regimen sliding scale   SubCutaneous Before meals and at bedtime  insulin lispro Injectable (HumaLOG) 8 Unit(s) SubCutaneous three times a day before meals  levETIRAcetam 500 milliGRAM(s) Oral two times a day  melatonin 5 milliGRAM(s) Oral at bedtime  multivitamin 1 Tablet(s) Oral daily  pantoprazole    Tablet 40 milliGRAM(s) Oral at bedtime  polyethylene glycol 3350 17 Gram(s) Oral two times a day  senna 2 Tablet(s) Oral at bedtime  sodium chloride 2 Gram(s) Oral every 6 hours    MEDICATIONS  (PRN):  acetaminophen   Tablet .. 650 milliGRAM(s) Oral every 6 hours PRN Temp greater or equal to 38C (100.4F), Mild Pain (1 - 3)  oxyCODONE    IR 5 milliGRAM(s) Oral every 4 hours PRN Moderate Pain (4 - 6)  oxyCODONE    IR 10 milliGRAM(s) Oral every 4 hours PRN Severe Pain (7 - 10)      Vital Signs Last 24 Hrs  T(C): 36.6 (20 Feb 2020 08:17), Max: 36.8 (19 Feb 2020 15:00)  T(F): 97.9 (20 Feb 2020 08:17), Max: 98.2 (19 Feb 2020 15:00)  HR: 80 (20 Feb 2020 08:17) (51 - 82)  BP: 136/77 (20 Feb 2020 08:17) (123/72 - 151/79)  BP(mean): 87 (19 Feb 2020 15:00) (84 - 87)  RR: 18 (20 Feb 2020 08:17) (12 - 18)  SpO2: 96% (20 Feb 2020 08:17) (95% - 97%)  CAPILLARY BLOOD GLUCOSE      POCT Blood Glucose.: 109 mg/dL (20 Feb 2020 09:00)  POCT Blood Glucose.: 156 mg/dL (19 Feb 2020 21:44)  POCT Blood Glucose.: 183 mg/dL (19 Feb 2020 17:59)  POCT Blood Glucose.: 170 mg/dL (19 Feb 2020 13:52)  POCT Blood Glucose.: 198 mg/dL (19 Feb 2020 12:34)    I&O's Summary    19 Feb 2020 07:01  -  20 Feb 2020 07:00  --------------------------------------------------------  IN: 870 mL / OUT: 400 mL / NET: 470 mL        PHYSICAL EXAM:  GENERAL: NAD, breathing normal  EYES: conjunctiva and sclera clear  NECK: supple, No JVD  CHEST/LUNG: CTA b/l  HEART: S1 S2 RRR  ABDOMEN: +BS Soft, NT/ND  EXTREMITIES:  2+ DP Pulses, No c/c. no LE edema  NEUROLOGY: AAOx3, no facial droop, 5/5 MS b/l UE and LE  SKIN: No rashes or lesions      LABS:                        13.1   17.18 )-----------( 313      ( 20 Feb 2020 07:07 )             38.8     02-20    127<L>  |  95<L>  |  34<H>  ----------------------------<  114<H>  4.5   |  20<L>  |  1.01    Ca    8.6      20 Feb 2020 06:57  Phos  2.5     02-18  Mg     1.9     02-18                RADIOLOGY & ADDITIONAL TESTS:    Imaging Personally Reviewed: ct head 2/17 reviewed - No significant interval change from the prior exam.    Hydrocephalus with intraventricular hemorrhage.    Interhemispheric low density collection contiguous with a subgaleal fluid collection which may represent a pseudomeningocele.      Consultant(s) Notes Reviewed:      Care Discussed with Consultants/Other Providers: d/w Neurosurgery ALEXANDER Browne regarding hospitalization

## 2020-02-20 NOTE — CONSULT NOTE ADULT - PROBLEM SELECTOR PROBLEM 2
Essential hypertension
Type 2 diabetes mellitus with hyperglycemia, without long-term current use of insulin

## 2020-02-21 DIAGNOSIS — Z02.9 ENCOUNTER FOR ADMINISTRATIVE EXAMINATIONS, UNSPECIFIED: ICD-10-CM

## 2020-02-21 LAB
ANION GAP SERPL CALC-SCNC: 10 MMOL/L — SIGNIFICANT CHANGE UP (ref 5–17)
ANION GAP SERPL CALC-SCNC: 11 MMOL/L — SIGNIFICANT CHANGE UP (ref 5–17)
ANION GAP SERPL CALC-SCNC: 11 MMOL/L — SIGNIFICANT CHANGE UP (ref 5–17)
BUN SERPL-MCNC: 31 MG/DL — HIGH (ref 7–23)
BUN SERPL-MCNC: 32 MG/DL — HIGH (ref 7–23)
BUN SERPL-MCNC: 34 MG/DL — HIGH (ref 7–23)
CALCIUM SERPL-MCNC: 8.1 MG/DL — LOW (ref 8.4–10.5)
CALCIUM SERPL-MCNC: 8.1 MG/DL — LOW (ref 8.4–10.5)
CALCIUM SERPL-MCNC: 8.6 MG/DL — SIGNIFICANT CHANGE UP (ref 8.4–10.5)
CHLORIDE SERPL-SCNC: 100 MMOL/L — SIGNIFICANT CHANGE UP (ref 96–108)
CHLORIDE SERPL-SCNC: 97 MMOL/L — SIGNIFICANT CHANGE UP (ref 96–108)
CHLORIDE SERPL-SCNC: 99 MMOL/L — SIGNIFICANT CHANGE UP (ref 96–108)
CO2 SERPL-SCNC: 20 MMOL/L — LOW (ref 22–31)
CO2 SERPL-SCNC: 21 MMOL/L — LOW (ref 22–31)
CO2 SERPL-SCNC: 22 MMOL/L — SIGNIFICANT CHANGE UP (ref 22–31)
CREAT SERPL-MCNC: 0.94 MG/DL — SIGNIFICANT CHANGE UP (ref 0.5–1.3)
CREAT SERPL-MCNC: 0.95 MG/DL — SIGNIFICANT CHANGE UP (ref 0.5–1.3)
CREAT SERPL-MCNC: 1.14 MG/DL — SIGNIFICANT CHANGE UP (ref 0.5–1.3)
GLUCOSE BLDC GLUCOMTR-MCNC: 158 MG/DL — HIGH (ref 70–99)
GLUCOSE BLDC GLUCOMTR-MCNC: 183 MG/DL — HIGH (ref 70–99)
GLUCOSE BLDC GLUCOMTR-MCNC: 79 MG/DL — SIGNIFICANT CHANGE UP (ref 70–99)
GLUCOSE BLDC GLUCOMTR-MCNC: 92 MG/DL — SIGNIFICANT CHANGE UP (ref 70–99)
GLUCOSE SERPL-MCNC: 179 MG/DL — HIGH (ref 70–99)
GLUCOSE SERPL-MCNC: 181 MG/DL — HIGH (ref 70–99)
GLUCOSE SERPL-MCNC: 90 MG/DL — SIGNIFICANT CHANGE UP (ref 70–99)
POTASSIUM SERPL-MCNC: 4.1 MMOL/L — SIGNIFICANT CHANGE UP (ref 3.5–5.3)
POTASSIUM SERPL-MCNC: 4.4 MMOL/L — SIGNIFICANT CHANGE UP (ref 3.5–5.3)
POTASSIUM SERPL-MCNC: 4.5 MMOL/L — SIGNIFICANT CHANGE UP (ref 3.5–5.3)
POTASSIUM SERPL-SCNC: 4.1 MMOL/L — SIGNIFICANT CHANGE UP (ref 3.5–5.3)
POTASSIUM SERPL-SCNC: 4.4 MMOL/L — SIGNIFICANT CHANGE UP (ref 3.5–5.3)
POTASSIUM SERPL-SCNC: 4.5 MMOL/L — SIGNIFICANT CHANGE UP (ref 3.5–5.3)
SODIUM SERPL-SCNC: 129 MMOL/L — LOW (ref 135–145)
SODIUM SERPL-SCNC: 130 MMOL/L — LOW (ref 135–145)
SODIUM SERPL-SCNC: 132 MMOL/L — LOW (ref 135–145)

## 2020-02-21 PROCEDURE — 99232 SBSQ HOSP IP/OBS MODERATE 35: CPT

## 2020-02-21 PROCEDURE — 70250 X-RAY EXAM OF SKULL: CPT | Mod: 26

## 2020-02-21 PROCEDURE — 99233 SBSQ HOSP IP/OBS HIGH 50: CPT

## 2020-02-21 RX ORDER — INSULIN GLARGINE 100 [IU]/ML
16 INJECTION, SOLUTION SUBCUTANEOUS AT BEDTIME
Refills: 0 | Status: DISCONTINUED | OUTPATIENT
Start: 2020-02-21 | End: 2020-02-23

## 2020-02-21 RX ORDER — INSULIN LISPRO 100/ML
6 VIAL (ML) SUBCUTANEOUS
Refills: 0 | Status: DISCONTINUED | OUTPATIENT
Start: 2020-02-21 | End: 2020-02-24

## 2020-02-21 RX ADMIN — SODIUM CHLORIDE 2 GRAM(S): 9 INJECTION INTRAMUSCULAR; INTRAVENOUS; SUBCUTANEOUS at 06:55

## 2020-02-21 RX ADMIN — Medication 650 MILLIGRAM(S): at 08:00

## 2020-02-21 RX ADMIN — Medication 1 MILLIGRAM(S): at 13:39

## 2020-02-21 RX ADMIN — Medication 1 TABLET(S): at 13:39

## 2020-02-21 RX ADMIN — Medication 2: at 21:38

## 2020-02-21 RX ADMIN — Medication 1 SPRAY(S): at 06:57

## 2020-02-21 RX ADMIN — Medication 2 MILLIGRAM(S): at 17:28

## 2020-02-21 RX ADMIN — OXYCODONE HYDROCHLORIDE 5 MILLIGRAM(S): 5 TABLET ORAL at 20:00

## 2020-02-21 RX ADMIN — DULOXETINE HYDROCHLORIDE 60 MILLIGRAM(S): 30 CAPSULE, DELAYED RELEASE ORAL at 13:39

## 2020-02-21 RX ADMIN — PANTOPRAZOLE SODIUM 40 MILLIGRAM(S): 20 TABLET, DELAYED RELEASE ORAL at 21:39

## 2020-02-21 RX ADMIN — Medication 6 UNIT(S): at 17:27

## 2020-02-21 RX ADMIN — SODIUM CHLORIDE 2 GRAM(S): 9 INJECTION INTRAMUSCULAR; INTRAVENOUS; SUBCUTANEOUS at 17:29

## 2020-02-21 RX ADMIN — Medication 1 SPRAY(S): at 17:28

## 2020-02-21 RX ADMIN — GABAPENTIN 300 MILLIGRAM(S): 400 CAPSULE ORAL at 06:56

## 2020-02-21 RX ADMIN — GABAPENTIN 300 MILLIGRAM(S): 400 CAPSULE ORAL at 21:38

## 2020-02-21 RX ADMIN — LEVETIRACETAM 500 MILLIGRAM(S): 250 TABLET, FILM COATED ORAL at 17:29

## 2020-02-21 RX ADMIN — GABAPENTIN 300 MILLIGRAM(S): 400 CAPSULE ORAL at 13:39

## 2020-02-21 RX ADMIN — Medication 650 MILLIGRAM(S): at 06:56

## 2020-02-21 RX ADMIN — SODIUM CHLORIDE 2 GRAM(S): 9 INJECTION INTRAMUSCULAR; INTRAVENOUS; SUBCUTANEOUS at 13:38

## 2020-02-21 RX ADMIN — Medication 5 MILLIGRAM(S): at 21:38

## 2020-02-21 RX ADMIN — LEVETIRACETAM 500 MILLIGRAM(S): 250 TABLET, FILM COATED ORAL at 06:56

## 2020-02-21 RX ADMIN — POLYETHYLENE GLYCOL 3350 17 GRAM(S): 17 POWDER, FOR SOLUTION ORAL at 17:28

## 2020-02-21 RX ADMIN — INSULIN GLARGINE 16 UNIT(S): 100 INJECTION, SOLUTION SUBCUTANEOUS at 21:39

## 2020-02-21 RX ADMIN — AMLODIPINE BESYLATE 5 MILLIGRAM(S): 2.5 TABLET ORAL at 06:55

## 2020-02-21 RX ADMIN — OXYCODONE HYDROCHLORIDE 5 MILLIGRAM(S): 5 TABLET ORAL at 19:30

## 2020-02-21 RX ADMIN — ENOXAPARIN SODIUM 40 MILLIGRAM(S): 100 INJECTION SUBCUTANEOUS at 17:28

## 2020-02-21 RX ADMIN — Medication 2 MILLIGRAM(S): at 06:56

## 2020-02-21 RX ADMIN — SENNA PLUS 2 TABLET(S): 8.6 TABLET ORAL at 21:39

## 2020-02-21 RX ADMIN — POLYETHYLENE GLYCOL 3350 17 GRAM(S): 17 POWDER, FOR SOLUTION ORAL at 06:56

## 2020-02-21 RX ADMIN — SODIUM CHLORIDE 2 GRAM(S): 9 INJECTION INTRAMUSCULAR; INTRAVENOUS; SUBCUTANEOUS at 01:51

## 2020-02-21 RX ADMIN — ATORVASTATIN CALCIUM 10 MILLIGRAM(S): 80 TABLET, FILM COATED ORAL at 21:39

## 2020-02-21 RX ADMIN — Medication 2: at 17:28

## 2020-02-21 NOTE — CONSULT NOTE ADULT - ASSESSMENT
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 noticed with hyponatremia.    1- hyponatremia  2- nausea  3-  HTN       suspect hyponatremia siadh in setting of nausea   anti-emetics  2% nacl infusion @ 50 cc/hr   norvasc 5 mg daily

## 2020-02-21 NOTE — CONSULT NOTE ADULT - SUBJECTIVE AND OBJECTIVE BOX
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 noticed with hyponatremia. renal consult called.     PAST HISTORY  --------------------------------------------------------------------------------  PAST MEDICAL & SURGICAL HISTORY:  FELICE (obstructive sleep apnea)  H/O hydrocephalus: 2008, s/p  shunt  H/O brain tumor  Peripheral neuropathy  Stroke: 2008, 12/2019  Diabetes: type 2  Hypertension  Depression  Seizure: post craniotomy surgery , placed on phenytoin which was eventually discontinued, pt had not had any seizure since 2008  HTN (hypertension)  CVA (cerebral vascular accident): 2008  S/P ventriculoperitoneal shunt: 2008  H/O craniotomy: 2008 secondary to tumor in third ventricle    FAMILY HISTORY: denies hx ckd     PAST SOCIAL HISTORY: denies tobacco use     ALLERGIES & MEDICATIONS  --------------------------------------------------------------------------------  Allergies    No Known Allergies    Intolerances      Standing Inpatient Medications  amLODIPine   Tablet 5 milliGRAM(s) Oral daily  atorvastatin 10 milliGRAM(s) Oral at bedtime  dexAMETHasone     Tablet 2 milliGRAM(s) Oral two times a day  DULoxetine 60 milliGRAM(s) Oral daily  enoxaparin Injectable 40 milliGRAM(s) SubCutaneous <User Schedule>  fluticasone propionate 50 MICROgram(s)/spray Nasal Spray 1 Spray(s) Both Nostrils two times a day  folic acid 1 milliGRAM(s) Oral daily  gabapentin 300 milliGRAM(s) Oral three times a day  insulin glargine Injectable (LANTUS) 16 Unit(s) SubCutaneous at bedtime  insulin lispro (HumaLOG) corrective regimen sliding scale   SubCutaneous Before meals and at bedtime  insulin lispro Injectable (HumaLOG) 6 Unit(s) SubCutaneous three times a day before meals  levETIRAcetam 500 milliGRAM(s) Oral two times a day  melatonin 5 milliGRAM(s) Oral at bedtime  multivitamin 1 Tablet(s) Oral daily  pantoprazole    Tablet 40 milliGRAM(s) Oral at bedtime  polyethylene glycol 3350 17 Gram(s) Oral two times a day  senna 2 Tablet(s) Oral at bedtime  sodium chloride 2 Gram(s) Oral every 6 hours  sodium chloride 2% . 1000 milliLiter(s) IV Continuous <Continuous>    PRN Inpatient Medications  acetaminophen   Tablet .. 650 milliGRAM(s) Oral every 6 hours PRN  oxyCODONE    IR 5 milliGRAM(s) Oral every 4 hours PRN  oxyCODONE    IR 10 milliGRAM(s) Oral every 4 hours PRN      REVIEW OF SYSTEMS  --------------------------------------------------------------------------------  Gen: No  fevers/chills   Skin: No rashes  Head/Eyes/Ears/Mouth: No headache; Normal hearing;  No sinus pain/discomfort, sore throat  Respiratory: No dyspnea, cough, wheezing, hemoptysis  CV: No chest pain, orthopnea  GI: No abdominal pain, diarrhea, + nausea, vomiting, melena  : No dysuria, decrease urination or hesitancy urinating  hematuria, nocturia  MSK: No joint pain/swelling; no back pain  Neuro: No dizziness/lightheadedness,  also with no edema     All other systems were reviewed and are negative, except as noted.    VITALS/PHYSICAL EXAM  --------------------------------------------------------------------------------  T(C): 36.8 (02-21-20 @ 19:43), Max: 36.8 (02-21-20 @ 19:43)  HR: 56 (02-21-20 @ 19:43) (55 - 61)  BP: 152/84 (02-21-20 @ 19:43) (121/71 - 164/90)  RR: 18 (02-21-20 @ 19:43) (18 - 18)  SpO2: 97% (02-21-20 @ 19:43) (96% - 99%)  Wt(kg): --        02-20-20 @ 07:01  -  02-21-20 @ 07:00  --------------------------------------------------------  IN: 560 mL / OUT: 0 mL / NET: 560 mL      Physical Exam:  	Gen: falls asleep easily and appears overall lethargic   	no jvd  	Pulm: decrease bs  no rales or ronchi or wheezing  	CV: RRR, S1S2; no rub  	Back: No CVA tenderness  	Abd: +BS, soft, nontender/nondistended  	: No suprapubic tenderness  	UE: Warm, no cyanosis  no clubbing,  no edema;  	LE: Warm, no cyanosis  no clubbing, no edema  	Neuro: slow to answer questions   	Skin: Warm, no decrease skin turgor   	    LABS/STUDIES  --------------------------------------------------------------------------------              13.1   17.18 >-----------<  313      [02-20-20 @ 07:07]              38.8     129  |  99  |  31  ----------------------------<  179      [02-21-20 @ 14:29]  4.5   |  20  |  0.95        Ca     8.1     [02-21-20 @ 14:29]            Creatinine Trend:  SCr 0.95 [02-21 @ 14:29]  SCr 1.14 [02-21 @ 08:08]  SCr 1.01 [02-20 @ 06:57]  SCr 0.99 [02-18 @ 16:13]  SCr 0.86 [02-18 @ 08:29]    Urinalysis - [12-14-19 @ 17:42]      Color Yellow / Appearance Clear / SG 1.015 / pH 5.0      Gluc Negative / Ketone Negative  / Bili Negative / Urobili Negative       Blood Negative / Protein Negative / Leuk Est Negative / Nitrite Negative      RBC Negative / WBC Negative / Hyaline  / Gran  / Sq Epi  / Non Sq Epi Neg.-Few / Bacteria Negative      HbA1c 6.0      [02-06-20 @ 13:46]    HCV 0.19, Nonreact      [11-28-19 @ 09:14]

## 2020-02-21 NOTE — PROGRESS NOTE ADULT - ASSESSMENT
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

## 2020-02-21 NOTE — PROGRESS NOTE ADULT - PROBLEM SELECTOR PLAN 2
Fasting glucose <100 today   Endocrine is following pt and decreased the Lantus and Humalog   cont to monitor Blood glucose

## 2020-02-21 NOTE — PROGRESS NOTE ADULT - SUBJECTIVE AND OBJECTIVE BOX
Subjective: Patient seen and examined. No new events except as noted.     SUBJECTIVE/ROS:  No chest pain, dyspnea, palpitation, or dizziness.     MEDICATIONS:  MEDICATIONS  (STANDING):  amLODIPine   Tablet 5 milliGRAM(s) Oral daily  atorvastatin 10 milliGRAM(s) Oral at bedtime  dexAMETHasone     Tablet 2 milliGRAM(s) Oral two times a day  DULoxetine 60 milliGRAM(s) Oral daily  enoxaparin Injectable 40 milliGRAM(s) SubCutaneous <User Schedule>  fluticasone propionate 50 MICROgram(s)/spray Nasal Spray 1 Spray(s) Both Nostrils two times a day  folic acid 1 milliGRAM(s) Oral daily  gabapentin 300 milliGRAM(s) Oral three times a day  insulin glargine Injectable (LANTUS) 22 Unit(s) SubCutaneous at bedtime  insulin lispro (HumaLOG) corrective regimen sliding scale   SubCutaneous Before meals and at bedtime  insulin lispro Injectable (HumaLOG) 8 Unit(s) SubCutaneous three times a day before meals  levETIRAcetam 500 milliGRAM(s) Oral two times a day  melatonin 5 milliGRAM(s) Oral at bedtime  multivitamin 1 Tablet(s) Oral daily  pantoprazole    Tablet 40 milliGRAM(s) Oral at bedtime  polyethylene glycol 3350 17 Gram(s) Oral two times a day  senna 2 Tablet(s) Oral at bedtime  sodium chloride 2 Gram(s) Oral every 6 hours  sodium chloride 2% . 1000 milliLiter(s) (50 mL/Hr) IV Continuous <Continuous>      PHYSICAL EXAM:  T(C): 36.6 (02-21-20 @ 07:45), Max: 36.6 (02-20-20 @ 11:56)  HR: 61 (02-21-20 @ 07:45) (55 - 79)  BP: 155/81 (02-21-20 @ 07:45) (146/76 - 164/92)  RR: 18 (02-21-20 @ 07:45) (18 - 18)  SpO2: 96% (02-21-20 @ 07:45) (96% - 99%)  Wt(kg): --  I&O's Summary    20 Feb 2020 07:01  -  21 Feb 2020 07:00  --------------------------------------------------------  IN: 560 mL / OUT: 0 mL / NET: 560 mL            JVP: Normal  Neck: supple  Lung: clear   CV: S1 S2 , Murmur:  Abd: soft  Ext: No edema  neuro: Awake / alert  Psych: flat affect  Skin: normal``    LABS/DATA:    CARDIAC MARKERS:                                13.1   17.18 )-----------( 313      ( 20 Feb 2020 07:07 )             38.8     02-21    130<L>  |  97  |  34<H>  ----------------------------<  90  4.1   |  22  |  1.14    Ca    8.6      21 Feb 2020 08:08      proBNP:   Lipid Profile:   HgA1c:   TSH:     TELE:  EKG:

## 2020-02-21 NOTE — PROGRESS NOTE ADULT - PROBLEM SELECTOR PLAN 4
cont current meds   if BP continues to increase , might consider increasing the Norvasc from 5mg to 10 mg oral daily

## 2020-02-21 NOTE — PROGRESS NOTE ADULT - PROBLEM SELECTOR PLAN 1
Recommend decreasing Lantus to 16 Units HS and Humalog 6 Units TIDAC plus moderate scale  Patient remains on decadron 2mg BID  Please inform endo team if changes to his steroid regimen are made.   Will follow.

## 2020-02-21 NOTE — PROGRESS NOTE ADULT - SUBJECTIVE AND OBJECTIVE BOX
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 required cardene gtt in NSCU.        ADDITIONAL REVIEW OF SYSTEMS: Negative except for above    MEDICATIONS  (STANDING):  amLODIPine   Tablet 5 milliGRAM(s) Oral daily  atorvastatin 10 milliGRAM(s) Oral at bedtime  dexAMETHasone     Tablet 2 milliGRAM(s) Oral two times a day  DULoxetine 60 milliGRAM(s) Oral daily  enoxaparin Injectable 40 milliGRAM(s) SubCutaneous <User Schedule>  fluticasone propionate 50 MICROgram(s)/spray Nasal Spray 1 Spray(s) Both Nostrils two times a day  folic acid 1 milliGRAM(s) Oral daily  gabapentin 300 milliGRAM(s) Oral three times a day  insulin glargine Injectable (LANTUS) 16 Unit(s) SubCutaneous at bedtime  insulin lispro (HumaLOG) corrective regimen sliding scale   SubCutaneous Before meals and at bedtime  insulin lispro Injectable (HumaLOG) 6 Unit(s) SubCutaneous three times a day before meals  levETIRAcetam 500 milliGRAM(s) Oral two times a day  melatonin 5 milliGRAM(s) Oral at bedtime  multivitamin 1 Tablet(s) Oral daily  pantoprazole    Tablet 40 milliGRAM(s) Oral at bedtime  polyethylene glycol 3350 17 Gram(s) Oral two times a day  senna 2 Tablet(s) Oral at bedtime  sodium chloride 2 Gram(s) Oral every 6 hours  sodium chloride 2% . 1000 milliLiter(s) (50 mL/Hr) IV Continuous <Continuous>    MEDICATIONS  (PRN):  acetaminophen   Tablet .. 650 milliGRAM(s) Oral every 6 hours PRN Temp greater or equal to 38C (100.4F), Mild Pain (1 - 3)  oxyCODONE    IR 5 milliGRAM(s) Oral every 4 hours PRN Moderate Pain (4 - 6)  oxyCODONE    IR 10 milliGRAM(s) Oral every 4 hours PRN Severe Pain (7 - 10)      CAPILLARY BLOOD GLUCOSE      POCT Blood Glucose.: 79 mg/dL (21 Feb 2020 08:34)  POCT Blood Glucose.: 170 mg/dL (20 Feb 2020 21:04)  POCT Blood Glucose.: 130 mg/dL (20 Feb 2020 17:19)  POCT Blood Glucose.: 202 mg/dL (20 Feb 2020 12:59)    I&O's Summary    20 Feb 2020 07:01  -  21 Feb 2020 07:00  --------------------------------------------------------  IN: 560 mL / OUT: 0 mL / NET: 560 mL        PHYSICAL EXAM:  Vital Signs Last 24 Hrs  T(C): 36.6 (21 Feb 2020 07:45), Max: 36.6 (21 Feb 2020 04:35)  T(F): 97.9 (21 Feb 2020 07:45), Max: 97.9 (21 Feb 2020 07:45)  HR: 61 (21 Feb 2020 07:45) (55 - 61)  BP: 155/81 (21 Feb 2020 07:45) (146/76 - 164/92)  BP(mean): --  RR: 18 (21 Feb 2020 07:45) (18 - 18)  SpO2: 96% (21 Feb 2020 07:45) (96% - 99%)    PHYSICAL EXAM:  GENERAL: NAD, well-developed  HEAD:  Atraumatic, Normocephalic  EYES:  conjunctiva and sclera clear  NECK: Supple, No JVD  CHEST/LUNG: Clear to auscultation bilaterally; No wheeze  HEART: Regular rate and rhythm; No murmurs, rubs, or gallops  ABDOMEN: Soft, Nontender, Nondistended; Bowel sounds present  EXTREMITIES:  2+ Peripheral Pulses, No clubbing, cyanosis, or edema  NEUROLOGY:       LABS:                        13.1   17.18 )-----------( 313      ( 20 Feb 2020 07:07 )             38.8     02-21    130<L>  |  97  |  34<H>  ----------------------------<  90  4.1   |  22  |  1.14    Ca    8.6      21 Feb 2020 08:08                  RADIOLOGY & ADDITIONAL TESTS:    Imaging Personally Reviewed:    Electrocardiogram Personally Reviewed:    COORDINATION OF CARE:  Care Discussed with Consultants/Other Providers [Y/N]:  Prior or Outpatient Records Reviewed [Y/N]: Patient is a 64y old  Male who presents with a chief complaint of Brain mass (20 Feb 2020 12:30)      SUBJECTIVE / OVERNIGHT EVENTS:  64M h/o HTN, T2DM (A1C=6.4), FELICE (pt used CPAP in the past, however he stated he lost weight and no longer has symptoms and does not use CPAP anymore), 2008- CVA with left sided paresthesia residual, 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 required cardene gtt in NSCU.        ADDITIONAL REVIEW OF SYSTEMS: Negative except for above    MEDICATIONS  (STANDING):  amLODIPine   Tablet 5 milliGRAM(s) Oral daily  atorvastatin 10 milliGRAM(s) Oral at bedtime  dexAMETHasone     Tablet 2 milliGRAM(s) Oral two times a day  DULoxetine 60 milliGRAM(s) Oral daily  enoxaparin Injectable 40 milliGRAM(s) SubCutaneous <User Schedule>  fluticasone propionate 50 MICROgram(s)/spray Nasal Spray 1 Spray(s) Both Nostrils two times a day  folic acid 1 milliGRAM(s) Oral daily  gabapentin 300 milliGRAM(s) Oral three times a day  insulin glargine Injectable (LANTUS) 16 Unit(s) SubCutaneous at bedtime  insulin lispro (HumaLOG) corrective regimen sliding scale   SubCutaneous Before meals and at bedtime  insulin lispro Injectable (HumaLOG) 6 Unit(s) SubCutaneous three times a day before meals  levETIRAcetam 500 milliGRAM(s) Oral two times a day  melatonin 5 milliGRAM(s) Oral at bedtime  multivitamin 1 Tablet(s) Oral daily  pantoprazole    Tablet 40 milliGRAM(s) Oral at bedtime  polyethylene glycol 3350 17 Gram(s) Oral two times a day  senna 2 Tablet(s) Oral at bedtime  sodium chloride 2 Gram(s) Oral every 6 hours  sodium chloride 2% . 1000 milliLiter(s) (50 mL/Hr) IV Continuous <Continuous>    MEDICATIONS  (PRN):  acetaminophen   Tablet .. 650 milliGRAM(s) Oral every 6 hours PRN Temp greater or equal to 38C (100.4F), Mild Pain (1 - 3)  oxyCODONE    IR 5 milliGRAM(s) Oral every 4 hours PRN Moderate Pain (4 - 6)  oxyCODONE    IR 10 milliGRAM(s) Oral every 4 hours PRN Severe Pain (7 - 10)      CAPILLARY BLOOD GLUCOSE      POCT Blood Glucose.: 79 mg/dL (21 Feb 2020 08:34)  POCT Blood Glucose.: 170 mg/dL (20 Feb 2020 21:04)  POCT Blood Glucose.: 130 mg/dL (20 Feb 2020 17:19)  POCT Blood Glucose.: 202 mg/dL (20 Feb 2020 12:59)    I&O's Summary    20 Feb 2020 07:01  -  21 Feb 2020 07:00  --------------------------------------------------------  IN: 560 mL / OUT: 0 mL / NET: 560 mL        PHYSICAL EXAM:  Vital Signs Last 24 Hrs  T(C): 36.6 (21 Feb 2020 07:45), Max: 36.6 (21 Feb 2020 04:35)  T(F): 97.9 (21 Feb 2020 07:45), Max: 97.9 (21 Feb 2020 07:45)  HR: 61 (21 Feb 2020 07:45) (55 - 61)  BP: 155/81 (21 Feb 2020 07:45) (146/76 - 164/92)  BP(mean): --  RR: 18 (21 Feb 2020 07:45) (18 - 18)  SpO2: 96% (21 Feb 2020 07:45) (96% - 99%)    PHYSICAL EXAM:  GENERAL: NAD, well-developed  HEAD:  Atraumatic, Normocephalic  EYES:  conjunctiva and sclera clear  NECK: Supple, No JVD  CHEST/LUNG: Clear to auscultation bilaterally; No wheeze  HEART: Regular rate and rhythm; No murmurs, rubs, or gallops  ABDOMEN: Soft, Nontender, Nondistended; Bowel sounds present  EXTREMITIES:  2+ Peripheral Pulses, No clubbing, cyanosis, or edema  NEUROLOGY:  AAO       LABS:                        13.1   17.18 )-----------( 313      ( 20 Feb 2020 07:07 )             38.8     02-21    130<L>  |  97  |  34<H>  ----------------------------<  90  4.1   |  22  |  1.14    Ca    8.6      21 Feb 2020 08:08                  RADIOLOGY & ADDITIONAL TESTS:    Imaging Personally Reviewed:    Electrocardiogram Personally Reviewed:    COORDINATION OF CARE:  Care Discussed with Consultants/Other Providers [Y/N]:  Prior or Outpatient Records Reviewed [Y/N]:

## 2020-02-21 NOTE — PROGRESS NOTE ADULT - ASSESSMENT
63 y/o M w/ Type 2 DM currently uncontrolled on high doses of steroids admitted for craniotomy, now on basal bolus insulin    T2DM with hyperglycemia   - Pt with elevated glucose after breakfast today.   He had been NPO, then procedure was canceled and he ate breakfast prior to receiving insulin   - Continue current doses of insulin for now, Decadron was decreased,  monitor for decreasing glucose values and consider decreasing insulin doses   - Consider discharge on oral agents    HTN: COntinue amlodipine    Hyperlipidemia: continue atorvastatin 65 y/o M w/ Type 2 DM currently uncontrolled on high doses of steroids admitted for craniotomy, now on basal bolus insulin

## 2020-02-21 NOTE — PROGRESS NOTE ADULT - PROBLEM SELECTOR PLAN 1
s/p craniotomy with subtotal resection of brain mass on 2/12   path with non diagnostic material ( mucus)   management per neurosurgery.

## 2020-02-21 NOTE — PROGRESS NOTE ADULT - ASSESSMENT
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 To monitor  - Continue Keppra for seizure prophylaxis  - Continue Decadron 2mg BID, last taper was yesterday  - Continue pain control with medications (tylenol prn / oxy prn)  - Continue Melatonin at bedtime for insomnia  - Continue Gabapentin for neuropathic pain  - Continue Cymbalta for anxiety d/o  - Surg path: colloid cyst    PULM:   - Encourage incentive spirometry  - Continue Flonase    CV:  - Continue Norvasc for HTN  - Continue Lipitor for HLD  - Appreciate Cardiology following, avoid QT prolonging drugs ( on 2/16)    ENDO:   - Appreciate Endocrinology following, FS this morning were low (70-90s) per RN patient had decreased appetite secondary to nausea  - Held humalog due to FS, continue sliding scale    HEME/ONC:             Last CBC 2/20 reviewed WBC elevated to 17 - afebrile. H/H stable         DVT ppx: Continue SQL, 2/13 [place] - neg    RENAL:   - Noted for Hyponatremia this morning 130 - already on 2%NACL@50 and salt tabs already 2mg q 6  - Consulted Dr. Bonds (Nephrology) for hyponatremia, increased 2%NACL @ 75, will continue salt tabs, will continue BMP Q 6 and 1L Fluid restriction    ID:   - Afebrile    GI:    - Continue protonix for GI ppx  - Continue miralax and senna  - Continue MVT and folic acid    DISCHARGE PLANNING:   PT/OT: CÉSAR    Assessment:  Please Check When Present   []  GCS  E   V  M     Heart Failure: []Acute, [] acute on chronic , []chronic  Heart Failure:  [] Diastolic (HFpEF), [] Systolic (HFrEF), []Combined (HFpEF and HFrEF), [] RHF, [] Pulm HTN, [] Other    [] RA, [] ATN, [] AIN, [] other  [] CKD1, [] CKD2, [] CKD 3, [] CKD 4, [] CKD 5, []ESRD    Encephalopathy: [] Metabolic, [] Hepatic, [] toxic, [] Neurological, [] Other    Abnormal Nurtitional Status: [] malnutrition (see nutrition note), [ ]underweight: BMI < 19, [] morbid obesity: BMI >40, [] Cachexia    [] Sepsis  [] hypovolemic shock,[] cardiogenic shock, [] hemorrhagic shock, [] neuogenic shock  [] Acute Respiratory Failure  []Cerebral edema, [] Brain compression/ herniation,   [] Functional quadriplegia  [] Acute blood loss anemia    To discuss w/ Dr. Manley  [id number]

## 2020-02-21 NOTE — PROGRESS NOTE ADULT - SUBJECTIVE AND OBJECTIVE BOX
SUBJECTIVE: HPI:  63 yo male. 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  c/o worsening left sided paresthesia in December, hospitalized at [facility] Dec 3-17, 2019, found to have subacute SDH along R frontal lobe and large mass in third ventricle. pt was referred to neurosurgery consult and transferred to rehab facility, now presents to PST scheduled for craniotomy surgery for removal of brain mass.  ***contacted surgeon, Dr. Manley's office, s/w  Elida who had consulted with Vineet, pt to hold aspirin 1 week preop.*** (06 Feb 2020 10:22)      OVERNIGHT EVENTS:     Vital Signs Last 24 Hrs  T(C): 36.6 (21 Feb 2020 07:45), Max: 36.6 (20 Feb 2020 11:56)  T(F): 97.9 (21 Feb 2020 07:45), Max: 97.9 (21 Feb 2020 07:45)  HR: 61 (21 Feb 2020 07:45) (55 - 79)  BP: 155/81 (21 Feb 2020 07:45) (146/76 - 164/92)  BP(mean): --  RR: 18 (21 Feb 2020 07:45) (18 - 18)  SpO2: 96% (21 Feb 2020 07:45) (96% - 99%)    PHYSICAL EXAM:    General: No Acute Distress     Neurological: Awake, alert oriented to person, place and time, Following Commands, PERRL, EOMI, Face Symmetrical, Speech Fluent, Moving all extremities, Muscle Strength normal in all four extremities, No Drift, Sensation to Light Touch Intact    Pulmonary: Clear to Auscultation, No Rales, No Rhonchi, No Wheezes     Cardiovascular: S1, S2, Regular Rate and Rhythm     Gastrointestinal: Soft, Nontender, Nondistended     Incision:     LABS:                        13.1   17.18 )-----------( 313      ( 20 Feb 2020 07:07 )             38.8    02-21    130<L>  |  97  |  34<H>  ----------------------------<  90  4.1   |  22  |  1.14    Ca    8.6      21 Feb 2020 08:08      Hemoglobin A1C, Whole Blood: 6.0 % (02-06 @ 13:46)      02-20 @ 07:01  -  02-21 @ 07:00  --------------------------------------------------------  IN: 560 mL / OUT: 0 mL / NET: 560 mL      DRAINS:     MEDICATIONS:  Antibiotics:    Neuro:  acetaminophen   Tablet .. 650 milliGRAM(s) Oral every 6 hours PRN Temp greater or equal to 38C (100.4F), Mild Pain (1 - 3)  DULoxetine 60 milliGRAM(s) Oral daily  gabapentin 300 milliGRAM(s) Oral three times a day  levETIRAcetam 500 milliGRAM(s) Oral two times a day  melatonin 5 milliGRAM(s) Oral at bedtime  oxyCODONE    IR 5 milliGRAM(s) Oral every 4 hours PRN Moderate Pain (4 - 6)  oxyCODONE    IR 10 milliGRAM(s) Oral every 4 hours PRN Severe Pain (7 - 10)    Cardiac:  amLODIPine   Tablet 5 milliGRAM(s) Oral daily    Pulm:    GI/:  pantoprazole    Tablet 40 milliGRAM(s) Oral at bedtime  polyethylene glycol 3350 17 Gram(s) Oral two times a day  senna 2 Tablet(s) Oral at bedtime    Other:   atorvastatin 10 milliGRAM(s) Oral at bedtime  dexAMETHasone     Tablet 2 milliGRAM(s) Oral two times a day  enoxaparin Injectable 40 milliGRAM(s) SubCutaneous <User Schedule>  fluticasone propionate 50 MICROgram(s)/spray Nasal Spray 1 Spray(s) Both Nostrils two times a day  folic acid 1 milliGRAM(s) Oral daily  insulin glargine Injectable (LANTUS) 22 Unit(s) SubCutaneous at bedtime  insulin lispro (HumaLOG) corrective regimen sliding scale   SubCutaneous Before meals and at bedtime  insulin lispro Injectable (HumaLOG) 8 Unit(s) SubCutaneous three times a day before meals  multivitamin 1 Tablet(s) Oral daily  sodium chloride 2 Gram(s) Oral every 6 hours  sodium chloride 2% . 1000 milliLiter(s) IV Continuous <Continuous>    DIET: [] Regular [] CCD [] Renal [] Puree [] Dysphagia [] Tube Feeds:     IMAGING: SUBJECTIVE: HPI:  65 yo male. 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  c/o worsening left sided paresthesia in December, hospitalized at [facility] Dec 3-17, 2019, found to have subacute SDH along R frontal lobe and large mass in third ventricle. pt was referred to neurosurgery consult and transferred to rehab facility, now presents to PST scheduled for craniotomy surgery for removal of brain mass.  ***contacted surgeon, Dr. Manley's office, s/w  Elida who had consulted with Vineet, pt to hold aspirin 1 week preop.*** (06 Feb 2020 10:22)      OVERNIGHT EVENTS: Patient remained on hypertonic IVF (2%NACL @ 50)    Vital Signs Last 24 Hrs  T(C): 36.6 (21 Feb 2020 07:45), Max: 36.6 (20 Feb 2020 11:56)  T(F): 97.9 (21 Feb 2020 07:45), Max: 97.9 (21 Feb 2020 07:45)  HR: 61 (21 Feb 2020 07:45) (55 - 79)  BP: 155/81 (21 Feb 2020 07:45) (146/76 - 164/92)  BP(mean): --  RR: 18 (21 Feb 2020 07:45) (18 - 18)  SpO2: 96% (21 Feb 2020 07:45) (96% - 99%)    PHYSICAL EXAM:    General: No Acute Distress     Neurological: Awake, alert oriented to person, place and time, Following Commands, PERRL, EOMI, Face Symmetrical, Speech Fluent, Moving all extremities, Muscle Strength normal in all four extremities, No Drift, Sensation to Light Touch Intact    Pulmonary: Clear to Auscultation, No Rales, No Rhonchi, No Wheezes     Cardiovascular: S1, S2, Regular Rate and Rhythm     Gastrointestinal: Soft, Nontender, Nondistended     Incision:     LABS:                        13.1   17.18 )-----------( 313      ( 20 Feb 2020 07:07 )             38.8    02-21    130<L>  |  97  |  34<H>  ----------------------------<  90  4.1   |  22  |  1.14    Ca    8.6      21 Feb 2020 08:08      Hemoglobin A1C, Whole Blood: 6.0 % (02-06 @ 13:46)      02-20 @ 07:01  -  02-21 @ 07:00  --------------------------------------------------------  IN: 560 mL / OUT: 0 mL / NET: 560 mL      DRAINS:     MEDICATIONS:  Antibiotics:    Neuro:  acetaminophen   Tablet .. 650 milliGRAM(s) Oral every 6 hours PRN Temp greater or equal to 38C (100.4F), Mild Pain (1 - 3)  DULoxetine 60 milliGRAM(s) Oral daily  gabapentin 300 milliGRAM(s) Oral three times a day  levETIRAcetam 500 milliGRAM(s) Oral two times a day  melatonin 5 milliGRAM(s) Oral at bedtime  oxyCODONE    IR 5 milliGRAM(s) Oral every 4 hours PRN Moderate Pain (4 - 6)  oxyCODONE    IR 10 milliGRAM(s) Oral every 4 hours PRN Severe Pain (7 - 10)    Cardiac:  amLODIPine   Tablet 5 milliGRAM(s) Oral daily    Pulm:    GI/:  pantoprazole    Tablet 40 milliGRAM(s) Oral at bedtime  polyethylene glycol 3350 17 Gram(s) Oral two times a day  senna 2 Tablet(s) Oral at bedtime    Other:   atorvastatin 10 milliGRAM(s) Oral at bedtime  dexAMETHasone     Tablet 2 milliGRAM(s) Oral two times a day  enoxaparin Injectable 40 milliGRAM(s) SubCutaneous <User Schedule>  fluticasone propionate 50 MICROgram(s)/spray Nasal Spray 1 Spray(s) Both Nostrils two times a day  folic acid 1 milliGRAM(s) Oral daily  insulin glargine Injectable (LANTUS) 22 Unit(s) SubCutaneous at bedtime  insulin lispro (HumaLOG) corrective regimen sliding scale   SubCutaneous Before meals and at bedtime  insulin lispro Injectable (HumaLOG) 8 Unit(s) SubCutaneous three times a day before meals  multivitamin 1 Tablet(s) Oral daily  sodium chloride 2 Gram(s) Oral every 6 hours  sodium chloride 2% . 1000 milliLiter(s) IV Continuous <Continuous>    DIET: [] Regular [] CCD [] Renal [] Puree [] Dysphagia [] Tube Feeds:     IMAGING: SUBJECTIVE: HPI:  65 yo male. 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  c/o worsening left sided paresthesia in December, hospitalized at [facility] Dec 3-17, 2019, found to have subacute SDH along R frontal lobe and large mass in third ventricle. pt was referred to neurosurgery consult and transferred to rehab facility, now presents to PST scheduled for craniotomy surgery for removal of brain mass.  ***contacted surgeon, Dr. Manley's office, s/w  Elida who had consulted with Vineet pt to hold aspirin 1 week preop.*** (06 Feb 2020 10:22)      OVERNIGHT EVENTS: Patient remained on hypertonic IVF (2%NACL @ 50) and patient seen and evaluated this morning sitting in chair feeling mildly nauseous however refused Zofran when offered. O/w no complaints of headache and pain well controlled with pain medications.      Vital Signs Last 24 Hrs  T(C): 36.6 (21 Feb 2020 07:45), Max: 36.6 (20 Feb 2020 11:56)  T(F): 97.9 (21 Feb 2020 07:45), Max: 97.9 (21 Feb 2020 07:45)  HR: 61 (21 Feb 2020 07:45) (55 - 79)  BP: 155/81 (21 Feb 2020 07:45) (146/76 - 164/92)  BP(mean): --  RR: 18 (21 Feb 2020 07:45) (18 - 18)  SpO2: 96% (21 Feb 2020 07:45) (96% - 99%)    PHYSICAL EXAM:    General: No Acute Distress     Neurological: Arousable to voice, Ox3, following commands, uppers 4/5 (effort related), LUE drift, lowers 4/5 (effort related), needed encouragement to participate on exam    Pulmonary: Clear to Auscultation, No Rales, No Rhonchi, No Wheezes     Cardiovascular: S1, S2, Regular Rate and Rhythm     Gastrointestinal: Soft, Nontender, Nondistended     Incision: Surgical incision - C/D/I    LABS:                        13.1   17.18 )-----------( 313      ( 20 Feb 2020 07:07 )             38.8    02-21    130<L>  |  97  |  34<H>  ----------------------------<  90  4.1   |  22  |  1.14    Ca    8.6      21 Feb 2020 08:08      Hemoglobin A1C, Whole Blood: 6.0 % (02-06 @ 13:46)      02-20 @ 07:01  -  02-21 @ 07:00  --------------------------------------------------------  IN: 560 mL / OUT: 0 mL / NET: 560 mL      DRAINS: None    MEDICATIONS:  Antibiotics:    Neuro:  acetaminophen   Tablet .. 650 milliGRAM(s) Oral every 6 hours PRN Temp greater or equal to 38C (100.4F), Mild Pain (1 - 3)  DULoxetine 60 milliGRAM(s) Oral daily  gabapentin 300 milliGRAM(s) Oral three times a day  levETIRAcetam 500 milliGRAM(s) Oral two times a day  melatonin 5 milliGRAM(s) Oral at bedtime  oxyCODONE    IR 5 milliGRAM(s) Oral every 4 hours PRN Moderate Pain (4 - 6)  oxyCODONE    IR 10 milliGRAM(s) Oral every 4 hours PRN Severe Pain (7 - 10)    Cardiac:  amLODIPine   Tablet 5 milliGRAM(s) Oral daily    Pulm:    GI/:  pantoprazole    Tablet 40 milliGRAM(s) Oral at bedtime  polyethylene glycol 3350 17 Gram(s) Oral two times a day  senna 2 Tablet(s) Oral at bedtime    Other:   atorvastatin 10 milliGRAM(s) Oral at bedtime  dexAMETHasone     Tablet 2 milliGRAM(s) Oral two times a day  enoxaparin Injectable 40 milliGRAM(s) SubCutaneous <User Schedule>  fluticasone propionate 50 MICROgram(s)/spray Nasal Spray 1 Spray(s) Both Nostrils two times a day  folic acid 1 milliGRAM(s) Oral daily  insulin glargine Injectable (LANTUS) 22 Unit(s) SubCutaneous at bedtime  insulin lispro (HumaLOG) corrective regimen sliding scale   SubCutaneous Before meals and at bedtime  insulin lispro Injectable (HumaLOG) 8 Unit(s) SubCutaneous three times a day before meals  multivitamin 1 Tablet(s) Oral daily  sodium chloride 2 Gram(s) Oral every 6 hours  sodium chloride 2% . 1000 milliLiter(s) IV Continuous <Continuous>    DIET: [] Regular [x - 1L Fluid restriction] CCD [] Renal [] Puree [] Dysphagia [] Tube Feeds:     IMAGING:   < from: CT Head No Cont (02.17.20 @ 08:15) >  IMPRESSION:    No significant interval change from the prior exam.    Hydrocephalus with intraventricular hemorrhage.    Interhemispheric low density collection contiguous with a subgaleal fluid collection which may represent a pseudomeningocele.    ERNESTO BURKETT M.D., ATTENDING RADIOLOGIST  This document has been electronically signed. Feb 17 2020  8:29AM    < end of copied text > SUBJECTIVE: HPI:  63 yo male. 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  c/o worsening left sided paresthesia in December, hospitalized at [facility] Dec 3-17, 2019, found to have subacute SDH along R frontal lobe and large mass in third ventricle. pt was referred to neurosurgery consult and transferred to rehab facility, now presents to PST scheduled for craniotomy surgery for removal of brain mass.  ***contacted surgeon, Dr. Manley's office, s/w  Elida who had consulted with Vineet pt to hold aspirin 1 week preop.*** (06 Feb 2020 10:22)      OVERNIGHT EVENTS: Patient remained on hypertonic IVF (2%NACL @ 50) and patient seen and evaluated this morning sitting in chair feeling mildly nauseous however refused Zofran when offered. O/w no complaints of headache and pain well controlled with pain medications.      Vital Signs Last 24 Hrs  T(C): 36.6 (21 Feb 2020 07:45), Max: 36.6 (20 Feb 2020 11:56)  T(F): 97.9 (21 Feb 2020 07:45), Max: 97.9 (21 Feb 2020 07:45)  HR: 61 (21 Feb 2020 07:45) (55 - 79)  BP: 155/81 (21 Feb 2020 07:45) (146/76 - 164/92)  BP(mean): --  RR: 18 (21 Feb 2020 07:45) (18 - 18)  SpO2: 96% (21 Feb 2020 07:45) (96% - 99%)    PHYSICAL EXAM:    General: No Acute Distress     Neurological: Arousable to voice, Ox3, following commands, uppers 4/5 (effort related), LUE drift, lowers 4/5 (effort related), needed encouragement to participate on exam    Pulmonary: Clear to Auscultation, No Rales, No Rhonchi, No Wheezes     Cardiovascular: S1, S2, Regular Rate and Rhythm     Gastrointestinal: Soft, Nontender, Nondistended     Incision: Surgical incision - C/D/I    LABS:                        13.1   17.18 )-----------( 313      ( 20 Feb 2020 07:07 )             38.8    02-21    130<L>  |  97  |  34<H>  ----------------------------<  90  4.1   |  22  |  1.14    Ca    8.6      21 Feb 2020 08:08      Hemoglobin A1C, Whole Blood: 6.0 % (02-06 @ 13:46)      02-20 @ 07:01  -  02-21 @ 07:00  --------------------------------------------------------  IN: 560 mL / OUT: 0 mL / NET: 560 mL      DRAINS: None    MEDICATIONS:  Antibiotics:    Neuro:  acetaminophen   Tablet .. 650 milliGRAM(s) Oral every 6 hours PRN Temp greater or equal to 38C (100.4F), Mild Pain (1 - 3)  DULoxetine 60 milliGRAM(s) Oral daily  gabapentin 300 milliGRAM(s) Oral three times a day  levETIRAcetam 500 milliGRAM(s) Oral two times a day  melatonin 5 milliGRAM(s) Oral at bedtime  oxyCODONE    IR 5 milliGRAM(s) Oral every 4 hours PRN Moderate Pain (4 - 6)  oxyCODONE    IR 10 milliGRAM(s) Oral every 4 hours PRN Severe Pain (7 - 10)    Cardiac:  amLODIPine   Tablet 5 milliGRAM(s) Oral daily    Pulm:    GI/:  pantoprazole    Tablet 40 milliGRAM(s) Oral at bedtime  polyethylene glycol 3350 17 Gram(s) Oral two times a day  senna 2 Tablet(s) Oral at bedtime    Other:   atorvastatin 10 milliGRAM(s) Oral at bedtime  dexAMETHasone     Tablet 2 milliGRAM(s) Oral two times a day  enoxaparin Injectable 40 milliGRAM(s) SubCutaneous <User Schedule>  fluticasone propionate 50 MICROgram(s)/spray Nasal Spray 1 Spray(s) Both Nostrils two times a day  folic acid 1 milliGRAM(s) Oral daily  insulin glargine Injectable (LANTUS) 22 Unit(s) SubCutaneous at bedtime  insulin lispro (HumaLOG) corrective regimen sliding scale   SubCutaneous Before meals and at bedtime  insulin lispro Injectable (HumaLOG) 8 Unit(s) SubCutaneous three times a day before meals  multivitamin 1 Tablet(s) Oral daily  sodium chloride 2 Gram(s) Oral every 6 hours  sodium chloride 2% . 1000 milliLiter(s) IV Continuous <Continuous>    DIET: [] Regular [x - 1L Fluid restriction] CCD [] Renal [] Puree [] Dysphagia [] Tube Feeds:     IMAGING:   < from: CT Head No Cont (02.17.20 @ 08:15) >  IMPRESSION:    No significant interval change from the prior exam.    Hydrocephalus with intraventricular hemorrhage.    Interhemispheric low density collection contiguous with a subgaleal fluid collection which may represent a pseudomeningocele.    ERNESTO BURKETT M.D., ATTENDING RADIOLOGIST  This document has been electronically signed. 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 No change in hydrocephalus compared with 2/17/2020.    Stable severe ventricular dilation with ventriculoperitoneal shunt catheter in appropriate position.    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    MADELYN SALAS M.D., RADIOLOGY FELLOW  This document has been electronically signed.  GUERLINE OLIVO M.D., ATTENDING RADIOLOGIST  This document has been electronically signed. Feb 21 2020 12:14PM    < end of copied text >

## 2020-02-21 NOTE — PROGRESS NOTE ADULT - SUBJECTIVE AND OBJECTIVE BOX
Chief Complaint:     History:    MEDICATIONS  (STANDING):  amLODIPine   Tablet 5 milliGRAM(s) Oral daily  atorvastatin 10 milliGRAM(s) Oral at bedtime  dexAMETHasone     Tablet 2 milliGRAM(s) Oral two times a day  DULoxetine 60 milliGRAM(s) Oral daily  enoxaparin Injectable 40 milliGRAM(s) SubCutaneous <User Schedule>  fluticasone propionate 50 MICROgram(s)/spray Nasal Spray 1 Spray(s) Both Nostrils two times a day  folic acid 1 milliGRAM(s) Oral daily  gabapentin 300 milliGRAM(s) Oral three times a day  insulin glargine Injectable (LANTUS) 22 Unit(s) SubCutaneous at bedtime  insulin lispro (HumaLOG) corrective regimen sliding scale   SubCutaneous Before meals and at bedtime  insulin lispro Injectable (HumaLOG) 8 Unit(s) SubCutaneous three times a day before meals  levETIRAcetam 500 milliGRAM(s) Oral two times a day  melatonin 5 milliGRAM(s) Oral at bedtime  multivitamin 1 Tablet(s) Oral daily  pantoprazole    Tablet 40 milliGRAM(s) Oral at bedtime  polyethylene glycol 3350 17 Gram(s) Oral two times a day  senna 2 Tablet(s) Oral at bedtime  sodium chloride 2 Gram(s) Oral every 6 hours  sodium chloride 2% . 1000 milliLiter(s) (50 mL/Hr) IV Continuous <Continuous>    MEDICATIONS  (PRN):  acetaminophen   Tablet .. 650 milliGRAM(s) Oral every 6 hours PRN Temp greater or equal to 38C (100.4F), Mild Pain (1 - 3)  oxyCODONE    IR 5 milliGRAM(s) Oral every 4 hours PRN Moderate Pain (4 - 6)  oxyCODONE    IR 10 milliGRAM(s) Oral every 4 hours PRN Severe Pain (7 - 10)      Allergies    No Known Allergies    Intolerances      Review of Systems:  Constitutional: No fever  Eyes: No blurry vision  Neuro: No tremors  HEENT: No pain  Cardiovascular: No chest pain, palpitations  Respiratory: No SOB, no cough  GI: No nausea, vomiting, abdominal pain  : No dysuria  Skin: no rash  Psych: no depression  Endocrine: no polyuria, polydipsia  Hem/lymph: no swelling  Osteoporosis: no fractures    ALL OTHER SYSTEMS REVIEWED AND NEGATIVE    UNABLE TO OBTAIN    PHYSICAL EXAM:  VITALS: T(C): 36.6 (02-21-20 @ 07:45)  T(F): 97.9 (02-21-20 @ 07:45), Max: 97.9 (02-21-20 @ 07:45)  HR: 61 (02-21-20 @ 07:45) (55 - 79)  BP: 155/81 (02-21-20 @ 07:45) (146/76 - 164/92)  RR:  (18 - 18)  SpO2:  (96% - 99%)  Wt(kg): --  GENERAL: NAD, well-groomed, well-developed  EYES: No proptosis, no lid lag, anicteric  HEENT:  Atraumatic, Normocephalic, moist mucous membranes  THYROID: Normal size, no palpable nodules  RESPIRATORY: Clear to auscultation bilaterally; No rales, rhonchi, wheezing, or rubs  CARDIOVASCULAR: Regular rate and rhythm; No murmurs; no peripheral edema  GI: Soft, nontender, non distended, normal bowel sounds  SKIN: Dry, intact, No rashes or lesions  MUSCULOSKELETAL: Full range of motion, normal strength  NEURO: sensation intact, extraocular movements intact, no tremor, normal reflexes  PSYCH: Alert and oriented x 3, normal affect, normal mood  CUSHING'S SIGNS: no striae    POCT Blood Glucose.: 79 mg/dL (02-21-20 @ 08:34)  POCT Blood Glucose.: 170 mg/dL (02-20-20 @ 21:04)  POCT Blood Glucose.: 130 mg/dL (02-20-20 @ 17:19)  POCT Blood Glucose.: 202 mg/dL (02-20-20 @ 12:59)  POCT Blood Glucose.: 109 mg/dL (02-20-20 @ 09:00)  POCT Blood Glucose.: 156 mg/dL (02-19-20 @ 21:44)  POCT Blood Glucose.: 183 mg/dL (02-19-20 @ 17:59)  POCT Blood Glucose.: 170 mg/dL (02-19-20 @ 13:52)  POCT Blood Glucose.: 198 mg/dL (02-19-20 @ 12:34)  POCT Blood Glucose.: 109 mg/dL (02-19-20 @ 08:51)  POCT Blood Glucose.: 111 mg/dL (02-19-20 @ 00:18)  POCT Blood Glucose.: 135 mg/dL (02-18-20 @ 21:46)  POCT Blood Glucose.: 152 mg/dL (02-18-20 @ 17:49)  POCT Blood Glucose.: 158 mg/dL (02-18-20 @ 12:24)      02-21    130<L>  |  97  |  34<H>  ----------------------------<  90  4.1   |  22  |  1.14    EGFR if : 77  EGFR if non : 67    Ca    8.6      02-21  Mg     1.9     02-18  Phos  2.5     02-18            Thyroid Function Tests:      Hemoglobin A1C, Whole Blood: 6.0 % <H> [4.0 - 5.6] (02-06-20 @ 13:46)  Hemoglobin A1C, Whole Blood: 6.4 % <H> [4.0 - 5.6] (11-29-19 @ 08:25) Chief Complaint: T2DM    History: Patient noted to have overly tight glucose control. He is eating well. Denies hypoglycemic symptoms.     MEDICATIONS  (STANDING):  amLODIPine   Tablet 5 milliGRAM(s) Oral daily  atorvastatin 10 milliGRAM(s) Oral at bedtime  dexAMETHasone     Tablet 2 milliGRAM(s) Oral two times a day  DULoxetine 60 milliGRAM(s) Oral daily  enoxaparin Injectable 40 milliGRAM(s) SubCutaneous <User Schedule>  fluticasone propionate 50 MICROgram(s)/spray Nasal Spray 1 Spray(s) Both Nostrils two times a day  folic acid 1 milliGRAM(s) Oral daily  gabapentin 300 milliGRAM(s) Oral three times a day  insulin glargine Injectable (LANTUS) 22 Unit(s) SubCutaneous at bedtime  insulin lispro (HumaLOG) corrective regimen sliding scale   SubCutaneous Before meals and at bedtime  insulin lispro Injectable (HumaLOG) 8 Unit(s) SubCutaneous three times a day before meals  levETIRAcetam 500 milliGRAM(s) Oral two times a day  melatonin 5 milliGRAM(s) Oral at bedtime  multivitamin 1 Tablet(s) Oral daily  pantoprazole    Tablet 40 milliGRAM(s) Oral at bedtime  polyethylene glycol 3350 17 Gram(s) Oral two times a day  senna 2 Tablet(s) Oral at bedtime  sodium chloride 2 Gram(s) Oral every 6 hours  sodium chloride 2% . 1000 milliLiter(s) (50 mL/Hr) IV Continuous <Continuous>    MEDICATIONS  (PRN):  acetaminophen   Tablet .. 650 milliGRAM(s) Oral every 6 hours PRN Temp greater or equal to 38C (100.4F), Mild Pain (1 - 3)  oxyCODONE    IR 5 milliGRAM(s) Oral every 4 hours PRN Moderate Pain (4 - 6)  oxyCODONE    IR 10 milliGRAM(s) Oral every 4 hours PRN Severe Pain (7 - 10)      Allergies    No Known Allergies    Intolerances      Review of Systems:  Constitutional: No fever  Eyes: No blurry vision  Neuro: No tremors  HEENT: No pain  Cardiovascular: No chest pain, palpitations  Respiratory: No SOB, no cough  GI: No nausea, vomiting, abdominal pain  : No dysuria  Skin: no rash  Psych: no depression  Endocrine: no polyuria, polydipsia  Hem/lymph: no swelling  Osteoporosis: no fractures    ALL OTHER SYSTEMS REVIEWED AND NEGATIVE        PHYSICAL EXAM:  VITALS: T(C): 36.6 (02-21-20 @ 07:45)  T(F): 97.9 (02-21-20 @ 07:45), Max: 97.9 (02-21-20 @ 07:45)  HR: 61 (02-21-20 @ 07:45) (55 - 79)  BP: 155/81 (02-21-20 @ 07:45) (146/76 - 164/92)  RR:  (18 - 18)  SpO2:  (96% - 99%)  Wt(kg): --  GENERAL: NAD, well-groomed, well-developed  EYES: No proptosis, no lid lag, anicteric  HEENT:  Atraumatic, Normocephalic, moist mucous membranes  RESPIRATORY: Clear to auscultation bilaterally; No rales, rhonchi, wheezing, or rubs  CARDIOVASCULAR: Regular rate and rhythm; No murmurs; no peripheral edema  GI: Soft, nontender, non distended, normal bowel sounds  SKIN: Dry, intact, No rashes or lesions      POCT Blood Glucose.: 79 mg/dL (02-21-20 @ 08:34)  POCT Blood Glucose.: 170 mg/dL (02-20-20 @ 21:04)  POCT Blood Glucose.: 130 mg/dL (02-20-20 @ 17:19)  POCT Blood Glucose.: 202 mg/dL (02-20-20 @ 12:59)  POCT Blood Glucose.: 109 mg/dL (02-20-20 @ 09:00)  POCT Blood Glucose.: 156 mg/dL (02-19-20 @ 21:44)  POCT Blood Glucose.: 183 mg/dL (02-19-20 @ 17:59)  POCT Blood Glucose.: 170 mg/dL (02-19-20 @ 13:52)  POCT Blood Glucose.: 198 mg/dL (02-19-20 @ 12:34)  POCT Blood Glucose.: 109 mg/dL (02-19-20 @ 08:51)  POCT Blood Glucose.: 111 mg/dL (02-19-20 @ 00:18)  POCT Blood Glucose.: 135 mg/dL (02-18-20 @ 21:46)  POCT Blood Glucose.: 152 mg/dL (02-18-20 @ 17:49)  POCT Blood Glucose.: 158 mg/dL (02-18-20 @ 12:24)      02-21    130<L>  |  97  |  34<H>  ----------------------------<  90  4.1   |  22  |  1.14    EGFR if : 77  EGFR if non : 67    Ca    8.6      02-21  Mg     1.9     02-18  Phos  2.5     02-18                Hemoglobin A1C, Whole Blood: 6.0 % <H> [4.0 - 5.6] (02-06-20 @ 13:46)  Hemoglobin A1C, Whole Blood: 6.4 % <H> [4.0 - 5.6] (11-29-19 @ 08:25)

## 2020-02-22 LAB
ANION GAP SERPL CALC-SCNC: 10 MMOL/L — SIGNIFICANT CHANGE UP (ref 5–17)
ANION GAP SERPL CALC-SCNC: 11 MMOL/L — SIGNIFICANT CHANGE UP (ref 5–17)
ANION GAP SERPL CALC-SCNC: 11 MMOL/L — SIGNIFICANT CHANGE UP (ref 5–17)
ANION GAP SERPL CALC-SCNC: 12 MMOL/L — SIGNIFICANT CHANGE UP (ref 5–17)
BUN SERPL-MCNC: 27 MG/DL — HIGH (ref 7–23)
BUN SERPL-MCNC: 27 MG/DL — HIGH (ref 7–23)
BUN SERPL-MCNC: 28 MG/DL — HIGH (ref 7–23)
BUN SERPL-MCNC: 29 MG/DL — HIGH (ref 7–23)
CALCIUM SERPL-MCNC: 8.4 MG/DL — SIGNIFICANT CHANGE UP (ref 8.4–10.5)
CALCIUM SERPL-MCNC: 8.4 MG/DL — SIGNIFICANT CHANGE UP (ref 8.4–10.5)
CALCIUM SERPL-MCNC: 8.5 MG/DL — SIGNIFICANT CHANGE UP (ref 8.4–10.5)
CALCIUM SERPL-MCNC: 8.5 MG/DL — SIGNIFICANT CHANGE UP (ref 8.4–10.5)
CHLORIDE SERPL-SCNC: 101 MMOL/L — SIGNIFICANT CHANGE UP (ref 96–108)
CHLORIDE SERPL-SCNC: 102 MMOL/L — SIGNIFICANT CHANGE UP (ref 96–108)
CHLORIDE SERPL-SCNC: 98 MMOL/L — SIGNIFICANT CHANGE UP (ref 96–108)
CHLORIDE SERPL-SCNC: 98 MMOL/L — SIGNIFICANT CHANGE UP (ref 96–108)
CO2 SERPL-SCNC: 19 MMOL/L — LOW (ref 22–31)
CO2 SERPL-SCNC: 19 MMOL/L — LOW (ref 22–31)
CO2 SERPL-SCNC: 21 MMOL/L — LOW (ref 22–31)
CO2 SERPL-SCNC: 23 MMOL/L — SIGNIFICANT CHANGE UP (ref 22–31)
CREAT SERPL-MCNC: 0.71 MG/DL — SIGNIFICANT CHANGE UP (ref 0.5–1.3)
CREAT SERPL-MCNC: 0.89 MG/DL — SIGNIFICANT CHANGE UP (ref 0.5–1.3)
CREAT SERPL-MCNC: 0.89 MG/DL — SIGNIFICANT CHANGE UP (ref 0.5–1.3)
CREAT SERPL-MCNC: 0.9 MG/DL — SIGNIFICANT CHANGE UP (ref 0.5–1.3)
GLUCOSE BLDC GLUCOMTR-MCNC: 103 MG/DL — HIGH (ref 70–99)
GLUCOSE BLDC GLUCOMTR-MCNC: 145 MG/DL — HIGH (ref 70–99)
GLUCOSE BLDC GLUCOMTR-MCNC: 158 MG/DL — HIGH (ref 70–99)
GLUCOSE BLDC GLUCOMTR-MCNC: 215 MG/DL — HIGH (ref 70–99)
GLUCOSE SERPL-MCNC: 107 MG/DL — HIGH (ref 70–99)
GLUCOSE SERPL-MCNC: 166 MG/DL — HIGH (ref 70–99)
GLUCOSE SERPL-MCNC: 194 MG/DL — HIGH (ref 70–99)
GLUCOSE SERPL-MCNC: 244 MG/DL — HIGH (ref 70–99)
HCT VFR BLD CALC: 40.9 % — SIGNIFICANT CHANGE UP (ref 39–50)
HGB BLD-MCNC: 13.2 G/DL — SIGNIFICANT CHANGE UP (ref 13–17)
MCHC RBC-ENTMCNC: 25.4 PG — LOW (ref 27–34)
MCHC RBC-ENTMCNC: 32.3 GM/DL — SIGNIFICANT CHANGE UP (ref 32–36)
MCV RBC AUTO: 78.8 FL — LOW (ref 80–100)
NRBC # BLD: 0 /100 WBCS — SIGNIFICANT CHANGE UP (ref 0–0)
PLATELET # BLD AUTO: 292 K/UL — SIGNIFICANT CHANGE UP (ref 150–400)
POTASSIUM SERPL-MCNC: 4.5 MMOL/L — SIGNIFICANT CHANGE UP (ref 3.5–5.3)
POTASSIUM SERPL-MCNC: 4.5 MMOL/L — SIGNIFICANT CHANGE UP (ref 3.5–5.3)
POTASSIUM SERPL-MCNC: 5.1 MMOL/L — SIGNIFICANT CHANGE UP (ref 3.5–5.3)
POTASSIUM SERPL-MCNC: 5.7 MMOL/L — HIGH (ref 3.5–5.3)
POTASSIUM SERPL-SCNC: 4.5 MMOL/L — SIGNIFICANT CHANGE UP (ref 3.5–5.3)
POTASSIUM SERPL-SCNC: 4.5 MMOL/L — SIGNIFICANT CHANGE UP (ref 3.5–5.3)
POTASSIUM SERPL-SCNC: 5.1 MMOL/L — SIGNIFICANT CHANGE UP (ref 3.5–5.3)
POTASSIUM SERPL-SCNC: 5.7 MMOL/L — HIGH (ref 3.5–5.3)
RBC # BLD: 5.19 M/UL — SIGNIFICANT CHANGE UP (ref 4.2–5.8)
RBC # FLD: 14.2 % — SIGNIFICANT CHANGE UP (ref 10.3–14.5)
SODIUM SERPL-SCNC: 128 MMOL/L — LOW (ref 135–145)
SODIUM SERPL-SCNC: 131 MMOL/L — LOW (ref 135–145)
SODIUM SERPL-SCNC: 132 MMOL/L — LOW (ref 135–145)
SODIUM SERPL-SCNC: 134 MMOL/L — LOW (ref 135–145)
WBC # BLD: 15.57 K/UL — HIGH (ref 3.8–10.5)
WBC # FLD AUTO: 15.57 K/UL — HIGH (ref 3.8–10.5)

## 2020-02-22 PROCEDURE — 99232 SBSQ HOSP IP/OBS MODERATE 35: CPT

## 2020-02-22 RX ORDER — ACETAMINOPHEN 500 MG
1000 TABLET ORAL ONCE
Refills: 0 | Status: COMPLETED | OUTPATIENT
Start: 2020-02-22 | End: 2020-02-22

## 2020-02-22 RX ORDER — DEXAMETHASONE 0.5 MG/5ML
4 ELIXIR ORAL ONCE
Refills: 0 | Status: COMPLETED | OUTPATIENT
Start: 2020-02-22 | End: 2020-02-22

## 2020-02-22 RX ADMIN — Medication 4: at 12:51

## 2020-02-22 RX ADMIN — Medication 4 MILLIGRAM(S): at 01:28

## 2020-02-22 RX ADMIN — GABAPENTIN 300 MILLIGRAM(S): 400 CAPSULE ORAL at 21:32

## 2020-02-22 RX ADMIN — SODIUM CHLORIDE 2 GRAM(S): 9 INJECTION INTRAMUSCULAR; INTRAVENOUS; SUBCUTANEOUS at 21:37

## 2020-02-22 RX ADMIN — SENNA PLUS 2 TABLET(S): 8.6 TABLET ORAL at 21:32

## 2020-02-22 RX ADMIN — POLYETHYLENE GLYCOL 3350 17 GRAM(S): 17 POWDER, FOR SOLUTION ORAL at 18:04

## 2020-02-22 RX ADMIN — PANTOPRAZOLE SODIUM 40 MILLIGRAM(S): 20 TABLET, DELAYED RELEASE ORAL at 21:32

## 2020-02-22 RX ADMIN — Medication 2 MILLIGRAM(S): at 18:04

## 2020-02-22 RX ADMIN — Medication 1 SPRAY(S): at 06:13

## 2020-02-22 RX ADMIN — Medication 1 MILLIGRAM(S): at 12:52

## 2020-02-22 RX ADMIN — GABAPENTIN 300 MILLIGRAM(S): 400 CAPSULE ORAL at 15:53

## 2020-02-22 RX ADMIN — SODIUM CHLORIDE 2 GRAM(S): 9 INJECTION INTRAMUSCULAR; INTRAVENOUS; SUBCUTANEOUS at 06:12

## 2020-02-22 RX ADMIN — Medication 1000 MILLIGRAM(S): at 17:30

## 2020-02-22 RX ADMIN — AMLODIPINE BESYLATE 5 MILLIGRAM(S): 2.5 TABLET ORAL at 06:13

## 2020-02-22 RX ADMIN — POLYETHYLENE GLYCOL 3350 17 GRAM(S): 17 POWDER, FOR SOLUTION ORAL at 06:12

## 2020-02-22 RX ADMIN — Medication 400 MILLIGRAM(S): at 17:00

## 2020-02-22 RX ADMIN — INSULIN GLARGINE 16 UNIT(S): 100 INJECTION, SOLUTION SUBCUTANEOUS at 21:33

## 2020-02-22 RX ADMIN — Medication 2: at 18:04

## 2020-02-22 RX ADMIN — ATORVASTATIN CALCIUM 10 MILLIGRAM(S): 80 TABLET, FILM COATED ORAL at 21:32

## 2020-02-22 RX ADMIN — Medication 2 MILLIGRAM(S): at 06:13

## 2020-02-22 RX ADMIN — Medication 6 UNIT(S): at 18:03

## 2020-02-22 RX ADMIN — Medication 1 SPRAY(S): at 18:04

## 2020-02-22 RX ADMIN — LEVETIRACETAM 500 MILLIGRAM(S): 250 TABLET, FILM COATED ORAL at 06:13

## 2020-02-22 RX ADMIN — SODIUM CHLORIDE 2 GRAM(S): 9 INJECTION INTRAMUSCULAR; INTRAVENOUS; SUBCUTANEOUS at 00:36

## 2020-02-22 RX ADMIN — OXYCODONE HYDROCHLORIDE 10 MILLIGRAM(S): 5 TABLET ORAL at 12:50

## 2020-02-22 RX ADMIN — SODIUM CHLORIDE 2 GRAM(S): 9 INJECTION INTRAMUSCULAR; INTRAVENOUS; SUBCUTANEOUS at 12:51

## 2020-02-22 RX ADMIN — Medication 1 TABLET(S): at 12:52

## 2020-02-22 RX ADMIN — LEVETIRACETAM 500 MILLIGRAM(S): 250 TABLET, FILM COATED ORAL at 18:04

## 2020-02-22 RX ADMIN — ENOXAPARIN SODIUM 40 MILLIGRAM(S): 100 INJECTION SUBCUTANEOUS at 18:05

## 2020-02-22 RX ADMIN — Medication 6 UNIT(S): at 12:51

## 2020-02-22 RX ADMIN — SODIUM CHLORIDE 2 GRAM(S): 9 INJECTION INTRAMUSCULAR; INTRAVENOUS; SUBCUTANEOUS at 18:04

## 2020-02-22 RX ADMIN — OXYCODONE HYDROCHLORIDE 10 MILLIGRAM(S): 5 TABLET ORAL at 13:00

## 2020-02-22 RX ADMIN — DULOXETINE HYDROCHLORIDE 60 MILLIGRAM(S): 30 CAPSULE, DELAYED RELEASE ORAL at 12:52

## 2020-02-22 RX ADMIN — GABAPENTIN 300 MILLIGRAM(S): 400 CAPSULE ORAL at 06:13

## 2020-02-22 RX ADMIN — Medication 5 MILLIGRAM(S): at 21:32

## 2020-02-22 RX ADMIN — Medication 6 UNIT(S): at 08:52

## 2020-02-22 NOTE — PROGRESS NOTE ADULT - SUBJECTIVE AND OBJECTIVE BOX
Patient is a 64y old  Male who presents with a chief complaint of Brain mass (21 Feb 2020 12:49)      SUBJECTIVE / OVERNIGHT EVENTS:      Pt c/o frontal headache unable to rate of clarify the pain , " it hurts" .      ADDITIONAL REVIEW OF SYSTEMS: Negative except for above    MEDICATIONS  (STANDING):  amLODIPine   Tablet 5 milliGRAM(s) Oral daily  atorvastatin 10 milliGRAM(s) Oral at bedtime  dexAMETHasone     Tablet 2 milliGRAM(s) Oral two times a day  DULoxetine 60 milliGRAM(s) Oral daily  enoxaparin Injectable 40 milliGRAM(s) SubCutaneous <User Schedule>  fluticasone propionate 50 MICROgram(s)/spray Nasal Spray 1 Spray(s) Both Nostrils two times a day  folic acid 1 milliGRAM(s) Oral daily  gabapentin 300 milliGRAM(s) Oral three times a day  insulin glargine Injectable (LANTUS) 16 Unit(s) SubCutaneous at bedtime  insulin lispro (HumaLOG) corrective regimen sliding scale   SubCutaneous Before meals and at bedtime  insulin lispro Injectable (HumaLOG) 6 Unit(s) SubCutaneous three times a day before meals  levETIRAcetam 500 milliGRAM(s) Oral two times a day  melatonin 5 milliGRAM(s) Oral at bedtime  multivitamin 1 Tablet(s) Oral daily  pantoprazole    Tablet 40 milliGRAM(s) Oral at bedtime  polyethylene glycol 3350 17 Gram(s) Oral two times a day  senna 2 Tablet(s) Oral at bedtime  sodium chloride 2 Gram(s) Oral every 6 hours  sodium chloride 2% . 1000 milliLiter(s) (75 mL/Hr) IV Continuous <Continuous>    MEDICATIONS  (PRN):  acetaminophen   Tablet .. 650 milliGRAM(s) Oral every 6 hours PRN Temp greater or equal to 38C (100.4F), Mild Pain (1 - 3)  oxyCODONE    IR 5 milliGRAM(s) Oral every 4 hours PRN Moderate Pain (4 - 6)  oxyCODONE    IR 10 milliGRAM(s) Oral every 4 hours PRN Severe Pain (7 - 10)      CAPILLARY BLOOD GLUCOSE      POCT Blood Glucose.: 103 mg/dL (22 Feb 2020 08:43)  POCT Blood Glucose.: 158 mg/dL (21 Feb 2020 21:09)  POCT Blood Glucose.: 183 mg/dL (21 Feb 2020 17:14)  POCT Blood Glucose.: 92 mg/dL (21 Feb 2020 12:38)    I&O's Summary    21 Feb 2020 07:01  -  22 Feb 2020 07:00  --------------------------------------------------------  IN: 1360 mL / OUT: 0 mL / NET: 1360 mL        PHYSICAL EXAM:  Vital Signs Last 24 Hrs  T(C): 36.6 (22 Feb 2020 08:04), Max: 36.8 (21 Feb 2020 19:43)  T(F): 97.8 (22 Feb 2020 08:04), Max: 98.3 (21 Feb 2020 19:43)  HR: 61 (22 Feb 2020 08:04) (49 - 74)  BP: 147/90 (22 Feb 2020 08:04) (121/71 - 159/96)  BP(mean): --  RR: 16 (22 Feb 2020 08:04) (16 - 18)  SpO2: 97% (22 Feb 2020 08:04) (96% - 99%)    PHYSICAL EXAM:  GENERAL: NAD, well-developed  HEAD:  Atraumatic, Normocephalic  EYES:  conjunctiva and sclera clear  NECK: Supple, No JVD  CHEST/LUNG: Clear to auscultation bilaterally; No wheeze  HEART: Regular rate and rhythm; No murmurs, rubs, or gallops  ABDOMEN: Soft, Nontender, Nondistended; Bowel sounds present  EXTREMITIES:  2+ Peripheral Pulses, No clubbing, cyanosis, or edema  NEUROLOGY:  AAOx3  SKIN: No rashes or lesions      LABS:                        13.2   15.57 )-----------( 292      ( 22 Feb 2020 04:19 )             40.9     02-22    134<L>  |  101  |  27<H>  ----------------------------<  107<H>  4.5   |  23  |  0.89    Ca    8.4      22 Feb 2020 04:19                  RADIOLOGY & ADDITIONAL TESTS:    Imaging Personally Reviewed:    Electrocardiogram Personally Reviewed:    COORDINATION OF CARE:  Care Discussed with Consultants/Other Providers [Y/N]:  Prior or Outpatient Records Reviewed [Y/N]: Patient is a 64y old  Male who presents with a chief complaint of Brain mass (21 Feb 2020 12:49)      SUBJECTIVE / OVERNIGHT EVENTS:      Pt c/o frontal headache unable to rate of clarify the pain , " it hurts" .      ADDITIONAL REVIEW OF SYSTEMS: Negative except for above    MEDICATIONS  (STANDING):  amLODIPine   Tablet 5 milliGRAM(s) Oral daily  atorvastatin 10 milliGRAM(s) Oral at bedtime  dexAMETHasone     Tablet 2 milliGRAM(s) Oral two times a day  DULoxetine 60 milliGRAM(s) Oral daily  enoxaparin Injectable 40 milliGRAM(s) SubCutaneous <User Schedule>  fluticasone propionate 50 MICROgram(s)/spray Nasal Spray 1 Spray(s) Both Nostrils two times a day  folic acid 1 milliGRAM(s) Oral daily  gabapentin 300 milliGRAM(s) Oral three times a day  insulin glargine Injectable (LANTUS) 16 Unit(s) SubCutaneous at bedtime  insulin lispro (HumaLOG) corrective regimen sliding scale   SubCutaneous Before meals and at bedtime  insulin lispro Injectable (HumaLOG) 6 Unit(s) SubCutaneous three times a day before meals  levETIRAcetam 500 milliGRAM(s) Oral two times a day  melatonin 5 milliGRAM(s) Oral at bedtime  multivitamin 1 Tablet(s) Oral daily  pantoprazole    Tablet 40 milliGRAM(s) Oral at bedtime  polyethylene glycol 3350 17 Gram(s) Oral two times a day  senna 2 Tablet(s) Oral at bedtime  sodium chloride 2 Gram(s) Oral every 6 hours  sodium chloride 2% . 1000 milliLiter(s) (75 mL/Hr) IV Continuous <Continuous>    MEDICATIONS  (PRN):  acetaminophen   Tablet .. 650 milliGRAM(s) Oral every 6 hours PRN Temp greater or equal to 38C (100.4F), Mild Pain (1 - 3)  oxyCODONE    IR 5 milliGRAM(s) Oral every 4 hours PRN Moderate Pain (4 - 6)  oxyCODONE    IR 10 milliGRAM(s) Oral every 4 hours PRN Severe Pain (7 - 10)      CAPILLARY BLOOD GLUCOSE      POCT Blood Glucose.: 103 mg/dL (22 Feb 2020 08:43)  POCT Blood Glucose.: 158 mg/dL (21 Feb 2020 21:09)  POCT Blood Glucose.: 183 mg/dL (21 Feb 2020 17:14)  POCT Blood Glucose.: 92 mg/dL (21 Feb 2020 12:38)    I&O's Summary    21 Feb 2020 07:01  -  22 Feb 2020 07:00  --------------------------------------------------------  IN: 1360 mL / OUT: 0 mL / NET: 1360 mL        PHYSICAL EXAM:  Vital Signs Last 24 Hrs  T(C): 36.6 (22 Feb 2020 08:04), Max: 36.8 (21 Feb 2020 19:43)  T(F): 97.8 (22 Feb 2020 08:04), Max: 98.3 (21 Feb 2020 19:43)  HR: 61 (22 Feb 2020 08:04) (49 - 74)  BP: 147/90 (22 Feb 2020 08:04) (121/71 - 159/96)  BP(mean): --  RR: 16 (22 Feb 2020 08:04) (16 - 18)  SpO2: 97% (22 Feb 2020 08:04) (96% - 99%)    PHYSICAL EXAM:  GENERAL: NAD, well-developed  HEAD:  Atraumatic, Normocephalic  EYES:  conjunctiva and sclera clear  NECK: Supple, No JVD  CHEST/LUNG: Clear to auscultation bilaterally; No wheeze  HEART: Regular rate and rhythm; No murmurs, rubs, or gallops  ABDOMEN: Soft, Nontender, Nondistended; Bowel sounds present  EXTREMITIES:  2+ Peripheral Pulses, No clubbing, cyanosis, or edema  NEUROLOGY:  AAOx3  SKIN: clean surgical site       LABS:                        13.2   15.57 )-----------( 292      ( 22 Feb 2020 04:19 )             40.9     02-22    134<L>  |  101  |  27<H>  ----------------------------<  107<H>  4.5   |  23  |  0.89    Ca    8.4      22 Feb 2020 04:19                  RADIOLOGY & ADDITIONAL TESTS:    Imaging Personally Reviewed:    Electrocardiogram Personally Reviewed:    COORDINATION OF CARE:  Care Discussed with Consultants/Other Providers [Y/N]:  Prior or Outpatient Records Reviewed [Y/N]:

## 2020-02-22 NOTE — PROGRESS NOTE ADULT - SUBJECTIVE AND OBJECTIVE BOX
Subjective: Patient seen and examined. No new events except as noted.     SUBJECTIVE/ROS:  feels ok       MEDICATIONS:  MEDICATIONS  (STANDING):  amLODIPine   Tablet 5 milliGRAM(s) Oral daily  atorvastatin 10 milliGRAM(s) Oral at bedtime  dexAMETHasone     Tablet 2 milliGRAM(s) Oral two times a day  DULoxetine 60 milliGRAM(s) Oral daily  enoxaparin Injectable 40 milliGRAM(s) SubCutaneous <User Schedule>  fluticasone propionate 50 MICROgram(s)/spray Nasal Spray 1 Spray(s) Both Nostrils two times a day  folic acid 1 milliGRAM(s) Oral daily  gabapentin 300 milliGRAM(s) Oral three times a day  insulin glargine Injectable (LANTUS) 16 Unit(s) SubCutaneous at bedtime  insulin lispro (HumaLOG) corrective regimen sliding scale   SubCutaneous Before meals and at bedtime  insulin lispro Injectable (HumaLOG) 6 Unit(s) SubCutaneous three times a day before meals  levETIRAcetam 500 milliGRAM(s) Oral two times a day  melatonin 5 milliGRAM(s) Oral at bedtime  multivitamin 1 Tablet(s) Oral daily  pantoprazole    Tablet 40 milliGRAM(s) Oral at bedtime  polyethylene glycol 3350 17 Gram(s) Oral two times a day  senna 2 Tablet(s) Oral at bedtime  sodium chloride 2 Gram(s) Oral every 6 hours  sodium chloride 2% . 1000 milliLiter(s) (75 mL/Hr) IV Continuous <Continuous>      PHYSICAL EXAM:  T(C): 36.6 (02-22-20 @ 08:04), Max: 36.8 (02-21-20 @ 19:43)  HR: 61 (02-22-20 @ 08:04) (49 - 74)  BP: 147/90 (02-22-20 @ 08:04) (121/71 - 159/96)  RR: 16 (02-22-20 @ 08:04) (16 - 18)  SpO2: 97% (02-22-20 @ 08:04) (96% - 99%)  Wt(kg): --  I&O's Summary    21 Feb 2020 07:01  -  22 Feb 2020 07:00  --------------------------------------------------------  IN: 1360 mL / OUT: 0 mL / NET: 1360 mL            JVP: Normal  Neck: supple  Lung: clear   CV: S1 S2 , Murmur:  Abd: soft  Ext: No edema  neuro: Awake / alert  Psych: flat affect  Skin: normal``    LABS/DATA:    CARDIAC MARKERS:                                13.2   15.57 )-----------( 292      ( 22 Feb 2020 04:19 )             40.9     02-22    134<L>  |  101  |  27<H>  ----------------------------<  107<H>  4.5   |  23  |  0.89    Ca    8.4      22 Feb 2020 04:19      proBNP:   Lipid Profile:   HgA1c:   TSH:     TELE:  EKG:

## 2020-02-22 NOTE — PROGRESS NOTE ADULT - SUBJECTIVE AND OBJECTIVE BOX
SUBJECTIVE: HPI:  63 yo male. 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  c/o worsening left sided paresthesia in December, hospitalized at [facility] Dec 3-17, 2019, found to have subacute SDH along R frontal lobe and large mass in third ventricle. pt was referred to neurosurgery consult and transferred to rehab facility, now presents to PST scheduled for craniotomy surgery for removal of brain mass.  ***contacted surgeon, Dr. Manley's office, s/w  Elida who had consulted with Vineet, pt to hold aspirin 1 week preop.*** (06 Feb 2020 10:22)      OVERNIGHT EVENTS: No acute events overnight, patient continues to be on hypetonic saline for his hyponatremia. Patient seen and evaluated this morning and comfortable, states nausea has dissipated in comparison to yesterday.     Vital Signs Last 24 Hrs  T(C): 36.4 (22 Feb 2020 11:56), Max: 36.8 (21 Feb 2020 19:43)  T(F): 97.6 (22 Feb 2020 11:56), Max: 98.3 (21 Feb 2020 19:43)  HR: 64 (22 Feb 2020 11:56) (49 - 74)  BP: 148/84 (22 Feb 2020 11:56) (121/71 - 159/96)  BP(mean): --  RR: 16 (22 Feb 2020 11:56) (16 - 18)  SpO2: 98% (22 Feb 2020 11:56) (96% - 99%)    PHYSICAL EXAM:    General: No Acute Distress     Neurological: Arousable to voice, OX3, following commands, LUE 4+/5, RT side 5/5, lowers 5/5    Pulmonary: Clear to Auscultation, No Rales, No Rhonchi, No Wheezes     Cardiovascular: S1, S2, Regular Rate and Rhythm     Gastrointestinal: Soft, Nontender, Nondistended     Incision: Crani sutures C/D/I    LABS:                        13.2   15.57 )-----------( 292      ( 22 Feb 2020 04:19 )             40.9    02-22    128<L>  |  98  |  27<H>  ----------------------------<  244<H>  5.7<H>   |  19<L>  |  0.71    Ca    8.4      22 Feb 2020 12:51      Hemoglobin A1C, Whole Blood: 6.0 % (02-06 @ 13:46)      02-21 @ 07:01  -  02-22 @ 07:00  --------------------------------------------------------  IN: 1360 mL / OUT: 0 mL / NET: 1360 mL    02-22 @ 07:01  -  02-22 @ 15:04  --------------------------------------------------------  IN: 720 mL / OUT: 0 mL / NET: 720 mL      DRAINS: None    MEDICATIONS:  Antibiotics:    Neuro:  acetaminophen   Tablet .. 650 milliGRAM(s) Oral every 6 hours PRN Temp greater or equal to 38C (100.4F), Mild Pain (1 - 3)  DULoxetine 60 milliGRAM(s) Oral daily  gabapentin 300 milliGRAM(s) Oral three times a day  levETIRAcetam 500 milliGRAM(s) Oral two times a day  melatonin 5 milliGRAM(s) Oral at bedtime  oxyCODONE    IR 5 milliGRAM(s) Oral every 4 hours PRN Moderate Pain (4 - 6)  oxyCODONE    IR 10 milliGRAM(s) Oral every 4 hours PRN Severe Pain (7 - 10)    Cardiac:  amLODIPine   Tablet 5 milliGRAM(s) Oral daily    Pulm:    GI/:  pantoprazole    Tablet 40 milliGRAM(s) Oral at bedtime  polyethylene glycol 3350 17 Gram(s) Oral two times a day  senna 2 Tablet(s) Oral at bedtime    Other:   atorvastatin 10 milliGRAM(s) Oral at bedtime  dexAMETHasone     Tablet 2 milliGRAM(s) Oral two times a day  enoxaparin Injectable 40 milliGRAM(s) SubCutaneous <User Schedule>  fluticasone propionate 50 MICROgram(s)/spray Nasal Spray 1 Spray(s) Both Nostrils two times a day  folic acid 1 milliGRAM(s) Oral daily  insulin glargine Injectable (LANTUS) 16 Unit(s) SubCutaneous at bedtime  insulin lispro (HumaLOG) corrective regimen sliding scale   SubCutaneous Before meals and at bedtime  insulin lispro Injectable (HumaLOG) 6 Unit(s) SubCutaneous three times a day before meals  multivitamin 1 Tablet(s) Oral daily  sodium chloride 2 Gram(s) Oral every 6 hours  sodium chloride 2% . 1000 milliLiter(s) IV Continuous <Continuous>    DIET: [] Regular [x - w/ 1L Fluid restriction] CCD [] Renal [] Puree [] Dysphagia [] Tube Feeds:     IMAGING:   Imaging reviewed.

## 2020-02-22 NOTE — PROGRESS NOTE ADULT - ASSESSMENT
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, ETV POD#10    NEURO:   - Continue Neuro checks q 4  - Continue Keppra for seizure prophylaxis  - Continue Decadron 2mg BID, last taper was 2/20  - Continue pain control with medications (tylenol prn / oxy prn)  - Continue Melatonin at bedtime for insomnia  - Continue Gabapentin for neuropathic pain  - Continue Cymbalta for anxiety d/o  - Surg path: colloid cyst  - Skull Xray done for VPS Eval s/p MRI - currently set at [place] @ 200    PULM:   - Encourage incentive spirometry  - Continue Flonase    CV:  - Continue Norvasc for HTN  - Continue Lipitor for HLD  - Appreciate Cardiology following, avoid QT prolonging drugs ( on 2/16)    ENDO:   - Appreciate Endocrinology following  - Appetite much improved today, continue HISS, humalog, lantus, FS slightly elevated    HEME/ONC:             CBC 2/22 stable, WBC downtrending         DVT ppx: Continue SQL, 2/13 [place] - neg    RENAL:   - Appreciate Nephrology following for hypnatremia  - Continue 2%NACL@75, BMPq6, salts tabs and fluid restriction, BMP this morning much improved, BMP at 12:51 - hemolyzed, to be repeated    ID:   - Afebrile  - WBC downtrending     GI:    - Continue protonix for GI ppx  - Continue miralax and senna  - Continue MVT and folic acid    DISCHARGE PLANNING:   PT/OT: CÉSAR    Assessment:  Please Check When Present   []  GCS  E   V  M     Heart Failure: []Acute, [] acute on chronic , []chronic  Heart Failure:  [] Diastolic (HFpEF), [] Systolic (HFrEF), []Combined (HFpEF and HFrEF), [] RHF, [] Pulm HTN, [] Other    [] RA, [] ATN, [] AIN, [] other  [] CKD1, [] CKD2, [] CKD 3, [] CKD 4, [] CKD 5, []ESRD    Encephalopathy: [] Metabolic, [] Hepatic, [] toxic, [] Neurological, [] Other    Abnormal Nurtitional Status: [] malnutrition (see nutrition note), [ ]underweight: BMI < 19, [] morbid obesity: BMI >40, [] Cachexia    [] Sepsis  [] hypovolemic shock,[] cardiogenic shock, [] hemorrhagic shock, [] neuogenic shock  [] Acute Respiratory Failure  []Cerebral edema, [] Brain compression/ herniation,   [] Functional quadriplegia  [] Acute blood loss anemia    To discuss w/ Dr. Manley  [id number]

## 2020-02-23 LAB
ANION GAP SERPL CALC-SCNC: 13 MMOL/L — SIGNIFICANT CHANGE UP (ref 5–17)
ANION GAP SERPL CALC-SCNC: 13 MMOL/L — SIGNIFICANT CHANGE UP (ref 5–17)
ANION GAP SERPL CALC-SCNC: 9 MMOL/L — SIGNIFICANT CHANGE UP (ref 5–17)
BUN SERPL-MCNC: 30 MG/DL — HIGH (ref 7–23)
BUN SERPL-MCNC: 30 MG/DL — HIGH (ref 7–23)
BUN SERPL-MCNC: 31 MG/DL — HIGH (ref 7–23)
CALCIUM SERPL-MCNC: 8.4 MG/DL — SIGNIFICANT CHANGE UP (ref 8.4–10.5)
CALCIUM SERPL-MCNC: 8.5 MG/DL — SIGNIFICANT CHANGE UP (ref 8.4–10.5)
CALCIUM SERPL-MCNC: 8.6 MG/DL — SIGNIFICANT CHANGE UP (ref 8.4–10.5)
CHLORIDE SERPL-SCNC: 103 MMOL/L — SIGNIFICANT CHANGE UP (ref 96–108)
CHLORIDE SERPL-SCNC: 99 MMOL/L — SIGNIFICANT CHANGE UP (ref 96–108)
CHLORIDE SERPL-SCNC: 99 MMOL/L — SIGNIFICANT CHANGE UP (ref 96–108)
CO2 SERPL-SCNC: 18 MMOL/L — LOW (ref 22–31)
CO2 SERPL-SCNC: 19 MMOL/L — LOW (ref 22–31)
CO2 SERPL-SCNC: 22 MMOL/L — SIGNIFICANT CHANGE UP (ref 22–31)
CREAT SERPL-MCNC: 0.87 MG/DL — SIGNIFICANT CHANGE UP (ref 0.5–1.3)
CREAT SERPL-MCNC: 0.9 MG/DL — SIGNIFICANT CHANGE UP (ref 0.5–1.3)
CREAT SERPL-MCNC: 1.06 MG/DL — SIGNIFICANT CHANGE UP (ref 0.5–1.3)
GLUCOSE BLDC GLUCOMTR-MCNC: 176 MG/DL — HIGH (ref 70–99)
GLUCOSE BLDC GLUCOMTR-MCNC: 182 MG/DL — HIGH (ref 70–99)
GLUCOSE BLDC GLUCOMTR-MCNC: 216 MG/DL — HIGH (ref 70–99)
GLUCOSE BLDC GLUCOMTR-MCNC: 87 MG/DL — SIGNIFICANT CHANGE UP (ref 70–99)
GLUCOSE SERPL-MCNC: 118 MG/DL — HIGH (ref 70–99)
GLUCOSE SERPL-MCNC: 213 MG/DL — HIGH (ref 70–99)
GLUCOSE SERPL-MCNC: 238 MG/DL — HIGH (ref 70–99)
POTASSIUM SERPL-MCNC: 4.2 MMOL/L — SIGNIFICANT CHANGE UP (ref 3.5–5.3)
POTASSIUM SERPL-MCNC: 4.4 MMOL/L — SIGNIFICANT CHANGE UP (ref 3.5–5.3)
POTASSIUM SERPL-MCNC: 5 MMOL/L — SIGNIFICANT CHANGE UP (ref 3.5–5.3)
POTASSIUM SERPL-SCNC: 4.2 MMOL/L — SIGNIFICANT CHANGE UP (ref 3.5–5.3)
POTASSIUM SERPL-SCNC: 4.4 MMOL/L — SIGNIFICANT CHANGE UP (ref 3.5–5.3)
POTASSIUM SERPL-SCNC: 5 MMOL/L — SIGNIFICANT CHANGE UP (ref 3.5–5.3)
SODIUM SERPL-SCNC: 130 MMOL/L — LOW (ref 135–145)
SODIUM SERPL-SCNC: 131 MMOL/L — LOW (ref 135–145)
SODIUM SERPL-SCNC: 134 MMOL/L — LOW (ref 135–145)

## 2020-02-23 PROCEDURE — 99232 SBSQ HOSP IP/OBS MODERATE 35: CPT

## 2020-02-23 RX ORDER — INSULIN GLARGINE 100 [IU]/ML
12 INJECTION, SOLUTION SUBCUTANEOUS AT BEDTIME
Refills: 0 | Status: DISCONTINUED | OUTPATIENT
Start: 2020-02-23 | End: 2020-02-25

## 2020-02-23 RX ORDER — DEXTROSE 50 % IN WATER 50 %
25 SYRINGE (ML) INTRAVENOUS ONCE
Refills: 0 | Status: DISCONTINUED | OUTPATIENT
Start: 2020-02-23 | End: 2020-03-04

## 2020-02-23 RX ORDER — INSULIN LISPRO 100/ML
VIAL (ML) SUBCUTANEOUS AT BEDTIME
Refills: 0 | Status: DISCONTINUED | OUTPATIENT
Start: 2020-02-23 | End: 2020-02-25

## 2020-02-23 RX ORDER — DEXTROSE 50 % IN WATER 50 %
15 SYRINGE (ML) INTRAVENOUS ONCE
Refills: 0 | Status: DISCONTINUED | OUTPATIENT
Start: 2020-02-23 | End: 2020-03-04

## 2020-02-23 RX ORDER — INSULIN LISPRO 100/ML
VIAL (ML) SUBCUTANEOUS
Refills: 0 | Status: DISCONTINUED | OUTPATIENT
Start: 2020-02-23 | End: 2020-02-25

## 2020-02-23 RX ORDER — DEXTROSE 50 % IN WATER 50 %
12.5 SYRINGE (ML) INTRAVENOUS ONCE
Refills: 0 | Status: DISCONTINUED | OUTPATIENT
Start: 2020-02-23 | End: 2020-03-04

## 2020-02-23 RX ORDER — OXYCODONE HYDROCHLORIDE 5 MG/1
10 TABLET ORAL EVERY 4 HOURS
Refills: 0 | Status: DISCONTINUED | OUTPATIENT
Start: 2020-02-23 | End: 2020-03-01

## 2020-02-23 RX ORDER — SODIUM CHLORIDE 9 MG/ML
1000 INJECTION, SOLUTION INTRAVENOUS
Refills: 0 | Status: DISCONTINUED | OUTPATIENT
Start: 2020-02-23 | End: 2020-03-04

## 2020-02-23 RX ORDER — GLUCAGON INJECTION, SOLUTION 0.5 MG/.1ML
1 INJECTION, SOLUTION SUBCUTANEOUS ONCE
Refills: 0 | Status: DISCONTINUED | OUTPATIENT
Start: 2020-02-23 | End: 2020-03-04

## 2020-02-23 RX ORDER — SODIUM CHLORIDE 5 G/100ML
1000 INJECTION, SOLUTION INTRAVENOUS
Refills: 0 | Status: DISCONTINUED | OUTPATIENT
Start: 2020-02-23 | End: 2020-02-24

## 2020-02-23 RX ORDER — OXYCODONE HYDROCHLORIDE 5 MG/1
5 TABLET ORAL EVERY 4 HOURS
Refills: 0 | Status: DISCONTINUED | OUTPATIENT
Start: 2020-02-23 | End: 2020-03-01

## 2020-02-23 RX ADMIN — Medication 1 SPRAY(S): at 17:01

## 2020-02-23 RX ADMIN — ENOXAPARIN SODIUM 40 MILLIGRAM(S): 100 INJECTION SUBCUTANEOUS at 17:01

## 2020-02-23 RX ADMIN — GABAPENTIN 300 MILLIGRAM(S): 400 CAPSULE ORAL at 05:55

## 2020-02-23 RX ADMIN — GABAPENTIN 300 MILLIGRAM(S): 400 CAPSULE ORAL at 21:16

## 2020-02-23 RX ADMIN — GABAPENTIN 300 MILLIGRAM(S): 400 CAPSULE ORAL at 16:46

## 2020-02-23 RX ADMIN — ATORVASTATIN CALCIUM 10 MILLIGRAM(S): 80 TABLET, FILM COATED ORAL at 21:16

## 2020-02-23 RX ADMIN — Medication 1 TABLET(S): at 12:37

## 2020-02-23 RX ADMIN — Medication 2 MILLIGRAM(S): at 05:55

## 2020-02-23 RX ADMIN — Medication 6 UNIT(S): at 13:16

## 2020-02-23 RX ADMIN — Medication 1 SPRAY(S): at 05:55

## 2020-02-23 RX ADMIN — SODIUM CHLORIDE 2 GRAM(S): 9 INJECTION INTRAMUSCULAR; INTRAVENOUS; SUBCUTANEOUS at 05:55

## 2020-02-23 RX ADMIN — SENNA PLUS 2 TABLET(S): 8.6 TABLET ORAL at 21:16

## 2020-02-23 RX ADMIN — Medication 4: at 13:16

## 2020-02-23 RX ADMIN — LEVETIRACETAM 500 MILLIGRAM(S): 250 TABLET, FILM COATED ORAL at 17:02

## 2020-02-23 RX ADMIN — Medication 2: at 17:04

## 2020-02-23 RX ADMIN — Medication 2 MILLIGRAM(S): at 17:01

## 2020-02-23 RX ADMIN — SODIUM CHLORIDE 2 GRAM(S): 9 INJECTION INTRAMUSCULAR; INTRAVENOUS; SUBCUTANEOUS at 17:01

## 2020-02-23 RX ADMIN — DULOXETINE HYDROCHLORIDE 60 MILLIGRAM(S): 30 CAPSULE, DELAYED RELEASE ORAL at 12:37

## 2020-02-23 RX ADMIN — INSULIN GLARGINE 12 UNIT(S): 100 INJECTION, SOLUTION SUBCUTANEOUS at 21:16

## 2020-02-23 RX ADMIN — PANTOPRAZOLE SODIUM 40 MILLIGRAM(S): 20 TABLET, DELAYED RELEASE ORAL at 21:16

## 2020-02-23 RX ADMIN — Medication 6 UNIT(S): at 17:04

## 2020-02-23 RX ADMIN — Medication 1 MILLIGRAM(S): at 12:37

## 2020-02-23 RX ADMIN — SODIUM CHLORIDE 2 GRAM(S): 9 INJECTION INTRAMUSCULAR; INTRAVENOUS; SUBCUTANEOUS at 12:37

## 2020-02-23 RX ADMIN — POLYETHYLENE GLYCOL 3350 17 GRAM(S): 17 POWDER, FOR SOLUTION ORAL at 05:55

## 2020-02-23 RX ADMIN — AMLODIPINE BESYLATE 5 MILLIGRAM(S): 2.5 TABLET ORAL at 05:56

## 2020-02-23 RX ADMIN — POLYETHYLENE GLYCOL 3350 17 GRAM(S): 17 POWDER, FOR SOLUTION ORAL at 17:01

## 2020-02-23 RX ADMIN — Medication 5 MILLIGRAM(S): at 21:16

## 2020-02-23 RX ADMIN — LEVETIRACETAM 500 MILLIGRAM(S): 250 TABLET, FILM COATED ORAL at 05:55

## 2020-02-23 NOTE — PROGRESS NOTE ADULT - SUBJECTIVE AND OBJECTIVE BOX
SUBJECTIVE: HPI:  63 yo male. 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  c/o worsening left sided paresthesia in December, hospitalized at [facility] Dec 3-17, 2019, found to have subacute SDH along R frontal lobe and large mass in third ventricle. pt was referred to neurosurgery consult and transferred to rehab facility, now presents to PST scheduled for craniotomy surgery for removal of brain mass.  ***contacted surgeon, Dr. Manley's office, s/w  Elida who had consulted with Vineet pt to hold aspirin 1 week preop.*** (06 Feb 2020 10:22)    OVERNIGHT EVENTS: remains on hypertonic saline for his hyponatremia. Patient seen and evaluated this morning and comfortable, reports improvement of nausea from  yesterday. Was sitting in chair having breakfast.   staff reports frequent urinary output- condom cath in place for measurment    Vital Signs Last 24 Hrs  T(C): 36.4 (23 Feb 2020 12:19), Max: 36.7 (22 Feb 2020 23:43)  T(F): 97.6 (23 Feb 2020 12:19), Max: 98 (22 Feb 2020 23:43)  HR: 60 (23 Feb 2020 12:19) (54 - 62)  BP: 138/74 (23 Feb 2020 12:19) (125/69 - 156/85)  BP(mean): --  RR: 18 (23 Feb 2020 12:19) (18 - 20)  SpO2: 99% (23 Feb 2020 12:19) (95% - 99%)    PHYSICAL EXAM:    General: No Acute Distress     Neurological: Arousable to voice, OX3, following commands, LUE 4+/5, RT side 5/5, lowers 5/5; mild left UE drift and mild facial droop noted    Pulmonary: Clear to Auscultation, No Rales, No Rhonchi, No Wheezes     Cardiovascular: S1, S2, Regular Rate and Rhythm     Gastrointestinal: Soft, Nontender, Nondistended     Incision: Crani sutures C/D/I    LABS:                        13.2   15.57 )-----------( 292      ( 22 Feb 2020 04:19 )             40.9    02-22    128<L>  |  98  |  27<H>  ----------------------------<  244<H>  5.7<H>   |  19<L>  |  0.71    Ca    8.4      22 Feb 2020 12:51    Hemoglobin A1C, Whole Blood: 6.0 % (02-06 @ 13:46)    MEDICATIONS  (STANDING):  amLODIPine   Tablet 5 milliGRAM(s) Oral daily  atorvastatin 10 milliGRAM(s) Oral at bedtime  dexAMETHasone     Tablet 2 milliGRAM(s) Oral two times a day  dextrose 5%. 1000 milliLiter(s) (50 mL/Hr) IV Continuous <Continuous>  dextrose 50% Injectable 12.5 Gram(s) IV Push once  dextrose 50% Injectable 25 Gram(s) IV Push once  dextrose 50% Injectable 25 Gram(s) IV Push once  DULoxetine 60 milliGRAM(s) Oral daily  enoxaparin Injectable 40 milliGRAM(s) SubCutaneous <User Schedule>  fluticasone propionate 50 MICROgram(s)/spray Nasal Spray 1 Spray(s) Both Nostrils two times a day  folic acid 1 milliGRAM(s) Oral daily  gabapentin 300 milliGRAM(s) Oral three times a day  insulin glargine Injectable (LANTUS) 12 Unit(s) SubCutaneous at bedtime  insulin lispro (HumaLOG) corrective regimen sliding scale   SubCutaneous Before meals and at bedtime  insulin lispro Injectable (HumaLOG) 6 Unit(s) SubCutaneous three times a day before meals  levETIRAcetam 500 milliGRAM(s) Oral two times a day  melatonin 5 milliGRAM(s) Oral at bedtime  multivitamin 1 Tablet(s) Oral daily  pantoprazole    Tablet 40 milliGRAM(s) Oral at bedtime  polyethylene glycol 3350 17 Gram(s) Oral two times a day  senna 2 Tablet(s) Oral at bedtime  sodium chloride 2 Gram(s) Oral every 6 hours  sodium chloride 2% . 1000 milliLiter(s) (50 mL/Hr) IV Continuous <Continuous>    MEDICATIONS  (PRN):  acetaminophen   Tablet .. 650 milliGRAM(s) Oral every 6 hours PRN Temp greater or equal to 38C (100.4F), Mild Pain (1 - 3)  dextrose 40% Gel 15 Gram(s) Oral once PRN Blood Glucose LESS THAN 70 milliGRAM(s)/deciLiter  glucagon  Injectable 1 milliGRAM(s) IntraMuscular once PRN Glucose <70 milliGRAM(s)/deciLiter  oxyCODONE    IR 5 milliGRAM(s) Oral every 4 hours PRN Moderate Pain (4 - 6)  oxyCODONE    IR 10 milliGRAM(s) Oral every 4 hours PRN Severe Pain (7 - 10)    DIET: [] Regular [x - w/ 1L Fluid restriction] CCD [] Renal [] Puree [] Dysphagia [] Tube Feeds:     IMAGING:   < from: MR Head w/wo IV Cont (02.20.20 @ 23:20) >  INTERPRETATION:  CLINICAL INDICATION: ADMDIAG1: I67.1 CEREBRAL ANEURYSM, NONRUPTURED/  ADMDIAG2: I67.1 CEREBRAL ANEURYSM, NONRUPTURED. Status post craniotomy for colloid cyst resection..    TECHNIQUE: MRI of the brain was performed without and with contrast using the following sequences: Axial DWI/ADC map, axial SWI, axial gradient echo, axial SPGR, sagittal T1 FLAIR, axial T2 FLAIR, axial T2 FSE axial T1 SPGR postcontrast and axial/coronal/sagittal T1 spin-echo postcontrast.    10 mls of Gadavist was administered intravenously without complication and 0 mls were discarded.    COMPARISON: MR brain dated 11/28/2019. CT head dated 2/17/2020    FINDINGS:    Redemonstration of bifrontal craniotomy changes.    Similar appearing third ventricular mass which measures approximately 2.8 x 2.1 x 3.3 cm, maximum measurement. 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    The lateral ventricles remain significantly dilated.    Unchanged appearance of a CSF collection along the right interhemispheric fissure and frontal subdural space with a small amount of hemorrhage layering posteriorly. This collection appears to extend into the extra calvarial soft tissues.    There is unchanged encephalomalacia and gliosis involving the right frontal lobe.    There is a right frontal  shunt catheter.    There is no evidence of acute infarct or midline shift.    Major intracranial flow-voids are preserved.    The orbits, sellar and suprasellar structures, and craniocervical junction are unremarkable. There has been bilateral cataract surgery.    The visualized paranasal sinuses and tympanomastoid cavities are clear.    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 No change in hydrocephalus compared with 2/17/2020.    Stable severe ventricular dilation with ventriculoperitoneal shunt catheter in appropriate position.    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    < end of copied text >

## 2020-02-23 NOTE — PROGRESS NOTE ADULT - ASSESSMENT
64 year old male w/previously controlled T2DM w/hx of CVA here w/brain mass s/p craniotomy on decadron w/steroid induced hyperglycemia. BG values tightly controlled this AM w/out evidence of hypoglycemia. Decreased PO intake currently 2/2 GI symptoms. BG goal (100-180mg/dl). No change in steroid therapy as pt remains on Decadron 2mg BID at this time.

## 2020-02-23 NOTE — PROGRESS NOTE ADULT - ASSESSMENT
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, HCP s/p VPS 2008, in December found to have a large mass   in 3rd ventricle    PROCEDURE: 2/12/20 s/p crani transcallosal for subtotal resection of 3rd ventricular tumor, ETV     NEURO:   - Continue Neuro checks q 4  - Continue Keppra for seizure prophylaxis  - Continue Decadron 2mg 2x daily for post op tumor resection, last taper was 2/20  - Continue pain control with medications (tylenol prn/ oxy prn)  - Continue Melatonin at bedtime for insomnia  - Continue Gabapentin for neuropathic pain  - Continue Cymbalta for anxiety d/o  - Surg path: colloid cyst  - Skull Xray done for VPS Eval s/p MRI - currently set at Hakim @ 200    PULM:   - Encourage incentive spirometry  - Continue Flonase    CV:  - Continue Norvasc for HTN  - Continue Lipitor for HLD  - Appreciate Cardiology following, avoid QT prolonging drugs ( on 2/16)    ENDO:   - Appreciate Endocrinology following; low AM FS  - Appetite much improved today, continue HISS, humalog, lantus- will decrease lantus given low AM FS    HEME/ONC:  CBC 2/22 stable, WBC downtrending         DVT ppx: Continue SQL, 2/13 LE Dopps - neg    RENAL:   - Appreciate Nephrology following for hyponatremia  - Lower 2%NACL@50, BMPq6, salts tabs and fluid restriction,     ID:   - Afebrile  - WBC downtrending     GI:    - Continue protonix for GI ppx  - Continue miralax and senna  - Continue MVT and folic acid    Hospitalist Medicine Following    DISCHARGE PLANNING:   PT/OT: CÉSAR upon d/c     Assessment:  Please Check When Present   []  GCS  E   V  M     Heart Failure: []Acute, [] acute on chronic , []chronic  Heart Failure:  [] Diastolic (HFpEF), [] Systolic (HFrEF), []Combined (HFpEF and HFrEF), [] RHF, [] Pulm HTN, [] Other    [] RA, [] ATN, [] AIN, [] other  [] CKD1, [] CKD2, [] CKD 3, [] CKD 4, [] CKD 5, []ESRD    Encephalopathy: [] Metabolic, [] Hepatic, [] toxic, [] Neurological, [] Other    Abnormal Nurtitional Status: [] malnutrition (see nutrition note), [ ]underweight: BMI < 19, [] morbid obesity: BMI >40, [] Cachexia    [] Sepsis  [] hypovolemic shock,[] cardiogenic shock, [] hemorrhagic shock, [] neuogenic shock  [] Acute Respiratory Failure  []Cerebral edema, [] Brain compression/ herniation,   [] Functional quadriplegia  [] Acute blood loss anemia    will discuss with Dr. Manley  [id number]

## 2020-02-23 NOTE — PROGRESS NOTE ADULT - SUBJECTIVE AND OBJECTIVE BOX
Patient is a 64y old  Male who presents with a chief complaint of Brain mass (22 Feb 2020 10:57)      SUBJECTIVE / OVERNIGHT EVENTS:    No fever , no current headache.  As per Nurse, patient is noted to be depressed with crying spells .  NO suicidal ideation as per nurse.  AM glucose was noted to be <100.        ADDITIONAL REVIEW OF SYSTEMS: Negative except for above    MEDICATIONS  (STANDING):  amLODIPine   Tablet 5 milliGRAM(s) Oral daily  atorvastatin 10 milliGRAM(s) Oral at bedtime  dexAMETHasone     Tablet 2 milliGRAM(s) Oral two times a day  DULoxetine 60 milliGRAM(s) Oral daily  enoxaparin Injectable 40 milliGRAM(s) SubCutaneous <User Schedule>  fluticasone propionate 50 MICROgram(s)/spray Nasal Spray 1 Spray(s) Both Nostrils two times a day  folic acid 1 milliGRAM(s) Oral daily  gabapentin 300 milliGRAM(s) Oral three times a day  insulin glargine Injectable (LANTUS) 16 Unit(s) SubCutaneous at bedtime  insulin lispro (HumaLOG) corrective regimen sliding scale   SubCutaneous Before meals and at bedtime  insulin lispro Injectable (HumaLOG) 6 Unit(s) SubCutaneous three times a day before meals  levETIRAcetam 500 milliGRAM(s) Oral two times a day  melatonin 5 milliGRAM(s) Oral at bedtime  multivitamin 1 Tablet(s) Oral daily  pantoprazole    Tablet 40 milliGRAM(s) Oral at bedtime  polyethylene glycol 3350 17 Gram(s) Oral two times a day  senna 2 Tablet(s) Oral at bedtime  sodium chloride 2 Gram(s) Oral every 6 hours  sodium chloride 2% . 1000 milliLiter(s) (50 mL/Hr) IV Continuous <Continuous>    MEDICATIONS  (PRN):  acetaminophen   Tablet .. 650 milliGRAM(s) Oral every 6 hours PRN Temp greater or equal to 38C (100.4F), Mild Pain (1 - 3)  oxyCODONE    IR 5 milliGRAM(s) Oral every 4 hours PRN Moderate Pain (4 - 6)  oxyCODONE    IR 10 milliGRAM(s) Oral every 4 hours PRN Severe Pain (7 - 10)      CAPILLARY BLOOD GLUCOSE      POCT Blood Glucose.: 87 mg/dL (23 Feb 2020 08:49)  POCT Blood Glucose.: 145 mg/dL (22 Feb 2020 20:43)  POCT Blood Glucose.: 158 mg/dL (22 Feb 2020 18:03)  POCT Blood Glucose.: 215 mg/dL (22 Feb 2020 12:30)    I&O's Summary    22 Feb 2020 07:01  -  23 Feb 2020 07:00  --------------------------------------------------------  IN: 1980 mL / OUT: 0 mL / NET: 1980 mL        PHYSICAL EXAM:  Vital Signs Last 24 Hrs  T(C): 36.4 (23 Feb 2020 12:19), Max: 36.7 (22 Feb 2020 23:43)  T(F): 97.6 (23 Feb 2020 12:19), Max: 98 (22 Feb 2020 23:43)  HR: 60 (23 Feb 2020 12:19) (54 - 62)  BP: 138/74 (23 Feb 2020 12:19) (125/69 - 156/85)  BP(mean): --  RR: 18 (23 Feb 2020 12:19) (18 - 20)  SpO2: 99% (23 Feb 2020 12:19) (95% - 99%)    PHYSICAL EXAM:  GENERAL: NAD, well-developed  HEAD:  Atraumatic, Normocephalic  EYES:  conjunctiva and sclera clear  NECK: Supple, No JVD  CHEST/LUNG: Clear to auscultation bilaterally; No wheeze  HEART: Regular rate and rhythm; No murmurs, rubs, or gallops  ABDOMEN: Soft, Nontender, Nondistended; Bowel sounds present  EXTREMITIES:  2+ Peripheral Pulses, No clubbing, cyanosis, or edema  NEUROLOGY:  AAOx3  PSYCH       : appropriate affect       LABS:                        13.2   15.57 )-----------( 292      ( 22 Feb 2020 04:19 )             40.9     02-23    134<L>  |  103  |  30<H>  ----------------------------<  118<H>  4.2   |  22  |  0.90    Ca    8.4      23 Feb 2020 07:05                  RADIOLOGY & ADDITIONAL TESTS:    Imaging Personally Reviewed:    Electrocardiogram Personally Reviewed:    COORDINATION OF CARE:  Care Discussed with Consultants/Other Providers [Y/N]:  Prior or Outpatient Records Reviewed [Y/N]:

## 2020-02-23 NOTE — PROGRESS NOTE ADULT - PROBLEM SELECTOR PLAN 2
Fasting glucose <100 today   Pt is on Lantus 16 units at Bedtime and 6 units three times a day before meals   I WOULD RECOMMEND to lower the Nightime Lantus from 16 units to 14 units and discuss with the ENdocrine team who is monitoring patient

## 2020-02-23 NOTE — PROGRESS NOTE ADULT - SUBJECTIVE AND OBJECTIVE BOX
Subjective: Patient seen and examined. No new events except as noted.     SUBJECTIVE/ROS:        MEDICATIONS:  MEDICATIONS  (STANDING):  amLODIPine   Tablet 5 milliGRAM(s) Oral daily  atorvastatin 10 milliGRAM(s) Oral at bedtime  dexAMETHasone     Tablet 2 milliGRAM(s) Oral two times a day  DULoxetine 60 milliGRAM(s) Oral daily  enoxaparin Injectable 40 milliGRAM(s) SubCutaneous <User Schedule>  fluticasone propionate 50 MICROgram(s)/spray Nasal Spray 1 Spray(s) Both Nostrils two times a day  folic acid 1 milliGRAM(s) Oral daily  gabapentin 300 milliGRAM(s) Oral three times a day  insulin glargine Injectable (LANTUS) 16 Unit(s) SubCutaneous at bedtime  insulin lispro (HumaLOG) corrective regimen sliding scale   SubCutaneous Before meals and at bedtime  insulin lispro Injectable (HumaLOG) 6 Unit(s) SubCutaneous three times a day before meals  levETIRAcetam 500 milliGRAM(s) Oral two times a day  melatonin 5 milliGRAM(s) Oral at bedtime  multivitamin 1 Tablet(s) Oral daily  pantoprazole    Tablet 40 milliGRAM(s) Oral at bedtime  polyethylene glycol 3350 17 Gram(s) Oral two times a day  senna 2 Tablet(s) Oral at bedtime  sodium chloride 2 Gram(s) Oral every 6 hours  sodium chloride 2% . 1000 milliLiter(s) (75 mL/Hr) IV Continuous <Continuous>      PHYSICAL EXAM:  T(C): 36.3 (02-23-20 @ 04:35), Max: 36.7 (02-22-20 @ 23:43)  HR: 54 (02-23-20 @ 04:35) (54 - 64)  BP: 156/85 (02-23-20 @ 04:35) (132/76 - 156/85)  RR: 18 (02-23-20 @ 04:35) (16 - 18)  SpO2: 97% (02-23-20 @ 04:35) (95% - 98%)  Wt(kg): --  I&O's Summary    22 Feb 2020 07:01  -  23 Feb 2020 07:00  --------------------------------------------------------  IN: 1980 mL / OUT: 0 mL / NET: 1980 mL            JVP: Normal  Neck: supple  Lung: clear   CV: S1 S2 , Murmur:  Abd: soft  Ext: No edema  neuro: Awake / alert  Psych: flat affect  Skin: normal``    LABS/DATA:    CARDIAC MARKERS:                                13.2   15.57 )-----------( 292      ( 22 Feb 2020 04:19 )             40.9     02-23    134<L>  |  103  |  30<H>  ----------------------------<  118<H>  4.2   |  22  |  0.90    Ca    8.4      23 Feb 2020 07:05      proBNP:   Lipid Profile:   HgA1c:   TSH:     TELE:  EKG:

## 2020-02-23 NOTE — PROGRESS NOTE ADULT - SUBJECTIVE AND OBJECTIVE BOX
Diabetes Follow up note:    Chief complaint: f/u T2DM w/steroid induced hyperglycemia.     Interval Hx: Fasting glucose 87mg/dl this AM. Premeal insulin held. Pt seen at bedside. Reports not feeling well poor appetite w/nausea and vomiting. Not able to tell me how long symptoms have been present or provide further details.     Review of Systems:  General: as above.   GI: Endorsing n/v.   CV: Denies CP/SOB  ENDO: No S&Sx of hypoglycemia  MEDS:  atorvastatin 10 milliGRAM(s) Oral at bedtime  dexAMETHasone     Tablet 2 milliGRAM(s) Oral two times a day  insulin glargine Injectable (LANTUS) 12 Unit(s) SubCutaneous at bedtime  insulin lispro (HumaLOG) corrective regimen sliding scale   SubCutaneous Before meals and at bedtime  insulin lispro Injectable (HumaLOG) 6 Unit(s) SubCutaneous three times a day before meals      Allergies    No Known Allergies      PE:  General: Male sitting in chair. Appears depressed.   Vital Signs Last 24 Hrs  T(C): 36.4 (23 Feb 2020 12:19), Max: 36.7 (22 Feb 2020 23:43)  T(F): 97.6 (23 Feb 2020 12:19), Max: 98 (22 Feb 2020 23:43)  HR: 60 (23 Feb 2020 12:19) (54 - 62)  BP: 138/74 (23 Feb 2020 12:19) (125/69 - 156/85)  BP(mean): --  RR: 18 (23 Feb 2020 12:19) (18 - 20)  SpO2: 99% (23 Feb 2020 12:19) (95% - 99%)  HEENT: Craniotomy incision c/d/i  Abd: Soft, NT,ND,   Extremities: Warm  Neuro: A&O X3    LABS:  POCT Blood Glucose.: 87 mg/dL (02-23-20 @ 08:49)  POCT Blood Glucose.: 145 mg/dL (02-22-20 @ 20:43)  POCT Blood Glucose.: 158 mg/dL (02-22-20 @ 18:03)  POCT Blood Glucose.: 215 mg/dL (02-22-20 @ 12:30)  POCT Blood Glucose.: 103 mg/dL (02-22-20 @ 08:43)  POCT Blood Glucose.: 158 mg/dL (02-21-20 @ 21:09)  POCT Blood Glucose.: 183 mg/dL (02-21-20 @ 17:14)  POCT Blood Glucose.: 92 mg/dL (02-21-20 @ 12:38)  POCT Blood Glucose.: 79 mg/dL (02-21-20 @ 08:34)  POCT Blood Glucose.: 170 mg/dL (02-20-20 @ 21:04)  POCT Blood Glucose.: 130 mg/dL (02-20-20 @ 17:19)                            13.2   15.57 )-----------( 292      ( 22 Feb 2020 04:19 )             40.9       02-23    134<L>  |  103  |  30<H>  ----------------------------<  118<H>  4.2   |  22  |  0.90    Ca    8.4      23 Feb 2020 07:05        Hemoglobin A1C, Whole Blood: 6.0 % <H> [4.0 - 5.6] (02-06-20 @ 13:46)  Hemoglobin A1C, Whole Blood: 6.4 % <H> [4.0 - 5.6] (11-29-19 @ 08:25)          Contact number: solo [phone] or [phone]

## 2020-02-23 NOTE — PROGRESS NOTE ADULT - PROBLEM SELECTOR PLAN 1
-test BG AC/HS  -Decrease Lantus 12 units QHS  -c/w Humalog  6 units AC meals (please hold if not eating)  -c/w Humalog moderate correction scale AC and Mod HS scale  Please inform endo team if changes to his steroid regimen are made.   -discharge plan will be based on steroid taper and insulin requirements.   discussed w/Neuro andrzej FRIEDMAN  pager: [phone]

## 2020-02-24 DIAGNOSIS — R45.89 OTHER SYMPTOMS AND SIGNS INVOLVING EMOTIONAL STATE: ICD-10-CM

## 2020-02-24 LAB
ANION GAP SERPL CALC-SCNC: 12 MMOL/L — SIGNIFICANT CHANGE UP (ref 5–17)
BASOPHILS # BLD AUTO: 0.02 K/UL — SIGNIFICANT CHANGE UP (ref 0–0.2)
BASOPHILS NFR BLD AUTO: 0.2 % — SIGNIFICANT CHANGE UP (ref 0–2)
BUN SERPL-MCNC: 29 MG/DL — HIGH (ref 7–23)
CALCIUM SERPL-MCNC: 8.4 MG/DL — SIGNIFICANT CHANGE UP (ref 8.4–10.5)
CHLORIDE SERPL-SCNC: 101 MMOL/L — SIGNIFICANT CHANGE UP (ref 96–108)
CO2 SERPL-SCNC: 21 MMOL/L — LOW (ref 22–31)
CREAT SERPL-MCNC: 0.91 MG/DL — SIGNIFICANT CHANGE UP (ref 0.5–1.3)
EOSINOPHIL # BLD AUTO: 0.04 K/UL — SIGNIFICANT CHANGE UP (ref 0–0.5)
EOSINOPHIL NFR BLD AUTO: 0.3 % — SIGNIFICANT CHANGE UP (ref 0–6)
GLUCOSE BLDC GLUCOMTR-MCNC: 124 MG/DL — HIGH (ref 70–99)
GLUCOSE BLDC GLUCOMTR-MCNC: 125 MG/DL — HIGH (ref 70–99)
GLUCOSE BLDC GLUCOMTR-MCNC: 177 MG/DL — HIGH (ref 70–99)
GLUCOSE BLDC GLUCOMTR-MCNC: 212 MG/DL — HIGH (ref 70–99)
GLUCOSE SERPL-MCNC: 123 MG/DL — HIGH (ref 70–99)
HCT VFR BLD CALC: 39.4 % — SIGNIFICANT CHANGE UP (ref 39–50)
HGB BLD-MCNC: 12.8 G/DL — LOW (ref 13–17)
IMM GRANULOCYTES NFR BLD AUTO: 1.4 % — SIGNIFICANT CHANGE UP (ref 0–1.5)
LYMPHOCYTES # BLD AUTO: 1.98 K/UL — SIGNIFICANT CHANGE UP (ref 1–3.3)
LYMPHOCYTES # BLD AUTO: 14.9 % — SIGNIFICANT CHANGE UP (ref 13–44)
MCHC RBC-ENTMCNC: 25.8 PG — LOW (ref 27–34)
MCHC RBC-ENTMCNC: 32.5 GM/DL — SIGNIFICANT CHANGE UP (ref 32–36)
MCV RBC AUTO: 79.3 FL — LOW (ref 80–100)
MONOCYTES # BLD AUTO: 0.78 K/UL — SIGNIFICANT CHANGE UP (ref 0–0.9)
MONOCYTES NFR BLD AUTO: 5.9 % — SIGNIFICANT CHANGE UP (ref 2–14)
NEUTROPHILS # BLD AUTO: 10.28 K/UL — HIGH (ref 1.8–7.4)
NEUTROPHILS NFR BLD AUTO: 77.3 % — HIGH (ref 43–77)
NRBC # BLD: 0 /100 WBCS — SIGNIFICANT CHANGE UP (ref 0–0)
PLATELET # BLD AUTO: 275 K/UL — SIGNIFICANT CHANGE UP (ref 150–400)
POTASSIUM SERPL-MCNC: 4.2 MMOL/L — SIGNIFICANT CHANGE UP (ref 3.5–5.3)
POTASSIUM SERPL-SCNC: 4.2 MMOL/L — SIGNIFICANT CHANGE UP (ref 3.5–5.3)
RBC # BLD: 4.97 M/UL — SIGNIFICANT CHANGE UP (ref 4.2–5.8)
RBC # FLD: 14.5 % — SIGNIFICANT CHANGE UP (ref 10.3–14.5)
SODIUM SERPL-SCNC: 134 MMOL/L — LOW (ref 135–145)
WBC # BLD: 13.29 K/UL — HIGH (ref 3.8–10.5)
WBC # FLD AUTO: 13.29 K/UL — HIGH (ref 3.8–10.5)

## 2020-02-24 PROCEDURE — 99232 SBSQ HOSP IP/OBS MODERATE 35: CPT

## 2020-02-24 RX ORDER — INSULIN LISPRO 100/ML
5 VIAL (ML) SUBCUTANEOUS
Refills: 0 | Status: DISCONTINUED | OUTPATIENT
Start: 2020-02-24 | End: 2020-02-25

## 2020-02-24 RX ADMIN — Medication 2 MILLIGRAM(S): at 16:51

## 2020-02-24 RX ADMIN — OXYCODONE HYDROCHLORIDE 10 MILLIGRAM(S): 5 TABLET ORAL at 17:57

## 2020-02-24 RX ADMIN — POLYETHYLENE GLYCOL 3350 17 GRAM(S): 17 POWDER, FOR SOLUTION ORAL at 16:52

## 2020-02-24 RX ADMIN — SENNA PLUS 2 TABLET(S): 8.6 TABLET ORAL at 21:42

## 2020-02-24 RX ADMIN — ATORVASTATIN CALCIUM 10 MILLIGRAM(S): 80 TABLET, FILM COATED ORAL at 21:42

## 2020-02-24 RX ADMIN — Medication 650 MILLIGRAM(S): at 05:01

## 2020-02-24 RX ADMIN — GABAPENTIN 300 MILLIGRAM(S): 400 CAPSULE ORAL at 21:42

## 2020-02-24 RX ADMIN — GABAPENTIN 300 MILLIGRAM(S): 400 CAPSULE ORAL at 14:28

## 2020-02-24 RX ADMIN — Medication 5 UNIT(S): at 17:57

## 2020-02-24 RX ADMIN — LEVETIRACETAM 500 MILLIGRAM(S): 250 TABLET, FILM COATED ORAL at 16:51

## 2020-02-24 RX ADMIN — POLYETHYLENE GLYCOL 3350 17 GRAM(S): 17 POWDER, FOR SOLUTION ORAL at 05:00

## 2020-02-24 RX ADMIN — Medication 650 MILLIGRAM(S): at 07:00

## 2020-02-24 RX ADMIN — Medication 4: at 13:36

## 2020-02-24 RX ADMIN — Medication 1 MILLIGRAM(S): at 14:28

## 2020-02-24 RX ADMIN — SODIUM CHLORIDE 2 GRAM(S): 9 INJECTION INTRAMUSCULAR; INTRAVENOUS; SUBCUTANEOUS at 00:02

## 2020-02-24 RX ADMIN — Medication 2 MILLIGRAM(S): at 05:00

## 2020-02-24 RX ADMIN — SODIUM CHLORIDE 2 GRAM(S): 9 INJECTION INTRAMUSCULAR; INTRAVENOUS; SUBCUTANEOUS at 23:52

## 2020-02-24 RX ADMIN — Medication 5 MILLIGRAM(S): at 21:42

## 2020-02-24 RX ADMIN — Medication 1 SPRAY(S): at 05:00

## 2020-02-24 RX ADMIN — Medication 650 MILLIGRAM(S): at 21:42

## 2020-02-24 RX ADMIN — PANTOPRAZOLE SODIUM 40 MILLIGRAM(S): 20 TABLET, DELAYED RELEASE ORAL at 21:42

## 2020-02-24 RX ADMIN — Medication 1 SPRAY(S): at 16:51

## 2020-02-24 RX ADMIN — SODIUM CHLORIDE 2 GRAM(S): 9 INJECTION INTRAMUSCULAR; INTRAVENOUS; SUBCUTANEOUS at 14:28

## 2020-02-24 RX ADMIN — AMLODIPINE BESYLATE 5 MILLIGRAM(S): 2.5 TABLET ORAL at 05:01

## 2020-02-24 RX ADMIN — SODIUM CHLORIDE 2 GRAM(S): 9 INJECTION INTRAMUSCULAR; INTRAVENOUS; SUBCUTANEOUS at 05:00

## 2020-02-24 RX ADMIN — INSULIN GLARGINE 12 UNIT(S): 100 INJECTION, SOLUTION SUBCUTANEOUS at 21:42

## 2020-02-24 RX ADMIN — DULOXETINE HYDROCHLORIDE 60 MILLIGRAM(S): 30 CAPSULE, DELAYED RELEASE ORAL at 13:35

## 2020-02-24 RX ADMIN — SODIUM CHLORIDE 2 GRAM(S): 9 INJECTION INTRAMUSCULAR; INTRAVENOUS; SUBCUTANEOUS at 16:52

## 2020-02-24 RX ADMIN — ENOXAPARIN SODIUM 40 MILLIGRAM(S): 100 INJECTION SUBCUTANEOUS at 16:51

## 2020-02-24 RX ADMIN — GABAPENTIN 300 MILLIGRAM(S): 400 CAPSULE ORAL at 05:01

## 2020-02-24 RX ADMIN — LEVETIRACETAM 500 MILLIGRAM(S): 250 TABLET, FILM COATED ORAL at 05:00

## 2020-02-24 RX ADMIN — Medication 6 UNIT(S): at 13:36

## 2020-02-24 RX ADMIN — Medication 1 TABLET(S): at 14:28

## 2020-02-24 RX ADMIN — OXYCODONE HYDROCHLORIDE 10 MILLIGRAM(S): 5 TABLET ORAL at 16:55

## 2020-02-24 NOTE — PROGRESS NOTE ADULT - ASSESSMENT
65 yo male. 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 [facility]im at 200,   c/o worsening left sided paresthesia in December, hospitalized at [facility] Dec 3-17, 2019, found to have subacute SDH along R frontal lobe and large mass in third ventricle. pt was referred to neurosurgery consult and transferred to rehab facility, now presents to PST scheduled for craniotomy surgery for removal of brain mass. ASA held 1 week prior to surgery.      On 2/12 s/p Right craniotomy interhemis transcallosal approach to 3rd vent tumor, ETV,  post op slow to wake up, CTH shows slight increase in ventricular size and ventricular air, Course c/b hyperglycemia likely steroid induced requiring insulin gtt, leaking of CSF from wound. Pathology is colloid cyst, has small IVH, small subdural collections, has pseudomeningocele       PLAN:  Neuro:   - hyponatremia- off 2%, salt tabs 2g q6, fluid restriction 1 liter day, Renal following, f/u BMP in am  - post op MRI completed  - pt has  shunt ([facility]aila @ 200)   - pathology reveals colloid cyst  - ?when to resume ASA  - continue keppra for seizure ppx  - decadron for vasogenic edema  - depression- continue cymbalta, melatonin for sleep  - pain control- gabapentin 300 tid   CV:  - HTN- on amlodipine   - on statin  Endocrine:   - DMT2- on lantus 12 u qhs, humalog 6 u pre meal  -Endocrine following   DVT ppx:   - venodynes, sq lovenox  PT/OT:   Acute rehab    Assessment:  Please Check When Present   []  GCS  E   V  M     Heart Failure: []Acute, [] acute on chronic , []chronic  Heart Failure:  [] Diastolic (HFpEF), [] Systolic (HFrEF), []Combined (HFpEF and HFrEF), [] RHF, [] Pulm HTN, [] Other    [] RA, [] ATN, [] AIN, [] other  [] CKD1, [] CKD2, [] CKD 3, [] CKD 4, [] CKD 5, []ESRD    Encephalopathy: [] Metabolic, [] Hepatic, [] toxic, [] Neurological, [] Other    Abnormal Nurtitional Status: [] malnurtition (see nutrition note), [ ]underweight: BMI < 19, [] morbid obesity: BMI >40, [] Cachexia    [] Sepsis  [] hypovolemic shock,[] cardiogenic shock, [] hemorrhagic shock, [] neuogenic shock  [] Acute Respiratory Failure  []Cerebral edema, [] Brain compression/ herniation,   [] Functional quadriplegia  [] Acute blood loss anemia    Spectralink # [id number]

## 2020-02-24 NOTE — PROGRESS NOTE ADULT - PROBLEM SELECTOR PLAN 1
-test BG AC/HS  -Decrease Lantus  units QHS  -c/w Humalog units AC meals (please hold if not eating)  -c/w Humalog moderate correction scale AC and Mod HS scale  Please inform endo team if changes to his steroid regimen are made.   -discharge plan will be based on steroid taper and insulin requirements.   discussed w/Neuro andrzej FRIEDMAN  pager: [phone] -test BG AC/HS  -cont Lantus 12 units QHS  -Decrease Humalog to 5 units AC meals (please hold if not eating)  -c/w Humalog moderate correction scale AC and Mod HS scale  Please inform endo team if changes to his steroid regimen are made.   -discharge plan will be based on steroid taper and insulin requirements.

## 2020-02-24 NOTE — PROGRESS NOTE ADULT - ASSESSMENT
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

## 2020-02-24 NOTE — PROGRESS NOTE ADULT - SUBJECTIVE AND OBJECTIVE BOX
Subjective: Patient seen and examined. No new events except as noted.     SUBJECTIVE/ROS:        MEDICATIONS:  MEDICATIONS  (STANDING):  amLODIPine   Tablet 5 milliGRAM(s) Oral daily  atorvastatin 10 milliGRAM(s) Oral at bedtime  dexAMETHasone     Tablet 2 milliGRAM(s) Oral two times a day  dextrose 5%. 1000 milliLiter(s) (50 mL/Hr) IV Continuous <Continuous>  dextrose 50% Injectable 12.5 Gram(s) IV Push once  dextrose 50% Injectable 25 Gram(s) IV Push once  dextrose 50% Injectable 25 Gram(s) IV Push once  DULoxetine 60 milliGRAM(s) Oral daily  enoxaparin Injectable 40 milliGRAM(s) SubCutaneous <User Schedule>  fluticasone propionate 50 MICROgram(s)/spray Nasal Spray 1 Spray(s) Both Nostrils two times a day  folic acid 1 milliGRAM(s) Oral daily  gabapentin 300 milliGRAM(s) Oral three times a day  insulin glargine Injectable (LANTUS) 12 Unit(s) SubCutaneous at bedtime  insulin lispro (HumaLOG) corrective regimen sliding scale   SubCutaneous three times a day with meals  insulin lispro (HumaLOG) corrective regimen sliding scale   SubCutaneous at bedtime  insulin lispro Injectable (HumaLOG) 6 Unit(s) SubCutaneous three times a day before meals  levETIRAcetam 500 milliGRAM(s) Oral two times a day  melatonin 5 milliGRAM(s) Oral at bedtime  multivitamin 1 Tablet(s) Oral daily  pantoprazole    Tablet 40 milliGRAM(s) Oral at bedtime  polyethylene glycol 3350 17 Gram(s) Oral two times a day  senna 2 Tablet(s) Oral at bedtime  sodium chloride 2 Gram(s) Oral every 6 hours  sodium chloride 2% . 1000 milliLiter(s) (50 mL/Hr) IV Continuous <Continuous>      PHYSICAL EXAM:  T(C): 36.8 (02-24-20 @ 04:38), Max: 36.8 (02-23-20 @ 23:46)  HR: 58 (02-24-20 @ 04:38) (58 - 66)  BP: 144/79 (02-24-20 @ 04:38) (125/69 - 159/88)  RR: 20 (02-24-20 @ 04:38) (18 - 20)  SpO2: 98% (02-24-20 @ 04:38) (97% - 99%)  Wt(kg): --  I&O's Summary    23 Feb 2020 07:01  -  24 Feb 2020 07:00  --------------------------------------------------------  IN: 440 mL / OUT: 2950 mL / NET: -2510 mL            JVP: Normal  Neck: supple  Lung: clear   CV: S1 S2 , Murmur:  Abd: soft  Ext: No edema  neuro: Awake / alert  Psych: flat affect  Skin: normal``    LABS/DATA:    CARDIAC MARKERS:            02-23    131<L>  |  99  |  31<H>  ----------------------------<  238<H>  4.4   |  19<L>  |  1.06    Ca    8.6      23 Feb 2020 23:06      proBNP:   Lipid Profile:   HgA1c:   TSH:     TELE:  EKG:

## 2020-02-24 NOTE — PROGRESS NOTE ADULT - SUBJECTIVE AND OBJECTIVE BOX
SUBJECTIVE: Pt seen and examined, resting comfortably in bed    OVERNIGHT EVENTS: none    Vital Signs Last 24 Hrs  T(C): 36.8 (24 Feb 2020 12:21), Max: 36.8 (23 Feb 2020 23:46)  T(F): 98.2 (24 Feb 2020 12:21), Max: 98.2 (23 Feb 2020 23:46)  HR: 60 (24 Feb 2020 12:21) (58 - 66)  BP: 129/62 (24 Feb 2020 12:21) (129/62 - 159/88)  BP(mean): --  RR: 16 (24 Feb 2020 12:21) (16 - 20)  SpO2: 97% (24 Feb 2020 12:21) (97% - 98%)    PHYSICAL EXAM:    General: No Acute Distress     Neurological: Arousable to voice, OX3, following commands, LUE 4+/5, RT side 5/5, lowers 5/5; mild left UE drift and mild facial droop noted    Pulmonary: Clear to Auscultation, No Rales, No Rhonchi, No Wheezes     Cardiovascular: S1, S2, Regular Rate and Rhythm     Gastrointestinal: Soft, Nontender, Nondistended     Incision: crani sutures c/d/i    LABS:                        12.8   13.29 )-----------( 275      ( 24 Feb 2020 07:30 )             39.4    02-24    134<L>  |  101  |  29<H>  ----------------------------<  123<H>  4.2   |  21<L>  |  0.91    Ca    8.4      24 Feb 2020 07:23      Hemoglobin A1C, Whole Blood: 6.0 % (02-06 @ 13:46)      02-23 @ 07:01  -  02-24 @ 07:00  --------------------------------------------------------  IN: 440 mL / OUT: 2950 mL / NET: -2510 mL    02-24 @ 07:01  -  02-24 @ 15:03  --------------------------------------------------------  IN: 300 mL / OUT: 0 mL / NET: 300 mL      DRAINS: none    MEDICATIONS:  Antibiotics:    Neuro:  acetaminophen   Tablet .. 650 milliGRAM(s) Oral every 6 hours PRN Temp greater or equal to 38C (100.4F), Mild Pain (1 - 3)  DULoxetine 60 milliGRAM(s) Oral daily  gabapentin 300 milliGRAM(s) Oral three times a day  levETIRAcetam 500 milliGRAM(s) Oral two times a day  melatonin 5 milliGRAM(s) Oral at bedtime  oxyCODONE    IR 5 milliGRAM(s) Oral every 4 hours PRN Moderate Pain (4 - 6)  oxyCODONE    IR 10 milliGRAM(s) Oral every 4 hours PRN Severe Pain (7 - 10)    Cardiac:  amLODIPine   Tablet 5 milliGRAM(s) Oral daily    Pulm:    GI/:  pantoprazole    Tablet 40 milliGRAM(s) Oral at bedtime  polyethylene glycol 3350 17 Gram(s) Oral two times a day  senna 2 Tablet(s) Oral at bedtime    Other:   atorvastatin 10 milliGRAM(s) Oral at bedtime  dexAMETHasone     Tablet 2 milliGRAM(s) Oral two times a day  dextrose 40% Gel 15 Gram(s) Oral once PRN Blood Glucose LESS THAN 70 milliGRAM(s)/deciLiter  dextrose 5%. 1000 milliLiter(s) IV Continuous <Continuous>  dextrose 50% Injectable 12.5 Gram(s) IV Push once  dextrose 50% Injectable 25 Gram(s) IV Push once  dextrose 50% Injectable 25 Gram(s) IV Push once  enoxaparin Injectable 40 milliGRAM(s) SubCutaneous <User Schedule>  fluticasone propionate 50 MICROgram(s)/spray Nasal Spray 1 Spray(s) Both Nostrils two times a day  folic acid 1 milliGRAM(s) Oral daily  glucagon  Injectable 1 milliGRAM(s) IntraMuscular once PRN Glucose <70 milliGRAM(s)/deciLiter  insulin glargine Injectable (LANTUS) 12 Unit(s) SubCutaneous at bedtime  insulin lispro (HumaLOG) corrective regimen sliding scale   SubCutaneous three times a day with meals  insulin lispro (HumaLOG) corrective regimen sliding scale   SubCutaneous at bedtime  insulin lispro Injectable (HumaLOG) 6 Unit(s) SubCutaneous three times a day before meals  multivitamin 1 Tablet(s) Oral daily  sodium chloride 2 Gram(s) Oral every 6 hours    DIET: [x] Regular [] CCD [] Renal [] Puree [] Dysphagia [] Tube Feeds:     IMAGING:   < from: CT Head No Cont (02.17.20 @ 08:15) >  IMPRESSION:    No significant interval change from the prior exam.    Hydrocephalus with intraventricular hemorrhage.    Interhemispheric low density collection contiguous with a subgaleal fluid collection which may represent a pseudomeningocele.    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 No change in hydrocephalus compared with 2/17/2020.    Stable severe ventricular dilation with ventriculoperitoneal shunt catheter in appropriate position.    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    < end of copied text >

## 2020-02-24 NOTE — PROGRESS NOTE ADULT - SUBJECTIVE AND OBJECTIVE BOX
Chief Complaint:     History:    MEDICATIONS  (STANDING):  amLODIPine   Tablet 5 milliGRAM(s) Oral daily  atorvastatin 10 milliGRAM(s) Oral at bedtime  dexAMETHasone     Tablet 2 milliGRAM(s) Oral two times a day  dextrose 5%. 1000 milliLiter(s) (50 mL/Hr) IV Continuous <Continuous>  dextrose 50% Injectable 12.5 Gram(s) IV Push once  dextrose 50% Injectable 25 Gram(s) IV Push once  dextrose 50% Injectable 25 Gram(s) IV Push once  DULoxetine 60 milliGRAM(s) Oral daily  enoxaparin Injectable 40 milliGRAM(s) SubCutaneous <User Schedule>  fluticasone propionate 50 MICROgram(s)/spray Nasal Spray 1 Spray(s) Both Nostrils two times a day  folic acid 1 milliGRAM(s) Oral daily  gabapentin 300 milliGRAM(s) Oral three times a day  insulin glargine Injectable (LANTUS) 12 Unit(s) SubCutaneous at bedtime  insulin lispro (HumaLOG) corrective regimen sliding scale   SubCutaneous three times a day with meals  insulin lispro (HumaLOG) corrective regimen sliding scale   SubCutaneous at bedtime  insulin lispro Injectable (HumaLOG) 6 Unit(s) SubCutaneous three times a day before meals  levETIRAcetam 500 milliGRAM(s) Oral two times a day  melatonin 5 milliGRAM(s) Oral at bedtime  multivitamin 1 Tablet(s) Oral daily  pantoprazole    Tablet 40 milliGRAM(s) Oral at bedtime  polyethylene glycol 3350 17 Gram(s) Oral two times a day  senna 2 Tablet(s) Oral at bedtime  sodium chloride 2 Gram(s) Oral every 6 hours    MEDICATIONS  (PRN):  acetaminophen   Tablet .. 650 milliGRAM(s) Oral every 6 hours PRN Temp greater or equal to 38C (100.4F), Mild Pain (1 - 3)  dextrose 40% Gel 15 Gram(s) Oral once PRN Blood Glucose LESS THAN 70 milliGRAM(s)/deciLiter  glucagon  Injectable 1 milliGRAM(s) IntraMuscular once PRN Glucose <70 milliGRAM(s)/deciLiter  oxyCODONE    IR 5 milliGRAM(s) Oral every 4 hours PRN Moderate Pain (4 - 6)  oxyCODONE    IR 10 milliGRAM(s) Oral every 4 hours PRN Severe Pain (7 - 10)      Allergies    No Known Allergies    Intolerances      Review of Systems:  Constitutional: No fever  Eyes: No blurry vision  Neuro: No tremors  HEENT: No pain  Cardiovascular: No chest pain, palpitations  Respiratory: No SOB, no cough  GI: No nausea, vomiting, abdominal pain  : No dysuria  Skin: no rash  Psych: no depression  Endocrine: no polyuria, polydipsia  Hem/lymph: no swelling  Osteoporosis: no fractures    ALL OTHER SYSTEMS REVIEWED AND NEGATIVE    UNABLE TO OBTAIN    PHYSICAL EXAM:  VITALS: T(C): 36.8 (02-24-20 @ 12:21)  T(F): 98.2 (02-24-20 @ 12:21), Max: 98.2 (02-23-20 @ 23:46)  HR: 60 (02-24-20 @ 12:21) (58 - 66)  BP: 129/62 (02-24-20 @ 12:21) (129/62 - 159/88)  RR:  (16 - 20)  SpO2:  (97% - 98%)  Wt(kg): --  GENERAL: NAD, well-groomed, well-developed  EYES: No proptosis, no lid lag, anicteric  HEENT:  Atraumatic, Normocephalic, moist mucous membranes  THYROID: Normal size, no palpable nodules  RESPIRATORY: Clear to auscultation bilaterally; No rales, rhonchi, wheezing, or rubs  CARDIOVASCULAR: Regular rate and rhythm; No murmurs; no peripheral edema  GI: Soft, nontender, non distended, normal bowel sounds  SKIN: Dry, intact, No rashes or lesions  MUSCULOSKELETAL: Full range of motion, normal strength  NEURO: sensation intact, extraocular movements intact, no tremor, normal reflexes  PSYCH: Alert and oriented x 3, normal affect, normal mood  CUSHING'S SIGNS: no striae    POCT Blood Glucose.: 212 mg/dL (02-24-20 @ 13:03)  POCT Blood Glucose.: 125 mg/dL (02-24-20 @ 08:49)  POCT Blood Glucose.: 176 mg/dL (02-23-20 @ 20:56)  POCT Blood Glucose.: 182 mg/dL (02-23-20 @ 17:02)  POCT Blood Glucose.: 216 mg/dL (02-23-20 @ 13:13)  POCT Blood Glucose.: 87 mg/dL (02-23-20 @ 08:49)  POCT Blood Glucose.: 145 mg/dL (02-22-20 @ 20:43)  POCT Blood Glucose.: 158 mg/dL (02-22-20 @ 18:03)  POCT Blood Glucose.: 215 mg/dL (02-22-20 @ 12:30)  POCT Blood Glucose.: 103 mg/dL (02-22-20 @ 08:43)  POCT Blood Glucose.: 158 mg/dL (02-21-20 @ 21:09)  POCT Blood Glucose.: 183 mg/dL (02-21-20 @ 17:14)      02-24    134<L>  |  101  |  29<H>  ----------------------------<  123<H>  4.2   |  21<L>  |  0.91    EGFR if : 101  EGFR if non : 88    Ca    8.4      02-24            Thyroid Function Tests:      Hemoglobin A1C, Whole Blood: 6.0 % <H> [4.0 - 5.6] (02-06-20 @ 13:46)  Hemoglobin A1C, Whole Blood: 6.4 % <H> [4.0 - 5.6] (11-29-19 @ 08:25) Chief Complaint: Type 2 DM    History: Patient has low mood. He cannot recall what he ate in the morning. States he has a headache.     MEDICATIONS  (STANDING):  amLODIPine   Tablet 5 milliGRAM(s) Oral daily  atorvastatin 10 milliGRAM(s) Oral at bedtime  dexAMETHasone     Tablet 2 milliGRAM(s) Oral two times a day  dextrose 5%. 1000 milliLiter(s) (50 mL/Hr) IV Continuous <Continuous>  dextrose 50% Injectable 12.5 Gram(s) IV Push once  dextrose 50% Injectable 25 Gram(s) IV Push once  dextrose 50% Injectable 25 Gram(s) IV Push once  DULoxetine 60 milliGRAM(s) Oral daily  enoxaparin Injectable 40 milliGRAM(s) SubCutaneous <User Schedule>  fluticasone propionate 50 MICROgram(s)/spray Nasal Spray 1 Spray(s) Both Nostrils two times a day  folic acid 1 milliGRAM(s) Oral daily  gabapentin 300 milliGRAM(s) Oral three times a day  insulin glargine Injectable (LANTUS) 12 Unit(s) SubCutaneous at bedtime  insulin lispro (HumaLOG) corrective regimen sliding scale   SubCutaneous three times a day with meals  insulin lispro (HumaLOG) corrective regimen sliding scale   SubCutaneous at bedtime  insulin lispro Injectable (HumaLOG) 6 Unit(s) SubCutaneous three times a day before meals  levETIRAcetam 500 milliGRAM(s) Oral two times a day  melatonin 5 milliGRAM(s) Oral at bedtime  multivitamin 1 Tablet(s) Oral daily  pantoprazole    Tablet 40 milliGRAM(s) Oral at bedtime  polyethylene glycol 3350 17 Gram(s) Oral two times a day  senna 2 Tablet(s) Oral at bedtime  sodium chloride 2 Gram(s) Oral every 6 hours    MEDICATIONS  (PRN):  acetaminophen   Tablet .. 650 milliGRAM(s) Oral every 6 hours PRN Temp greater or equal to 38C (100.4F), Mild Pain (1 - 3)  dextrose 40% Gel 15 Gram(s) Oral once PRN Blood Glucose LESS THAN 70 milliGRAM(s)/deciLiter  glucagon  Injectable 1 milliGRAM(s) IntraMuscular once PRN Glucose <70 milliGRAM(s)/deciLiter  oxyCODONE    IR 5 milliGRAM(s) Oral every 4 hours PRN Moderate Pain (4 - 6)  oxyCODONE    IR 10 milliGRAM(s) Oral every 4 hours PRN Severe Pain (7 - 10)      Allergies    No Known Allergies    Intolerances      Review of Systems:  Constitutional: No fever  Eyes: No blurry vision  Neuro: No tremors  HEENT: No pain  Cardiovascular: No chest pain, palpitations  Respiratory: No SOB, no cough  GI: No nausea, vomiting, abdominal pain  : No dysuria  Skin: no rash  Psych: no depression  Endocrine: no polyuria, polydipsia      ALL OTHER SYSTEMS REVIEWED AND NEGATIVE      PHYSICAL EXAM:  VITALS: T(C): 36.8 (02-24-20 @ 12:21)  T(F): 98.2 (02-24-20 @ 12:21), Max: 98.2 (02-23-20 @ 23:46)  HR: 60 (02-24-20 @ 12:21) (58 - 66)  BP: 129/62 (02-24-20 @ 12:21) (129/62 - 159/88)  RR:  (16 - 20)  SpO2:  (97% - 98%)  Wt(kg): --  GENERAL: NAD, well-groomed, well-developed  EYES: No proptosis, no lid lag, anicteric  HEENT:  Atraumatic, Normocephalic, moist mucous membranes  RESPIRATORY: Clear to auscultation bilaterally; No rales, rhonchi, wheezing, or rubs  CARDIOVASCULAR: Regular rate and rhythm; No murmurs  GI: Soft, nontender, non distended, normal bowel sounds  SKIN: Dry, intact, No rashes or lesions        POCT Blood Glucose.: 212 mg/dL (02-24-20 @ 13:03)  POCT Blood Glucose.: 125 mg/dL (02-24-20 @ 08:49)  POCT Blood Glucose.: 176 mg/dL (02-23-20 @ 20:56)  POCT Blood Glucose.: 182 mg/dL (02-23-20 @ 17:02)  POCT Blood Glucose.: 216 mg/dL (02-23-20 @ 13:13)  POCT Blood Glucose.: 87 mg/dL (02-23-20 @ 08:49)  POCT Blood Glucose.: 145 mg/dL (02-22-20 @ 20:43)  POCT Blood Glucose.: 158 mg/dL (02-22-20 @ 18:03)  POCT Blood Glucose.: 215 mg/dL (02-22-20 @ 12:30)  POCT Blood Glucose.: 103 mg/dL (02-22-20 @ 08:43)  POCT Blood Glucose.: 158 mg/dL (02-21-20 @ 21:09)  POCT Blood Glucose.: 183 mg/dL (02-21-20 @ 17:14)      02-24    134<L>  |  101  |  29<H>  ----------------------------<  123<H>  4.2   |  21<L>  |  0.91    EGFR if : 101  EGFR if non : 88    Ca    8.4      02-24                Hemoglobin A1C, Whole Blood: 6.0 % <H> [4.0 - 5.6] (02-06-20 @ 13:46)  Hemoglobin A1C, Whole Blood: 6.4 % <H> [4.0 - 5.6] (11-29-19 @ 08:25)

## 2020-02-24 NOTE — PROGRESS NOTE ADULT - PROBLEM SELECTOR PLAN 1
s/p craniotomy with subtotal resection of brain mass on 2/12   colloid cyst per nsgy  pain control, management per neurosurgery.

## 2020-02-24 NOTE — PROGRESS NOTE ADULT - SUBJECTIVE AND OBJECTIVE BOX
[facility] Division of Hospital Medicine  Trell Hannah MD  Pager (M-F, 5W-5V): [phone]  Other Times:  [phone]    Patient is a 64y old  Male who presents with a chief complaint of Brain mass (22 Feb 2020 10:57)    SUBJECTIVE / OVERNIGHT EVENTS: pt is depressed per nursing. c/o sig R sided HA   ADDITIONAL REVIEW OF SYSTEMS:    MEDICATIONS  (STANDING):  amLODIPine   Tablet 5 milliGRAM(s) Oral daily  atorvastatin 10 milliGRAM(s) Oral at bedtime  dexAMETHasone     Tablet 2 milliGRAM(s) Oral two times a day  dextrose 5%. 1000 milliLiter(s) (50 mL/Hr) IV Continuous <Continuous>  dextrose 50% Injectable 12.5 Gram(s) IV Push once  dextrose 50% Injectable 25 Gram(s) IV Push once  dextrose 50% Injectable 25 Gram(s) IV Push once  DULoxetine 60 milliGRAM(s) Oral daily  enoxaparin Injectable 40 milliGRAM(s) SubCutaneous <User Schedule>  fluticasone propionate 50 MICROgram(s)/spray Nasal Spray 1 Spray(s) Both Nostrils two times a day  folic acid 1 milliGRAM(s) Oral daily  gabapentin 300 milliGRAM(s) Oral three times a day  insulin glargine Injectable (LANTUS) 12 Unit(s) SubCutaneous at bedtime  insulin lispro (HumaLOG) corrective regimen sliding scale   SubCutaneous three times a day with meals  insulin lispro (HumaLOG) corrective regimen sliding scale   SubCutaneous at bedtime  insulin lispro Injectable (HumaLOG) 6 Unit(s) SubCutaneous three times a day before meals  levETIRAcetam 500 milliGRAM(s) Oral two times a day  melatonin 5 milliGRAM(s) Oral at bedtime  multivitamin 1 Tablet(s) Oral daily  pantoprazole    Tablet 40 milliGRAM(s) Oral at bedtime  polyethylene glycol 3350 17 Gram(s) Oral two times a day  senna 2 Tablet(s) Oral at bedtime  sodium chloride 2 Gram(s) Oral every 6 hours    MEDICATIONS  (PRN):  acetaminophen   Tablet .. 650 milliGRAM(s) Oral every 6 hours PRN Temp greater or equal to 38C (100.4F), Mild Pain (1 - 3)  dextrose 40% Gel 15 Gram(s) Oral once PRN Blood Glucose LESS THAN 70 milliGRAM(s)/deciLiter  glucagon  Injectable 1 milliGRAM(s) IntraMuscular once PRN Glucose <70 milliGRAM(s)/deciLiter  oxyCODONE    IR 5 milliGRAM(s) Oral every 4 hours PRN Moderate Pain (4 - 6)  oxyCODONE    IR 10 milliGRAM(s) Oral every 4 hours PRN Severe Pain (7 - 10)      CAPILLARY BLOOD GLUCOSE      POCT Blood Glucose.: 212 mg/dL (24 Feb 2020 13:03)  POCT Blood Glucose.: 125 mg/dL (24 Feb 2020 08:49)  POCT Blood Glucose.: 176 mg/dL (23 Feb 2020 20:56)  POCT Blood Glucose.: 182 mg/dL (23 Feb 2020 17:02)    I&O's Summary    23 Feb 2020 07:01  -  24 Feb 2020 07:00  --------------------------------------------------------  IN: 440 mL / OUT: 2950 mL / NET: -2510 mL    24 Feb 2020 07:01  -  24 Feb 2020 14:05  --------------------------------------------------------  IN: 300 mL / OUT: 0 mL / NET: 300 mL        PHYSICAL EXAM:  Vital Signs Last 24 Hrs  T(C): 36.8 (24 Feb 2020 12:21), Max: 36.8 (23 Feb 2020 23:46)  T(F): 98.2 (24 Feb 2020 12:21), Max: 98.2 (23 Feb 2020 23:46)  HR: 60 (24 Feb 2020 12:21) (58 - 66)  BP: 129/62 (24 Feb 2020 12:21) (129/62 - 159/88)  BP(mean): --  RR: 16 (24 Feb 2020 12:21) (16 - 20)  SpO2: 97% (24 Feb 2020 12:21) (97% - 98%)  GENERAL: NAD, well-developed  EYES:  conjunctiva and sclera clear  NECK: Supple, No JVD  CHEST/LUNG: Clear to auscultation bilaterally; No wheeze  HEART: Regular rate and rhythm; No murmurs, rubs, or gallops  ABDOMEN: Soft, Nontender, Nondistended; Bowel sounds present  EXTREMITIES:  2+ Peripheral Pulses, No clubbing, cyanosis, or edema  NEUROLOGY:  AAOx3 L pronator drift 4/5 UE   PSYCH: limited affect  SKIN: crani incision    LABS:                        12.8   13.29 )-----------( 275      ( 24 Feb 2020 07:30 )             39.4     02-24    134<L>  |  101  |  29<H>  ----------------------------<  123<H>  4.2   |  21<L>  |  0.91    Ca    8.4      24 Feb 2020 07:23      RADIOLOGY & ADDITIONAL TESTS:  Results Reviewed:   Imaging Personally Reviewed:  Electrocardiogram Personally Reviewed:    COORDINATION OF CARE:  Care Discussed with Consultants/Other Providers [Y/N]: KYLE ray plan of care  Prior or Outpatient Records Reviewed [Y/N]:

## 2020-02-25 DIAGNOSIS — F32.9 MAJOR DEPRESSIVE DISORDER, SINGLE EPISODE, UNSPECIFIED: ICD-10-CM

## 2020-02-25 DIAGNOSIS — F05 DELIRIUM DUE TO KNOWN PHYSIOLOGICAL CONDITION: ICD-10-CM

## 2020-02-25 LAB
ANION GAP SERPL CALC-SCNC: 10 MMOL/L — SIGNIFICANT CHANGE UP (ref 5–17)
ANION GAP SERPL CALC-SCNC: 10 MMOL/L — SIGNIFICANT CHANGE UP (ref 5–17)
ANION GAP SERPL CALC-SCNC: 12 MMOL/L — SIGNIFICANT CHANGE UP (ref 5–17)
BUN SERPL-MCNC: 30 MG/DL — HIGH (ref 7–23)
BUN SERPL-MCNC: 31 MG/DL — HIGH (ref 7–23)
BUN SERPL-MCNC: 33 MG/DL — HIGH (ref 7–23)
CALCIUM SERPL-MCNC: 8.6 MG/DL — SIGNIFICANT CHANGE UP (ref 8.4–10.5)
CALCIUM SERPL-MCNC: 8.8 MG/DL — SIGNIFICANT CHANGE UP (ref 8.4–10.5)
CALCIUM SERPL-MCNC: 8.9 MG/DL — SIGNIFICANT CHANGE UP (ref 8.4–10.5)
CHLORIDE SERPL-SCNC: 93 MMOL/L — LOW (ref 96–108)
CHLORIDE SERPL-SCNC: 95 MMOL/L — LOW (ref 96–108)
CHLORIDE SERPL-SCNC: 95 MMOL/L — LOW (ref 96–108)
CO2 SERPL-SCNC: 22 MMOL/L — SIGNIFICANT CHANGE UP (ref 22–31)
CO2 SERPL-SCNC: 24 MMOL/L — SIGNIFICANT CHANGE UP (ref 22–31)
CO2 SERPL-SCNC: 24 MMOL/L — SIGNIFICANT CHANGE UP (ref 22–31)
CREAT SERPL-MCNC: 0.96 MG/DL — SIGNIFICANT CHANGE UP (ref 0.5–1.3)
CREAT SERPL-MCNC: 1.1 MG/DL — SIGNIFICANT CHANGE UP (ref 0.5–1.3)
CREAT SERPL-MCNC: 1.24 MG/DL — SIGNIFICANT CHANGE UP (ref 0.5–1.3)
GLUCOSE BLDC GLUCOMTR-MCNC: 100 MG/DL — HIGH (ref 70–99)
GLUCOSE BLDC GLUCOMTR-MCNC: 133 MG/DL — HIGH (ref 70–99)
GLUCOSE BLDC GLUCOMTR-MCNC: 134 MG/DL — HIGH (ref 70–99)
GLUCOSE BLDC GLUCOMTR-MCNC: 229 MG/DL — HIGH (ref 70–99)
GLUCOSE SERPL-MCNC: 134 MG/DL — HIGH (ref 70–99)
GLUCOSE SERPL-MCNC: 141 MG/DL — HIGH (ref 70–99)
GLUCOSE SERPL-MCNC: 149 MG/DL — HIGH (ref 70–99)
POTASSIUM SERPL-MCNC: 4.4 MMOL/L — SIGNIFICANT CHANGE UP (ref 3.5–5.3)
POTASSIUM SERPL-MCNC: 4.4 MMOL/L — SIGNIFICANT CHANGE UP (ref 3.5–5.3)
POTASSIUM SERPL-MCNC: 4.7 MMOL/L — SIGNIFICANT CHANGE UP (ref 3.5–5.3)
POTASSIUM SERPL-SCNC: 4.4 MMOL/L — SIGNIFICANT CHANGE UP (ref 3.5–5.3)
POTASSIUM SERPL-SCNC: 4.4 MMOL/L — SIGNIFICANT CHANGE UP (ref 3.5–5.3)
POTASSIUM SERPL-SCNC: 4.7 MMOL/L — SIGNIFICANT CHANGE UP (ref 3.5–5.3)
SODIUM SERPL-SCNC: 127 MMOL/L — LOW (ref 135–145)
SODIUM SERPL-SCNC: 129 MMOL/L — LOW (ref 135–145)
SODIUM SERPL-SCNC: 129 MMOL/L — LOW (ref 135–145)

## 2020-02-25 PROCEDURE — 99223 1ST HOSP IP/OBS HIGH 75: CPT

## 2020-02-25 PROCEDURE — 93010 ELECTROCARDIOGRAM REPORT: CPT

## 2020-02-25 PROCEDURE — 99233 SBSQ HOSP IP/OBS HIGH 50: CPT

## 2020-02-25 PROCEDURE — 99232 SBSQ HOSP IP/OBS MODERATE 35: CPT

## 2020-02-25 RX ORDER — ONDANSETRON 8 MG/1
4 TABLET, FILM COATED ORAL ONCE
Refills: 0 | Status: COMPLETED | OUTPATIENT
Start: 2020-02-25 | End: 2020-02-25

## 2020-02-25 RX ORDER — INSULIN LISPRO 100/ML
VIAL (ML) SUBCUTANEOUS
Refills: 0 | Status: DISCONTINUED | OUTPATIENT
Start: 2020-02-25 | End: 2020-03-04

## 2020-02-25 RX ORDER — LEVETIRACETAM 250 MG/1
500 TABLET, FILM COATED ORAL
Refills: 0 | Status: DISCONTINUED | OUTPATIENT
Start: 2020-02-25 | End: 2020-02-26

## 2020-02-25 RX ORDER — VALPROIC ACID (AS SODIUM SALT) 250 MG/5ML
500 SOLUTION, ORAL ORAL EVERY 12 HOURS
Refills: 0 | Status: DISCONTINUED | OUTPATIENT
Start: 2020-02-25 | End: 2020-03-04

## 2020-02-25 RX ORDER — INSULIN LISPRO 100/ML
VIAL (ML) SUBCUTANEOUS AT BEDTIME
Refills: 0 | Status: DISCONTINUED | OUTPATIENT
Start: 2020-02-25 | End: 2020-03-04

## 2020-02-25 RX ORDER — INSULIN LISPRO 100/ML
4 VIAL (ML) SUBCUTANEOUS
Refills: 0 | Status: DISCONTINUED | OUTPATIENT
Start: 2020-02-25 | End: 2020-02-27

## 2020-02-25 RX ORDER — SODIUM CHLORIDE 5 G/100ML
1000 INJECTION, SOLUTION INTRAVENOUS
Refills: 0 | Status: DISCONTINUED | OUTPATIENT
Start: 2020-02-25 | End: 2020-02-27

## 2020-02-25 RX ORDER — SODIUM CHLORIDE 5 G/100ML
1000 INJECTION, SOLUTION INTRAVENOUS
Refills: 0 | Status: DISCONTINUED | OUTPATIENT
Start: 2020-02-25 | End: 2020-02-25

## 2020-02-25 RX ORDER — DEXAMETHASONE 0.5 MG/5ML
1 ELIXIR ORAL
Refills: 0 | Status: COMPLETED | OUTPATIENT
Start: 2020-02-25 | End: 2020-02-26

## 2020-02-25 RX ORDER — INSULIN GLARGINE 100 [IU]/ML
10 INJECTION, SOLUTION SUBCUTANEOUS AT BEDTIME
Refills: 0 | Status: DISCONTINUED | OUTPATIENT
Start: 2020-02-25 | End: 2020-02-25

## 2020-02-25 RX ORDER — ACETAMINOPHEN 500 MG
1000 TABLET ORAL ONCE
Refills: 0 | Status: COMPLETED | OUTPATIENT
Start: 2020-02-25 | End: 2020-02-25

## 2020-02-25 RX ORDER — INSULIN GLARGINE 100 [IU]/ML
8 INJECTION, SOLUTION SUBCUTANEOUS AT BEDTIME
Refills: 0 | Status: DISCONTINUED | OUTPATIENT
Start: 2020-02-25 | End: 2020-02-26

## 2020-02-25 RX ADMIN — Medication 4: at 12:44

## 2020-02-25 RX ADMIN — AMLODIPINE BESYLATE 5 MILLIGRAM(S): 2.5 TABLET ORAL at 05:29

## 2020-02-25 RX ADMIN — Medication 1 SPRAY(S): at 17:22

## 2020-02-25 RX ADMIN — OXYCODONE HYDROCHLORIDE 5 MILLIGRAM(S): 5 TABLET ORAL at 08:37

## 2020-02-25 RX ADMIN — SODIUM CHLORIDE 2 GRAM(S): 9 INJECTION INTRAMUSCULAR; INTRAVENOUS; SUBCUTANEOUS at 05:30

## 2020-02-25 RX ADMIN — SODIUM CHLORIDE 2 GRAM(S): 9 INJECTION INTRAMUSCULAR; INTRAVENOUS; SUBCUTANEOUS at 23:30

## 2020-02-25 RX ADMIN — GABAPENTIN 300 MILLIGRAM(S): 400 CAPSULE ORAL at 13:12

## 2020-02-25 RX ADMIN — OXYCODONE HYDROCHLORIDE 5 MILLIGRAM(S): 5 TABLET ORAL at 17:37

## 2020-02-25 RX ADMIN — POLYETHYLENE GLYCOL 3350 17 GRAM(S): 17 POWDER, FOR SOLUTION ORAL at 05:30

## 2020-02-25 RX ADMIN — POLYETHYLENE GLYCOL 3350 17 GRAM(S): 17 POWDER, FOR SOLUTION ORAL at 17:19

## 2020-02-25 RX ADMIN — SODIUM CHLORIDE 30 MILLILITER(S): 5 INJECTION, SOLUTION INTRAVENOUS at 10:02

## 2020-02-25 RX ADMIN — OXYCODONE HYDROCHLORIDE 5 MILLIGRAM(S): 5 TABLET ORAL at 17:07

## 2020-02-25 RX ADMIN — ONDANSETRON 4 MILLIGRAM(S): 8 TABLET, FILM COATED ORAL at 20:11

## 2020-02-25 RX ADMIN — DULOXETINE HYDROCHLORIDE 60 MILLIGRAM(S): 30 CAPSULE, DELAYED RELEASE ORAL at 12:44

## 2020-02-25 RX ADMIN — SODIUM CHLORIDE 2 GRAM(S): 9 INJECTION INTRAMUSCULAR; INTRAVENOUS; SUBCUTANEOUS at 17:19

## 2020-02-25 RX ADMIN — GABAPENTIN 300 MILLIGRAM(S): 400 CAPSULE ORAL at 05:29

## 2020-02-25 RX ADMIN — GABAPENTIN 300 MILLIGRAM(S): 400 CAPSULE ORAL at 21:49

## 2020-02-25 RX ADMIN — Medication 1 MILLIGRAM(S): at 12:44

## 2020-02-25 RX ADMIN — OXYCODONE HYDROCHLORIDE 5 MILLIGRAM(S): 5 TABLET ORAL at 08:07

## 2020-02-25 RX ADMIN — SENNA PLUS 2 TABLET(S): 8.6 TABLET ORAL at 21:48

## 2020-02-25 RX ADMIN — INSULIN GLARGINE 8 UNIT(S): 100 INJECTION, SOLUTION SUBCUTANEOUS at 21:48

## 2020-02-25 RX ADMIN — Medication 1 TABLET(S): at 12:44

## 2020-02-25 RX ADMIN — PANTOPRAZOLE SODIUM 40 MILLIGRAM(S): 20 TABLET, DELAYED RELEASE ORAL at 21:50

## 2020-02-25 RX ADMIN — LEVETIRACETAM 500 MILLIGRAM(S): 250 TABLET, FILM COATED ORAL at 17:19

## 2020-02-25 RX ADMIN — Medication 2 MILLIGRAM(S): at 05:29

## 2020-02-25 RX ADMIN — LEVETIRACETAM 500 MILLIGRAM(S): 250 TABLET, FILM COATED ORAL at 05:29

## 2020-02-25 RX ADMIN — ONDANSETRON 4 MILLIGRAM(S): 8 TABLET, FILM COATED ORAL at 11:47

## 2020-02-25 RX ADMIN — Medication 400 MILLIGRAM(S): at 14:26

## 2020-02-25 RX ADMIN — Medication 1000 MILLIGRAM(S): at 14:58

## 2020-02-25 RX ADMIN — Medication 1 SPRAY(S): at 05:30

## 2020-02-25 RX ADMIN — SODIUM CHLORIDE 2 GRAM(S): 9 INJECTION INTRAMUSCULAR; INTRAVENOUS; SUBCUTANEOUS at 12:44

## 2020-02-25 RX ADMIN — Medication 5 UNIT(S): at 12:44

## 2020-02-25 RX ADMIN — ENOXAPARIN SODIUM 40 MILLIGRAM(S): 100 INJECTION SUBCUTANEOUS at 17:19

## 2020-02-25 RX ADMIN — Medication 5 UNIT(S): at 17:20

## 2020-02-25 RX ADMIN — Medication 5 MILLIGRAM(S): at 21:48

## 2020-02-25 RX ADMIN — Medication 1 MILLIGRAM(S): at 17:19

## 2020-02-25 RX ADMIN — ATORVASTATIN CALCIUM 10 MILLIGRAM(S): 80 TABLET, FILM COATED ORAL at 21:48

## 2020-02-25 RX ADMIN — Medication 500 MILLIGRAM(S): at 20:10

## 2020-02-25 NOTE — BEHAVIORAL HEALTH ASSESSMENT NOTE - NSBHCONSULTRECOMMENDOTHER_PSY_A_CORE FT
1. Check: TSH, B12, folate, RPR, UA 1. Check: TSH, B12, folate, RPR, UA  Consider switching to a mood stabilizing antiepileptic (e.g. valproate) for better impulse control

## 2020-02-25 NOTE — BEHAVIORAL HEALTH ASSESSMENT NOTE - OTHER PAST PSYCHIATRIC HISTORY (INCLUDE DETAILS REGARDING ONSET, COURSE OF ILLNESS, INPATIENT/OUTPATIENT TREATMENT)
h/o depression and anxiety; remote h/o seeing a psychiatrist and taking Prozac.  No inpt psychiatric hospitalizations, no SA, no substance abuse

## 2020-02-25 NOTE — PROGRESS NOTE ADULT - SUBJECTIVE AND OBJECTIVE BOX
Subjective: Patient seen and examined. No new events except as noted.     SUBJECTIVE/ROS:        MEDICATIONS:  MEDICATIONS  (STANDING):  amLODIPine   Tablet 5 milliGRAM(s) Oral daily  atorvastatin 10 milliGRAM(s) Oral at bedtime  dexAMETHasone     Tablet 2 milliGRAM(s) Oral two times a day  dextrose 5%. 1000 milliLiter(s) (50 mL/Hr) IV Continuous <Continuous>  dextrose 50% Injectable 12.5 Gram(s) IV Push once  dextrose 50% Injectable 25 Gram(s) IV Push once  dextrose 50% Injectable 25 Gram(s) IV Push once  DULoxetine 60 milliGRAM(s) Oral daily  enoxaparin Injectable 40 milliGRAM(s) SubCutaneous <User Schedule>  fluticasone propionate 50 MICROgram(s)/spray Nasal Spray 1 Spray(s) Both Nostrils two times a day  folic acid 1 milliGRAM(s) Oral daily  gabapentin 300 milliGRAM(s) Oral three times a day  insulin glargine Injectable (LANTUS) 12 Unit(s) SubCutaneous at bedtime  insulin lispro (HumaLOG) corrective regimen sliding scale   SubCutaneous three times a day with meals  insulin lispro (HumaLOG) corrective regimen sliding scale   SubCutaneous at bedtime  insulin lispro Injectable (HumaLOG) 5 Unit(s) SubCutaneous three times a day before meals  levETIRAcetam 500 milliGRAM(s) Oral two times a day  melatonin 5 milliGRAM(s) Oral at bedtime  multivitamin 1 Tablet(s) Oral daily  pantoprazole    Tablet 40 milliGRAM(s) Oral at bedtime  polyethylene glycol 3350 17 Gram(s) Oral two times a day  senna 2 Tablet(s) Oral at bedtime  sodium chloride 2 Gram(s) Oral every 6 hours      PHYSICAL EXAM:  T(C): 36.6 (02-25-20 @ 08:45), Max: 36.8 (02-24-20 @ 12:21)  HR: 57 (02-25-20 @ 08:45) (56 - 71)  BP: 158/77 (02-25-20 @ 08:45) (129/62 - 158/77)  RR: 18 (02-25-20 @ 08:45) (16 - 18)  SpO2: 98% (02-25-20 @ 08:45) (96% - 98%)  Wt(kg): --  I&O's Summary    24 Feb 2020 07:01  -  25 Feb 2020 07:00  --------------------------------------------------------  IN: 300 mL / OUT: 1100 mL / NET: -800 mL            JVP: Normal  Neck: supple  Lung: clear   CV: S1 S2 , Murmur:  Abd: soft  Ext: No edema  neuro: Awake / alert  Psych: flat affect  Skin: normal``    LABS/DATA:    CARDIAC MARKERS:                                12.8   13.29 )-----------( 275      ( 24 Feb 2020 07:30 )             39.4     02-25    129<L>  |  95<L>  |  33<H>  ----------------------------<  141<H>  4.4   |  24  |  0.96    Ca    8.8      25 Feb 2020 06:33      proBNP:   Lipid Profile:   HgA1c:   TSH:     TELE:  EKG:

## 2020-02-25 NOTE — BEHAVIORAL HEALTH ASSESSMENT NOTE - CASE SUMMARY
This is a 64-y.o. 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  c/o worsening left sided paresthesia in December, hospitalized at [facility] Dec 3-17, 2019, found to have subacute SDH along R frontal lobe and large mass in third ventricle. pt was referred to neurosurgery consult and transferred to rehab facility, now presents to PST scheduled for craniotomy surgery for removal of brain mass, s/p right crani transcallosal for subtotal resection of 3rd ventricular tumor.  Of note during hospitalization, patient endorsing that he wants to die, has no plan or intent.  Psychiatry consulted to assess for depression, suicidality.    I have seen and evaluated this patient myself. Chart, labs, meds reviewed. I agree with NP's assessment and plan.

## 2020-02-25 NOTE — BEHAVIORAL HEALTH ASSESSMENT NOTE - NSBHCHARTREVIEWVS_PSY_A_CORE FT
Vital Signs Last 24 Hrs  T(C): 36.6 (25 Feb 2020 12:47), Max: 36.8 (24 Feb 2020 20:00)  T(F): 97.9 (25 Feb 2020 12:47), Max: 98.2 (24 Feb 2020 20:00)  HR: 59 (25 Feb 2020 12:47) (56 - 71)  BP: 147/77 (25 Feb 2020 12:47) (130/71 - 158/77)  BP(mean): --  RR: 18 (25 Feb 2020 12:47) (18 - 18)  SpO2: 97% (25 Feb 2020 12:47) (96% - 98%)

## 2020-02-25 NOTE — PROGRESS NOTE ADULT - SUBJECTIVE AND OBJECTIVE BOX
[facility]   [phone]  RENAL FOLLOW UP NOTE  --------------------------------------------------------------------------------  Chief Complaint:    24 hour events/subjective:  still tired   had been off 2% nacl and was started on nacl 2 g q6.     PAST HISTORY  --------------------------------------------------------------------------------  No significant changes to PMH, PSH, FHx, SHx, unless otherwise noted    ALLERGIES & MEDICATIONS  --------------------------------------------------------------------------------  Allergies    No Known Allergies    Intolerances      Standing Inpatient Medications  amLODIPine   Tablet 5 milliGRAM(s) Oral daily  atorvastatin 10 milliGRAM(s) Oral at bedtime  dexAMETHasone     Tablet 1 milliGRAM(s) Oral two times a day  dextrose 5%. 1000 milliLiter(s) IV Continuous <Continuous>  dextrose 50% Injectable 12.5 Gram(s) IV Push once  dextrose 50% Injectable 25 Gram(s) IV Push once  dextrose 50% Injectable 25 Gram(s) IV Push once  DULoxetine 60 milliGRAM(s) Oral daily  enoxaparin Injectable 40 milliGRAM(s) SubCutaneous <User Schedule>  fluticasone propionate 50 MICROgram(s)/spray Nasal Spray 1 Spray(s) Both Nostrils two times a day  folic acid 1 milliGRAM(s) Oral daily  gabapentin 300 milliGRAM(s) Oral three times a day  insulin glargine Injectable (LANTUS) 8 Unit(s) SubCutaneous at bedtime  insulin lispro (HumaLOG) corrective regimen sliding scale   SubCutaneous three times a day with meals  insulin lispro (HumaLOG) corrective regimen sliding scale   SubCutaneous at bedtime  insulin lispro Injectable (HumaLOG) 4 Unit(s) SubCutaneous three times a day before meals  levETIRAcetam 500 milliGRAM(s) Oral two times a day  melatonin 5 milliGRAM(s) Oral at bedtime  multivitamin 1 Tablet(s) Oral daily  pantoprazole    Tablet 40 milliGRAM(s) Oral at bedtime  polyethylene glycol 3350 17 Gram(s) Oral two times a day  senna 2 Tablet(s) Oral at bedtime  sodium chloride 2 Gram(s) Oral every 6 hours  sodium chloride 2% . 1000 milliLiter(s) IV Continuous <Continuous>  valproic acid 500 milliGRAM(s) Oral every 12 hours    PRN Inpatient Medications  acetaminophen   Tablet .. 650 milliGRAM(s) Oral every 6 hours PRN  dextrose 40% Gel 15 Gram(s) Oral once PRN  glucagon  Injectable 1 milliGRAM(s) IntraMuscular once PRN  oxyCODONE    IR 5 milliGRAM(s) Oral every 4 hours PRN  oxyCODONE    IR 10 milliGRAM(s) Oral every 4 hours PRN      REVIEW OF SYSTEMS  --------------------------------------------------------------------------------    Gen: denies  fevers/chills,  CVS: denies chest pain/palpitations  Resp: denies  SOB/Cough  GI: Denies N/V/Abd pain  : Denies dysuria    All other systems were reviewed and are negative, except as noted.    VITALS/PHYSICAL EXAM  --------------------------------------------------------------------------------  T(C): 36.4 (02-25-20 @ 19:33), Max: 36.7 (02-24-20 @ 23:27)  HR: 57 (02-25-20 @ 19:33) (56 - 60)  BP: 136/77 (02-25-20 @ 19:33) (130/71 - 158/77)  RR: 18 (02-25-20 @ 19:33) (18 - 18)  SpO2: 97% (02-25-20 @ 19:33) (95% - 98%)  Wt(kg): --        02-24-20 @ 07:01  -  02-25-20 @ 07:00  --------------------------------------------------------  IN: 300 mL / OUT: 1100 mL / NET: -800 mL    02-25-20 @ 07:01  -  02-25-20 @ 22:13  --------------------------------------------------------  IN: 460 mL / OUT: 1400 mL / NET: -940 mL      Physical Exam:  	  Gen: falls asleep easily and appears overall lethargic  still   	no jvd  	Pulm: decrease bs  no rales or ronchi or wheezing  	CV: RRR, S1S2; no rub  	Back: No CVA tenderness  	Abd: +BS, soft, nontender/nondistended  	: No suprapubic tenderness  	UE: Warm, no cyanosis  no clubbing,  no edema;  	LE: Warm, no cyanosis  no clubbing, no edema        LABS/STUDIES  --------------------------------------------------------------------------------              12.8   13.29 >-----------<  275      [02-24-20 @ 07:30]              39.4     127  |  93  |  31  ----------------------------<  134      [02-25-20 @ 17:15]  4.7   |  24  |  1.10        Ca     8.9     [02-25-20 @ 17:15]            Creatinine Trend:  SCr 1.10 [02-25 @ 17:15]  SCr 0.96 [02-25 @ 06:33]  SCr 0.91 [02-24 @ 07:23]  SCr 1.06 [02-23 @ 23:06]  SCr 0.87 [02-23 @ 16:26]                  HbA1c 6.0      [02-06-20 @ 13:46]

## 2020-02-25 NOTE — PROGRESS NOTE ADULT - SUBJECTIVE AND OBJECTIVE BOX
[facility] Division of Hospital Medicine  Trell Hannah MD  Pager (M-F, 1H-7R): [phone]  Other Times:  [phone]    Patient is a 64y old  Male who presents with a chief complaint of Brain tumor (24 Feb 2020 15:02)    SUBJECTIVE / OVERNIGHT EVENTS: no new complaints - seen this morning. suicidal ideation noted but pt was not endorsing this during my interview  ADDITIONAL REVIEW OF SYSTEMS:    MEDICATIONS  (STANDING):  amLODIPine   Tablet 5 milliGRAM(s) Oral daily  atorvastatin 10 milliGRAM(s) Oral at bedtime  dexAMETHasone     Tablet 1 milliGRAM(s) Oral two times a day  dextrose 5%. 1000 milliLiter(s) (50 mL/Hr) IV Continuous <Continuous>  dextrose 50% Injectable 12.5 Gram(s) IV Push once  dextrose 50% Injectable 25 Gram(s) IV Push once  dextrose 50% Injectable 25 Gram(s) IV Push once  DULoxetine 60 milliGRAM(s) Oral daily  enoxaparin Injectable 40 milliGRAM(s) SubCutaneous <User Schedule>  fluticasone propionate 50 MICROgram(s)/spray Nasal Spray 1 Spray(s) Both Nostrils two times a day  folic acid 1 milliGRAM(s) Oral daily  gabapentin 300 milliGRAM(s) Oral three times a day  insulin glargine Injectable (LANTUS) 10 Unit(s) SubCutaneous at bedtime  insulin lispro (HumaLOG) corrective regimen sliding scale   SubCutaneous three times a day with meals  insulin lispro (HumaLOG) corrective regimen sliding scale   SubCutaneous at bedtime  insulin lispro Injectable (HumaLOG) 5 Unit(s) SubCutaneous three times a day before meals  levETIRAcetam 500 milliGRAM(s) Oral two times a day  melatonin 5 milliGRAM(s) Oral at bedtime  multivitamin 1 Tablet(s) Oral daily  pantoprazole    Tablet 40 milliGRAM(s) Oral at bedtime  polyethylene glycol 3350 17 Gram(s) Oral two times a day  senna 2 Tablet(s) Oral at bedtime  sodium chloride 2 Gram(s) Oral every 6 hours  sodium chloride 2% . 1000 milliLiter(s) (30 mL/Hr) IV Continuous <Continuous>    MEDICATIONS  (PRN):  acetaminophen   Tablet .. 650 milliGRAM(s) Oral every 6 hours PRN Temp greater or equal to 38C (100.4F), Mild Pain (1 - 3)  dextrose 40% Gel 15 Gram(s) Oral once PRN Blood Glucose LESS THAN 70 milliGRAM(s)/deciLiter  glucagon  Injectable 1 milliGRAM(s) IntraMuscular once PRN Glucose <70 milliGRAM(s)/deciLiter  oxyCODONE    IR 5 milliGRAM(s) Oral every 4 hours PRN Moderate Pain (4 - 6)  oxyCODONE    IR 10 milliGRAM(s) Oral every 4 hours PRN Severe Pain (7 - 10)      CAPILLARY BLOOD GLUCOSE      POCT Blood Glucose.: 229 mg/dL (25 Feb 2020 12:31)  POCT Blood Glucose.: 100 mg/dL (25 Feb 2020 08:34)  POCT Blood Glucose.: 177 mg/dL (24 Feb 2020 21:32)  POCT Blood Glucose.: 124 mg/dL (24 Feb 2020 17:16)    I&O's Summary    24 Feb 2020 07:01  -  25 Feb 2020 07:00  --------------------------------------------------------  IN: 300 mL / OUT: 1100 mL / NET: -800 mL    25 Feb 2020 07:01  -  25 Feb 2020 15:36  --------------------------------------------------------  IN: 280 mL / OUT: 1400 mL / NET: -1120 mL        PHYSICAL EXAM:  Vital Signs Last 24 Hrs  T(C): 36.6 (25 Feb 2020 12:47), Max: 36.8 (24 Feb 2020 20:00)  T(F): 97.9 (25 Feb 2020 12:47), Max: 98.2 (24 Feb 2020 20:00)  HR: 59 (25 Feb 2020 12:47) (56 - 71)  BP: 147/77 (25 Feb 2020 12:47) (130/71 - 158/77)  BP(mean): --  RR: 18 (25 Feb 2020 12:47) (18 - 18)  SpO2: 97% (25 Feb 2020 12:47) (96% - 98%)  GENERAL: NAD, well-developed  EYES:  conjunctiva and sclera clear  NECK: Supple, No JVD  CHEST/LUNG: Clear to auscultation bilaterally; No wheeze  HEART: Regular rate and rhythm; No murmurs, rubs, or gallops  ABDOMEN: Soft, Nontender, Nondistended; Bowel sounds present  EXTREMITIES:  2+ Peripheral Pulses, No clubbing, cyanosis, or edema  NEUROLOGY:  AAOx2 L pronator drift 4/5 UE   PSYCH: limited affect  SKIN: crani incision    LABS:                        12.8   13.29 )-----------( 275      ( 24 Feb 2020 07:30 )             39.4     02-25    129<L>  |  95<L>  |  33<H>  ----------------------------<  141<H>  4.4   |  24  |  0.96    Ca    8.8      25 Feb 2020 06:33    RADIOLOGY & ADDITIONAL TESTS:  Results Reviewed:   Imaging Personally Reviewed:  Electrocardiogram Personally Reviewed:     COORDINATION OF CARE:  Care Discussed with Consultants/Other Providers [Y/N]: KYLE ray psych needs  Prior or Outpatient Records Reviewed [Y/N]:

## 2020-02-25 NOTE — BEHAVIORAL HEALTH ASSESSMENT NOTE - NSBHCHARTREVIEWINVESTIGATE_PSY_A_CORE FT
< from: 12 Lead ECG (02.19.20 @ 01:19) >      Ventricular Rate 60 BPM    Atrial Rate 60 BPM    P-R Interval 184 ms    QRS Duration 92 ms    Q-T Interval 428 ms    QTC Calculation(Bezet) 428 ms    P Axis 16 degrees    R Axis 214 degrees    T Axis 58 degrees    Diagnosis Line SINUS RHYTHM WITH PREMATURE ATRIAL COMPLEXES  LOW VOLTAGE QRS  ANTEROLATERAL INFARCT , AGE UNDETERMINED  ABNORMAL ECG    Confirmed by AZEEM HAILE ADAM ([id number]) on 2/23/2020 6:25:59 PM    < end of copied text >

## 2020-02-25 NOTE — BEHAVIORAL HEALTH ASSESSMENT NOTE - NSBHCHARTREVIEWLAB_PSY_A_CORE FT
12.8   13.29 )-----------( 275      ( 24 Feb 2020 07:30 )             39.4     02-25    129<L>  |  95<L>  |  33<H>  ----------------------------<  141<H>  4.4   |  24  |  0.96    Ca    8.8      25 Feb 2020 06:33

## 2020-02-25 NOTE — BEHAVIORAL HEALTH ASSESSMENT NOTE - NSBHCONSULTMEDS_PSY_A_CORE FT
Cymbalta 60mg po daily  melatonin 5mg po qhs Reduce Cymbalta to 40mg po daily  melatonin 5mg po qhs  Seroquel 25mg p.o. at bedtime

## 2020-02-25 NOTE — PROGRESS NOTE ADULT - ASSESSMENT
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 noticed with hyponatremia.    1- hyponatremia  2- nausea overall subsiding   3-  HTN       suspect hyponatremia siadh  nacl 2 g q6 and add 2% nacl infusion @ 30 cc/hr   norvasc 5 mg daily   d/w ns team when seen

## 2020-02-25 NOTE — BEHAVIORAL HEALTH ASSESSMENT NOTE - RISK ASSESSMENT
Unable to determine Suicide Risk Risk factors: acute/chronic medical issues, acute/chronic cognitive impairments, chronic pain, noncompliant with treatment,  passive suicidality with no plan or intent     Protective factors: no h/o SA/SIB, no h/o psych admissions, no active substance abuse, with adult children and grandchildren, domiciled, social supports

## 2020-02-25 NOTE — PROGRESS NOTE ADULT - ASSESSMENT
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, ETV POD#13    NEURO:   - Continue Neuro checks q 4  - Continue Keppra for seizure prophylaxis  - Continue Decadron tapered to 1mg BID then off tomorrow  - Continue pain control with medications (tylenol prn / oxy prn)  - Continue Melatonin at bedtime for insomnia  - Continue Gabapentin for neuropathic pain  - Continue Cymbalta for anxiety d/o  - Surg path: colloid cyst  - Continue VPS - currently set at [place] @ 200    PULM:   - Encourage incentive spirometry  - Continue Flonase    CV:  - Continue Norvasc for HTN  - Continue Lipitor for HLD  - Appreciate Cardiology following, avoid QT prolonging drugs ( on 2/16), gave Zofran x 1 for nausea    ENDO:   - Appreciate Endocrinology following  - Appetite much improved today, continue HISS, humalog, lantus, FS slightly elevated    HEME/ONC:             CBC 2/24 stable, WBC downtrending         DVT ppx: Continue SQL, 2/13 [place] - neg    RENAL:   - Appreciate Nephrology following for hypnatremia  - Spoke w/ Dr. Bonds regarding this morning Na which was 129, restarted 2%Nacl @ 30 and BMP q 6, will continue salt tabs and fluid restriction    ID:   - Afebrile  - WBC downtrending     GI:    - Continue protonix for GI ppx  - Continue miralax and senna  - Continue MVT and folic acid    DISCHARGE PLANNING:   PT/OT: CÉSAR    Assessment:  Please Check When Present   []  GCS  E   V  M     Heart Failure: []Acute, [] acute on chronic , []chronic  Heart Failure:  [] Diastolic (HFpEF), [] Systolic (HFrEF), []Combined (HFpEF and HFrEF), [] RHF, [] Pulm HTN, [] Other    [] RA, [] ATN, [] AIN, [] other  [] CKD1, [] CKD2, [] CKD 3, [] CKD 4, [] CKD 5, []ESRD    Encephalopathy: [] Metabolic, [] Hepatic, [] toxic, [] Neurological, [] Other    Abnormal Nurtitional Status: [] malnutrition (see nutrition note), [ ]underweight: BMI < 19, [] morbid obesity: BMI >40, [] Cachexia    [] Sepsis  [] hypovolemic shock,[] cardiogenic shock, [] hemorrhagic shock, [] neuogenic shock  [] Acute Respiratory Failure  []Cerebral edema, [] Brain compression/ herniation,   [] Functional quadriplegia  [] Acute blood loss anemia    To discuss w/ Dr. 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, ETV POD#13    NEURO:   - Continue Neuro checks q 4  - Continue Keppra for seizure prophylaxis  - Continue Decadron tapered to 1mg BID then off tomorrow  - Continue pain control with medications (tylenol prn / oxy prn)  - Continue Melatonin at bedtime for insomnia  - Continue Gabapentin for neuropathic pain  - Continue Cymbalta for anxiety d/o  - Surg path: colloid cyst  - Continue VPS - currently set at [place]akim @ 200    PULM:   - Encourage incentive spirometry  - Continue Flonase    CV:  - Continue Norvasc for HTN  - Continue Lipitor for HLD  - Appreciate Cardiology following, avoid QT prolonging drugs ( on 2/16), gave Zofran x 1 for nausea    ENDO:   - Appreciate Endocrinology following  - Appetite much improved today, continue HISS, humalog, lantus, FS slightly elevated    HEME/ONC:             CBC 2/24 stable, WBC downtrending         DVT ppx: Continue SQL, 2/13 [place] - neg, LUE noted for mild swelling - ordered LUE Dopp    RENAL:   - Appreciate Nephrology following for hypnatremia  - Spoke w/ Dr. Bonds regarding this morning Na which was 129, restarted 2%Nacl @ 30 and BMP q 6, will continue salt tabs and fluid restriction    ID:   - Afebrile  - WBC downtrending     GI:    - Continue protonix for GI ppx  - Continue miralax and senna  - Continue MVT and folic acid    DISCHARGE PLANNING:   PT/OT: CÉSAR    Assessment:  Please Check When Present   []  GCS  E   V  M     Heart Failure: []Acute, [] acute on chronic , []chronic  Heart Failure:  [] Diastolic (HFpEF), [] Systolic (HFrEF), []Combined (HFpEF and HFrEF), [] RHF, [] Pulm HTN, [] Other    [] RA, [] ATN, [] AIN, [] other  [] CKD1, [] CKD2, [] CKD 3, [] CKD 4, [] CKD 5, []ESRD    Encephalopathy: [] Metabolic, [] Hepatic, [] toxic, [] Neurological, [] Other    Abnormal Nurtitional Status: [] malnutrition (see nutrition note), [ ]underweight: BMI < 19, [] morbid obesity: BMI >40, [] Cachexia    [] Sepsis  [] hypovolemic shock,[] cardiogenic shock, [] hemorrhagic shock, [] neuogenic shock  [] Acute Respiratory Failure  []Cerebral edema, [] Brain compression/ herniation,   [] Functional quadriplegia  [] Acute blood loss anemia    To discuss w/ Dr. Manley  [id number]

## 2020-02-25 NOTE — BEHAVIORAL HEALTH ASSESSMENT NOTE - NSBHCHARTREVIEWIMAGING_PSY_A_CORE FT
< from: MR Head w/wo IV Cont (02.20.20 @ 23:20) >    EXAM:  MR BRAIN WAW IC                            PROCEDURE DATE:  02/20/2020    INTERPRETATION:  CLINICAL INDICATION: ADMDIAG1: I67.1 CEREBRAL ANEURYSM, NONRUPTURED/  ADMDIAG2: I67.1 CEREBRAL ANEURYSM, NONRUPTURED. Status post craniotomy for colloid cyst resection..    TECHNIQUE: MRI of the brain was performed without and with contrast using the following sequences: Axial DWI/ADC map, axial SWI, axial gradient echo, axial SPGR, sagittal T1 FLAIR, axial T2 FLAIR, axial T2 FSE axial T1 SPGR postcontrast and axial/coronal/sagittal T1 spin-echo postcontrast.    10 mls of Gadavist was administered intravenously without complication and 0 mls were discarded.    COMPARISON: MR brain dated 11/28/2019. CT head dated 2/17/2020    FINDINGS:    Redemonstration of bifrontal craniotomy changes.    Similar appearing third ventricular mass which measures approximately 2.8 x 2.1 x 3.3 cm, maximum measurement. 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    The lateral ventricles remain significantly dilated.    Unchanged appearance of a CSF collection along the right interhemispheric fissure and frontal subdural space with a small amount of hemorrhage layering posteriorly. This collection appears to extend into the extra calvarial soft tissues.    There is unchanged encephalomalacia and gliosis involving the right frontal lobe.    There is a right frontal  shunt catheter.    There is no evidence of acute infarct or midline shift.    Major intracranial flow-voids are preserved.    The orbits, sellar and suprasellar structures, and craniocervical junction are unremarkable. There has been bilateral cataract surgery.    The visualized paranasal sinuses and tympanomastoid cavities are clear.    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 No change in hydrocephalus compared with 2/17/2020.    Stable severe ventricular dilation with ventriculoperitoneal shunt catheter in appropriate position.    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    < end of copied text >

## 2020-02-25 NOTE — BEHAVIORAL HEALTH ASSESSMENT NOTE - DESCRIPTION
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

## 2020-02-25 NOTE — PROGRESS NOTE ADULT - PROBLEM SELECTOR PLAN 1
-test BG AC/HS  -Decrease Lantus 10 units QHS  -c/w Humalog 5 units AC meals (hold if not eating)  -c/w Humalog moderate correction scale AC and Mod HS scale  Please inform endo team if changes to his steroid regimen are made.   -discharge plan will be based on steroid taper and insulin requirements.  pager: [phone] -test BG AC/HS  -Decadron reduced this afternoon to 1mg BID  -Decrease Lantus 8 units QHS  -c/w Humalog 4 units AC meals (hold if not eating)  -change Humalog to low correction scale AC and low HS scale  Please inform endo team if changes to his steroid regimen are made.   -discharge plan will be based on steroid taper and insulin requirements.  pager: [phone]

## 2020-02-25 NOTE — PROGRESS NOTE ADULT - SUBJECTIVE AND OBJECTIVE BOX
Diabetes Follow up note:    Chief complaint: f/u T2DM w/steroid induced hyperglycemia    Interval Hx: Glucose remains tightly controlled. Premeal insulin held this AM. Pt seen at bedside. Remains w/low mood. Reports feeling nauseated. Decadron still at 2mg BID.     Review of Systems:  General: as above.   GI: Reports nausea and constipation  CV: Denies CP/SOB  ENDO: No S&Sx of hypoglycemia  MEDS:  atorvastatin 10 milliGRAM(s) Oral at bedtime  dexAMETHasone     Tablet 2 milliGRAM(s) Oral two times a day    insulin glargine Injectable (LANTUS) 12 Unit(s) SubCutaneous at bedtime  insulin lispro (HumaLOG) corrective regimen sliding scale   SubCutaneous three times a day with meals  insulin lispro (HumaLOG) corrective regimen sliding scale   SubCutaneous at bedtime  insulin lispro Injectable (HumaLOG) 5 Unit(s) SubCutaneous three times a day before meals      Allergies    No Known Allergies        PE:  General: Male lying in bed. NAD.   Vital Signs Last 24 Hrs  T(C): 36.6 (25 Feb 2020 08:45), Max: 36.8 (24 Feb 2020 12:21)  T(F): 97.9 (25 Feb 2020 08:45), Max: 98.2 (24 Feb 2020 12:21)  HR: 57 (25 Feb 2020 08:45) (56 - 71)  BP: 158/77 (25 Feb 2020 08:45) (129/62 - 158/77)  BP(mean): --  RR: 18 (25 Feb 2020 08:45) (16 - 18)  SpO2: 98% (25 Feb 2020 08:45) (96% - 98%)  HEENT: Craniotomy incision c/d/i  Abd: Soft, NT,ND, Obese.   Extremities: Warm  Neuro: A&O X3    LABS:  POCT Blood Glucose.: 100 mg/dL (02-25-20 @ 08:34)  POCT Blood Glucose.: 177 mg/dL (02-24-20 @ 21:32)  POCT Blood Glucose.: 124 mg/dL (02-24-20 @ 17:16)  POCT Blood Glucose.: 212 mg/dL (02-24-20 @ 13:03)  POCT Blood Glucose.: 125 mg/dL (02-24-20 @ 08:49)  POCT Blood Glucose.: 176 mg/dL (02-23-20 @ 20:56)  POCT Blood Glucose.: 182 mg/dL (02-23-20 @ 17:02)  POCT Blood Glucose.: 216 mg/dL (02-23-20 @ 13:13)  POCT Blood Glucose.: 87 mg/dL (02-23-20 @ 08:49)  POCT Blood Glucose.: 145 mg/dL (02-22-20 @ 20:43)  POCT Blood Glucose.: 158 mg/dL (02-22-20 @ 18:03)  POCT Blood Glucose.: 215 mg/dL (02-22-20 @ 12:30)                            12.8   13.29 )-----------( 275      ( 24 Feb 2020 07:30 )             39.4       02-25    129<L>  |  95<L>  |  33<H>  ----------------------------<  141<H>  4.4   |  24  |  0.96    Ca    8.8      25 Feb 2020 06:33          Hemoglobin A1C, Whole Blood: 6.0 % <H> [4.0 - 5.6] (02-06-20 @ 13:46)  Hemoglobin A1C, Whole Blood: 6.4 % <H> [4.0 - 5.6] (11-29-19 @ 08:25)          Contact number: solo [phone] or [phone]

## 2020-02-25 NOTE — PROGRESS NOTE ADULT - ASSESSMENT
64 year old male w/previously controlled T2DM w/hx of CVA here w/brain mass s/p craniotomy on decadron w/steroid induced hyperglycemia. Glucose values at goal. Pt w/decreased PO intake w/GI symptoms today. Premeal insulin held today. No change in steroid dosing. BG goal (100-180mg/dl).

## 2020-02-26 LAB
ANION GAP SERPL CALC-SCNC: 12 MMOL/L — SIGNIFICANT CHANGE UP (ref 5–17)
ANION GAP SERPL CALC-SCNC: 13 MMOL/L — SIGNIFICANT CHANGE UP (ref 5–17)
ANION GAP SERPL CALC-SCNC: 13 MMOL/L — SIGNIFICANT CHANGE UP (ref 5–17)
BUN SERPL-MCNC: 27 MG/DL — HIGH (ref 7–23)
BUN SERPL-MCNC: 28 MG/DL — HIGH (ref 7–23)
BUN SERPL-MCNC: 30 MG/DL — HIGH (ref 7–23)
CALCIUM SERPL-MCNC: 8.4 MG/DL — SIGNIFICANT CHANGE UP (ref 8.4–10.5)
CALCIUM SERPL-MCNC: 8.5 MG/DL — SIGNIFICANT CHANGE UP (ref 8.4–10.5)
CALCIUM SERPL-MCNC: 8.6 MG/DL — SIGNIFICANT CHANGE UP (ref 8.4–10.5)
CHLORIDE SERPL-SCNC: 98 MMOL/L — SIGNIFICANT CHANGE UP (ref 96–108)
CHLORIDE SERPL-SCNC: 98 MMOL/L — SIGNIFICANT CHANGE UP (ref 96–108)
CHLORIDE SERPL-SCNC: 99 MMOL/L — SIGNIFICANT CHANGE UP (ref 96–108)
CO2 SERPL-SCNC: 22 MMOL/L — SIGNIFICANT CHANGE UP (ref 22–31)
CO2 SERPL-SCNC: 23 MMOL/L — SIGNIFICANT CHANGE UP (ref 22–31)
CO2 SERPL-SCNC: 23 MMOL/L — SIGNIFICANT CHANGE UP (ref 22–31)
CREAT SERPL-MCNC: 0.93 MG/DL — SIGNIFICANT CHANGE UP (ref 0.5–1.3)
CREAT SERPL-MCNC: 1.04 MG/DL — SIGNIFICANT CHANGE UP (ref 0.5–1.3)
CREAT SERPL-MCNC: 1.07 MG/DL — SIGNIFICANT CHANGE UP (ref 0.5–1.3)
GLUCOSE BLDC GLUCOMTR-MCNC: 114 MG/DL — HIGH (ref 70–99)
GLUCOSE BLDC GLUCOMTR-MCNC: 114 MG/DL — HIGH (ref 70–99)
GLUCOSE BLDC GLUCOMTR-MCNC: 155 MG/DL — HIGH (ref 70–99)
GLUCOSE BLDC GLUCOMTR-MCNC: 190 MG/DL — HIGH (ref 70–99)
GLUCOSE SERPL-MCNC: 135 MG/DL — HIGH (ref 70–99)
GLUCOSE SERPL-MCNC: 144 MG/DL — HIGH (ref 70–99)
GLUCOSE SERPL-MCNC: 189 MG/DL — HIGH (ref 70–99)
POTASSIUM SERPL-MCNC: 4.4 MMOL/L — SIGNIFICANT CHANGE UP (ref 3.5–5.3)
POTASSIUM SERPL-MCNC: 4.4 MMOL/L — SIGNIFICANT CHANGE UP (ref 3.5–5.3)
POTASSIUM SERPL-MCNC: 4.6 MMOL/L — SIGNIFICANT CHANGE UP (ref 3.5–5.3)
POTASSIUM SERPL-SCNC: 4.4 MMOL/L — SIGNIFICANT CHANGE UP (ref 3.5–5.3)
POTASSIUM SERPL-SCNC: 4.4 MMOL/L — SIGNIFICANT CHANGE UP (ref 3.5–5.3)
POTASSIUM SERPL-SCNC: 4.6 MMOL/L — SIGNIFICANT CHANGE UP (ref 3.5–5.3)
SODIUM SERPL-SCNC: 133 MMOL/L — LOW (ref 135–145)
SODIUM SERPL-SCNC: 134 MMOL/L — LOW (ref 135–145)
SODIUM SERPL-SCNC: 134 MMOL/L — LOW (ref 135–145)

## 2020-02-26 PROCEDURE — 93971 EXTREMITY STUDY: CPT | Mod: 26

## 2020-02-26 PROCEDURE — 99232 SBSQ HOSP IP/OBS MODERATE 35: CPT

## 2020-02-26 PROCEDURE — 99233 SBSQ HOSP IP/OBS HIGH 50: CPT

## 2020-02-26 RX ORDER — DULOXETINE HYDROCHLORIDE 30 MG/1
40 CAPSULE, DELAYED RELEASE ORAL DAILY
Refills: 0 | Status: DISCONTINUED | OUTPATIENT
Start: 2020-02-26 | End: 2020-03-04

## 2020-02-26 RX ORDER — INSULIN GLARGINE 100 [IU]/ML
6 INJECTION, SOLUTION SUBCUTANEOUS AT BEDTIME
Refills: 0 | Status: DISCONTINUED | OUTPATIENT
Start: 2020-02-26 | End: 2020-02-27

## 2020-02-26 RX ADMIN — GABAPENTIN 300 MILLIGRAM(S): 400 CAPSULE ORAL at 05:11

## 2020-02-26 RX ADMIN — SODIUM CHLORIDE 50 MILLILITER(S): 5 INJECTION, SOLUTION INTRAVENOUS at 12:14

## 2020-02-26 RX ADMIN — GABAPENTIN 300 MILLIGRAM(S): 400 CAPSULE ORAL at 21:41

## 2020-02-26 RX ADMIN — Medication 1 SPRAY(S): at 05:11

## 2020-02-26 RX ADMIN — OXYCODONE HYDROCHLORIDE 5 MILLIGRAM(S): 5 TABLET ORAL at 21:40

## 2020-02-26 RX ADMIN — Medication 1 MILLIGRAM(S): at 12:13

## 2020-02-26 RX ADMIN — Medication 1: at 17:50

## 2020-02-26 RX ADMIN — OXYCODONE HYDROCHLORIDE 5 MILLIGRAM(S): 5 TABLET ORAL at 14:13

## 2020-02-26 RX ADMIN — Medication 1 TABLET(S): at 12:13

## 2020-02-26 RX ADMIN — Medication 4 UNIT(S): at 09:55

## 2020-02-26 RX ADMIN — POLYETHYLENE GLYCOL 3350 17 GRAM(S): 17 POWDER, FOR SOLUTION ORAL at 17:49

## 2020-02-26 RX ADMIN — Medication 650 MILLIGRAM(S): at 17:53

## 2020-02-26 RX ADMIN — INSULIN GLARGINE 6 UNIT(S): 100 INJECTION, SOLUTION SUBCUTANEOUS at 21:40

## 2020-02-26 RX ADMIN — Medication 500 MILLIGRAM(S): at 17:49

## 2020-02-26 RX ADMIN — SODIUM CHLORIDE 2 GRAM(S): 9 INJECTION INTRAMUSCULAR; INTRAVENOUS; SUBCUTANEOUS at 05:10

## 2020-02-26 RX ADMIN — Medication 1: at 12:49

## 2020-02-26 RX ADMIN — ATORVASTATIN CALCIUM 10 MILLIGRAM(S): 80 TABLET, FILM COATED ORAL at 21:40

## 2020-02-26 RX ADMIN — DULOXETINE HYDROCHLORIDE 60 MILLIGRAM(S): 30 CAPSULE, DELAYED RELEASE ORAL at 12:14

## 2020-02-26 RX ADMIN — Medication 5 MILLIGRAM(S): at 21:40

## 2020-02-26 RX ADMIN — Medication 4 UNIT(S): at 12:49

## 2020-02-26 RX ADMIN — POLYETHYLENE GLYCOL 3350 17 GRAM(S): 17 POWDER, FOR SOLUTION ORAL at 05:11

## 2020-02-26 RX ADMIN — Medication 1 SPRAY(S): at 17:50

## 2020-02-26 RX ADMIN — PANTOPRAZOLE SODIUM 40 MILLIGRAM(S): 20 TABLET, DELAYED RELEASE ORAL at 21:41

## 2020-02-26 RX ADMIN — SODIUM CHLORIDE 2 GRAM(S): 9 INJECTION INTRAMUSCULAR; INTRAVENOUS; SUBCUTANEOUS at 12:13

## 2020-02-26 RX ADMIN — Medication 1 MILLIGRAM(S): at 05:11

## 2020-02-26 RX ADMIN — SENNA PLUS 2 TABLET(S): 8.6 TABLET ORAL at 21:41

## 2020-02-26 RX ADMIN — OXYCODONE HYDROCHLORIDE 5 MILLIGRAM(S): 5 TABLET ORAL at 05:22

## 2020-02-26 RX ADMIN — OXYCODONE HYDROCHLORIDE 5 MILLIGRAM(S): 5 TABLET ORAL at 14:45

## 2020-02-26 RX ADMIN — Medication 650 MILLIGRAM(S): at 18:28

## 2020-02-26 RX ADMIN — GABAPENTIN 300 MILLIGRAM(S): 400 CAPSULE ORAL at 14:15

## 2020-02-26 RX ADMIN — OXYCODONE HYDROCHLORIDE 5 MILLIGRAM(S): 5 TABLET ORAL at 20:11

## 2020-02-26 RX ADMIN — Medication 4 UNIT(S): at 17:49

## 2020-02-26 RX ADMIN — Medication 500 MILLIGRAM(S): at 05:11

## 2020-02-26 RX ADMIN — ENOXAPARIN SODIUM 40 MILLIGRAM(S): 100 INJECTION SUBCUTANEOUS at 17:50

## 2020-02-26 RX ADMIN — LEVETIRACETAM 500 MILLIGRAM(S): 250 TABLET, FILM COATED ORAL at 05:11

## 2020-02-26 RX ADMIN — SODIUM CHLORIDE 2 GRAM(S): 9 INJECTION INTRAMUSCULAR; INTRAVENOUS; SUBCUTANEOUS at 17:49

## 2020-02-26 RX ADMIN — OXYCODONE HYDROCHLORIDE 5 MILLIGRAM(S): 5 TABLET ORAL at 05:52

## 2020-02-26 RX ADMIN — AMLODIPINE BESYLATE 5 MILLIGRAM(S): 2.5 TABLET ORAL at 05:11

## 2020-02-26 NOTE — PROGRESS NOTE ADULT - SUBJECTIVE AND OBJECTIVE BOX
SUBJECTIVE: HPI:  63 yo male. 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  c/o worsening left sided paresthesia in December, hospitalized at [facility] Dec 3-17, 2019, found to have subacute SDH along R frontal lobe and large mass in third ventricle. pt was referred to neurosurgery consult and transferred to rehab facility, now presents to PST scheduled for craniotomy surgery for removal of brain mass.  ***contacted surgeon, Dr. Manley's office, s/w  Elida who had consulted with Vineet pt to hold aspirin 1 week preop.*** (06 Feb 2020 10:22)      OVERNIGHT EVENTS: Patient seen by Psych yesterday due to suicidal ideations, was on constant observation since last night with no issues. No acute events. Patient seen and evaluated this morning, calmer and comfortable, not tearful. No acute complaints.     Vital Signs Last 24 Hrs  T(C): 36.7 (26 Feb 2020 08:03), Max: 36.7 (25 Feb 2020 15:48)  T(F): 98 (26 Feb 2020 08:03), Max: 98.1 (25 Feb 2020 15:48)  HR: 59 (26 Feb 2020 08:03) (57 - 60)  BP: 129/78 (26 Feb 2020 08:03) (128/79 - 147/81)  BP(mean): --  RR: 18 (26 Feb 2020 08:03) (18 - 18)  SpO2: 97% (26 Feb 2020 08:03) (95% - 97%)    PHYSICAL EXAM:    General: No Acute Distress     Neurological: Arousable to voice, OX3, following commands, uppers 4+/5 (effort and encouragement), lowers 4+/5 (effort and encouragement)    Pulmonary: Clear to Auscultation, No Rales, No Rhonchi, No Wheezes     Cardiovascular: S1, S2, Regular Rate and Rhythm     Gastrointestinal: Soft, Nontender, Nondistended     Incision: Crani incision C/D/I    LABS:   02-26    133<L>  |  98  |  30<H>  ----------------------------<  135<H>  4.6   |  22  |  1.07    Ca    8.5      26 Feb 2020 05:58      Hemoglobin A1C, Whole Blood: 6.0 % (02-06 @ 13:46)      02-25 @ 07:01  -  02-26 @ 07:00  --------------------------------------------------------  IN: 460 mL / OUT: 3200 mL / NET: -2740 mL      DRAINS: None    MEDICATIONS:  Antibiotics:    Neuro:  acetaminophen   Tablet .. 650 milliGRAM(s) Oral every 6 hours PRN Temp greater or equal to 38C (100.4F), Mild Pain (1 - 3)  DULoxetine 60 milliGRAM(s) Oral daily  gabapentin 300 milliGRAM(s) Oral three times a day  melatonin 5 milliGRAM(s) Oral at bedtime  oxyCODONE    IR 5 milliGRAM(s) Oral every 4 hours PRN Moderate Pain (4 - 6)  oxyCODONE    IR 10 milliGRAM(s) Oral every 4 hours PRN Severe Pain (7 - 10)  valproic acid 500 milliGRAM(s) Oral every 12 hours    Cardiac:  amLODIPine   Tablet 5 milliGRAM(s) Oral daily    Pulm:    GI/:  pantoprazole    Tablet 40 milliGRAM(s) Oral at bedtime  polyethylene glycol 3350 17 Gram(s) Oral two times a day  senna 2 Tablet(s) Oral at bedtime    Other:   atorvastatin 10 milliGRAM(s) Oral at bedtime  dextrose 40% Gel 15 Gram(s) Oral once PRN Blood Glucose LESS THAN 70 milliGRAM(s)/deciLiter  dextrose 5%. 1000 milliLiter(s) IV Continuous <Continuous>  dextrose 50% Injectable 12.5 Gram(s) IV Push once  dextrose 50% Injectable 25 Gram(s) IV Push once  dextrose 50% Injectable 25 Gram(s) IV Push once  enoxaparin Injectable 40 milliGRAM(s) SubCutaneous <User Schedule>  fluticasone propionate 50 MICROgram(s)/spray Nasal Spray 1 Spray(s) Both Nostrils two times a day  folic acid 1 milliGRAM(s) Oral daily  glucagon  Injectable 1 milliGRAM(s) IntraMuscular once PRN Glucose <70 milliGRAM(s)/deciLiter  insulin glargine Injectable (LANTUS) 8 Unit(s) SubCutaneous at bedtime  insulin lispro (HumaLOG) corrective regimen sliding scale   SubCutaneous three times a day with meals  insulin lispro (HumaLOG) corrective regimen sliding scale   SubCutaneous at bedtime  insulin lispro Injectable (HumaLOG) 4 Unit(s) SubCutaneous three times a day before meals  multivitamin 1 Tablet(s) Oral daily  sodium chloride 2 Gram(s) Oral every 6 hours  sodium chloride 2% . 1000 milliLiter(s) IV Continuous <Continuous>    DIET: [] Regular [x - 1L fluid restriction] CCD [] Renal [] Puree [] Dysphagia [] Tube Feeds:     IMAGING: SUBJECTIVE: HPI:  63 yo male. 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  c/o worsening left sided paresthesia in December, hospitalized at [facility] Dec 3-17, 2019, found to have subacute SDH along R frontal lobe and large mass in third ventricle. pt was referred to neurosurgery consult and transferred to rehab facility, now presents to PST scheduled for craniotomy surgery for removal of brain mass.  ***contacted surgeon, Dr. Manley's office, s/w  Elida who had consulted with Vineet pt to hold aspirin 1 week preop.*** (06 Feb 2020 10:22)      OVERNIGHT EVENTS: Patient seen by Psych yesterday due to suicidal ideations, was on constant observation since last night with no issues. No acute events. Patient seen and evaluated this morning, calmer and comfortable, not tearful. No acute complaints.     Vital Signs Last 24 Hrs  T(C): 36.7 (26 Feb 2020 08:03), Max: 36.7 (25 Feb 2020 15:48)  T(F): 98 (26 Feb 2020 08:03), Max: 98.1 (25 Feb 2020 15:48)  HR: 59 (26 Feb 2020 08:03) (57 - 60)  BP: 129/78 (26 Feb 2020 08:03) (128/79 - 147/81)  BP(mean): --  RR: 18 (26 Feb 2020 08:03) (18 - 18)  SpO2: 97% (26 Feb 2020 08:03) (95% - 97%)    PHYSICAL EXAM:    General: No Acute Distress     Neurological: Arousable to voice, OX3, following commands, uppers 4+/5 (effort and encouragement), lowers 4+/5 (effort and encouragement)    Pulmonary: Clear to Auscultation, No Rales, No Rhonchi, No Wheezes     Cardiovascular: S1, S2, Regular Rate and Rhythm     Gastrointestinal: Soft, Nontender, Nondistended     Incision: Crani incision C/D/I    LABS:   02-26    133<L>  |  98  |  30<H>  ----------------------------<  135<H>  4.6   |  22  |  1.07    Ca    8.5      26 Feb 2020 05:58      Hemoglobin A1C, Whole Blood: 6.0 % (02-06 @ 13:46)      02-25 @ 07:01  -  02-26 @ 07:00  --------------------------------------------------------  IN: 460 mL / OUT: 3200 mL / NET: -2740 mL      DRAINS: None    MEDICATIONS:  Antibiotics:    Neuro:  acetaminophen   Tablet .. 650 milliGRAM(s) Oral every 6 hours PRN Temp greater or equal to 38C (100.4F), Mild Pain (1 - 3)  DULoxetine 60 milliGRAM(s) Oral daily  gabapentin 300 milliGRAM(s) Oral three times a day  melatonin 5 milliGRAM(s) Oral at bedtime  oxyCODONE    IR 5 milliGRAM(s) Oral every 4 hours PRN Moderate Pain (4 - 6)  oxyCODONE    IR 10 milliGRAM(s) Oral every 4 hours PRN Severe Pain (7 - 10)  valproic acid 500 milliGRAM(s) Oral every 12 hours    Cardiac:  amLODIPine   Tablet 5 milliGRAM(s) Oral daily    Pulm:    GI/:  pantoprazole    Tablet 40 milliGRAM(s) Oral at bedtime  polyethylene glycol 3350 17 Gram(s) Oral two times a day  senna 2 Tablet(s) Oral at bedtime    Other:   atorvastatin 10 milliGRAM(s) Oral at bedtime  dextrose 40% Gel 15 Gram(s) Oral once PRN Blood Glucose LESS THAN 70 milliGRAM(s)/deciLiter  dextrose 5%. 1000 milliLiter(s) IV Continuous <Continuous>  dextrose 50% Injectable 12.5 Gram(s) IV Push once  dextrose 50% Injectable 25 Gram(s) IV Push once  dextrose 50% Injectable 25 Gram(s) IV Push once  enoxaparin Injectable 40 milliGRAM(s) SubCutaneous <User Schedule>  fluticasone propionate 50 MICROgram(s)/spray Nasal Spray 1 Spray(s) Both Nostrils two times a day  folic acid 1 milliGRAM(s) Oral daily  glucagon  Injectable 1 milliGRAM(s) IntraMuscular once PRN Glucose <70 milliGRAM(s)/deciLiter  insulin glargine Injectable (LANTUS) 8 Unit(s) SubCutaneous at bedtime  insulin lispro (HumaLOG) corrective regimen sliding scale   SubCutaneous three times a day with meals  insulin lispro (HumaLOG) corrective regimen sliding scale   SubCutaneous at bedtime  insulin lispro Injectable (HumaLOG) 4 Unit(s) SubCutaneous three times a day before meals  multivitamin 1 Tablet(s) Oral daily  sodium chloride 2 Gram(s) Oral every 6 hours  sodium chloride 2% . 1000 milliLiter(s) IV Continuous <Continuous>    DIET: [] Regular [x - 1L fluid restriction] CCD [] Renal [] Puree [] Dysphagia [] Tube Feeds:     IMAGING:   Imaging reviewed, no new imaging.

## 2020-02-26 NOTE — PROGRESS NOTE BEHAVIORAL HEALTH - NSBHCONSULTMEDS_PSY_A_CORE FT
Reduce Cymbalta to 40mg po daily  melatonin 5mg po qhs  Seroquel 25mg p.o. at bedtime Reduce Cymbalta to 40mg po daily  melatonin 5mg po qhs  continue valproic acid for seizure prophylaxis and delirium

## 2020-02-26 NOTE — PROGRESS NOTE ADULT - SUBJECTIVE AND OBJECTIVE BOX
Diabetes Follow up note:    Chief complaint: f/u T2DM w/steroid induced hyperglycemia.     Interval Hx: Pt received last dose of decadron this AM. BG values in 100s today. Pt seen at bedside. Pt reports feeling down as he doesn't understand why he is in the hospital and doesn't recall having surgery. Wants to get better and go home. Reports is eating his meals today.     Review of Systems:  General: + headache.   GI: Tolerating POs. Denies N/V/D/Abd pain  CV: Denies CP/SOB  ENDO: No S&Sx of hypoglycemia  MEDS:  atorvastatin 10 milliGRAM(s) Oral at bedtime    insulin glargine Injectable (LANTUS) 8 Unit(s) SubCutaneous at bedtime  insulin lispro (HumaLOG) corrective regimen sliding scale   SubCutaneous three times a day with meals  insulin lispro (HumaLOG) corrective regimen sliding scale   SubCutaneous at bedtime  insulin lispro Injectable (HumaLOG) 4 Unit(s) SubCutaneous three times a day before meals      Allergies    No Known Allergies      PE:  General: Male lying in bed. NAD>   Vital Signs Last 24 Hrs  T(C): 36.5 (26 Feb 2020 12:36), Max: 36.7 (25 Feb 2020 15:48)  T(F): 97.7 (26 Feb 2020 12:36), Max: 98.1 (25 Feb 2020 15:48)  HR: 62 (26 Feb 2020 12:36) (57 - 62)  BP: 128/76 (26 Feb 2020 12:36) (128/76 - 147/81)  BP(mean): --  RR: 18 (26 Feb 2020 12:36) (18 - 18)  SpO2: 97% (26 Feb 2020 12:36) (95% - 97%)  HEENT: Craniotomy incision c/d/i  Abd: Soft, NT,ND,   Extremities: Warm  Neuro: A&O X2. Confused to situation.   Psych: depressed mood    LABS:  POCT Blood Glucose.: 190 mg/dL (02-26-20 @ 12:35)  POCT Blood Glucose.: 114 mg/dL (02-26-20 @ 08:46)  POCT Blood Glucose.: 134 mg/dL (02-25-20 @ 21:44)  POCT Blood Glucose.: 133 mg/dL (02-25-20 @ 17:14)  POCT Blood Glucose.: 229 mg/dL (02-25-20 @ 12:31)  POCT Blood Glucose.: 100 mg/dL (02-25-20 @ 08:34)  POCT Blood Glucose.: 177 mg/dL (02-24-20 @ 21:32)  POCT Blood Glucose.: 124 mg/dL (02-24-20 @ 17:16)  POCT Blood Glucose.: 212 mg/dL (02-24-20 @ 13:03)  POCT Blood Glucose.: 125 mg/dL (02-24-20 @ 08:49)  POCT Blood Glucose.: 176 mg/dL (02-23-20 @ 20:56)  POCT Blood Glucose.: 182 mg/dL (02-23-20 @ 17:02)          02-26    134<L>  |  98  |  28<H>  ----------------------------<  189<H>  4.4   |  23  |  0.93    Ca    8.4      26 Feb 2020 14:15      Hemoglobin A1C, Whole Blood: 6.0 % <H> [4.0 - 5.6] (02-06-20 @ 13:46)  Hemoglobin A1C, Whole Blood: 6.4 % <H> [4.0 - 5.6] (11-29-19 @ 08:25)          Contact number: solo [phone] or [phone]

## 2020-02-26 NOTE — PROGRESS NOTE ADULT - ASSESSMENT
64 year old male w/previously controlled T2DM w/hx of CVA here w/brain mass s/p craniotomy on decadron w/steroid induced hyperglycemia. Improved glucose values now off decadron today. Can expect some glucocorticoid/hyperglcyemia effect can linger for 48-72 hours after stopping steroid. Tolerating POs. Remains inpatient w/hyponatremia management and suicidality. BG goal (100-180mg/dl).

## 2020-02-26 NOTE — PROGRESS NOTE ADULT - SUBJECTIVE AND OBJECTIVE BOX
[facility] Division of Hospital Medicine  Trell Hannah MD  Pager (M-F, 0Z-[phone]): [phone]  Other Times:  [phone]    Patient is a 64y old  Male who presents with a chief complaint of brain mass (25 Feb 2020 15:35)      SUBJECTIVE / OVERNIGHT EVENTS: no complaints  ADDITIONAL REVIEW OF SYSTEMS:    MEDICATIONS  (STANDING):  amLODIPine   Tablet 5 milliGRAM(s) Oral daily  atorvastatin 10 milliGRAM(s) Oral at bedtime  dextrose 5%. 1000 milliLiter(s) (50 mL/Hr) IV Continuous <Continuous>  dextrose 50% Injectable 12.5 Gram(s) IV Push once  dextrose 50% Injectable 25 Gram(s) IV Push once  dextrose 50% Injectable 25 Gram(s) IV Push once  DULoxetine 40 milliGRAM(s) Oral daily  enoxaparin Injectable 40 milliGRAM(s) SubCutaneous <User Schedule>  fluticasone propionate 50 MICROgram(s)/spray Nasal Spray 1 Spray(s) Both Nostrils two times a day  folic acid 1 milliGRAM(s) Oral daily  gabapentin 300 milliGRAM(s) Oral three times a day  insulin glargine Injectable (LANTUS) 6 Unit(s) SubCutaneous at bedtime  insulin lispro (HumaLOG) corrective regimen sliding scale   SubCutaneous three times a day with meals  insulin lispro (HumaLOG) corrective regimen sliding scale   SubCutaneous at bedtime  insulin lispro Injectable (HumaLOG) 4 Unit(s) SubCutaneous three times a day before meals  melatonin 5 milliGRAM(s) Oral at bedtime  multivitamin 1 Tablet(s) Oral daily  pantoprazole    Tablet 40 milliGRAM(s) Oral at bedtime  polyethylene glycol 3350 17 Gram(s) Oral two times a day  senna 2 Tablet(s) Oral at bedtime  sodium chloride 2 Gram(s) Oral every 6 hours  sodium chloride 2% . 1000 milliLiter(s) (50 mL/Hr) IV Continuous <Continuous>  valproic acid 500 milliGRAM(s) Oral every 12 hours    MEDICATIONS  (PRN):  acetaminophen   Tablet .. 650 milliGRAM(s) Oral every 6 hours PRN Temp greater or equal to 38C (100.4F), Mild Pain (1 - 3)  dextrose 40% Gel 15 Gram(s) Oral once PRN Blood Glucose LESS THAN 70 milliGRAM(s)/deciLiter  glucagon  Injectable 1 milliGRAM(s) IntraMuscular once PRN Glucose <70 milliGRAM(s)/deciLiter  oxyCODONE    IR 5 milliGRAM(s) Oral every 4 hours PRN Moderate Pain (4 - 6)  oxyCODONE    IR 10 milliGRAM(s) Oral every 4 hours PRN Severe Pain (7 - 10)      CAPILLARY BLOOD GLUCOSE      POCT Blood Glucose.: 190 mg/dL (26 Feb 2020 12:35)  POCT Blood Glucose.: 114 mg/dL (26 Feb 2020 08:46)  POCT Blood Glucose.: 134 mg/dL (25 Feb 2020 21:44)  POCT Blood Glucose.: 133 mg/dL (25 Feb 2020 17:14)    I&O's Summary    25 Feb 2020 07:01  -  26 Feb 2020 07:00  --------------------------------------------------------  IN: 460 mL / OUT: 3200 mL / NET: -2740 mL    26 Feb 2020 07:01  -  26 Feb 2020 15:37  --------------------------------------------------------  IN: 320 mL / OUT: 900 mL / NET: -580 mL        PHYSICAL EXAM:  Vital Signs Last 24 Hrs  T(C): 36.6 (26 Feb 2020 15:34), Max: 36.7 (25 Feb 2020 15:48)  T(F): 97.9 (26 Feb 2020 15:34), Max: 98.1 (25 Feb 2020 15:48)  HR: 60 (26 Feb 2020 15:34) (57 - 62)  BP: 121/73 (26 Feb 2020 15:34) (121/73 - 147/81)  BP(mean): --  RR: 18 (26 Feb 2020 15:34) (18 - 18)  SpO2: 96% (26 Feb 2020 15:34) (95% - 97%)  GENERAL: NAD, well-developed  EYES:  conjunctiva and sclera clear  NECK: Supple, No JVD  CHEST/LUNG: Clear to auscultation bilaterally; No wheeze  HEART: Regular rate and rhythm; No murmurs, rubs, or gallops  ABDOMEN: Soft, Nontender, Nondistended; Bowel sounds present  EXTREMITIES:  2+ Peripheral Pulses, No clubbing, cyanosis, or edema  NEUROLOGY:  AAOx2 L pronator drift 4/5 UE. confused about why he is in hospital  PSYCH: limited affect  SKIN: crani incision      LABS:    02-26    134<L>  |  98  |  28<H>  ----------------------------<  189<H>  4.4   |  23  |  0.93    Ca    8.4      26 Feb 2020 14:15                  RADIOLOGY & ADDITIONAL TESTS:  Results Reviewed:   Imaging Personally Reviewed:  Electrocardiogram Personally Reviewed:    COORDINATION OF CARE: psych recs appreciated  Care Discussed with Consultants/Other Providers [Y/N]: NSGY team re plan of care  Prior or Outpatient Records Reviewed [Y/N]:

## 2020-02-26 NOTE — PROGRESS NOTE BEHAVIORAL HEALTH - NSBHCHARTREVIEWVS_PSY_A_CORE FT
Vital Signs Last 24 Hrs  T(C): 36.5 (26 Feb 2020 12:36), Max: 36.7 (25 Feb 2020 15:48)  T(F): 97.7 (26 Feb 2020 12:36), Max: 98.1 (25 Feb 2020 15:48)  HR: 62 (26 Feb 2020 12:36) (57 - 62)  BP: 128/76 (26 Feb 2020 12:36) (128/76 - 147/81)  BP(mean): --  RR: 18 (26 Feb 2020 12:36) (18 - 18)  SpO2: 97% (26 Feb 2020 12:36) (95% - 97%)

## 2020-02-26 NOTE — PROGRESS NOTE ADULT - SUBJECTIVE AND OBJECTIVE BOX
Subjective: Patient seen and examined. No new events except as noted.     SUBJECTIVE/ROS:        MEDICATIONS:  MEDICATIONS  (STANDING):  amLODIPine   Tablet 5 milliGRAM(s) Oral daily  atorvastatin 10 milliGRAM(s) Oral at bedtime  dextrose 5%. 1000 milliLiter(s) (50 mL/Hr) IV Continuous <Continuous>  dextrose 50% Injectable 12.5 Gram(s) IV Push once  dextrose 50% Injectable 25 Gram(s) IV Push once  dextrose 50% Injectable 25 Gram(s) IV Push once  DULoxetine 60 milliGRAM(s) Oral daily  enoxaparin Injectable 40 milliGRAM(s) SubCutaneous <User Schedule>  fluticasone propionate 50 MICROgram(s)/spray Nasal Spray 1 Spray(s) Both Nostrils two times a day  folic acid 1 milliGRAM(s) Oral daily  gabapentin 300 milliGRAM(s) Oral three times a day  insulin glargine Injectable (LANTUS) 8 Unit(s) SubCutaneous at bedtime  insulin lispro (HumaLOG) corrective regimen sliding scale   SubCutaneous three times a day with meals  insulin lispro (HumaLOG) corrective regimen sliding scale   SubCutaneous at bedtime  insulin lispro Injectable (HumaLOG) 4 Unit(s) SubCutaneous three times a day before meals  melatonin 5 milliGRAM(s) Oral at bedtime  multivitamin 1 Tablet(s) Oral daily  pantoprazole    Tablet 40 milliGRAM(s) Oral at bedtime  polyethylene glycol 3350 17 Gram(s) Oral two times a day  senna 2 Tablet(s) Oral at bedtime  sodium chloride 2 Gram(s) Oral every 6 hours  sodium chloride 2% . 1000 milliLiter(s) (50 mL/Hr) IV Continuous <Continuous>  valproic acid 500 milliGRAM(s) Oral every 12 hours      PHYSICAL EXAM:  T(C): 36.7 (02-26-20 @ 08:03), Max: 36.7 (02-25-20 @ 15:48)  HR: 59 (02-26-20 @ 08:03) (57 - 60)  BP: 129/78 (02-26-20 @ 08:03) (128/79 - 147/81)  RR: 18 (02-26-20 @ 08:03) (18 - 18)  SpO2: 97% (02-26-20 @ 08:03) (95% - 97%)  Wt(kg): --  I&O's Summary    25 Feb 2020 07:01  -  26 Feb 2020 07:00  --------------------------------------------------------  IN: 460 mL / OUT: 3200 mL / NET: -2740 mL            JVP: Normal  Neck: supple  Lung: clear   CV: S1 S2 , Murmur:  Abd: soft  Ext: No edema  neuro: Awake   Psych: flat affect  Skin: normal``    LABS/DATA:    CARDIAC MARKERS:            02-26    133<L>  |  98  |  30<H>  ----------------------------<  135<H>  4.6   |  22  |  1.07    Ca    8.5      26 Feb 2020 05:58      proBNP:   Lipid Profile:   HgA1c:   TSH:     TELE:  EKG:

## 2020-02-26 NOTE — PROGRESS NOTE BEHAVIORAL HEALTH - NSBHCHARTREVIEWLAB_PSY_A_CORE FT
02-26    133<L>  |  98  |  30<H>  ----------------------------<  135<H>  4.6   |  22  |  1.07    Ca    8.5      26 Feb 2020 05:58

## 2020-02-26 NOTE — PROGRESS NOTE BEHAVIORAL HEALTH - NSBHFUPINTERVALHXFT_PSY_A_CORE
Patient seen and evaluated, awake and alert, oriented to person, place ([place]) and date (states it's Jonathan Wednesday, year 2020), states he's here because he had a stroke and a tumor in his brain, unsure whether or not he had surgery to get it removed.  Denies feeling depressed, states he feels "blah", reports always feeling tired.  Denies SI, denies having feelings of wanting to die.  Denies HI, A/V/H, reports good sleep.  Per team, patient improved today, not crying, denies patient has endorsed any suicidality or thoughts of wanting to die.  They report patient is eating better today. Patient seen and evaluated, awake and alert, oriented to person, place ([place]) and date (states it's Jonathan Wednesday, year 2020), states he's here because he had a stroke and a tumor in his brain, unsure whether or not he had surgery to get it removed.  Denies feeling depressed, states he feels "blah", reports always feeling tired.  Denies SI, denies having feelings of wanting to die.  Denies HI, A/V/H, reports good sleep.  Per team, patient improved today, not crying, denies patient has endorsed any suicidality or thoughts of wanting to die.  They report patient is eating better today. Per primary team, patient sensitive to qtc prolonging agents.

## 2020-02-26 NOTE — PROGRESS NOTE ADULT - ASSESSMENT
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, ETV POD#14    NEURO:   - Continue Neuro checks q 4  - Decadron finished last dose was yesterday  - Continue pain control with medications (tylenol prn / oxy prn)  - Continue Melatonin at bedtime for insomnia  - Continue Gabapentin for neuropathic pain  - Continue Cymbalta for anxiety d/o  - Surg path: colloid cyst  - Continue VPS - currently set at [place] @ 200  - Patient seen and evaluated by Psych yesterday due to suicidal ideations (see my note and Psych note from yesterday), patient was placed on 1:1 due to their recommendation, evaluated patient this morning and patient is calmer, spoke with Psych NP and agreed patient does not need 1:1 and agreed with Valproate being started yesterday as both a mood stabilizer and seizure prophylaxis instead of Keppra. Also spoke with [facility] staffing about moving the patient to a window bed.     PULM:   - Encourage incentive spirometry  - Continue Flonase    CV:  - Continue Norvasc for HTN  - Continue Lipitor for HLD  - Appreciate Cardiology following, avoid QT prolonging drugs ( on 2/16)    ENDO:   - Appreciate Endocrinology following  - Continue HISS, humalog, lantus, FS slightly elevated    HEME/ONC:             CBC 2/24 stable, WBC downtrending         DVT ppx: Continue SQL, 2/13 [place] - neg, LUE noted for mild swelling - ordered LUE Dopp    RENAL:   - Appreciate Nephrology following for hyponatremia  - 2%Nacl increased to 50cc last night, this AM Na 133, will continue BMP q 6, with salt tabs and fluid restriction    ID:   - Afebrile  - WBC downtrending     GI:    - Continue protonix for GI ppx  - Continue miralax and senna  - Continue MVT and folic acid    DISCHARGE PLANNING:   PT/OT: CÉSAR    Assessment:  Please Check When Present   []  GCS  E   V  M     Heart Failure: []Acute, [] acute on chronic , []chronic  Heart Failure:  [] Diastolic (HFpEF), [] Systolic (HFrEF), []Combined (HFpEF and HFrEF), [] RHF, [] Pulm HTN, [] Other    [] RA, [] ATN, [] AIN, [] other  [] CKD1, [] CKD2, [] CKD 3, [] CKD 4, [] CKD 5, []ESRD    Encephalopathy: [] Metabolic, [] Hepatic, [] toxic, [] Neurological, [] Other    Abnormal Nurtitional Status: [] malnutrition (see nutrition note), [ ]underweight: BMI < 19, [] morbid obesity: BMI >40, [] Cachexia    [] Sepsis  [] hypovolemic shock,[] cardiogenic shock, [] hemorrhagic shock, [] neuogenic shock  [] Acute Respiratory Failure  []Cerebral edema, [] Brain compression/ herniation,   [] Functional quadriplegia  [] Acute blood loss anemia    To discuss w/ Dr. Manley  [id number]

## 2020-02-26 NOTE — PROGRESS NOTE ADULT - PROBLEM SELECTOR PLAN 1
-test BG AC/HS  -Decrease lantus 6 units QHS  -c/w Humalog 4 units AC meals for now. If Glucose downtrending will reduce this dose.   -c/w Humalog low correction scale AC and low HS scale  -Now off steroids-can expect to be discharged off insulin when going to rehab.   Discharge plan: Likely restart metformin 500mg BID. Stop insulin

## 2020-02-27 LAB
ANION GAP SERPL CALC-SCNC: 11 MMOL/L — SIGNIFICANT CHANGE UP (ref 5–17)
ANION GAP SERPL CALC-SCNC: 13 MMOL/L — SIGNIFICANT CHANGE UP (ref 5–17)
ANION GAP SERPL CALC-SCNC: 15 MMOL/L — SIGNIFICANT CHANGE UP (ref 5–17)
BUN SERPL-MCNC: 28 MG/DL — HIGH (ref 7–23)
BUN SERPL-MCNC: 29 MG/DL — HIGH (ref 7–23)
BUN SERPL-MCNC: 31 MG/DL — HIGH (ref 7–23)
CALCIUM SERPL-MCNC: 8.4 MG/DL — SIGNIFICANT CHANGE UP (ref 8.4–10.5)
CALCIUM SERPL-MCNC: 8.8 MG/DL — SIGNIFICANT CHANGE UP (ref 8.4–10.5)
CALCIUM SERPL-MCNC: 9 MG/DL — SIGNIFICANT CHANGE UP (ref 8.4–10.5)
CHLORIDE SERPL-SCNC: 100 MMOL/L — SIGNIFICANT CHANGE UP (ref 96–108)
CHLORIDE SERPL-SCNC: 95 MMOL/L — LOW (ref 96–108)
CHLORIDE SERPL-SCNC: 97 MMOL/L — SIGNIFICANT CHANGE UP (ref 96–108)
CO2 SERPL-SCNC: 23 MMOL/L — SIGNIFICANT CHANGE UP (ref 22–31)
CO2 SERPL-SCNC: 23 MMOL/L — SIGNIFICANT CHANGE UP (ref 22–31)
CO2 SERPL-SCNC: 24 MMOL/L — SIGNIFICANT CHANGE UP (ref 22–31)
CREAT SERPL-MCNC: 0.92 MG/DL — SIGNIFICANT CHANGE UP (ref 0.5–1.3)
CREAT SERPL-MCNC: 1.1 MG/DL — SIGNIFICANT CHANGE UP (ref 0.5–1.3)
CREAT SERPL-MCNC: 1.22 MG/DL — SIGNIFICANT CHANGE UP (ref 0.5–1.3)
FOLATE SERPL-MCNC: >20 NG/ML — SIGNIFICANT CHANGE UP
GLUCOSE BLDC GLUCOMTR-MCNC: 107 MG/DL — HIGH (ref 70–99)
GLUCOSE BLDC GLUCOMTR-MCNC: 126 MG/DL — HIGH (ref 70–99)
GLUCOSE BLDC GLUCOMTR-MCNC: 137 MG/DL — HIGH (ref 70–99)
GLUCOSE BLDC GLUCOMTR-MCNC: 79 MG/DL — SIGNIFICANT CHANGE UP (ref 70–99)
GLUCOSE SERPL-MCNC: 109 MG/DL — HIGH (ref 70–99)
GLUCOSE SERPL-MCNC: 117 MG/DL — HIGH (ref 70–99)
GLUCOSE SERPL-MCNC: 124 MG/DL — HIGH (ref 70–99)
POTASSIUM SERPL-MCNC: 4.3 MMOL/L — SIGNIFICANT CHANGE UP (ref 3.5–5.3)
POTASSIUM SERPL-SCNC: 4.3 MMOL/L — SIGNIFICANT CHANGE UP (ref 3.5–5.3)
SODIUM SERPL-SCNC: 131 MMOL/L — LOW (ref 135–145)
SODIUM SERPL-SCNC: 135 MMOL/L — SIGNIFICANT CHANGE UP (ref 135–145)
SODIUM SERPL-SCNC: 135 MMOL/L — SIGNIFICANT CHANGE UP (ref 135–145)
T3 SERPL-MCNC: 56 NG/DL — LOW (ref 80–200)
T4 AB SER-ACNC: 4.6 UG/DL — SIGNIFICANT CHANGE UP (ref 4.6–12)
TSH SERPL-MCNC: 1.57 UIU/ML — SIGNIFICANT CHANGE UP (ref 0.27–4.2)
VIT B12 SERPL-MCNC: 1045 PG/ML — SIGNIFICANT CHANGE UP (ref 232–1245)

## 2020-02-27 PROCEDURE — 99232 SBSQ HOSP IP/OBS MODERATE 35: CPT

## 2020-02-27 RX ORDER — SODIUM CHLORIDE 5 G/100ML
1000 INJECTION, SOLUTION INTRAVENOUS
Refills: 0 | Status: DISCONTINUED | OUTPATIENT
Start: 2020-02-27 | End: 2020-02-27

## 2020-02-27 RX ADMIN — OXYCODONE HYDROCHLORIDE 5 MILLIGRAM(S): 5 TABLET ORAL at 16:35

## 2020-02-27 RX ADMIN — Medication 1 SPRAY(S): at 17:51

## 2020-02-27 RX ADMIN — SODIUM CHLORIDE 2 GRAM(S): 9 INJECTION INTRAMUSCULAR; INTRAVENOUS; SUBCUTANEOUS at 17:51

## 2020-02-27 RX ADMIN — ATORVASTATIN CALCIUM 10 MILLIGRAM(S): 80 TABLET, FILM COATED ORAL at 22:26

## 2020-02-27 RX ADMIN — Medication 1 TABLET(S): at 13:07

## 2020-02-27 RX ADMIN — SENNA PLUS 2 TABLET(S): 8.6 TABLET ORAL at 22:25

## 2020-02-27 RX ADMIN — POLYETHYLENE GLYCOL 3350 17 GRAM(S): 17 POWDER, FOR SOLUTION ORAL at 17:50

## 2020-02-27 RX ADMIN — GABAPENTIN 300 MILLIGRAM(S): 400 CAPSULE ORAL at 05:30

## 2020-02-27 RX ADMIN — DULOXETINE HYDROCHLORIDE 40 MILLIGRAM(S): 30 CAPSULE, DELAYED RELEASE ORAL at 13:06

## 2020-02-27 RX ADMIN — ENOXAPARIN SODIUM 40 MILLIGRAM(S): 100 INJECTION SUBCUTANEOUS at 17:50

## 2020-02-27 RX ADMIN — SODIUM CHLORIDE 2 GRAM(S): 9 INJECTION INTRAMUSCULAR; INTRAVENOUS; SUBCUTANEOUS at 05:30

## 2020-02-27 RX ADMIN — Medication 650 MILLIGRAM(S): at 20:46

## 2020-02-27 RX ADMIN — Medication 1 SPRAY(S): at 05:31

## 2020-02-27 RX ADMIN — AMLODIPINE BESYLATE 5 MILLIGRAM(S): 2.5 TABLET ORAL at 05:30

## 2020-02-27 RX ADMIN — POLYETHYLENE GLYCOL 3350 17 GRAM(S): 17 POWDER, FOR SOLUTION ORAL at 05:30

## 2020-02-27 RX ADMIN — Medication 500 MILLIGRAM(S): at 17:50

## 2020-02-27 RX ADMIN — Medication 500 MILLIGRAM(S): at 05:30

## 2020-02-27 RX ADMIN — GABAPENTIN 300 MILLIGRAM(S): 400 CAPSULE ORAL at 13:06

## 2020-02-27 RX ADMIN — SODIUM CHLORIDE 2 GRAM(S): 9 INJECTION INTRAMUSCULAR; INTRAVENOUS; SUBCUTANEOUS at 01:21

## 2020-02-27 RX ADMIN — PANTOPRAZOLE SODIUM 40 MILLIGRAM(S): 20 TABLET, DELAYED RELEASE ORAL at 22:26

## 2020-02-27 RX ADMIN — SODIUM CHLORIDE 2 GRAM(S): 9 INJECTION INTRAMUSCULAR; INTRAVENOUS; SUBCUTANEOUS at 13:07

## 2020-02-27 RX ADMIN — GABAPENTIN 300 MILLIGRAM(S): 400 CAPSULE ORAL at 22:26

## 2020-02-27 RX ADMIN — Medication 5 MILLIGRAM(S): at 22:25

## 2020-02-27 RX ADMIN — Medication 1 MILLIGRAM(S): at 13:07

## 2020-02-27 NOTE — PROGRESS NOTE ADULT - SUBJECTIVE AND OBJECTIVE BOX
Diabetes Follow up note:    Chief complaint: f/u T2DM w/steroid induced hyperglycemia    Interval Hx: Glucose in 70s this AM. Pt seen at bedside. Ate most of his breakfast. States he doesn't know where he is, what month or year it is and does not recall being told he had surgery.     Review of Systems:  General: +headache  GI: Tolerating POs. Denies N/V/D/Abd pain  CV: Denies CP/SOB  ENDO: No S&Sx of hypoglycemia  MEDS:  atorvastatin 10 milliGRAM(s) Oral at bedtime    insulin lispro (HumaLOG) corrective regimen sliding scale   SubCutaneous three times a day with meals  insulin lispro (HumaLOG) corrective regimen sliding scale   SubCutaneous at bedtime      Allergies    No Known Allergies        PE:  General: Male lying in bed. NAD.   Vital Signs Last 24 Hrs  T(C): 36.8 (27 Feb 2020 08:00), Max: 36.8 (26 Feb 2020 23:39)  T(F): 98.2 (27 Feb 2020 08:00), Max: 98.3 (26 Feb 2020 23:39)  HR: 57 (27 Feb 2020 08:00) (57 - 63)  BP: 141/81 (27 Feb 2020 08:00) (121/73 - 141/81)  BP(mean): --  RR: 18 (27 Feb 2020 08:00) (18 - 18)  SpO2: 97% (27 Feb 2020 08:00) (95% - 97%)  HEENT: Craniotomy incision c/d/i  Abd: Soft, NT,ND, Central adiposity  Extremities: Warm  Neuro: Alert. O x1. Depressed mood    LABS:  POCT Blood Glucose.: 79 mg/dL (02-27-20 @ 08:42)  POCT Blood Glucose.: 114 mg/dL (02-26-20 @ 21:13)  POCT Blood Glucose.: 155 mg/dL (02-26-20 @ 17:11)  POCT Blood Glucose.: 190 mg/dL (02-26-20 @ 12:35)  POCT Blood Glucose.: 114 mg/dL (02-26-20 @ 08:46)  POCT Blood Glucose.: 134 mg/dL (02-25-20 @ 21:44)  POCT Blood Glucose.: 133 mg/dL (02-25-20 @ 17:14)  POCT Blood Glucose.: 229 mg/dL (02-25-20 @ 12:31)  POCT Blood Glucose.: 100 mg/dL (02-25-20 @ 08:34)  POCT Blood Glucose.: 177 mg/dL (02-24-20 @ 21:32)  POCT Blood Glucose.: 124 mg/dL (02-24-20 @ 17:16)  POCT Blood Glucose.: 212 mg/dL (02-24-20 @ 13:03)          02-27    135  |  100  |  31<H>  ----------------------------<  117<H>  4.3   |  24  |  1.10    Ca    8.4      27 Feb 2020 01:22          Hemoglobin A1C, Whole Blood: 6.0 % <H> [4.0 - 5.6] (02-06-20 @ 13:46)          Contact number: solo [phone] or [phone]

## 2020-02-27 NOTE — PROGRESS NOTE ADULT - ASSESSMENT
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, ETV POD#15    NEURO:   - Continue Neuro checks q 4  - Continue pain control with medications (tylenol prn / oxy prn)  - Continue Melatonin at bedtime for insomnia  - Continue Gabapentin for neuropathic pain  - Continue Cymbalta for anxiety d/o  - Surg path: colloid cyst  - Continue VPS - currently set at [place] @ 200  - Appreciate Psych following for patients suicidal ideation 2 days prior, patient on VPA for mood stabilizer and seizure ppx, lowered Cymbalta per their rec, and sent TSH / Vit B12/ Folate, to follow    PULM:   - Encourage incentive spirometry  - Continue Flonase    CV:  - Continue Norvasc for HTN  - Continue Lipitor for HLD  - Appreciate Cardiology following, avoid QT prolonging drugs ( on 2/19)    ENDO:   - Appreciate Endocrinology following, since patient is off Decadron, FS stable, d/c'd pre-meal humalog and lantus  - Continue CCD diet and HISS, will continue to monitor FS    HEME/ONC:             CBC 2/24 stable, WBC downtrending         DVT ppx: Continue SQL, 2/13 [place] - neg, LUE noted for mild swelling that has since improved, LUE Dopps neg 2/26    RENAL:   - Appreciate Nephrology following for hyponatremia  - BMP this morning Na 135, decreased 2%NACL to 30cc/hr, BMP Q8, cont w/ salt tabs and fluid restriction    ID:   - Afebrile    GI:    - Continue protonix for GI ppx  - Continue miralax and senna  - Continue MVT and folic acid    DISCHARGE PLANNING:   PT/OT: CÉSAR    Assessment:  Please Check When Present   []  GCS  E   V  M     Heart Failure: []Acute, [] acute on chronic , []chronic  Heart Failure:  [] Diastolic (HFpEF), [] Systolic (HFrEF), []Combined (HFpEF and HFrEF), [] RHF, [] Pulm HTN, [] Other    [] RA, [] ATN, [] AIN, [] other  [] CKD1, [] CKD2, [] CKD 3, [] CKD 4, [] CKD 5, []ESRD    Encephalopathy: [] Metabolic, [] Hepatic, [] toxic, [] Neurological, [] Other    Abnormal Nurtitional Status: [] malnutrition (see nutrition note), [ ]underweight: BMI < 19, [] morbid obesity: BMI >40, [] Cachexia    [] Sepsis  [] hypovolemic shock,[] cardiogenic shock, [] hemorrhagic shock, [] neuogenic shock  [] Acute Respiratory Failure  []Cerebral edema, [] Brain compression/ herniation,   [] Functional quadriplegia  [] Acute blood loss anemia    To discuss w/ Dr. Manley  [phone]

## 2020-02-27 NOTE — PROGRESS NOTE BEHAVIORAL HEALTH - NSBHCHARTREVIEWLAB_PSY_A_CORE FT
02-27    135  |  100  |  31<H>  ----------------------------<  117<H>  4.3   |  24  |  1.10    Ca    8.4      27 Feb 2020 01:22

## 2020-02-27 NOTE — PROGRESS NOTE ADULT - SUBJECTIVE AND OBJECTIVE BOX
[facility] Division of Hospital Medicine  Trell Hannah MD  Pager (M-F, 4L-8Z): [phone]  Other Times:  [phone]    Patient is a 64y old  Male who presents with a chief complaint of brain mass (26 Feb 2020 15:36)    SUBJECTIVE / OVERNIGHT EVENTS: no new complaints  ADDITIONAL REVIEW OF SYSTEMS:    MEDICATIONS  (STANDING):  amLODIPine   Tablet 5 milliGRAM(s) Oral daily  atorvastatin 10 milliGRAM(s) Oral at bedtime  dextrose 5%. 1000 milliLiter(s) (50 mL/Hr) IV Continuous <Continuous>  dextrose 50% Injectable 12.5 Gram(s) IV Push once  dextrose 50% Injectable 25 Gram(s) IV Push once  dextrose 50% Injectable 25 Gram(s) IV Push once  DULoxetine 40 milliGRAM(s) Oral daily  enoxaparin Injectable 40 milliGRAM(s) SubCutaneous <User Schedule>  fluticasone propionate 50 MICROgram(s)/spray Nasal Spray 1 Spray(s) Both Nostrils two times a day  folic acid 1 milliGRAM(s) Oral daily  gabapentin 300 milliGRAM(s) Oral three times a day  insulin lispro (HumaLOG) corrective regimen sliding scale   SubCutaneous three times a day with meals  insulin lispro (HumaLOG) corrective regimen sliding scale   SubCutaneous at bedtime  melatonin 5 milliGRAM(s) Oral at bedtime  multivitamin 1 Tablet(s) Oral daily  pantoprazole    Tablet 40 milliGRAM(s) Oral at bedtime  polyethylene glycol 3350 17 Gram(s) Oral two times a day  senna 2 Tablet(s) Oral at bedtime  sodium chloride 2 Gram(s) Oral every 6 hours  sodium chloride 2% . 1000 milliLiter(s) (30 mL/Hr) IV Continuous <Continuous>  valproic acid 500 milliGRAM(s) Oral every 12 hours    MEDICATIONS  (PRN):  acetaminophen   Tablet .. 650 milliGRAM(s) Oral every 6 hours PRN Temp greater or equal to 38C (100.4F), Mild Pain (1 - 3)  dextrose 40% Gel 15 Gram(s) Oral once PRN Blood Glucose LESS THAN 70 milliGRAM(s)/deciLiter  glucagon  Injectable 1 milliGRAM(s) IntraMuscular once PRN Glucose <70 milliGRAM(s)/deciLiter  oxyCODONE    IR 5 milliGRAM(s) Oral every 4 hours PRN Moderate Pain (4 - 6)  oxyCODONE    IR 10 milliGRAM(s) Oral every 4 hours PRN Severe Pain (7 - 10)      CAPILLARY BLOOD GLUCOSE      POCT Blood Glucose.: 126 mg/dL (27 Feb 2020 12:42)  POCT Blood Glucose.: 79 mg/dL (27 Feb 2020 08:42)  POCT Blood Glucose.: 114 mg/dL (26 Feb 2020 21:13)  POCT Blood Glucose.: 155 mg/dL (26 Feb 2020 17:11)    I&O's Summary    26 Feb 2020 07:01  -  27 Feb 2020 07:00  --------------------------------------------------------  IN: 320 mL / OUT: 1900 mL / NET: -1580 mL    27 Feb 2020 07:01  -  27 Feb 2020 14:59  --------------------------------------------------------  IN: 680 mL / OUT: 1600 mL / NET: -920 mL        PHYSICAL EXAM:  Vital Signs Last 24 Hrs  T(C): 36.7 (27 Feb 2020 12:00), Max: 36.8 (26 Feb 2020 23:39)  T(F): 98.1 (27 Feb 2020 12:00), Max: 98.3 (26 Feb 2020 23:39)  HR: 59 (27 Feb 2020 12:00) (57 - 63)  BP: 151/83 (27 Feb 2020 12:00) (121/73 - 151/83)  BP(mean): --  RR: 18 (27 Feb 2020 12:00) (18 - 18)  SpO2: 97% (27 Feb 2020 12:00) (95% - 97%)  GENERAL: NAD, well-developed  EYES:  conjunctiva and sclera clear  NECK: Supple, No JVD  CHEST/LUNG: Clear to auscultation bilaterally; No wheeze  HEART: Regular rate and rhythm; No murmurs, rubs, or gallops  ABDOMEN: Soft, Nontender, Nondistended; Bowel sounds present  EXTREMITIES:  2+ Peripheral Pulses, No clubbing, cyanosis, or edema  NEUROLOGY:  AAOx2 L pronator drift 4/5 UE. confused about why he is in hospital  PSYCH: limited affect  SKIN: crani incision    LABS:    02-27    135  |  97  |  28<H>  ----------------------------<  109<H>  4.3   |  23  |  0.92    Ca    8.8      27 Feb 2020 13:32                  RADIOLOGY & ADDITIONAL TESTS:  Results Reviewed:   Imaging Personally Reviewed:  Electrocardiogram Personally Reviewed:    COORDINATION OF CARE:  Care Discussed with Consultants/Other Providers [Y/N]: KYLE bridges  Prior or Outpatient Records Reviewed [Y/N]:

## 2020-02-27 NOTE — PROGRESS NOTE ADULT - ASSESSMENT
HTN  labile  cont current meds     hyponatremia  fu with medicine   stable     DM  on insulin   fu with med

## 2020-02-27 NOTE — PROGRESS NOTE ADULT - SUBJECTIVE AND OBJECTIVE BOX
Subjective: Patient seen and examined. No new events except as noted.     SUBJECTIVE/ROS:        MEDICATIONS:  MEDICATIONS  (STANDING):  amLODIPine   Tablet 5 milliGRAM(s) Oral daily  atorvastatin 10 milliGRAM(s) Oral at bedtime  dextrose 5%. 1000 milliLiter(s) (50 mL/Hr) IV Continuous <Continuous>  dextrose 50% Injectable 12.5 Gram(s) IV Push once  dextrose 50% Injectable 25 Gram(s) IV Push once  dextrose 50% Injectable 25 Gram(s) IV Push once  DULoxetine 40 milliGRAM(s) Oral daily  enoxaparin Injectable 40 milliGRAM(s) SubCutaneous <User Schedule>  fluticasone propionate 50 MICROgram(s)/spray Nasal Spray 1 Spray(s) Both Nostrils two times a day  folic acid 1 milliGRAM(s) Oral daily  gabapentin 300 milliGRAM(s) Oral three times a day  insulin glargine Injectable (LANTUS) 6 Unit(s) SubCutaneous at bedtime  insulin lispro (HumaLOG) corrective regimen sliding scale   SubCutaneous three times a day with meals  insulin lispro (HumaLOG) corrective regimen sliding scale   SubCutaneous at bedtime  insulin lispro Injectable (HumaLOG) 4 Unit(s) SubCutaneous three times a day before meals  melatonin 5 milliGRAM(s) Oral at bedtime  multivitamin 1 Tablet(s) Oral daily  pantoprazole    Tablet 40 milliGRAM(s) Oral at bedtime  polyethylene glycol 3350 17 Gram(s) Oral two times a day  senna 2 Tablet(s) Oral at bedtime  sodium chloride 2 Gram(s) Oral every 6 hours  sodium chloride 2% . 1000 milliLiter(s) (50 mL/Hr) IV Continuous <Continuous>  valproic acid 500 milliGRAM(s) Oral every 12 hours      PHYSICAL EXAM:  T(C): 36.6 (02-27-20 @ 04:28), Max: 36.8 (02-26-20 @ 23:39)  HR: 60 (02-27-20 @ 04:28) (59 - 63)  BP: 133/76 (02-27-20 @ 04:28) (121/73 - 137/76)  RR: 18 (02-27-20 @ 04:28) (18 - 18)  SpO2: 96% (02-27-20 @ 04:28) (95% - 97%)  Wt(kg): --  I&O's Summary    26 Feb 2020 07:01  -  27 Feb 2020 07:00  --------------------------------------------------------  IN: 320 mL / OUT: 1900 mL / NET: -1580 mL            JVP: Normal  Neck: supple  Lung: clear   CV: S1 S2 , Murmur:  Abd: soft  Ext: No edema  neuro: Awake / alert  Psych: flat affect  Skin: normal``    LABS/DATA:    CARDIAC MARKERS:            02-27    135  |  100  |  31<H>  ----------------------------<  117<H>  4.3   |  24  |  1.10    Ca    8.4      27 Feb 2020 01:22      proBNP:   Lipid Profile:   HgA1c:   TSH:     TELE:  EKG:

## 2020-02-27 NOTE — PROGRESS NOTE ADULT - PROBLEM SELECTOR PLAN 1
-test BG AC/HS  -Discontinue Lantus and Standing premeal Humalog at this time  -c/w Humalog low correction scales AC and low HS scale  Now off steroids-can expect to be discharged off insulin when going to rehab.   Discharge plan: Likely restart metformin 500mg BID. Stop insulin.   -Will sign off in next 24 hours if glucose at goal on correction scales only  -discussed plan w/pt and team  pager: [phone]

## 2020-02-27 NOTE — PROGRESS NOTE ADULT - SUBJECTIVE AND OBJECTIVE BOX
SUBJECTIVE: HPI:  65 yo male. 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  c/o worsening left sided paresthesia in December, hospitalized at [facility] Dec 3-17, 2019, found to have subacute SDH along R frontal lobe and large mass in third ventricle. pt was referred to neurosurgery consult and transferred to rehab facility, now presents to PST scheduled for craniotomy surgery for removal of brain mass.  ***contacted surgeon, Dr. Manley's office, s/w  Elida who had consulted with Vineet, pt to hold aspirin 1 week preop.*** (06 Feb 2020 10:22)      OVERNIGHT EVENTS: No acute events overnight, patient seen and evaluated at bedside, sitting up and eating his breakfast. Calm and appears more awake.    Vital Signs Last 24 Hrs  T(C): 36.8 (27 Feb 2020 08:00), Max: 36.8 (26 Feb 2020 23:39)  T(F): 98.2 (27 Feb 2020 08:00), Max: 98.3 (26 Feb 2020 23:39)  HR: 57 (27 Feb 2020 08:00) (57 - 63)  BP: 141/81 (27 Feb 2020 08:00) (121/73 - 141/81)  BP(mean): --  RR: 18 (27 Feb 2020 08:00) (18 - 18)  SpO2: 97% (27 Feb 2020 08:00) (95% - 97%)    PHYSICAL EXAM:    General: No Acute Distress     Neurological: Awake, OX3, following commands, LUE drift, LUE 4+/5, RUE 5/5, lowers 5/5    Pulmonary: Clear to Auscultation, No Rales, No Rhonchi, No Wheezes     Cardiovascular: S1, S2, Regular Rate and Rhythm     Gastrointestinal: Soft, Nontender, Nondistended     Incision: Crani incision stable C/D/I    LABS:   02-27    135  |  100  |  31<H>  ----------------------------<  117<H>  4.3   |  24  |  1.10    Ca    8.4      27 Feb 2020 01:22      Hemoglobin A1C, Whole Blood: 6.0 % (02-06 @ 13:46)      02-26 @ 07:01  -  02-27 @ 07:00  --------------------------------------------------------  IN: 320 mL / OUT: 1900 mL / NET: -1580 mL      DRAINS: None    MEDICATIONS:  Antibiotics:    Neuro:  acetaminophen   Tablet .. 650 milliGRAM(s) Oral every 6 hours PRN Temp greater or equal to 38C (100.4F), Mild Pain (1 - 3)  DULoxetine 40 milliGRAM(s) Oral daily  gabapentin 300 milliGRAM(s) Oral three times a day  melatonin 5 milliGRAM(s) Oral at bedtime  oxyCODONE    IR 5 milliGRAM(s) Oral every 4 hours PRN Moderate Pain (4 - 6)  oxyCODONE    IR 10 milliGRAM(s) Oral every 4 hours PRN Severe Pain (7 - 10)  valproic acid 500 milliGRAM(s) Oral every 12 hours    Cardiac:  amLODIPine   Tablet 5 milliGRAM(s) Oral daily    Pulm:    GI/:  pantoprazole    Tablet 40 milliGRAM(s) Oral at bedtime  polyethylene glycol 3350 17 Gram(s) Oral two times a day  senna 2 Tablet(s) Oral at bedtime    Other:   atorvastatin 10 milliGRAM(s) Oral at bedtime  dextrose 40% Gel 15 Gram(s) Oral once PRN Blood Glucose LESS THAN 70 milliGRAM(s)/deciLiter  dextrose 5%. 1000 milliLiter(s) IV Continuous <Continuous>  dextrose 50% Injectable 12.5 Gram(s) IV Push once  dextrose 50% Injectable 25 Gram(s) IV Push once  dextrose 50% Injectable 25 Gram(s) IV Push once  enoxaparin Injectable 40 milliGRAM(s) SubCutaneous <User Schedule>  fluticasone propionate 50 MICROgram(s)/spray Nasal Spray 1 Spray(s) Both Nostrils two times a day  folic acid 1 milliGRAM(s) Oral daily  glucagon  Injectable 1 milliGRAM(s) IntraMuscular once PRN Glucose <70 milliGRAM(s)/deciLiter  insulin lispro (HumaLOG) corrective regimen sliding scale   SubCutaneous three times a day with meals  insulin lispro (HumaLOG) corrective regimen sliding scale   SubCutaneous at bedtime  multivitamin 1 Tablet(s) Oral daily  sodium chloride 2 Gram(s) Oral every 6 hours  sodium chloride 2% . 1000 milliLiter(s) IV Continuous <Continuous>    DIET: [] Regular [x w/ 1L Fluid restriction] CCD [] Renal [] Puree [] Dysphagia [] Tube Feeds:     IMAGING:   Images reviewed, no new imaging.

## 2020-02-27 NOTE — PROGRESS NOTE ADULT - ASSESSMENT
64 year old male w/previously controlled T2DM w/hx of CVA here w/brain mass s/p craniotomy on decadron w/steroid induced hyperglycemia. Glucose values tightly controlled now off decadron. Pt tolerating POs, w/continued AMS. Will stop standing insulin at this time and observe on correction scale only. Expect any residual glucocorticoid effect to dissipate within the next 24 hours. BG goal (100-180mg/dl).

## 2020-02-27 NOTE — PROGRESS NOTE BEHAVIORAL HEALTH - NSBHCONSULTMEDS_PSY_A_CORE FT
Reduce Cymbalta to 40mg po daily  melatonin 5mg po qhs  continue valproic acid for seizure prophylaxis and delirium

## 2020-02-27 NOTE — PROGRESS NOTE ADULT - ASSESSMENT
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

## 2020-02-27 NOTE — PROGRESS NOTE BEHAVIORAL HEALTH - NSBHCHARTREVIEWVS_PSY_A_CORE FT
Vital Signs Last 24 Hrs  T(C): 36.8 (27 Feb 2020 08:00), Max: 36.8 (26 Feb 2020 23:39)  T(F): 98.2 (27 Feb 2020 08:00), Max: 98.3 (26 Feb 2020 23:39)  HR: 57 (27 Feb 2020 08:00) (57 - 63)  BP: 141/81 (27 Feb 2020 08:00) (121/73 - 141/81)  BP(mean): --  RR: 18 (27 Feb 2020 08:00) (18 - 18)  SpO2: 97% (27 Feb 2020 08:00) (95% - 97%)

## 2020-02-27 NOTE — PROGRESS NOTE BEHAVIORAL HEALTH - NSBHFUPINTERVALHXFT_PSY_A_CORE
Patient seen and evaluated, awake, oriented to person, place (states he's in the hospital, unable to state which one), oriented to year only, states is here in the hospital for a stroke.  Denies feeling depressed, denies SI/HI, or thoughts of wanting to be dead.  Denies AVH, reports no issues with sleep.  Denies having any significant concerns.

## 2020-02-28 DIAGNOSIS — K59.00 CONSTIPATION, UNSPECIFIED: ICD-10-CM

## 2020-02-28 LAB
ANION GAP SERPL CALC-SCNC: 12 MMOL/L — SIGNIFICANT CHANGE UP (ref 5–17)
ANION GAP SERPL CALC-SCNC: 12 MMOL/L — SIGNIFICANT CHANGE UP (ref 5–17)
ANION GAP SERPL CALC-SCNC: 14 MMOL/L — SIGNIFICANT CHANGE UP (ref 5–17)
ANION GAP SERPL CALC-SCNC: 15 MMOL/L — SIGNIFICANT CHANGE UP (ref 5–17)
BUN SERPL-MCNC: 25 MG/DL — HIGH (ref 7–23)
BUN SERPL-MCNC: 26 MG/DL — HIGH (ref 7–23)
BUN SERPL-MCNC: 30 MG/DL — HIGH (ref 7–23)
BUN SERPL-MCNC: 30 MG/DL — HIGH (ref 7–23)
CALCIUM SERPL-MCNC: 8.8 MG/DL — SIGNIFICANT CHANGE UP (ref 8.4–10.5)
CALCIUM SERPL-MCNC: 9.2 MG/DL — SIGNIFICANT CHANGE UP (ref 8.4–10.5)
CHLORIDE SERPL-SCNC: 94 MMOL/L — LOW (ref 96–108)
CHLORIDE SERPL-SCNC: 94 MMOL/L — LOW (ref 96–108)
CHLORIDE SERPL-SCNC: 95 MMOL/L — LOW (ref 96–108)
CHLORIDE SERPL-SCNC: 97 MMOL/L — SIGNIFICANT CHANGE UP (ref 96–108)
CO2 SERPL-SCNC: 21 MMOL/L — LOW (ref 22–31)
CO2 SERPL-SCNC: 21 MMOL/L — LOW (ref 22–31)
CO2 SERPL-SCNC: 23 MMOL/L — SIGNIFICANT CHANGE UP (ref 22–31)
CO2 SERPL-SCNC: 23 MMOL/L — SIGNIFICANT CHANGE UP (ref 22–31)
CREAT SERPL-MCNC: 0.86 MG/DL — SIGNIFICANT CHANGE UP (ref 0.5–1.3)
CREAT SERPL-MCNC: 0.91 MG/DL — SIGNIFICANT CHANGE UP (ref 0.5–1.3)
CREAT SERPL-MCNC: 1.07 MG/DL — SIGNIFICANT CHANGE UP (ref 0.5–1.3)
CREAT SERPL-MCNC: 1.1 MG/DL — SIGNIFICANT CHANGE UP (ref 0.5–1.3)
GLUCOSE BLDC GLUCOMTR-MCNC: 105 MG/DL — HIGH (ref 70–99)
GLUCOSE BLDC GLUCOMTR-MCNC: 113 MG/DL — HIGH (ref 70–99)
GLUCOSE BLDC GLUCOMTR-MCNC: 159 MG/DL — HIGH (ref 70–99)
GLUCOSE BLDC GLUCOMTR-MCNC: 269 MG/DL — HIGH (ref 70–99)
GLUCOSE SERPL-MCNC: 104 MG/DL — HIGH (ref 70–99)
GLUCOSE SERPL-MCNC: 112 MG/DL — HIGH (ref 70–99)
GLUCOSE SERPL-MCNC: 169 MG/DL — HIGH (ref 70–99)
GLUCOSE SERPL-MCNC: 299 MG/DL — HIGH (ref 70–99)
POTASSIUM SERPL-MCNC: 4.3 MMOL/L — SIGNIFICANT CHANGE UP (ref 3.5–5.3)
POTASSIUM SERPL-MCNC: 4.3 MMOL/L — SIGNIFICANT CHANGE UP (ref 3.5–5.3)
POTASSIUM SERPL-MCNC: 4.4 MMOL/L — SIGNIFICANT CHANGE UP (ref 3.5–5.3)
POTASSIUM SERPL-MCNC: 4.6 MMOL/L — SIGNIFICANT CHANGE UP (ref 3.5–5.3)
POTASSIUM SERPL-SCNC: 4.3 MMOL/L — SIGNIFICANT CHANGE UP (ref 3.5–5.3)
POTASSIUM SERPL-SCNC: 4.3 MMOL/L — SIGNIFICANT CHANGE UP (ref 3.5–5.3)
POTASSIUM SERPL-SCNC: 4.4 MMOL/L — SIGNIFICANT CHANGE UP (ref 3.5–5.3)
POTASSIUM SERPL-SCNC: 4.6 MMOL/L — SIGNIFICANT CHANGE UP (ref 3.5–5.3)
SODIUM SERPL-SCNC: 129 MMOL/L — LOW (ref 135–145)
SODIUM SERPL-SCNC: 129 MMOL/L — LOW (ref 135–145)
SODIUM SERPL-SCNC: 131 MMOL/L — LOW (ref 135–145)
SODIUM SERPL-SCNC: 132 MMOL/L — LOW (ref 135–145)

## 2020-02-28 PROCEDURE — 74018 RADEX ABDOMEN 1 VIEW: CPT | Mod: 26

## 2020-02-28 PROCEDURE — 99232 SBSQ HOSP IP/OBS MODERATE 35: CPT

## 2020-02-28 RX ORDER — MULTIVIT WITH MIN/MFOLATE/K2 340-15/3 G
1 POWDER (GRAM) ORAL ONCE
Refills: 0 | Status: COMPLETED | OUTPATIENT
Start: 2020-02-28 | End: 2020-02-28

## 2020-02-28 RX ORDER — SODIUM CHLORIDE 5 G/100ML
1000 INJECTION, SOLUTION INTRAVENOUS
Refills: 0 | Status: DISCONTINUED | OUTPATIENT
Start: 2020-02-28 | End: 2020-02-29

## 2020-02-28 RX ORDER — ACETAMINOPHEN 500 MG
1000 TABLET ORAL ONCE
Refills: 0 | Status: COMPLETED | OUTPATIENT
Start: 2020-02-28 | End: 2020-02-28

## 2020-02-28 RX ADMIN — DULOXETINE HYDROCHLORIDE 40 MILLIGRAM(S): 30 CAPSULE, DELAYED RELEASE ORAL at 13:29

## 2020-02-28 RX ADMIN — SODIUM CHLORIDE 2 GRAM(S): 9 INJECTION INTRAMUSCULAR; INTRAVENOUS; SUBCUTANEOUS at 05:37

## 2020-02-28 RX ADMIN — PANTOPRAZOLE SODIUM 40 MILLIGRAM(S): 20 TABLET, DELAYED RELEASE ORAL at 21:19

## 2020-02-28 RX ADMIN — Medication 1 TABLET(S): at 13:29

## 2020-02-28 RX ADMIN — Medication 1: at 21:18

## 2020-02-28 RX ADMIN — Medication 10 MILLIGRAM(S): at 10:45

## 2020-02-28 RX ADMIN — POLYETHYLENE GLYCOL 3350 17 GRAM(S): 17 POWDER, FOR SOLUTION ORAL at 05:37

## 2020-02-28 RX ADMIN — Medication 500 MILLIGRAM(S): at 05:37

## 2020-02-28 RX ADMIN — SODIUM CHLORIDE 30 MILLILITER(S): 5 INJECTION, SOLUTION INTRAVENOUS at 02:30

## 2020-02-28 RX ADMIN — GABAPENTIN 300 MILLIGRAM(S): 400 CAPSULE ORAL at 05:37

## 2020-02-28 RX ADMIN — SODIUM CHLORIDE 2 GRAM(S): 9 INJECTION INTRAMUSCULAR; INTRAVENOUS; SUBCUTANEOUS at 23:42

## 2020-02-28 RX ADMIN — Medication 400 MILLIGRAM(S): at 11:35

## 2020-02-28 RX ADMIN — Medication 5 MILLIGRAM(S): at 21:18

## 2020-02-28 RX ADMIN — ENOXAPARIN SODIUM 40 MILLIGRAM(S): 100 INJECTION SUBCUTANEOUS at 18:09

## 2020-02-28 RX ADMIN — Medication 1 SPRAY(S): at 05:37

## 2020-02-28 RX ADMIN — SODIUM CHLORIDE 2 GRAM(S): 9 INJECTION INTRAMUSCULAR; INTRAVENOUS; SUBCUTANEOUS at 01:04

## 2020-02-28 RX ADMIN — Medication 5 MILLIGRAM(S): at 18:09

## 2020-02-28 RX ADMIN — AMLODIPINE BESYLATE 5 MILLIGRAM(S): 2.5 TABLET ORAL at 05:37

## 2020-02-28 RX ADMIN — SODIUM CHLORIDE 2 GRAM(S): 9 INJECTION INTRAMUSCULAR; INTRAVENOUS; SUBCUTANEOUS at 13:29

## 2020-02-28 RX ADMIN — Medication 500 MILLIGRAM(S): at 18:09

## 2020-02-28 RX ADMIN — Medication 1 MILLIGRAM(S): at 13:29

## 2020-02-28 RX ADMIN — GABAPENTIN 300 MILLIGRAM(S): 400 CAPSULE ORAL at 13:29

## 2020-02-28 RX ADMIN — Medication 1 SPRAY(S): at 18:11

## 2020-02-28 RX ADMIN — SENNA PLUS 2 TABLET(S): 8.6 TABLET ORAL at 21:19

## 2020-02-28 RX ADMIN — GABAPENTIN 300 MILLIGRAM(S): 400 CAPSULE ORAL at 21:19

## 2020-02-28 RX ADMIN — ATORVASTATIN CALCIUM 10 MILLIGRAM(S): 80 TABLET, FILM COATED ORAL at 21:19

## 2020-02-28 RX ADMIN — SODIUM CHLORIDE 2 GRAM(S): 9 INJECTION INTRAMUSCULAR; INTRAVENOUS; SUBCUTANEOUS at 18:09

## 2020-02-28 RX ADMIN — Medication 1 BOTTLE: at 13:31

## 2020-02-28 RX ADMIN — Medication 1: at 18:13

## 2020-02-28 NOTE — PROGRESS NOTE ADULT - SUBJECTIVE AND OBJECTIVE BOX
Subjective: Patient seen and examined. No new events except as noted.     SUBJECTIVE/ROS:        MEDICATIONS:  MEDICATIONS  (STANDING):  amLODIPine   Tablet 5 milliGRAM(s) Oral daily  atorvastatin 10 milliGRAM(s) Oral at bedtime  dextrose 5%. 1000 milliLiter(s) (50 mL/Hr) IV Continuous <Continuous>  dextrose 50% Injectable 12.5 Gram(s) IV Push once  dextrose 50% Injectable 25 Gram(s) IV Push once  dextrose 50% Injectable 25 Gram(s) IV Push once  DULoxetine 40 milliGRAM(s) Oral daily  enoxaparin Injectable 40 milliGRAM(s) SubCutaneous <User Schedule>  fluticasone propionate 50 MICROgram(s)/spray Nasal Spray 1 Spray(s) Both Nostrils two times a day  folic acid 1 milliGRAM(s) Oral daily  gabapentin 300 milliGRAM(s) Oral three times a day  insulin lispro (HumaLOG) corrective regimen sliding scale   SubCutaneous three times a day with meals  insulin lispro (HumaLOG) corrective regimen sliding scale   SubCutaneous at bedtime  melatonin 5 milliGRAM(s) Oral at bedtime  multivitamin 1 Tablet(s) Oral daily  pantoprazole    Tablet 40 milliGRAM(s) Oral at bedtime  polyethylene glycol 3350 17 Gram(s) Oral two times a day  senna 2 Tablet(s) Oral at bedtime  sodium chloride 2 Gram(s) Oral every 6 hours  sodium chloride 2% . 1000 milliLiter(s) (50 mL/Hr) IV Continuous <Continuous>  valproic acid 500 milliGRAM(s) Oral every 12 hours      PHYSICAL EXAM:  T(C): 36.7 (02-28-20 @ 07:31), Max: 37.1 (02-28-20 @ 04:35)  HR: 63 (02-28-20 @ 07:31) (59 - 80)  BP: 147/91 (02-28-20 @ 07:31) (124/80 - 151/83)  RR: 18 (02-28-20 @ 07:31) (18 - 18)  SpO2: 97% (02-28-20 @ 07:31) (96% - 98%)  Wt(kg): --  I&O's Summary    27 Feb 2020 07:01  -  28 Feb 2020 07:00  --------------------------------------------------------  IN: 680 mL / OUT: 3650 mL / NET: -2970 mL            JVP: Normal  Neck: supple  Lung: clear   CV: S1 S2 , Murmur:  Abd: soft  Ext: No edema  neuro: Awake / alert  Psych: flat affect  Skin: normal``    LABS/DATA:    CARDIAC MARKERS:            02-28    129<L>  |  94<L>  |  30<H>  ----------------------------<  104<H>  4.3   |  23  |  1.07    Ca    8.8      28 Feb 2020 06:30      proBNP:   Lipid Profile:   HgA1c:   TSH: Thyroid Stimulating Hormone, Serum: 1.57 uIU/mL (02-27 @ 17:39)      TELE:  EKG:

## 2020-02-28 NOTE — PROGRESS NOTE ADULT - SUBJECTIVE AND OBJECTIVE BOX
SUBJECTIVE: HPI:  63 yo male. 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  c/o worsening left sided paresthesia in December, hospitalized at [facility] Dec 3-17, 2019, found to have subacute SDH along R frontal lobe and large mass in third ventricle. pt was referred to neurosurgery consult and transferred to rehab facility, now presents to PST scheduled for craniotomy surgery for removal of brain mass.  ***contacted surgeon, Dr. Manley's office, s/w  Elida who had consulted with Vineet, pt to hold aspirin 1 week preop.*** (06 Feb 2020 10:22)      OVERNIGHT EVENTS: Overnight patient dropped Na off hypertonics to 129, 2%NACL restarted. Patient seen and evaluated at bedside, no complains of nausea / HA.     Vital Signs Last 24 Hrs  T(C): 36.7 (28 Feb 2020 07:31), Max: 37.1 (28 Feb 2020 04:35)  T(F): 98 (28 Feb 2020 07:31), Max: 98.8 (28 Feb 2020 04:35)  HR: 63 (28 Feb 2020 07:31) (59 - 80)  BP: 147/91 (28 Feb 2020 07:31) (124/80 - 151/83)  BP(mean): --  RR: 18 (28 Feb 2020 07:31) (18 - 18)  SpO2: 97% (28 Feb 2020 07:31) (96% - 98%)    PHYSICAL EXAM:    General: No Acute Distress     Neurological: Arousable, OX3, PERRL, EOMI, following commands, uppers 4+/5, lowers 4+/5 - effort related / requires encouragement    Pulmonary: Clear to Auscultation, No Rales, No Rhonchi, No Wheezes     Cardiovascular: S1, S2, Regular Rate and Rhythm     Gastrointestinal: Soft, Nontender, Nondistended     Incision: Crani sutures C/D/I    LABS:   02-28    129<L>  |  94<L>  |  30<H>  ----------------------------<  104<H>  4.3   |  23  |  1.07    Ca    8.8      28 Feb 2020 06:30      Hemoglobin A1C, Whole Blood: 6.0 % (02-06 @ 13:46)      02-27 @ 07:01  -  02-28 @ 07:00  --------------------------------------------------------  IN: 680 mL / OUT: 3650 mL / NET: -2970 mL      DRAINS: None    MEDICATIONS:  Antibiotics:    Neuro:  acetaminophen   Tablet .. 650 milliGRAM(s) Oral every 6 hours PRN Temp greater or equal to 38C (100.4F), Mild Pain (1 - 3)  DULoxetine 40 milliGRAM(s) Oral daily  gabapentin 300 milliGRAM(s) Oral three times a day  melatonin 5 milliGRAM(s) Oral at bedtime  oxyCODONE    IR 5 milliGRAM(s) Oral every 4 hours PRN Moderate Pain (4 - 6)  oxyCODONE    IR 10 milliGRAM(s) Oral every 4 hours PRN Severe Pain (7 - 10)  valproic acid 500 milliGRAM(s) Oral every 12 hours    Cardiac:  amLODIPine   Tablet 5 milliGRAM(s) Oral daily    Pulm:    GI/:  pantoprazole    Tablet 40 milliGRAM(s) Oral at bedtime  polyethylene glycol 3350 17 Gram(s) Oral two times a day  senna 2 Tablet(s) Oral at bedtime    Other:   atorvastatin 10 milliGRAM(s) Oral at bedtime  dextrose 40% Gel 15 Gram(s) Oral once PRN Blood Glucose LESS THAN 70 milliGRAM(s)/deciLiter  dextrose 5%. 1000 milliLiter(s) IV Continuous <Continuous>  dextrose 50% Injectable 12.5 Gram(s) IV Push once  dextrose 50% Injectable 25 Gram(s) IV Push once  dextrose 50% Injectable 25 Gram(s) IV Push once  enoxaparin Injectable 40 milliGRAM(s) SubCutaneous <User Schedule>  fluticasone propionate 50 MICROgram(s)/spray Nasal Spray 1 Spray(s) Both Nostrils two times a day  folic acid 1 milliGRAM(s) Oral daily  glucagon  Injectable 1 milliGRAM(s) IntraMuscular once PRN Glucose <70 milliGRAM(s)/deciLiter  insulin lispro (HumaLOG) corrective regimen sliding scale   SubCutaneous three times a day with meals  insulin lispro (HumaLOG) corrective regimen sliding scale   SubCutaneous at bedtime  multivitamin 1 Tablet(s) Oral daily  sodium chloride 2 Gram(s) Oral every 6 hours  sodium chloride 2% . 1000 milliLiter(s) IV Continuous <Continuous>    DIET: [] Regular [X - 1L fluid restriction] CCD [] Renal [] Puree [] Dysphagia [] Tube Feeds:     IMAGING:  Images reviewed, no new imaging.

## 2020-02-28 NOTE — PROGRESS NOTE BEHAVIORAL HEALTH - NSBHCHARTREVIEWVS_PSY_A_CORE FT
Vital Signs Last 24 Hrs  T(C): 36.3 (28 Feb 2020 14:07), Max: 37.1 (28 Feb 2020 04:35)  T(F): 97.4 (28 Feb 2020 14:07), Max: 98.8 (28 Feb 2020 04:35)  HR: 63 (28 Feb 2020 14:07) (63 - 80)  BP: 124/78 (28 Feb 2020 14:07) (124/78 - 147/91)  BP(mean): --  RR: 18 (28 Feb 2020 14:07) (18 - 18)  SpO2: 96% (28 Feb 2020 14:07) (96% - 98%)

## 2020-02-28 NOTE — PROGRESS NOTE BEHAVIORAL HEALTH - CASE SUMMARY
This is a 64-y.o. 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  c/o worsening left sided paresthesia in December, hospitalized at [facility] Dec 3-17, 2019, found to have subacute SDH along R frontal lobe and large mass in third ventricle. pt was referred to neurosurgery consult and transferred to rehab facility, now presents to PST scheduled for craniotomy surgery for removal of brain mass, s/p right crani transcallosal for subtotal resection of 3rd ventricular tumor.  Of note during hospitalization, patient endorsing that he wants to die, has no plan or intent.  Psychiatry consulted to assess for depression, suicidality.    I have seen and evaluated this patient myself. Chart, labs, meds reviewed. I agree with NP's assessment and plan.
This is a 64-y.o. 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  c/o worsening left sided paresthesia in December, hospitalized at [facility] Dec 3-17, 2019, found to have subacute SDH along R frontal lobe and large mass in third ventricle. pt was referred to neurosurgery consult and transferred to rehab facility, now presents to PST scheduled for craniotomy surgery for removal of brain mass, s/p right crani transcallosal for subtotal resection of 3rd ventricular tumor.  Of note during hospitalization, patient endorsing that he wants to die, has no plan or intent.  Psychiatry consulted to assess for depression, suicidality.    I have seen and evaluated this patient myself. Chart, labs, meds reviewed. I agree with NP's assessment and plan.

## 2020-02-28 NOTE — PROGRESS NOTE BEHAVIORAL HEALTH - NSBHCONSULTMEDS_PSY_A_CORE FT
Cymbalta to 40mg po daily  melatonin 5mg po qhs  continue valproic acid for seizure prophylaxis and delirium

## 2020-02-28 NOTE — PROGRESS NOTE BEHAVIORAL HEALTH - NSBHFUPINTERVALHXFT_PSY_A_CORE
Patient seen and evaluated, oriented to se self only, states he doesn't know why he is here or what interventions have been done in the hospital.  No SI/HI.

## 2020-02-28 NOTE — PROGRESS NOTE BEHAVIORAL HEALTH - NSBHCONSULTRECOMMENDOTHER_PSY_A_CORE FT
1. Check: TSH, B12, folate, RPR, UA

## 2020-02-28 NOTE — PROGRESS NOTE BEHAVIORAL HEALTH - RISK ASSESSMENT
Risk factors: acute/chronic medical issues, acute/chronic cognitive impairments, chronic pain, noncompliant with treatment,  passive suicidality with no plan or intent     Protective factors: no h/o SA/SIB, no h/o psych admissions, no active substance abuse, with adult children and grandchildren, domiciled, social supports

## 2020-02-28 NOTE — PROGRESS NOTE BEHAVIORAL HEALTH - SUMMARY
63 yo  male. 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  c/o worsening left sided paresthesia in December, hospitalized at [facility] Dec 3-17, 2019, found to have subacute SDH along R frontal lobe and large mass in third ventricle. pt was referred to neurosurgery consult and transferred to rehab facility, now presents to PST scheduled for craniotomy surgery for removal of brain mass, s/p right crani transcallosal for subtotal resection of 3rd ventricular tumor.  Of note during hospitalization, patient endorsing that he wants to die, has no plan or intent.  Psychiatry consulted to assess for depression, suicidality.  Patient seen and evaluated, awake and alert, oriented x 2, at times confused, states he doesn't know what surgeries or procedures he has had done on this admission, but then later states he had a brain tumor removed.  States he can't remember when he lived before coming to the hospital.  Reports feeling depressed, wanting to die, however states that he doesn't want to kill himself.  States frustration over his current medical issues and not wanting to deal with them.  Denies that he'll harm himself intentionally, states he misses his son and grand children.  Seen and re-evaluated, continues to be confused, only oriented to self, doesn't recall why he is here in the hospital and what interventions or treatments he has had.  Denies SI.  Presentation is consistent with delirium.  Does not meet criteria for psychiatric hospitalization.
63 yo  male. 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  c/o worsening left sided paresthesia in December, hospitalized at [facility] Dec 3-17, 2019, found to have subacute SDH along R frontal lobe and large mass in third ventricle. pt was referred to neurosurgery consult and transferred to rehab facility, now presents to PST scheduled for craniotomy surgery for removal of brain mass, s/p right crani transcallosal for subtotal resection of 3rd ventricular tumor.  Of note during hospitalization, patient endorsing that he wants to die, has no plan or intent.  Psychiatry consulted to assess for depression, suicidality.  Patient seen and evaluated, awake and alert, oriented x 2, at times confused, states he doesn't know what surgeries or procedures he has had done on this admission, but then later states he had a brain trumor removed.  States he can't remember when he lived before coming to the hospital.  Reports feeling depressed, wanting to die, however states that he doesn't want to kill himself.  States frustration over his current medical issues and not wanting to deal with them.  Denies that he'll harm himself intentionally, states he misses his son and grand children.
65 yo  male. 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  c/o worsening left sided paresthesia in December, hospitalized at [facility] Dec 3-17, 2019, found to have subacute SDH along R frontal lobe and large mass in third ventricle. pt was referred to neurosurgery consult and transferred to rehab facility, now presents to PST scheduled for craniotomy surgery for removal of brain mass, s/p right crani transcallosal for subtotal resection of 3rd ventricular tumor.  Of note during hospitalization, patient endorsing that he wants to die, has no plan or intent.  Psychiatry consulted to assess for depression, suicidality.  Patient seen and evaluated, awake and alert, oriented x 2, at times confused, states he doesn't know what surgeries or procedures he has had done on this admission, but then later states he had a brain trumor removed.  States he can't remember when he lived before coming to the hospital.  Reports feeling depressed, wanting to die, however states that he doesn't want to kill himself.  States frustration over his current medical issues and not wanting to deal with them.  Denies that he'll harm himself intentionally, states he misses his son and grand children.

## 2020-02-28 NOTE — PROGRESS NOTE BEHAVIORAL HEALTH - NSBHCHARTREVIEWINVESTIGATE_PSY_A_CORE FT
< from: 12 Lead ECG (02.25.20 @ 18:08) >      Ventricular Rate 60 BPM    Atrial Rate 60 BPM    P-R Interval 226 ms    QRS Duration 90 ms    Q-T Interval 408 ms    QTC Calculation(Bezet) 408 ms    P Axis 29 degrees    R Axis 118 degrees    T Axis 18 degrees    Diagnosis Line SINUS RHYTHM WITH 1ST DEGREE A-V BLOCK  INFERIOR INFARCT , AGE UNDETERMINED  POSSIBLE ANTEROLATERAL INFARCT , AGE UNDETERMINED  ABNORMAL ECG    Confirmed by MD JOHN, RENNY ([id number]) on 2/26/2020 1:08:55 P    < end of copied text >
< from: 12 Lead ECG (02.19.20 @ 01:19) >      Ventricular Rate 60 BPM    Atrial Rate 60 BPM    P-R Interval 184 ms    QRS Duration 92 ms    Q-T Interval 428 ms    QTC Calculation(Bezet) 428 ms    P Axis 16 degrees    R Axis 214 degrees    T Axis 58 degrees    Diagnosis Line SINUS RHYTHM WITH PREMATURE ATRIAL COMPLEXES  LOW VOLTAGE QRS  ANTEROLATERAL INFARCT , AGE UNDETERMINED  ABNORMAL ECG    Confirmed by AZEEM HAILE ADAM ([id number]) on 2/23/2020 6:25:59 PM    < end of copied text >
< from: 12 Lead ECG (02.25.20 @ 18:08) >      Ventricular Rate 60 BPM    Atrial Rate 60 BPM    P-R Interval 226 ms    QRS Duration 90 ms    Q-T Interval 408 ms    QTC Calculation(Bezet) 408 ms    P Axis 29 degrees    R Axis 118 degrees    T Axis 18 degrees    Diagnosis Line SINUS RHYTHM WITH 1ST DEGREE A-V BLOCK  INFERIOR INFARCT , AGE UNDETERMINED  POSSIBLE ANTEROLATERAL INFARCT , AGE UNDETERMINED  ABNORMAL ECG    Confirmed by MD JOHN, RENNY ([id number]) on 2/26/2020 1:08:55 PM    < end of copied text >

## 2020-02-28 NOTE — PROGRESS NOTE ADULT - ASSESSMENT
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, ETV POD#16    NEURO:   - Continue Neuro checks q 4  - Continue pain control with medications (tylenol prn / oxy prn)  - Continue Melatonin at bedtime for insomnia  - Continue Gabapentin for neuropathic pain  - Continue Cymbalta for anxiety d/o  - Surg path: colloid cyst  - Continue VPS - currently set at [place] @ 200  - Appreciate Psych following for patients suicidal ideation 3 days prior, patient on VPA for mood stabilizer and seizure ppx, lowered Cymbalta per their rec, and sent TSH / Vit B12/ Folate, to follow    PULM:   - Encourage incentive spirometry  - Continue Flonase    CV:  - Continue Norvasc for HTN  - Continue Lipitor for HLD  - Appreciate Cardiology following, avoid QT prolonging drugs ( on 2/19)    ENDO:   - Appreciate Endocrinology following  - Since Decadron off, patient's FS stable on just HISS  - Continue CCD diet and HISS, will continue to monitor FS    HEME/ONC:             CBC 2/24 stable, WBC downtrending         DVT ppx: Continue SQL, 2/13 [place] - neg, LUE noted for mild swelling that has since improved, LUE Dopps neg 2/26    RENAL:   - Appreciate Nephrology following for hyponatremia  - Na overnight 129 off 2%, hypertonic saline restarted, spoke with Dr. Bonds and to increase hypertonics to 50cc/hr and to continue BMP q 6. Continue fluid restriction / salt tabs    ID:   - Afebrile    GI:    - Continue protonix for GI ppx  - Continue miralax and senna  - Continue MVT and folic acid    DISCHARGE PLANNING:   PT/OT: CÉSAR    Assessment:  Please Check When Present   []  GCS  E   V  M     Heart Failure: []Acute, [] acute on chronic , []chronic  Heart Failure:  [] Diastolic (HFpEF), [] Systolic (HFrEF), []Combined (HFpEF and HFrEF), [] RHF, [] Pulm HTN, [] Other    [] RA, [] ATN, [] AIN, [] other  [] CKD1, [] CKD2, [] CKD 3, [] CKD 4, [] CKD 5, []ESRD    Encephalopathy: [] Metabolic, [] Hepatic, [] toxic, [] Neurological, [] Other    Abnormal Nurtitional Status: [] malnutrition (see nutrition note), [ ]underweight: BMI < 19, [] morbid obesity: BMI >40, [] Cachexia    [] Sepsis  [] hypovolemic shock,[] cardiogenic shock, [] hemorrhagic shock, [] neuogenic shock  [] Acute Respiratory Failure  []Cerebral edema, [] Brain compression/ herniation,   [] Functional quadriplegia  [] Acute blood loss anemia    To discuss w/ Dr. 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, ETV POD#16    NEURO:   - Continue Neuro checks q 4  - Continue pain control with medications (tylenol prn / oxy prn)  - Continue Melatonin at bedtime for insomnia  - Continue Gabapentin for neuropathic pain  - Continue Cymbalta for anxiety d/o  - Surg path: colloid cyst  - Continue VPS - currently set at [place] @ 200  - Appreciate Psych following for patients suicidal ideation 3 days prior, patient on VPA for mood stabilizer and seizure ppx, lowered Cymbalta per their rec, and sent TSH / Vit B12/ Folate, to follow    PULM:   - Encourage incentive spirometry  - Continue Flonase    CV:  - Continue Norvasc for HTN  - Continue Lipitor for HLD  - Appreciate Cardiology following, avoid QT prolonging drugs ( on 2/19)    ENDO:   - Appreciate Endocrinology following  - Since Decadron off, patient's FS stable on just HISS  - Continue CCD diet and HISS, will continue to monitor FS    HEME/ONC:             CBC 2/24 stable, WBC downtrending         DVT ppx: Continue SQL, 2/13 [place] - neg, LUE noted for mild swelling that has since improved, LUE Dopps neg 2/26    RENAL:   - Appreciate Nephrology following for hyponatremia  - Na overnight 129 off 2%, hypertonic saline restarted, spoke with Dr. Bonds and to increase hypertonics to 50cc/hr and to continue BMP q 6. Continue fluid restriction / salt tabs    ID:   - Afebrile    GI:    - Continue protonix for GI ppx  - Continue miralax and senna - addendum patient seen in the afternoon c/o constipation - exam: abd distention w/ +BS x 4, soft non-tender, Abd Xray: per radiology non-obstructive bowel gas pattern with fecal burden, dulcolax suppository and PO and mag citrate x 1 given  - Continue MVT and folic acid    DISCHARGE PLANNING:   PT/OT: CÉSAR    Assessment:  Please Check When Present   []  GCS  E   V  M     Heart Failure: []Acute, [] acute on chronic , []chronic  Heart Failure:  [] Diastolic (HFpEF), [] Systolic (HFrEF), []Combined (HFpEF and HFrEF), [] RHF, [] Pulm HTN, [] Other    [] RA, [] ATN, [] AIN, [] other  [] CKD1, [] CKD2, [] CKD 3, [] CKD 4, [] CKD 5, []ESRD    Encephalopathy: [] Metabolic, [] Hepatic, [] toxic, [] Neurological, [] Other    Abnormal Nurtitional Status: [] malnutrition (see nutrition note), [ ]underweight: BMI < 19, [] morbid obesity: BMI >40, [] Cachexia    [] Sepsis  [] hypovolemic shock,[] cardiogenic shock, [] hemorrhagic shock, [] neuogenic shock  [] Acute Respiratory Failure  []Cerebral edema, [] Brain compression/ herniation,   [] Functional quadriplegia  [] Acute blood loss anemia    To discuss w/ Dr. Manley  [id number]

## 2020-02-28 NOTE — PROGRESS NOTE ADULT - SUBJECTIVE AND OBJECTIVE BOX
[facility] Division of Hospital Medicine  Trell Hannah MD  Pager (M-F, 0Y-6M): [phone]  Other Times:  [phone]    Patient is a 64y old  Male who presents with a chief complaint of Brain mass (27 Feb 2020 14:59)    SUBJECTIVE / OVERNIGHT EVENTS: c/o ab pain, constipated  ADDITIONAL REVIEW OF SYSTEMS:    MEDICATIONS  (STANDING):  amLODIPine   Tablet 5 milliGRAM(s) Oral daily  atorvastatin 10 milliGRAM(s) Oral at bedtime  bisacodyl 5 milliGRAM(s) Oral every 12 hours  dextrose 5%. 1000 milliLiter(s) (50 mL/Hr) IV Continuous <Continuous>  dextrose 50% Injectable 12.5 Gram(s) IV Push once  dextrose 50% Injectable 25 Gram(s) IV Push once  dextrose 50% Injectable 25 Gram(s) IV Push once  DULoxetine 40 milliGRAM(s) Oral daily  enoxaparin Injectable 40 milliGRAM(s) SubCutaneous <User Schedule>  fluticasone propionate 50 MICROgram(s)/spray Nasal Spray 1 Spray(s) Both Nostrils two times a day  folic acid 1 milliGRAM(s) Oral daily  gabapentin 300 milliGRAM(s) Oral three times a day  insulin lispro (HumaLOG) corrective regimen sliding scale   SubCutaneous three times a day with meals  insulin lispro (HumaLOG) corrective regimen sliding scale   SubCutaneous at bedtime  melatonin 5 milliGRAM(s) Oral at bedtime  multivitamin 1 Tablet(s) Oral daily  pantoprazole    Tablet 40 milliGRAM(s) Oral at bedtime  polyethylene glycol 3350 17 Gram(s) Oral two times a day  senna 2 Tablet(s) Oral at bedtime  sodium chloride 2 Gram(s) Oral every 6 hours  sodium chloride 2% . 1000 milliLiter(s) (50 mL/Hr) IV Continuous <Continuous>  valproic acid 500 milliGRAM(s) Oral every 12 hours    MEDICATIONS  (PRN):  acetaminophen   Tablet .. 650 milliGRAM(s) Oral every 6 hours PRN Temp greater or equal to 38C (100.4F), Mild Pain (1 - 3)  dextrose 40% Gel 15 Gram(s) Oral once PRN Blood Glucose LESS THAN 70 milliGRAM(s)/deciLiter  glucagon  Injectable 1 milliGRAM(s) IntraMuscular once PRN Glucose <70 milliGRAM(s)/deciLiter  oxyCODONE    IR 5 milliGRAM(s) Oral every 4 hours PRN Moderate Pain (4 - 6)  oxyCODONE    IR 10 milliGRAM(s) Oral every 4 hours PRN Severe Pain (7 - 10)      CAPILLARY BLOOD GLUCOSE      POCT Blood Glucose.: 113 mg/dL (28 Feb 2020 12:02)  POCT Blood Glucose.: 105 mg/dL (28 Feb 2020 08:27)  POCT Blood Glucose.: 107 mg/dL (27 Feb 2020 21:27)  POCT Blood Glucose.: 137 mg/dL (27 Feb 2020 17:20)    I&O's Summary    27 Feb 2020 07:01  -  28 Feb 2020 07:00  --------------------------------------------------------  IN: 680 mL / OUT: 3650 mL / NET: -2970 mL        PHYSICAL EXAM:  Vital Signs Last 24 Hrs  T(C): 36.3 (28 Feb 2020 14:07), Max: 37.1 (28 Feb 2020 04:35)  T(F): 97.4 (28 Feb 2020 14:07), Max: 98.8 (28 Feb 2020 04:35)  HR: 63 (28 Feb 2020 14:07) (63 - 80)  BP: 124/78 (28 Feb 2020 14:07) (124/78 - 147/91)  BP(mean): --  RR: 18 (28 Feb 2020 14:07) (18 - 18)  SpO2: 96% (28 Feb 2020 14:07) (96% - 98%)  GENERAL: NAD, well-developed  EYES:  conjunctiva and sclera clear  NECK: Supple, No JVD  CHEST/LUNG: Clear to auscultation bilaterally; No wheeze  HEART: Regular rate and rhythm; No murmurs, rubs, or gallops  ABDOMEN: Soft, diffusely tender, Nondistended; Bowel sounds present no rebound some voluntary guarding  EXTREMITIES:  2+ Peripheral Pulses, No clubbing, cyanosis, or edema  NEUROLOGY:  AAOx2 L pronator drift 4/5 UE. confused about why he is in hospital  PSYCH: limited affect  SKIN: crani incision    LABS:    02-28    131<L>  |  95<L>  |  26<H>  ----------------------------<  169<H>  4.3   |  21<L>  |  0.91    Ca    9.2      28 Feb 2020 15:02      RADIOLOGY & ADDITIONAL TESTS:  Results Reviewed:   Imaging Personally Reviewed: < from: Xray Abdomen 1 View PORTABLE -Urgent (02.28.20 @ 11:06) >  Nonobstructive bowel gas pattern. Moderate colonic stool burden.     < end of copied text >    Electrocardiogram Personally Reviewed:    COORDINATION OF CARE:  Care Discussed with Consultants/Other Providers [Y/N]: KYLE Perez re ab pain  Prior or Outpatient Records Reviewed [Y/N]:

## 2020-02-28 NOTE — CHART NOTE - NSCHARTNOTEFT_GEN_A_CORE
CAPRINI SCORE [CLOT] Score on Admission for     AGE RELATED RISK FACTORS                                                       MOBILITY RELATED FACTORS  [ ] Age 41-60 years                                            (1 Point)                  [ ] Bed rest                                                        (1 Point)  [X] Age: 61-74 years                                           (2 Points)                [ ] Plaster cast                                                   (2 Points)  [ ] Age= 75 years                                              (3 Points)                  [ ] Bed bound for more than 72 hours         (2 Points)    DISEASE RELATED RISK FACTORS                                               GENDER SPECIFIC FACTORS  [ ] Edema in the lower extremities                       (1 Point)           [ ] Pregnancy                                                          (1 Point)  [ ] Varicose veins                                               (1 Point)                  [ ] Post-partum < 6 weeks                                   (1 Point)             [X] BMI > 25 Kg/m2                                            (1 Point)                  [ ] Hormonal therapy  or oral contraception   (1 Point)                 [ ] Sepsis (in the previous month)                        (1 Point)            [ ] History of pregnancy complications             (1 point)  [ ] Pneumonia or serious lung disease                                            [ ] Unexplained or recurrent               (1 Point)           (in the previous month)                               (1 Point)  [ ] Abnormal pulmonary function test                     (1 Point)                 SURGERY RELATED RISK FACTORS (include planned surgeries)  [ ] Acute myocardial infarction                              (1 Point)                 [ ]  Section                                             (1 Point)  [ ] Congestive heart failure (in the previous month)  (1 Point)        [ ] Minor surgery                                                  (1 Point)   [ ] Inflammatory bowel disease                             (1 Point)                 [ ] Arthroscopic surgery                                        (2 Points)  [ ] Central venous access                                      (2 Points)  [X] History / presence of malignancy                   (2 points)   [ ] General surgery lasting more than 45 minutes   (2 Points)       [ ] Stroke (in the previous month)                          (5 Points)               [ ] Elective arthroplasty                                         (5 Points)                                                                                                                                               HEMATOLOGY RELATED FACTORS                                                 TRAUMA RELATED RISK FACTORS  [ ] Prior episodes of VTE                                     (3 Points)                [ ] Fracture of the hip, pelvis, or leg                       (5 Points)  [ ] Positive family history for VTE                         (3 Points)             [ ] Acute spinal cord injury (in the previous month)  (5 Points)  [ ] Prothrombin 67777 A                                     (3 Points)                [ ] Paralysis  (less than 1 month)                             (5 Points)  [ ] Factor V Leiden                                             (3 Points)                   [ ] Multiple Trauma within 1 month                        (5 Points)  [ ] Lupus anticoagulants                                     (3 Points)                                                           [ ] Anticardiolipin antibodies                               (3 Points)                                                       [ ] High homocysteine in the blood                      (3 Points)                                             [ ] Other congenital or acquired thrombophilia      (3 Points)                                                [ ] Heparin induced thrombocytopenia                  (3 Points)                                          Total Score [     5     ]    Risk:  Very low 0   Low 1 to 2   Moderate 3 to 4   High =5       VTE Prophylaxis Recommendations:  [X] mechanical pneumatic compression devices                                      [ ] contraindicated: _____________________  [ ] chemoprophylaxis                                                                                    [X] contraindicated ____fresh post op_________________    **** HIGH LIKELIHOOD DVT PRESENT ON ADMISSION  [X] (please order LE dopplers within 24 hours of admission)
Nutrition Follow Up Note  Patient seen for: routine follow up    Source: Comprehensive chart review and patient    Chart reviewed, events noted. 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       Diet : Consistent carbohydrate with 1000ml fluid restriction; Danactive 2x daily    Pt observed to be lethargic during interview. Patient reports having a good appetite and adequate PO intake. Pt denies reports some nausea and vomiting but cannot recall last episode of emesis. Pt also reports constipation - on bowel regimen. Encouraged balanced meals with adequate protein for healing; sources discussed. Pt declined nutrition supplements at this time.      PO intake : >50%     Source for PO intake: patient     Enteral /Parenteral Nutrition: n/a      Daily Weight in k.2 (02-19)  % Weight Change - stable weight     Pertinent Medications: MEDICATIONS  (STANDING):  amLODIPine   Tablet 5 milliGRAM(s) Oral daily  atorvastatin 10 milliGRAM(s) Oral at bedtime  dexAMETHasone     Tablet 2 milliGRAM(s) Oral two times a day  DULoxetine 60 milliGRAM(s) Oral daily  enoxaparin Injectable 40 milliGRAM(s) SubCutaneous <User Schedule>  fluticasone propionate 50 MICROgram(s)/spray Nasal Spray 1 Spray(s) Both Nostrils two times a day  folic acid 1 milliGRAM(s) Oral daily  gabapentin 300 milliGRAM(s) Oral three times a day  insulin glargine Injectable (LANTUS) 16 Unit(s) SubCutaneous at bedtime  insulin lispro (HumaLOG) corrective regimen sliding scale   SubCutaneous Before meals and at bedtime  insulin lispro Injectable (HumaLOG) 6 Unit(s) SubCutaneous three times a day before meals  levETIRAcetam 500 milliGRAM(s) Oral two times a day  melatonin 5 milliGRAM(s) Oral at bedtime  multivitamin 1 Tablet(s) Oral daily  pantoprazole    Tablet 40 milliGRAM(s) Oral at bedtime  polyethylene glycol 3350 17 Gram(s) Oral two times a day  senna 2 Tablet(s) Oral at bedtime  sodium chloride 2 Gram(s) Oral every 6 hours  sodium chloride 2% . 1000 milliLiter(s) (75 mL/Hr) IV Continuous <Continuous>    MEDICATIONS  (PRN):  acetaminophen   Tablet .. 650 milliGRAM(s) Oral every 6 hours PRN Temp greater or equal to 38C (100.4F), Mild Pain (1 - 3)  oxyCODONE    IR 5 milliGRAM(s) Oral every 4 hours PRN Moderate Pain (4 - 6)  oxyCODONE    IR 10 milliGRAM(s) Oral every 4 hours PRN Severe Pain (7 - 10)    Pertinent Labs:  @ 14:29: Na 129<L>, BUN 31<H>, Cr 0.95, <H>, K+ 4.5, Phos --, Mg --, Alk Phos --, ALT/SGPT --, AST/SGOT --, HbA1c --   @ 08:08: Na 130<L>, BUN 34<H>, Cr 1.14, BG 90, K+ 4.1, Phos --, Mg --, Alk Phos --, ALT/SGPT --, AST/SGOT --, HbA1c --    Finger Sticks:  POCT Blood Glucose.: 183 mg/dL ( @ 17:14)  POCT Blood Glucose.: 92 mg/dL ( @ 12:38)  POCT Blood Glucose.: 79 mg/dL ( @ 08:34)  POCT Blood Glucose.: 170 mg/dL ( @ 21:04)      Skin per nursing documentation: no pressure injury documented   Edema: none noted    Estimated Needs:   [x] no change since previous assessment  [ ] recalculated:     Previous Nutrition Diagnosis 1: increased nutrient needs  Nutrition Diagnosis is: ongoing    Previous Nutrition Diagnosis 2: inadequate energy intake  Nutrition Diagnosis is: ongoing; addressed with diet advancement     New Nutrition Diagnosis: n/a       Interventions:     Recommend  1) Continue current diet and supplement order as tolerated.   2) Continue to encourage po intake and obtain/honor food preferences as able.  3) Monitor need for ONS to promote adequate protein intake.     Monitoring and Evaluation:     Continue to monitor Nutritional intake, Tolerance to diet prescription, weights, labs, skin integrity    RD remains available upon request and will follow up per protocol    Manuel North RD pager # [phone]
Saw pt briefly this am. Pt c/o abdominal pain and constipation. Off steroids  Reports eating with BG levels at goal hardly requiring Humalog insulin correction scale. No humalog required yesterday and 1 unit required today.  Will sign off for now since pt is not need of any insulin adjustments at this time.  Can restart Metformin 500mg bid upon discharge if renal function stable. No need of insulin as out pt for now.  Please recall as needed.
RN called me over regarding patient crying and expressing suicidal ideation (I just want to die), when I came at bedside patient states he does NOT want to hurt himself but that he is very sad because he misses his son and wants to go home, he did express once more about "wanting to die." I called his son Randolph at [phone] who states he will try to visit today, he was unable to do so over the weekend due to other family obligations but was happy to speak and calm his father at bedside through the phone. Will call Psych to possible increase current medication.    [id number]

## 2020-02-28 NOTE — PROGRESS NOTE BEHAVIORAL HEALTH - NSBHCHARTREVIEWLAB_PSY_A_CORE FT
02-28    129<L>  |  94<L>  |  30<H>  ----------------------------<  104<H>  4.3   |  23  |  1.07    Ca    8.8      28 Feb 2020 06:30

## 2020-02-28 NOTE — PROGRESS NOTE ADULT - ASSESSMENT
HTN  labile  cont current meds     hyponatremia  fu with medicine   consider renal eval     DM  on insulin   fu with med

## 2020-02-28 NOTE — PROGRESS NOTE ADULT - ASSESSMENT
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 noticed with hyponatremia.    1- hyponatremia  2- nausea overall subsiding   3-  HTN       suspect hyponatremia siadh  nacl 2 g q6 and add 2% nacl infusion @ 50 cc/hr   norvasc 5 mg daily   d/w ns team when seen

## 2020-02-28 NOTE — PROGRESS NOTE ADULT - SUBJECTIVE AND OBJECTIVE BOX
[facility]   [phone]  RENAL FOLLOW UP NOTE  --------------------------------------------------------------------------------  Chief Complaint:    24 hour events/subjective:    seen earlier. c/o nausea no HA     PAST HISTORY  --------------------------------------------------------------------------------  No significant changes to PMH, PSH, FHx, SHx, unless otherwise noted    ALLERGIES & MEDICATIONS  --------------------------------------------------------------------------------  Allergies    No Known Allergies    Intolerances      Standing Inpatient Medications  amLODIPine   Tablet 5 milliGRAM(s) Oral daily  atorvastatin 10 milliGRAM(s) Oral at bedtime  bisacodyl 5 milliGRAM(s) Oral every 12 hours  dextrose 5%. 1000 milliLiter(s) IV Continuous <Continuous>  dextrose 50% Injectable 12.5 Gram(s) IV Push once  dextrose 50% Injectable 25 Gram(s) IV Push once  dextrose 50% Injectable 25 Gram(s) IV Push once  DULoxetine 40 milliGRAM(s) Oral daily  enoxaparin Injectable 40 milliGRAM(s) SubCutaneous <User Schedule>  fluticasone propionate 50 MICROgram(s)/spray Nasal Spray 1 Spray(s) Both Nostrils two times a day  folic acid 1 milliGRAM(s) Oral daily  gabapentin 300 milliGRAM(s) Oral three times a day  insulin lispro (HumaLOG) corrective regimen sliding scale   SubCutaneous three times a day with meals  insulin lispro (HumaLOG) corrective regimen sliding scale   SubCutaneous at bedtime  melatonin 5 milliGRAM(s) Oral at bedtime  multivitamin 1 Tablet(s) Oral daily  pantoprazole    Tablet 40 milliGRAM(s) Oral at bedtime  polyethylene glycol 3350 17 Gram(s) Oral two times a day  senna 2 Tablet(s) Oral at bedtime  sodium chloride 2 Gram(s) Oral every 6 hours  sodium chloride 2% . 1000 milliLiter(s) IV Continuous <Continuous>  valproic acid 500 milliGRAM(s) Oral every 12 hours    PRN Inpatient Medications  acetaminophen   Tablet .. 650 milliGRAM(s) Oral every 6 hours PRN  dextrose 40% Gel 15 Gram(s) Oral once PRN  glucagon  Injectable 1 milliGRAM(s) IntraMuscular once PRN  oxyCODONE    IR 5 milliGRAM(s) Oral every 4 hours PRN  oxyCODONE    IR 10 milliGRAM(s) Oral every 4 hours PRN      REVIEW OF SYSTEMS  --------------------------------------------------------------------------------    Gen: denies fevers/chills,  CVS: denies chest pain/palpitations  Resp: denies SOB/Cough  GI:  + N/ - V/Abd pain -   : Denies dysuria    All other systems were reviewed and are negative, except as noted.    VITALS/PHYSICAL EXAM  --------------------------------------------------------------------------------  T(C): 36.4 (02-28-20 @ 19:34), Max: 37.1 (02-28-20 @ 04:35)  HR: 90 (02-28-20 @ 19:34) (63 - 90)  BP: 124/80 (02-28-20 @ 19:34) (113/75 - 147/91)  RR: 18 (02-28-20 @ 19:34) (18 - 18)  SpO2: 98% (02-28-20 @ 19:34) (96% - 98%)  Wt(kg): --        02-27-20 @ 07:01  -  02-28-20 @ 07:00  --------------------------------------------------------  IN: 680 mL / OUT: 3650 mL / NET: -2970 mL      Physical Exam:  	  	  Gen: falls asleep easily and appears overall lethargic  still   	no jvd  	Pulm: decrease bs  no rales or ronchi or wheezing  	CV: RRR, S1S2; no rub  	Back: No CVA tenderness  	Abd: +BS, soft, nontender/nondistended  	: No suprapubic tenderness  	UE: Warm, no cyanosis  no clubbing,  no edema;  	LE: Warm, no cyanosis  no clubbing, no edema  	    LABS/STUDIES  --------------------------------------------------------------------------------    132  |  97  |  25  ----------------------------<  299      [02-28-20 @ 20:28]  4.6   |  21  |  0.86        Ca     8.8     [02-28-20 @ 20:28]            Creatinine Trend:  SCr 0.86 [02-28 @ 20:28]  SCr 0.91 [02-28 @ 15:02]  SCr 1.07 [02-28 @ 06:30]  SCr 1.10 [02-28 @ 01:16]  SCr 1.22 [02-27 @ 20:50]                  HbA1c 6.0      [02-06-20 @ 13:46]  TSH 1.57      [02-27-20 @ 17:39]

## 2020-02-28 NOTE — PROGRESS NOTE BEHAVIORAL HEALTH - NSBHATTESTSEENBY_PSY_A_CORE
Attending Psychiatrist supervising NP/Trainee, meeting pt...
Trainee with telephonic supervision from Attending Psychiatrist
Attending Psychiatrist supervising NP/Trainee, meeting pt...

## 2020-02-29 LAB
ANION GAP SERPL CALC-SCNC: 13 MMOL/L — SIGNIFICANT CHANGE UP (ref 5–17)
ANION GAP SERPL CALC-SCNC: 13 MMOL/L — SIGNIFICANT CHANGE UP (ref 5–17)
ANION GAP SERPL CALC-SCNC: 14 MMOL/L — SIGNIFICANT CHANGE UP (ref 5–17)
BUN SERPL-MCNC: 23 MG/DL — SIGNIFICANT CHANGE UP (ref 7–23)
BUN SERPL-MCNC: 35 MG/DL — HIGH (ref 7–23)
BUN SERPL-MCNC: 40 MG/DL — HIGH (ref 7–23)
CALCIUM SERPL-MCNC: 8.6 MG/DL — SIGNIFICANT CHANGE UP (ref 8.4–10.5)
CALCIUM SERPL-MCNC: 8.6 MG/DL — SIGNIFICANT CHANGE UP (ref 8.4–10.5)
CALCIUM SERPL-MCNC: 8.8 MG/DL — SIGNIFICANT CHANGE UP (ref 8.4–10.5)
CHLORIDE SERPL-SCNC: 92 MMOL/L — LOW (ref 96–108)
CHLORIDE SERPL-SCNC: 96 MMOL/L — SIGNIFICANT CHANGE UP (ref 96–108)
CHLORIDE SERPL-SCNC: 99 MMOL/L — SIGNIFICANT CHANGE UP (ref 96–108)
CO2 SERPL-SCNC: 21 MMOL/L — LOW (ref 22–31)
CO2 SERPL-SCNC: 22 MMOL/L — SIGNIFICANT CHANGE UP (ref 22–31)
CO2 SERPL-SCNC: 22 MMOL/L — SIGNIFICANT CHANGE UP (ref 22–31)
CREAT SERPL-MCNC: 0.8 MG/DL — SIGNIFICANT CHANGE UP (ref 0.5–1.3)
CREAT SERPL-MCNC: 0.83 MG/DL — SIGNIFICANT CHANGE UP (ref 0.5–1.3)
CREAT SERPL-MCNC: 0.84 MG/DL — SIGNIFICANT CHANGE UP (ref 0.5–1.3)
GLUCOSE BLDC GLUCOMTR-MCNC: 133 MG/DL — HIGH (ref 70–99)
GLUCOSE BLDC GLUCOMTR-MCNC: 143 MG/DL — HIGH (ref 70–99)
GLUCOSE BLDC GLUCOMTR-MCNC: 168 MG/DL — HIGH (ref 70–99)
GLUCOSE BLDC GLUCOMTR-MCNC: 172 MG/DL — HIGH (ref 70–99)
GLUCOSE SERPL-MCNC: 146 MG/DL — HIGH (ref 70–99)
GLUCOSE SERPL-MCNC: 188 MG/DL — HIGH (ref 70–99)
GLUCOSE SERPL-MCNC: 236 MG/DL — HIGH (ref 70–99)
POTASSIUM SERPL-MCNC: 4.4 MMOL/L — SIGNIFICANT CHANGE UP (ref 3.5–5.3)
POTASSIUM SERPL-MCNC: 4.4 MMOL/L — SIGNIFICANT CHANGE UP (ref 3.5–5.3)
POTASSIUM SERPL-MCNC: 4.5 MMOL/L — SIGNIFICANT CHANGE UP (ref 3.5–5.3)
POTASSIUM SERPL-SCNC: 4.4 MMOL/L — SIGNIFICANT CHANGE UP (ref 3.5–5.3)
POTASSIUM SERPL-SCNC: 4.4 MMOL/L — SIGNIFICANT CHANGE UP (ref 3.5–5.3)
POTASSIUM SERPL-SCNC: 4.5 MMOL/L — SIGNIFICANT CHANGE UP (ref 3.5–5.3)
SODIUM SERPL-SCNC: 127 MMOL/L — LOW (ref 135–145)
SODIUM SERPL-SCNC: 130 MMOL/L — LOW (ref 135–145)
SODIUM SERPL-SCNC: 135 MMOL/L — SIGNIFICANT CHANGE UP (ref 135–145)

## 2020-02-29 PROCEDURE — 99233 SBSQ HOSP IP/OBS HIGH 50: CPT

## 2020-02-29 RX ORDER — ONDANSETRON 8 MG/1
4 TABLET, FILM COATED ORAL EVERY 8 HOURS
Refills: 0 | Status: DISCONTINUED | OUTPATIENT
Start: 2020-02-29 | End: 2020-03-04

## 2020-02-29 RX ORDER — SODIUM CHLORIDE 5 G/100ML
1000 INJECTION, SOLUTION INTRAVENOUS
Refills: 0 | Status: DISCONTINUED | OUTPATIENT
Start: 2020-02-29 | End: 2020-03-01

## 2020-02-29 RX ADMIN — SENNA PLUS 2 TABLET(S): 8.6 TABLET ORAL at 21:18

## 2020-02-29 RX ADMIN — SODIUM CHLORIDE 2 GRAM(S): 9 INJECTION INTRAMUSCULAR; INTRAVENOUS; SUBCUTANEOUS at 17:53

## 2020-02-29 RX ADMIN — GABAPENTIN 300 MILLIGRAM(S): 400 CAPSULE ORAL at 05:47

## 2020-02-29 RX ADMIN — Medication 650 MILLIGRAM(S): at 16:05

## 2020-02-29 RX ADMIN — OXYCODONE HYDROCHLORIDE 5 MILLIGRAM(S): 5 TABLET ORAL at 08:18

## 2020-02-29 RX ADMIN — OXYCODONE HYDROCHLORIDE 5 MILLIGRAM(S): 5 TABLET ORAL at 23:43

## 2020-02-29 RX ADMIN — PANTOPRAZOLE SODIUM 40 MILLIGRAM(S): 20 TABLET, DELAYED RELEASE ORAL at 21:17

## 2020-02-29 RX ADMIN — Medication 5 MILLIGRAM(S): at 05:52

## 2020-02-29 RX ADMIN — ENOXAPARIN SODIUM 40 MILLIGRAM(S): 100 INJECTION SUBCUTANEOUS at 17:53

## 2020-02-29 RX ADMIN — Medication 5 MILLIGRAM(S): at 17:55

## 2020-02-29 RX ADMIN — GABAPENTIN 300 MILLIGRAM(S): 400 CAPSULE ORAL at 13:18

## 2020-02-29 RX ADMIN — GABAPENTIN 300 MILLIGRAM(S): 400 CAPSULE ORAL at 21:17

## 2020-02-29 RX ADMIN — Medication 500 MILLIGRAM(S): at 05:46

## 2020-02-29 RX ADMIN — DULOXETINE HYDROCHLORIDE 40 MILLIGRAM(S): 30 CAPSULE, DELAYED RELEASE ORAL at 12:47

## 2020-02-29 RX ADMIN — Medication 1: at 17:52

## 2020-02-29 RX ADMIN — AMLODIPINE BESYLATE 5 MILLIGRAM(S): 2.5 TABLET ORAL at 05:46

## 2020-02-29 RX ADMIN — Medication 500 MILLIGRAM(S): at 17:53

## 2020-02-29 RX ADMIN — SODIUM CHLORIDE 2 GRAM(S): 9 INJECTION INTRAMUSCULAR; INTRAVENOUS; SUBCUTANEOUS at 23:43

## 2020-02-29 RX ADMIN — POLYETHYLENE GLYCOL 3350 17 GRAM(S): 17 POWDER, FOR SOLUTION ORAL at 05:46

## 2020-02-29 RX ADMIN — Medication 5 MILLIGRAM(S): at 21:17

## 2020-02-29 RX ADMIN — OXYCODONE HYDROCHLORIDE 5 MILLIGRAM(S): 5 TABLET ORAL at 08:48

## 2020-02-29 RX ADMIN — POLYETHYLENE GLYCOL 3350 17 GRAM(S): 17 POWDER, FOR SOLUTION ORAL at 17:53

## 2020-02-29 RX ADMIN — ONDANSETRON 4 MILLIGRAM(S): 8 TABLET, FILM COATED ORAL at 12:48

## 2020-02-29 RX ADMIN — SODIUM CHLORIDE 50 MILLILITER(S): 5 INJECTION, SOLUTION INTRAVENOUS at 20:26

## 2020-02-29 RX ADMIN — Medication 1 SPRAY(S): at 17:55

## 2020-02-29 RX ADMIN — OXYCODONE HYDROCHLORIDE 5 MILLIGRAM(S): 5 TABLET ORAL at 13:20

## 2020-02-29 RX ADMIN — SODIUM CHLORIDE 2 GRAM(S): 9 INJECTION INTRAMUSCULAR; INTRAVENOUS; SUBCUTANEOUS at 05:46

## 2020-02-29 RX ADMIN — SODIUM CHLORIDE 2 GRAM(S): 9 INJECTION INTRAMUSCULAR; INTRAVENOUS; SUBCUTANEOUS at 12:47

## 2020-02-29 RX ADMIN — Medication 1 MILLIGRAM(S): at 12:47

## 2020-02-29 RX ADMIN — ATORVASTATIN CALCIUM 10 MILLIGRAM(S): 80 TABLET, FILM COATED ORAL at 21:17

## 2020-02-29 RX ADMIN — OXYCODONE HYDROCHLORIDE 5 MILLIGRAM(S): 5 TABLET ORAL at 12:49

## 2020-02-29 RX ADMIN — Medication 1 SPRAY(S): at 05:47

## 2020-02-29 RX ADMIN — Medication 1 TABLET(S): at 12:55

## 2020-02-29 RX ADMIN — Medication 1: at 13:40

## 2020-02-29 NOTE — PROGRESS NOTE ADULT - ASSESSMENT
HPI:  63 yo male. 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  c/o worsening left sided paresthesia in December, hospitalized at [facility] Dec 3-17, 2019, found to have subacute SDH along R frontal lobe and large mass in third ventricle. pt was referred to neurosurgery consult and transferred to rehab facility, now presents to PST scheduled for craniotomy surgery for removal of brain mass.  ***contacted surgeon, Dr. Manley's office, s/w  Elida who had consulted with Vineet pt to hold aspirin 1 week preop.*** (06 Feb 2020 10:22)    PROCEDURE: s/p  elective right crani transcallosal for subtotal resection of 3rd ventricular tumor, ETV     POD# 17    PLAN:  - Na at 3am was improved to 135, 2%NS was discontinued, patient received one dose of Ure-Na at 6 am, repeat BMP at 10am showed decreased Na to 130, another dose of Ure-Na given, will f/u BMP Q6hrs, continue salt tabs and fluid restriction, possible tolvaptan if persistent hyponatremia  - continue prn tylenol and oxycodone for pain control  - vitals reviewed, stable, continue norvasc 5mg daily, continue lipitor for HLD  - last QTc 408 on 2/25, zofran prn for n/v, will repeat ECG in am, cardiology following  - continue VPA for seizure ppx  - last BM today, continue current bowel regiments  - continue HISS for blood glucose control  - encourage activity OOB  - incentive spirometry  - DVT ppx: SQH and b/l SCDs  - continue PT/OT  Discharge planning: [facility] when stable    will discuss plan with Dr. Manley    spectrolink [id number]    Assessment:  Please Check When Present   []  GCS  E   V  M     Heart Failure: []Acute, [] acute on chronic , []chronic  Heart Failure:  [] Diastolic (HFpEF), [] Systolic (HFrEF), []Combined (HFpEF and HFrEF), [] RHF, [] Pulm HTN, [] Other    [] RA, [] ATN, [] AIN, [] other  [] CKD1, [] CKD2, [] CKD 3, [] CKD 4, [] CKD 5, []ESRD    Encephalopathy: [] Metabolic, [] Hepatic, [] toxic, [] Neurological, [] Other    Abnormal Nurtitional Status: [] malnurtition (see nutrition note), [ ]underweight: BMI < 19, [] morbid obesity: BMI >40, [] Cachexia    [] Sepsis  [] hypovolemic shock,[] cardiogenic shock, [] hemorrhagic shock, [] neuogenic shock  [] Acute Respiratory Failure  []Cerebral edema, [] Brain compression/ herniation,   [] Functional quadriplegia  [] Acute blood loss anemia

## 2020-02-29 NOTE — PROGRESS NOTE ADULT - SUBJECTIVE AND OBJECTIVE BOX
Patient was seen at bedside this am. Patient was complaining of headaches. Denied any cp, n/v, abd pain, or sob.    OVERNIGHT EVENTS: no acute event overnight    Vital Signs Last 24 Hrs  T(C): 36.9 (29 Feb 2020 12:47), Max: 37.1 (29 Feb 2020 04:50)  T(F): 98.4 (29 Feb 2020 12:47), Max: 98.7 (29 Feb 2020 04:50)  HR: 93 (29 Feb 2020 12:47) (78 - 93)  BP: 185/94 (29 Feb 2020 12:47) (123/80 - 185/94)  BP(mean): --  RR: 17 (29 Feb 2020 12:47) (17 - 18)  SpO2: 98% (29 Feb 2020 12:47) (96% - 98%)    I&O's Detail    28 Feb 2020 07:01  -  29 Feb 2020 07:00  --------------------------------------------------------  IN:    Oral Fluid: 100 mL  Total IN: 100 mL    OUT:  Total OUT: 0 mL    Total NET: 100 mL        I&O's Summary    28 Feb 2020 07:01  -  29 Feb 2020 07:00  --------------------------------------------------------  IN: 100 mL / OUT: 0 mL / NET: 100 mL        PHYSICAL EXAM:  General: slightly agitated because of thirst from fluid restriction and headache  Neurological:  Awake, alert, oriented to person, place, and date, EOM intact, no facial asymmetry noted, following commands, LUE drift, LUE 4/5, otherwise 5/5, sensation intact to light touch  Cardiovascular: +s1, s2  Respiratory: clear to auscultation b/l  Gastrointestinal: soft, non-distended, non-tender  Extremities: No calf tenderness noted  Incision/Wound: incision site C/D/I    LABS:    02-29    130<L>  |  96  |  35<H>  ----------------------------<  236<H>  4.4   |  21<L>  |  0.80    Ca    8.6      29 Feb 2020 10:44              CAPILLARY BLOOD GLUCOSE      POCT Blood Glucose.: 172 mg/dL (29 Feb 2020 13:30)  POCT Blood Glucose.: 133 mg/dL (29 Feb 2020 08:57)  POCT Blood Glucose.: 269 mg/dL (28 Feb 2020 20:54)  POCT Blood Glucose.: 159 mg/dL (28 Feb 2020 17:17)      Drug Levels: [] N/A    CSF Analysis: [] N/A      Allergies    No Known Allergies    Intolerances      MEDICATIONS:  Antibiotics:    Neuro:  acetaminophen   Tablet .. 650 milliGRAM(s) Oral every 6 hours PRN  DULoxetine 40 milliGRAM(s) Oral daily  gabapentin 300 milliGRAM(s) Oral three times a day  melatonin 5 milliGRAM(s) Oral at bedtime  ondansetron Injectable 4 milliGRAM(s) IV Push every 8 hours PRN  oxyCODONE    IR 5 milliGRAM(s) Oral every 4 hours PRN  oxyCODONE    IR 10 milliGRAM(s) Oral every 4 hours PRN  valproic acid 500 milliGRAM(s) Oral every 12 hours    Anticoagulation:  enoxaparin Injectable 40 milliGRAM(s) SubCutaneous <User Schedule>    OTHER:  amLODIPine   Tablet 5 milliGRAM(s) Oral daily  atorvastatin 10 milliGRAM(s) Oral at bedtime  bisacodyl 5 milliGRAM(s) Oral every 12 hours  dextrose 40% Gel 15 Gram(s) Oral once PRN  dextrose 50% Injectable 12.5 Gram(s) IV Push once  dextrose 50% Injectable 25 Gram(s) IV Push once  dextrose 50% Injectable 25 Gram(s) IV Push once  fluticasone propionate 50 MICROgram(s)/spray Nasal Spray 1 Spray(s) Both Nostrils two times a day  glucagon  Injectable 1 milliGRAM(s) IntraMuscular once PRN  insulin lispro (HumaLOG) corrective regimen sliding scale   SubCutaneous three times a day with meals  insulin lispro (HumaLOG) corrective regimen sliding scale   SubCutaneous at bedtime  pantoprazole    Tablet 40 milliGRAM(s) Oral at bedtime  polyethylene glycol 3350 17 Gram(s) Oral two times a day  senna 2 Tablet(s) Oral at bedtime    IVF:  dextrose 5%. 1000 milliLiter(s) IV Continuous <Continuous>  folic acid 1 milliGRAM(s) Oral daily  multivitamin 1 Tablet(s) Oral daily  sodium chloride 2 Gram(s) Oral every 6 hours    CULTURES:    RADIOLOGY & ADDITIONAL TESTS:

## 2020-02-29 NOTE — PROGRESS NOTE ADULT - SUBJECTIVE AND OBJECTIVE BOX
[facility]   [phone]  RENAL FOLLOW UP NOTE  --------------------------------------------------------------------------------  Chief Complaint:    24 hour events/subjective:    seen earlier. c/o HA and nausea  was on 2% nacl iv and received urena with nacl 2 g q6    PAST HISTORY  --------------------------------------------------------------------------------  No significant changes to PMH, PSH, FHx, SHx, unless otherwise noted    ALLERGIES & MEDICATIONS  --------------------------------------------------------------------------------  Allergies    No Known Allergies    Intolerances      Standing Inpatient Medications  amLODIPine   Tablet 5 milliGRAM(s) Oral daily  atorvastatin 10 milliGRAM(s) Oral at bedtime  bisacodyl 5 milliGRAM(s) Oral every 12 hours  dextrose 5%. 1000 milliLiter(s) IV Continuous <Continuous>  dextrose 50% Injectable 12.5 Gram(s) IV Push once  dextrose 50% Injectable 25 Gram(s) IV Push once  dextrose 50% Injectable 25 Gram(s) IV Push once  DULoxetine 40 milliGRAM(s) Oral daily  enoxaparin Injectable 40 milliGRAM(s) SubCutaneous <User Schedule>  fluticasone propionate 50 MICROgram(s)/spray Nasal Spray 1 Spray(s) Both Nostrils two times a day  folic acid 1 milliGRAM(s) Oral daily  gabapentin 300 milliGRAM(s) Oral three times a day  insulin lispro (HumaLOG) corrective regimen sliding scale   SubCutaneous three times a day with meals  insulin lispro (HumaLOG) corrective regimen sliding scale   SubCutaneous at bedtime  melatonin 5 milliGRAM(s) Oral at bedtime  multivitamin 1 Tablet(s) Oral daily  pantoprazole    Tablet 40 milliGRAM(s) Oral at bedtime  polyethylene glycol 3350 17 Gram(s) Oral two times a day  senna 2 Tablet(s) Oral at bedtime  sodium chloride 2 Gram(s) Oral every 6 hours  Ure-Na (Urea 15g) 1 Packet(s) 1 Packet(s) Oral once  valproic acid 500 milliGRAM(s) Oral every 12 hours    PRN Inpatient Medications  acetaminophen   Tablet .. 650 milliGRAM(s) Oral every 6 hours PRN  dextrose 40% Gel 15 Gram(s) Oral once PRN  glucagon  Injectable 1 milliGRAM(s) IntraMuscular once PRN  ondansetron Injectable 4 milliGRAM(s) IV Push every 8 hours PRN  oxyCODONE    IR 5 milliGRAM(s) Oral every 4 hours PRN  oxyCODONE    IR 10 milliGRAM(s) Oral every 4 hours PRN      REVIEW OF SYSTEMS  --------------------------------------------------------------------------------    Gen: denies fevers/chills,  CVS: denies chest pain/palpitations  Resp: denies SOB/Cough  GI: Denies N/V/Abd pain  : Denies dysuria    All other systems were reviewed and are negative, except as noted.    VITALS/PHYSICAL EXAM  --------------------------------------------------------------------------------  T(C): 36.9 (02-29-20 @ 12:47), Max: 37.1 (02-29-20 @ 04:50)  HR: 93 (02-29-20 @ 12:47) (63 - 93)  BP: 185/94 (02-29-20 @ 12:47) (113/75 - 185/94)  RR: 17 (02-29-20 @ 12:47) (17 - 18)  SpO2: 98% (02-29-20 @ 12:47) (96% - 98%)  Wt(kg): --        02-28-20 @ 07:01  -  02-29-20 @ 07:00  --------------------------------------------------------  IN: 100 mL / OUT: 0 mL / NET: 100 mL      Physical Exam:  	  Gen: falls still  overall lethargic  	no jvd  	Pulm: decrease bs  no rales or ronchi or wheezing  	CV: RRR, S1S2; no rub  	Abd: +BS, soft, nontender/nondistended  	: No suprapubic tenderness  	UE: Warm, no cyanosis  no clubbing,  no edema;  	LE: Warm, no cyanosis  no clubbing, no edema  	      LABS/STUDIES  --------------------------------------------------------------------------------    130  |  96  |  35  ----------------------------<  236      [02-29-20 @ 10:44]  4.4   |  21  |  0.80        Ca     8.6     [02-29-20 @ 10:44]            Creatinine Trend:  SCr 0.80 [02-29 @ 10:44]  SCr 0.83 [02-29 @ 02:57]  SCr 0.86 [02-28 @ 20:28]  SCr 0.91 [02-28 @ 15:02]  SCr 1.07 [02-28 @ 06:30]                  HbA1c 6.0      [02-06-20 @ 13:46]  TSH 1.57      [02-27-20 @ 17:39]

## 2020-02-29 NOTE — PROGRESS NOTE ADULT - ASSESSMENT
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 noticed with hyponatremia.    1- hyponatremia  2- nausea overall subsiding   3-  HTN       suspect hyponatremia siadh  nacl 2 g q6   urena 15 g additional dose and repeat na level  if no improvement  may need to consider tolvaptan [ will d.w neurosurgeon ]  norvasc 5 mg daily

## 2020-02-29 NOTE — PROGRESS NOTE ADULT - SUBJECTIVE AND OBJECTIVE BOX
Subjective: Patient seen and examined. No new events except as noted.     SUBJECTIVE/ROS:  feels ok       MEDICATIONS:  MEDICATIONS  (STANDING):  amLODIPine   Tablet 5 milliGRAM(s) Oral daily  atorvastatin 10 milliGRAM(s) Oral at bedtime  bisacodyl 5 milliGRAM(s) Oral every 12 hours  dextrose 5%. 1000 milliLiter(s) (50 mL/Hr) IV Continuous <Continuous>  dextrose 50% Injectable 12.5 Gram(s) IV Push once  dextrose 50% Injectable 25 Gram(s) IV Push once  dextrose 50% Injectable 25 Gram(s) IV Push once  DULoxetine 40 milliGRAM(s) Oral daily  enoxaparin Injectable 40 milliGRAM(s) SubCutaneous <User Schedule>  fluticasone propionate 50 MICROgram(s)/spray Nasal Spray 1 Spray(s) Both Nostrils two times a day  folic acid 1 milliGRAM(s) Oral daily  gabapentin 300 milliGRAM(s) Oral three times a day  insulin lispro (HumaLOG) corrective regimen sliding scale   SubCutaneous three times a day with meals  insulin lispro (HumaLOG) corrective regimen sliding scale   SubCutaneous at bedtime  melatonin 5 milliGRAM(s) Oral at bedtime  multivitamin 1 Tablet(s) Oral daily  pantoprazole    Tablet 40 milliGRAM(s) Oral at bedtime  polyethylene glycol 3350 17 Gram(s) Oral two times a day  senna 2 Tablet(s) Oral at bedtime  sodium chloride 2 Gram(s) Oral every 6 hours  valproic acid 500 milliGRAM(s) Oral every 12 hours      PHYSICAL EXAM:  T(C): 37.1 (02-29-20 @ 04:50), Max: 37.1 (02-29-20 @ 04:50)  HR: 78 (02-29-20 @ 04:50) (63 - 90)  BP: 132/85 (02-29-20 @ 04:50) (113/75 - 132/85)  RR: 18 (02-29-20 @ 04:50) (18 - 18)  SpO2: 97% (02-29-20 @ 04:50) (96% - 98%)  Wt(kg): --  I&O's Summary    28 Feb 2020 07:01  -  29 Feb 2020 07:00  --------------------------------------------------------  IN: 100 mL / OUT: 0 mL / NET: 100 mL            JVP: Normal  Neck: supple  Lung: clear   CV: S1 S2 , Murmur:  Abd: soft  Ext: No edema  neuro: Awake   Psych: flat affect  Skin: normal``    LABS/DATA:    CARDIAC MARKERS:            02-29    135  |  99  |  23  ----------------------------<  146<H>  4.4   |  22  |  0.83    Ca    8.6      29 Feb 2020 02:57      proBNP:   Lipid Profile:   HgA1c:   TSH:     TELE:  EKG:

## 2020-02-29 NOTE — PROGRESS NOTE ADULT - SUBJECTIVE AND OBJECTIVE BOX
Patient is a 64y old  Male who presents with a chief complaint of brain mass (28 Feb 2020 15:44)      SUBJECTIVE / OVERNIGHT EVENTS:  No fever       ADDITIONAL REVIEW OF SYSTEMS: unable to obtain     MEDICATIONS  (STANDING):  amLODIPine   Tablet 5 milliGRAM(s) Oral daily  atorvastatin 10 milliGRAM(s) Oral at bedtime  bisacodyl 5 milliGRAM(s) Oral every 12 hours  dextrose 5%. 1000 milliLiter(s) (50 mL/Hr) IV Continuous <Continuous>  dextrose 50% Injectable 12.5 Gram(s) IV Push once  dextrose 50% Injectable 25 Gram(s) IV Push once  dextrose 50% Injectable 25 Gram(s) IV Push once  DULoxetine 40 milliGRAM(s) Oral daily  enoxaparin Injectable 40 milliGRAM(s) SubCutaneous <User Schedule>  fluticasone propionate 50 MICROgram(s)/spray Nasal Spray 1 Spray(s) Both Nostrils two times a day  folic acid 1 milliGRAM(s) Oral daily  gabapentin 300 milliGRAM(s) Oral three times a day  insulin lispro (HumaLOG) corrective regimen sliding scale   SubCutaneous three times a day with meals  insulin lispro (HumaLOG) corrective regimen sliding scale   SubCutaneous at bedtime  melatonin 5 milliGRAM(s) Oral at bedtime  multivitamin 1 Tablet(s) Oral daily  pantoprazole    Tablet 40 milliGRAM(s) Oral at bedtime  polyethylene glycol 3350 17 Gram(s) Oral two times a day  senna 2 Tablet(s) Oral at bedtime  sodium chloride 2 Gram(s) Oral every 6 hours  Ure-Na (Urea 15g) 1 Packet(s) 1 Packet(s) Oral once  valproic acid 500 milliGRAM(s) Oral every 12 hours    MEDICATIONS  (PRN):  acetaminophen   Tablet .. 650 milliGRAM(s) Oral every 6 hours PRN Temp greater or equal to 38C (100.4F), Mild Pain (1 - 3)  dextrose 40% Gel 15 Gram(s) Oral once PRN Blood Glucose LESS THAN 70 milliGRAM(s)/deciLiter  glucagon  Injectable 1 milliGRAM(s) IntraMuscular once PRN Glucose <70 milliGRAM(s)/deciLiter  ondansetron Injectable 4 milliGRAM(s) IV Push every 8 hours PRN Nausea and/or Vomiting  oxyCODONE    IR 5 milliGRAM(s) Oral every 4 hours PRN Moderate Pain (4 - 6)  oxyCODONE    IR 10 milliGRAM(s) Oral every 4 hours PRN Severe Pain (7 - 10)      CAPILLARY BLOOD GLUCOSE      POCT Blood Glucose.: 172 mg/dL (29 Feb 2020 13:30)  POCT Blood Glucose.: 133 mg/dL (29 Feb 2020 08:57)  POCT Blood Glucose.: 269 mg/dL (28 Feb 2020 20:54)  POCT Blood Glucose.: 159 mg/dL (28 Feb 2020 17:17)    I&O's Summary    28 Feb 2020 07:01  -  29 Feb 2020 07:00  --------------------------------------------------------  IN: 100 mL / OUT: 0 mL / NET: 100 mL        PHYSICAL EXAM:  Vital Signs Last 24 Hrs  T(C): 36.9 (29 Feb 2020 12:47), Max: 37.1 (29 Feb 2020 04:50)  T(F): 98.4 (29 Feb 2020 12:47), Max: 98.7 (29 Feb 2020 04:50)  HR: 93 (29 Feb 2020 12:47) (74 - 93)  BP: 185/94 (29 Feb 2020 12:47) (113/75 - 185/94)  BP(mean): --  RR: 17 (29 Feb 2020 12:47) (17 - 18)  SpO2: 98% (29 Feb 2020 12:47) (96% - 98%)    PHYSICAL EXAM:  GENERAL: NAD, well-developed  HEAD:  Atraumatic, Normocephalic  EYES:  conjunctiva and sclera clear  NECK: Supple, No JVD  CHEST/LUNG: Clear to auscultation bilaterally; No wheeze  HEART: Regular rate and rhythm; No murmurs, rubs, or gallops  ABDOMEN: Soft, Nontender, Nondistended; Bowel sounds present  EXTREMITIES:  2+ Peripheral Pulses, No clubbing, cyanosis, or edema  PSYCH: Awake and responsive to verbal stimuli   NEUROLOGY: non-focal        LABS:    02-29    130<L>  |  96  |  35<H>  ----------------------------<  236<H>  4.4   |  21<L>  |  0.80    Ca    8.6      29 Feb 2020 10:44                  RADIOLOGY & ADDITIONAL TESTS:    Imaging Personally Reviewed:    Electrocardiogram Personally Reviewed:    COORDINATION OF CARE:  Care Discussed with Consultants/Other Providers [Y/N]:  Prior or Outpatient Records Reviewed [Y/N]:

## 2020-02-29 NOTE — PROGRESS NOTE ADULT - PROBLEM SELECTOR PLAN 4
Pt is on Amlodipine 5 mg oral daily / if remains elevated might increase Norvasc to 10 mg oral daily   Please note, that due to the hyponatremia , he is on regular diet and on Salt tab / close BP monitoring

## 2020-03-01 LAB
ANION GAP SERPL CALC-SCNC: 11 MMOL/L — SIGNIFICANT CHANGE UP (ref 5–17)
ANION GAP SERPL CALC-SCNC: 12 MMOL/L — SIGNIFICANT CHANGE UP (ref 5–17)
ANION GAP SERPL CALC-SCNC: 12 MMOL/L — SIGNIFICANT CHANGE UP (ref 5–17)
ANION GAP SERPL CALC-SCNC: 13 MMOL/L — SIGNIFICANT CHANGE UP (ref 5–17)
BUN SERPL-MCNC: 26 MG/DL — HIGH (ref 7–23)
BUN SERPL-MCNC: 26 MG/DL — HIGH (ref 7–23)
BUN SERPL-MCNC: 32 MG/DL — HIGH (ref 7–23)
BUN SERPL-MCNC: 37 MG/DL — HIGH (ref 7–23)
CALCIUM SERPL-MCNC: 8.3 MG/DL — LOW (ref 8.4–10.5)
CALCIUM SERPL-MCNC: 8.5 MG/DL — SIGNIFICANT CHANGE UP (ref 8.4–10.5)
CHLORIDE SERPL-SCNC: 100 MMOL/L — SIGNIFICANT CHANGE UP (ref 96–108)
CHLORIDE SERPL-SCNC: 97 MMOL/L — SIGNIFICANT CHANGE UP (ref 96–108)
CHLORIDE SERPL-SCNC: 98 MMOL/L — SIGNIFICANT CHANGE UP (ref 96–108)
CHLORIDE SERPL-SCNC: 99 MMOL/L — SIGNIFICANT CHANGE UP (ref 96–108)
CO2 SERPL-SCNC: 18 MMOL/L — LOW (ref 22–31)
CO2 SERPL-SCNC: 21 MMOL/L — LOW (ref 22–31)
CO2 SERPL-SCNC: 22 MMOL/L — SIGNIFICANT CHANGE UP (ref 22–31)
CO2 SERPL-SCNC: 22 MMOL/L — SIGNIFICANT CHANGE UP (ref 22–31)
CREAT SERPL-MCNC: 0.84 MG/DL — SIGNIFICANT CHANGE UP (ref 0.5–1.3)
CREAT SERPL-MCNC: 0.87 MG/DL — SIGNIFICANT CHANGE UP (ref 0.5–1.3)
CREAT SERPL-MCNC: 0.91 MG/DL — SIGNIFICANT CHANGE UP (ref 0.5–1.3)
CREAT SERPL-MCNC: 0.91 MG/DL — SIGNIFICANT CHANGE UP (ref 0.5–1.3)
GLUCOSE BLDC GLUCOMTR-MCNC: 105 MG/DL — HIGH (ref 70–99)
GLUCOSE BLDC GLUCOMTR-MCNC: 113 MG/DL — HIGH (ref 70–99)
GLUCOSE BLDC GLUCOMTR-MCNC: 124 MG/DL — HIGH (ref 70–99)
GLUCOSE BLDC GLUCOMTR-MCNC: 172 MG/DL — HIGH (ref 70–99)
GLUCOSE SERPL-MCNC: 114 MG/DL — HIGH (ref 70–99)
GLUCOSE SERPL-MCNC: 127 MG/DL — HIGH (ref 70–99)
GLUCOSE SERPL-MCNC: 129 MG/DL — HIGH (ref 70–99)
GLUCOSE SERPL-MCNC: 194 MG/DL — HIGH (ref 70–99)
HCT VFR BLD CALC: 37.9 % — LOW (ref 39–50)
HGB BLD-MCNC: 12.2 G/DL — LOW (ref 13–17)
MCHC RBC-ENTMCNC: 25.9 PG — LOW (ref 27–34)
MCHC RBC-ENTMCNC: 32.2 GM/DL — SIGNIFICANT CHANGE UP (ref 32–36)
MCV RBC AUTO: 80.5 FL — SIGNIFICANT CHANGE UP (ref 80–100)
NRBC # BLD: 0 /100 WBCS — SIGNIFICANT CHANGE UP (ref 0–0)
PLATELET # BLD AUTO: 204 K/UL — SIGNIFICANT CHANGE UP (ref 150–400)
POTASSIUM SERPL-MCNC: 4.1 MMOL/L — SIGNIFICANT CHANGE UP (ref 3.5–5.3)
POTASSIUM SERPL-MCNC: 4.2 MMOL/L — SIGNIFICANT CHANGE UP (ref 3.5–5.3)
POTASSIUM SERPL-MCNC: 4.2 MMOL/L — SIGNIFICANT CHANGE UP (ref 3.5–5.3)
POTASSIUM SERPL-MCNC: 4.9 MMOL/L — SIGNIFICANT CHANGE UP (ref 3.5–5.3)
POTASSIUM SERPL-SCNC: 4.1 MMOL/L — SIGNIFICANT CHANGE UP (ref 3.5–5.3)
POTASSIUM SERPL-SCNC: 4.2 MMOL/L — SIGNIFICANT CHANGE UP (ref 3.5–5.3)
POTASSIUM SERPL-SCNC: 4.2 MMOL/L — SIGNIFICANT CHANGE UP (ref 3.5–5.3)
POTASSIUM SERPL-SCNC: 4.9 MMOL/L — SIGNIFICANT CHANGE UP (ref 3.5–5.3)
RBC # BLD: 4.71 M/UL — SIGNIFICANT CHANGE UP (ref 4.2–5.8)
RBC # FLD: 14.7 % — HIGH (ref 10.3–14.5)
SODIUM SERPL-SCNC: 129 MMOL/L — LOW (ref 135–145)
SODIUM SERPL-SCNC: 131 MMOL/L — LOW (ref 135–145)
SODIUM SERPL-SCNC: 131 MMOL/L — LOW (ref 135–145)
SODIUM SERPL-SCNC: 134 MMOL/L — LOW (ref 135–145)
WBC # BLD: 13.37 K/UL — HIGH (ref 3.8–10.5)
WBC # FLD AUTO: 13.37 K/UL — HIGH (ref 3.8–10.5)

## 2020-03-01 PROCEDURE — 99232 SBSQ HOSP IP/OBS MODERATE 35: CPT

## 2020-03-01 RX ORDER — TOLVAPTAN 15 MG/1
45 TABLET ORAL
Refills: 0 | Status: DISCONTINUED | OUTPATIENT
Start: 2020-03-01 | End: 2020-03-02

## 2020-03-01 RX ORDER — TOLVAPTAN 15 MG/1
15 TABLET ORAL AT BEDTIME
Refills: 0 | Status: DISCONTINUED | OUTPATIENT
Start: 2020-03-01 | End: 2020-03-02

## 2020-03-01 RX ORDER — OXYCODONE HYDROCHLORIDE 5 MG/1
5 TABLET ORAL EVERY 4 HOURS
Refills: 0 | Status: DISCONTINUED | OUTPATIENT
Start: 2020-03-01 | End: 2020-03-04

## 2020-03-01 RX ORDER — OXYCODONE HYDROCHLORIDE 5 MG/1
10 TABLET ORAL EVERY 4 HOURS
Refills: 0 | Status: DISCONTINUED | OUTPATIENT
Start: 2020-03-01 | End: 2020-03-04

## 2020-03-01 RX ORDER — LIDOCAINE 4 G/100G
1 CREAM TOPICAL ONCE
Refills: 0 | Status: COMPLETED | OUTPATIENT
Start: 2020-03-01 | End: 2020-03-01

## 2020-03-01 RX ADMIN — POLYETHYLENE GLYCOL 3350 17 GRAM(S): 17 POWDER, FOR SOLUTION ORAL at 17:56

## 2020-03-01 RX ADMIN — TOLVAPTAN 15 MILLIGRAM(S): 15 TABLET ORAL at 23:50

## 2020-03-01 RX ADMIN — Medication 650 MILLIGRAM(S): at 23:27

## 2020-03-01 RX ADMIN — SODIUM CHLORIDE 2 GRAM(S): 9 INJECTION INTRAMUSCULAR; INTRAVENOUS; SUBCUTANEOUS at 14:08

## 2020-03-01 RX ADMIN — Medication 650 MILLIGRAM(S): at 10:34

## 2020-03-01 RX ADMIN — Medication 500 MILLIGRAM(S): at 05:29

## 2020-03-01 RX ADMIN — GABAPENTIN 300 MILLIGRAM(S): 400 CAPSULE ORAL at 05:29

## 2020-03-01 RX ADMIN — Medication 500 MILLIGRAM(S): at 17:55

## 2020-03-01 RX ADMIN — ATORVASTATIN CALCIUM 10 MILLIGRAM(S): 80 TABLET, FILM COATED ORAL at 21:25

## 2020-03-01 RX ADMIN — SENNA PLUS 2 TABLET(S): 8.6 TABLET ORAL at 21:25

## 2020-03-01 RX ADMIN — PANTOPRAZOLE SODIUM 40 MILLIGRAM(S): 20 TABLET, DELAYED RELEASE ORAL at 21:24

## 2020-03-01 RX ADMIN — Medication 5 MILLIGRAM(S): at 17:55

## 2020-03-01 RX ADMIN — Medication 650 MILLIGRAM(S): at 10:04

## 2020-03-01 RX ADMIN — AMLODIPINE BESYLATE 5 MILLIGRAM(S): 2.5 TABLET ORAL at 05:30

## 2020-03-01 RX ADMIN — Medication 5 MILLIGRAM(S): at 21:24

## 2020-03-01 RX ADMIN — SODIUM CHLORIDE 2 GRAM(S): 9 INJECTION INTRAMUSCULAR; INTRAVENOUS; SUBCUTANEOUS at 17:55

## 2020-03-01 RX ADMIN — GABAPENTIN 300 MILLIGRAM(S): 400 CAPSULE ORAL at 21:23

## 2020-03-01 RX ADMIN — Medication 1: at 12:00

## 2020-03-01 RX ADMIN — ENOXAPARIN SODIUM 40 MILLIGRAM(S): 100 INJECTION SUBCUTANEOUS at 17:55

## 2020-03-01 RX ADMIN — Medication 1 TABLET(S): at 14:09

## 2020-03-01 RX ADMIN — GABAPENTIN 300 MILLIGRAM(S): 400 CAPSULE ORAL at 14:09

## 2020-03-01 RX ADMIN — OXYCODONE HYDROCHLORIDE 5 MILLIGRAM(S): 5 TABLET ORAL at 00:18

## 2020-03-01 RX ADMIN — LIDOCAINE 1 APPLICATION(S): 4 CREAM TOPICAL at 23:50

## 2020-03-01 RX ADMIN — Medication 1 SPRAY(S): at 17:56

## 2020-03-01 RX ADMIN — DULOXETINE HYDROCHLORIDE 40 MILLIGRAM(S): 30 CAPSULE, DELAYED RELEASE ORAL at 14:08

## 2020-03-01 RX ADMIN — SODIUM CHLORIDE 2 GRAM(S): 9 INJECTION INTRAMUSCULAR; INTRAVENOUS; SUBCUTANEOUS at 23:26

## 2020-03-01 RX ADMIN — SODIUM CHLORIDE 2 GRAM(S): 9 INJECTION INTRAMUSCULAR; INTRAVENOUS; SUBCUTANEOUS at 05:29

## 2020-03-01 RX ADMIN — Medication 1 MILLIGRAM(S): at 14:08

## 2020-03-01 RX ADMIN — OXYCODONE HYDROCHLORIDE 5 MILLIGRAM(S): 5 TABLET ORAL at 17:59

## 2020-03-01 RX ADMIN — OXYCODONE HYDROCHLORIDE 5 MILLIGRAM(S): 5 TABLET ORAL at 18:29

## 2020-03-01 RX ADMIN — Medication 1 SPRAY(S): at 05:30

## 2020-03-01 NOTE — PROGRESS NOTE ADULT - SUBJECTIVE AND OBJECTIVE BOX
SUBJECTIVE: Pt seen and examined,     OVERNIGHT EVENTS:     Vital Signs Last 24 Hrs  T(C): 37.1 (01 Mar 2020 08:08), Max: 37.1 (01 Mar 2020 08:08)  T(F): 98.7 (01 Mar 2020 08:08), Max: 98.7 (01 Mar 2020 08:08)  HR: 80 (01 Mar 2020 08:08) (73 - 93)  BP: 125/75 (01 Mar 2020 08:08) (124/72 - 185/94)  BP(mean): --  RR: 18 (01 Mar 2020 08:08) (16 - 18)  SpO2: 96% (01 Mar 2020 08:08) (95% - 98%)    PHYSICAL EXAM:    General: No Acute Distress     Neurological: Awake, alert oriented to person, place and time, Following Commands, PERRL, EOMI, Face Symmetrical, Speech Fluent, Moving all extremities, Muscle Strength normal in all four extremities, No Drift, Sensation to Light Touch Intact    Pulmonary: Clear to Auscultation, No Rales, No Rhonchi, No Wheezes     Cardiovascular: S1, S2, Regular Rate and Rhythm     Gastrointestinal: Soft, Nontender, Nondistended     Incision:     LABS:                        12.2   13.37 )-----------( 204      ( 01 Mar 2020 06:09 )             37.9    03-01    134<L>  |  100  |  32<H>  ----------------------------<  129<H>  4.1   |  22  |  0.84    Ca    8.3<L>      01 Mar 2020 06:59      Hemoglobin A1C, Whole Blood: 6.0 % (02-06 @ 13:46)      03-01 @ 07:01  -  03-01 @ 11:45  --------------------------------------------------------  IN: 20 mL / OUT: 0 mL / NET: 20 mL      DRAINS:     MEDICATIONS:  Antibiotics:    Neuro:  acetaminophen   Tablet .. 650 milliGRAM(s) Oral every 6 hours PRN Temp greater or equal to 38C (100.4F), Mild Pain (1 - 3)  DULoxetine 40 milliGRAM(s) Oral daily  gabapentin 300 milliGRAM(s) Oral three times a day  melatonin 5 milliGRAM(s) Oral at bedtime  ondansetron Injectable 4 milliGRAM(s) IV Push every 8 hours PRN Nausea and/or Vomiting  oxyCODONE    IR 5 milliGRAM(s) Oral every 4 hours PRN Moderate Pain (4 - 6)  oxyCODONE    IR 10 milliGRAM(s) Oral every 4 hours PRN Severe Pain (7 - 10)  valproic acid 500 milliGRAM(s) Oral every 12 hours    Cardiac:  amLODIPine   Tablet 5 milliGRAM(s) Oral daily    Pulm:    GI/:  bisacodyl 5 milliGRAM(s) Oral every 12 hours  pantoprazole    Tablet 40 milliGRAM(s) Oral at bedtime  polyethylene glycol 3350 17 Gram(s) Oral two times a day  senna 2 Tablet(s) Oral at bedtime    Other:   atorvastatin 10 milliGRAM(s) Oral at bedtime  dextrose 40% Gel 15 Gram(s) Oral once PRN Blood Glucose LESS THAN 70 milliGRAM(s)/deciLiter  dextrose 5%. 1000 milliLiter(s) IV Continuous <Continuous>  dextrose 50% Injectable 12.5 Gram(s) IV Push once  dextrose 50% Injectable 25 Gram(s) IV Push once  dextrose 50% Injectable 25 Gram(s) IV Push once  enoxaparin Injectable 40 milliGRAM(s) SubCutaneous <User Schedule>  fluticasone propionate 50 MICROgram(s)/spray Nasal Spray 1 Spray(s) Both Nostrils two times a day  folic acid 1 milliGRAM(s) Oral daily  glucagon  Injectable 1 milliGRAM(s) IntraMuscular once PRN Glucose <70 milliGRAM(s)/deciLiter  insulin lispro (HumaLOG) corrective regimen sliding scale   SubCutaneous three times a day with meals  insulin lispro (HumaLOG) corrective regimen sliding scale   SubCutaneous at bedtime  multivitamin 1 Tablet(s) Oral daily  sodium chloride 2 Gram(s) Oral every 6 hours  sodium chloride 2% . 1000 milliLiter(s) IV Continuous <Continuous>    DIET: [] Regular [] CCD [] Renal [] Puree [] Dysphagia [] Tube Feeds:     IMAGING: SUBJECTIVE: Pt seen and examined, resting in bed    OVERNIGHT EVENTS:     Vital Signs Last 24 Hrs  T(C): 37.1 (01 Mar 2020 08:08), Max: 37.1 (01 Mar 2020 08:08)  T(F): 98.7 (01 Mar 2020 08:08), Max: 98.7 (01 Mar 2020 08:08)  HR: 80 (01 Mar 2020 08:08) (73 - 93)  BP: 125/75 (01 Mar 2020 08:08) (124/72 - 185/94)  BP(mean): --  RR: 18 (01 Mar 2020 08:08) (16 - 18)  SpO2: 96% (01 Mar 2020 08:08) (95% - 98%)    PHYSICAL EXAM:    General: No Acute Distress     Neurological: Awake, alert oriented to person, place and time, Following Commands, PERRL, EOMI, Face Symmetrical, Speech Fluent, Moving all extremities, Muscle Strength normal in all four extremities, No Drift, Sensation to Light Touch Intact    Pulmonary: Clear to Auscultation, No Rales, No Rhonchi, No Wheezes     Cardiovascular: S1, S2, Regular Rate and Rhythm     Gastrointestinal: Soft, Nontender, Nondistended     Incision:     LABS:                        12.2   13.37 )-----------( 204      ( 01 Mar 2020 06:09 )             37.9    03-01    134<L>  |  100  |  32<H>  ----------------------------<  129<H>  4.1   |  22  |  0.84    Ca    8.3<L>      01 Mar 2020 06:59      Hemoglobin A1C, Whole Blood: 6.0 % (02-06 @ 13:46)      03-01 @ 07:01  -  03-01 @ 11:45  --------------------------------------------------------  IN: 20 mL / OUT: 0 mL / NET: 20 mL      DRAINS:     MEDICATIONS:  Antibiotics:    Neuro:  acetaminophen   Tablet .. 650 milliGRAM(s) Oral every 6 hours PRN Temp greater or equal to 38C (100.4F), Mild Pain (1 - 3)  DULoxetine 40 milliGRAM(s) Oral daily  gabapentin 300 milliGRAM(s) Oral three times a day  melatonin 5 milliGRAM(s) Oral at bedtime  ondansetron Injectable 4 milliGRAM(s) IV Push every 8 hours PRN Nausea and/or Vomiting  oxyCODONE    IR 5 milliGRAM(s) Oral every 4 hours PRN Moderate Pain (4 - 6)  oxyCODONE    IR 10 milliGRAM(s) Oral every 4 hours PRN Severe Pain (7 - 10)  valproic acid 500 milliGRAM(s) Oral every 12 hours    Cardiac:  amLODIPine   Tablet 5 milliGRAM(s) Oral daily    Pulm:    GI/:  bisacodyl 5 milliGRAM(s) Oral every 12 hours  pantoprazole    Tablet 40 milliGRAM(s) Oral at bedtime  polyethylene glycol 3350 17 Gram(s) Oral two times a day  senna 2 Tablet(s) Oral at bedtime    Other:   atorvastatin 10 milliGRAM(s) Oral at bedtime  dextrose 40% Gel 15 Gram(s) Oral once PRN Blood Glucose LESS THAN 70 milliGRAM(s)/deciLiter  dextrose 5%. 1000 milliLiter(s) IV Continuous <Continuous>  dextrose 50% Injectable 12.5 Gram(s) IV Push once  dextrose 50% Injectable 25 Gram(s) IV Push once  dextrose 50% Injectable 25 Gram(s) IV Push once  enoxaparin Injectable 40 milliGRAM(s) SubCutaneous <User Schedule>  fluticasone propionate 50 MICROgram(s)/spray Nasal Spray 1 Spray(s) Both Nostrils two times a day  folic acid 1 milliGRAM(s) Oral daily  glucagon  Injectable 1 milliGRAM(s) IntraMuscular once PRN Glucose <70 milliGRAM(s)/deciLiter  insulin lispro (HumaLOG) corrective regimen sliding scale   SubCutaneous three times a day with meals  insulin lispro (HumaLOG) corrective regimen sliding scale   SubCutaneous at bedtime  multivitamin 1 Tablet(s) Oral daily  sodium chloride 2 Gram(s) Oral every 6 hours  sodium chloride 2% . 1000 milliLiter(s) IV Continuous <Continuous>    DIET: [] Regular [] CCD [] Renal [] Puree [] Dysphagia [] Tube Feeds:     IMAGING: SUBJECTIVE: Pt seen and examined, resting in bed    OVERNIGHT EVENTS: none    Vital Signs Last 24 Hrs  T(C): 37.1 (01 Mar 2020 08:08), Max: 37.1 (01 Mar 2020 08:08)  T(F): 98.7 (01 Mar 2020 08:08), Max: 98.7 (01 Mar 2020 08:08)  HR: 80 (01 Mar 2020 08:08) (73 - 93)  BP: 125/75 (01 Mar 2020 08:08) (124/72 - 185/94)  BP(mean): --  RR: 18 (01 Mar 2020 08:08) (16 - 18)  SpO2: 96% (01 Mar 2020 08:08) (95% - 98%)    PHYSICAL EXAM:    General: No Acute Distress     Neurological: Awake, alert oriented to person, place and time, Following Commands, PERRL, EOMI, Face Symmetrical, Speech Fluent, Moving all extremities, LUE drift, LUE 4/5, otherwise 5/5, Sensation to Light Touch Intact    Pulmonary: Clear to Auscultation, No Rales, No Rhonchi, No Wheezes     Cardiovascular: S1, S2, Regular Rate and Rhythm     Gastrointestinal: Soft, Nontender, Nondistended     Incision: crani incision healed    LABS:                        12.2   13.37 )-----------( 204      ( 01 Mar 2020 06:09 )             37.9    03-01    134<L>  |  100  |  32<H>  ----------------------------<  129<H>  4.1   |  22  |  0.84    Ca    8.3<L>      01 Mar 2020 06:59      Hemoglobin A1C, Whole Blood: 6.0 % (02-06 @ 13:46)      03-01 @ 07:01  -  03-01 @ 11:45  --------------------------------------------------------  IN: 20 mL / OUT: 0 mL / NET: 20 mL      DRAINS: none    MEDICATIONS:  Antibiotics:    Neuro:  acetaminophen   Tablet .. 650 milliGRAM(s) Oral every 6 hours PRN Temp greater or equal to 38C (100.4F), Mild Pain (1 - 3)  DULoxetine 40 milliGRAM(s) Oral daily  gabapentin 300 milliGRAM(s) Oral three times a day  melatonin 5 milliGRAM(s) Oral at bedtime  ondansetron Injectable 4 milliGRAM(s) IV Push every 8 hours PRN Nausea and/or Vomiting  oxyCODONE    IR 5 milliGRAM(s) Oral every 4 hours PRN Moderate Pain (4 - 6)  oxyCODONE    IR 10 milliGRAM(s) Oral every 4 hours PRN Severe Pain (7 - 10)  valproic acid 500 milliGRAM(s) Oral every 12 hours    Cardiac:  amLODIPine   Tablet 5 milliGRAM(s) Oral daily    Pulm:    GI/:  bisacodyl 5 milliGRAM(s) Oral every 12 hours  pantoprazole    Tablet 40 milliGRAM(s) Oral at bedtime  polyethylene glycol 3350 17 Gram(s) Oral two times a day  senna 2 Tablet(s) Oral at bedtime    Other:   atorvastatin 10 milliGRAM(s) Oral at bedtime  dextrose 40% Gel 15 Gram(s) Oral once PRN Blood Glucose LESS THAN 70 milliGRAM(s)/deciLiter  dextrose 5%. 1000 milliLiter(s) IV Continuous <Continuous>  dextrose 50% Injectable 12.5 Gram(s) IV Push once  dextrose 50% Injectable 25 Gram(s) IV Push once  dextrose 50% Injectable 25 Gram(s) IV Push once  enoxaparin Injectable 40 milliGRAM(s) SubCutaneous <User Schedule>  fluticasone propionate 50 MICROgram(s)/spray Nasal Spray 1 Spray(s) Both Nostrils two times a day  folic acid 1 milliGRAM(s) Oral daily  glucagon  Injectable 1 milliGRAM(s) IntraMuscular once PRN Glucose <70 milliGRAM(s)/deciLiter  insulin lispro (HumaLOG) corrective regimen sliding scale   SubCutaneous three times a day with meals  insulin lispro (HumaLOG) corrective regimen sliding scale   SubCutaneous at bedtime  multivitamin 1 Tablet(s) Oral daily  sodium chloride 2 Gram(s) Oral every 6 hours  sodium chloride 2% . 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 No change in hydrocephalus compared with 2/17/2020.    Stable severe ventricular dilation with ventriculoperitoneal shunt catheter in appropriate position.    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    < end of copied text >  < from: [facility] Duplex Upper Ext Vein Scan, Left (02.26.20 @ 10:33) >  IMPRESSION:     No evidence of left upper extremity deep venous thrombosis.    < end of copied text >

## 2020-03-01 NOTE — PROGRESS NOTE ADULT - ASSESSMENT
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 noticed with hyponatremia.    1- hyponatremia  2- nausea overall subsiding   3-  HTN       suspect hyponatremia siadh  nacl 2 g q6   no improvement in na with urena.   if sodium decrease further will start tolvaptan and monitor response. I d/w Dr Vineet bazan 5 mg daily

## 2020-03-01 NOTE — PROGRESS NOTE ADULT - SUBJECTIVE AND OBJECTIVE BOX
[facility] Division of Hospital Medicine  Trell Hannah MD  Pager (M-F, [phone]-[phone]): [phone]  Other Times:  [phone]    Patient is a 64y old  Male who presents with a chief complaint of Brain tumor (01 Mar 2020 11:44)    SUBJECTIVE / OVERNIGHT EVENTS: c/o thirst only - on fluid restriction  ADDITIONAL REVIEW OF SYSTEMS:    MEDICATIONS  (STANDING):  amLODIPine   Tablet 5 milliGRAM(s) Oral daily  atorvastatin 10 milliGRAM(s) Oral at bedtime  bisacodyl 5 milliGRAM(s) Oral every 12 hours  dextrose 5%. 1000 milliLiter(s) (50 mL/Hr) IV Continuous <Continuous>  dextrose 50% Injectable 12.5 Gram(s) IV Push once  dextrose 50% Injectable 25 Gram(s) IV Push once  dextrose 50% Injectable 25 Gram(s) IV Push once  DULoxetine 40 milliGRAM(s) Oral daily  enoxaparin Injectable 40 milliGRAM(s) SubCutaneous <User Schedule>  fluticasone propionate 50 MICROgram(s)/spray Nasal Spray 1 Spray(s) Both Nostrils two times a day  folic acid 1 milliGRAM(s) Oral daily  gabapentin 300 milliGRAM(s) Oral three times a day  insulin lispro (HumaLOG) corrective regimen sliding scale   SubCutaneous three times a day with meals  insulin lispro (HumaLOG) corrective regimen sliding scale   SubCutaneous at bedtime  melatonin 5 milliGRAM(s) Oral at bedtime  multivitamin 1 Tablet(s) Oral daily  pantoprazole    Tablet 40 milliGRAM(s) Oral at bedtime  polyethylene glycol 3350 17 Gram(s) Oral two times a day  senna 2 Tablet(s) Oral at bedtime  sodium chloride 2 Gram(s) Oral every 6 hours  valproic acid 500 milliGRAM(s) Oral every 12 hours    MEDICATIONS  (PRN):  acetaminophen   Tablet .. 650 milliGRAM(s) Oral every 6 hours PRN Temp greater or equal to 38C (100.4F), Mild Pain (1 - 3)  dextrose 40% Gel 15 Gram(s) Oral once PRN Blood Glucose LESS THAN 70 milliGRAM(s)/deciLiter  glucagon  Injectable 1 milliGRAM(s) IntraMuscular once PRN Glucose <70 milliGRAM(s)/deciLiter  ondansetron Injectable 4 milliGRAM(s) IV Push every 8 hours PRN Nausea and/or Vomiting  oxyCODONE    IR 5 milliGRAM(s) Oral every 4 hours PRN Moderate Pain (4 - 6)  oxyCODONE    IR 10 milliGRAM(s) Oral every 4 hours PRN Severe Pain (7 - 10)      CAPILLARY BLOOD GLUCOSE      POCT Blood Glucose.: 172 mg/dL (01 Mar 2020 12:29)  POCT Blood Glucose.: 113 mg/dL (01 Mar 2020 08:45)  POCT Blood Glucose.: 143 mg/dL (29 Feb 2020 21:57)  POCT Blood Glucose.: 168 mg/dL (29 Feb 2020 17:12)    I&O's Summary    01 Mar 2020 07:01  -  01 Mar 2020 15:49  --------------------------------------------------------  IN: 20 mL / OUT: 0 mL / NET: 20 mL        PHYSICAL EXAM:  Vital Signs Last 24 Hrs  T(C): 36.6 (01 Mar 2020 12:06), Max: 37.1 (01 Mar 2020 08:08)  T(F): 97.9 (01 Mar 2020 12:06), Max: 98.7 (01 Mar 2020 08:08)  HR: 68 (01 Mar 2020 12:06) (68 - 80)  BP: 129/77 (01 Mar 2020 12:06) (124/72 - 137/65)  BP(mean): --  RR: 18 (01 Mar 2020 12:06) (16 - 18)  SpO2: 97% (01 Mar 2020 12:06) (95% - 97%)  GENERAL: NAD, well-developed  EYES:  conjunctiva and sclera clear  NECK: Supple, No JVD  CHEST/LUNG: Clear to auscultation bilaterally; No wheeze  HEART: Regular rate and rhythm; No murmurs, rubs, or gallops  ABDOMEN: Soft, diffusely tender, Nondistended; Bowel sounds present no rebound some voluntary guarding  EXTREMITIES:  2+ Peripheral Pulses, No clubbing, cyanosis, or edema  NEUROLOGY:  AAOx2 L pronator drift 4/5 UE.  PSYCH: limited affect  SKIN: crani incision    LABS:                        12.2   13.37 )-----------( 204      ( 01 Mar 2020 06:09 )             37.9     03-01    134<L>  |  100  |  32<H>  ----------------------------<  129<H>  4.1   |  22  |  0.84    Ca    8.3<L>      01 Mar 2020 06:59        RADIOLOGY & ADDITIONAL TESTS:  Results Reviewed:   Imaging Personally Reviewed:  Electrocardiogram Personally Reviewed:    COORDINATION OF CARE:  Care Discussed with Consultants/Other Providers [Y/N]: KYLE bridges  Prior or Outpatient Records Reviewed [Y/N]:

## 2020-03-01 NOTE — PROGRESS NOTE ADULT - SUBJECTIVE AND OBJECTIVE BOX
Subjective: Patient seen and examined. No new events except as noted.     SUBJECTIVE/ROS:        MEDICATIONS:  MEDICATIONS  (STANDING):  amLODIPine   Tablet 5 milliGRAM(s) Oral daily  atorvastatin 10 milliGRAM(s) Oral at bedtime  bisacodyl 5 milliGRAM(s) Oral every 12 hours  dextrose 5%. 1000 milliLiter(s) (50 mL/Hr) IV Continuous <Continuous>  dextrose 50% Injectable 12.5 Gram(s) IV Push once  dextrose 50% Injectable 25 Gram(s) IV Push once  dextrose 50% Injectable 25 Gram(s) IV Push once  DULoxetine 40 milliGRAM(s) Oral daily  enoxaparin Injectable 40 milliGRAM(s) SubCutaneous <User Schedule>  fluticasone propionate 50 MICROgram(s)/spray Nasal Spray 1 Spray(s) Both Nostrils two times a day  folic acid 1 milliGRAM(s) Oral daily  gabapentin 300 milliGRAM(s) Oral three times a day  insulin lispro (HumaLOG) corrective regimen sliding scale   SubCutaneous three times a day with meals  insulin lispro (HumaLOG) corrective regimen sliding scale   SubCutaneous at bedtime  melatonin 5 milliGRAM(s) Oral at bedtime  multivitamin 1 Tablet(s) Oral daily  pantoprazole    Tablet 40 milliGRAM(s) Oral at bedtime  polyethylene glycol 3350 17 Gram(s) Oral two times a day  senna 2 Tablet(s) Oral at bedtime  sodium chloride 2 Gram(s) Oral every 6 hours  sodium chloride 2% . 1000 milliLiter(s) (50 mL/Hr) IV Continuous <Continuous>  valproic acid 500 milliGRAM(s) Oral every 12 hours      PHYSICAL EXAM:  T(C): 36.8 (03-01-20 @ 04:45), Max: 36.9 (02-29-20 @ 12:47)  HR: 74 (03-01-20 @ 04:45) (73 - 93)  BP: 124/72 (03-01-20 @ 04:45) (124/72 - 185/94)  RR: 18 (03-01-20 @ 04:45) (16 - 18)  SpO2: 95% (03-01-20 @ 04:45) (95% - 98%)  Wt(kg): --  I&O's Summary          JVP: Normal  Neck: supple  Lung: clear   CV: S1 S2 , Murmur:  Abd: soft  Ext: No edema  neuro: Awake / alert  Psych: flat affect  Skin: normal``    LABS/DATA:    CARDIAC MARKERS:                                12.2   13.37 )-----------( 204      ( 01 Mar 2020 06:09 )             37.9     03-01    134<L>  |  100  |  32<H>  ----------------------------<  129<H>  4.1   |  22  |  0.84    Ca    8.3<L>      01 Mar 2020 06:59      proBNP:   Lipid Profile:   HgA1c:   TSH:     TELE:  EKG:

## 2020-03-01 NOTE — PROGRESS NOTE ADULT - PROBLEM SELECTOR PLAN 8
Transitions of Care Status:  1.  Name of PCP: Georgia Leonard  2.  PCP Contacted on Admission: [ ] Y    [ x] N    3.  PCP contacted at Discharge: [ ] Y    [ ] N    [ ] N/A  4.  Post-Discharge Appointment Date and Location:  5.  Summary of Handoff given to PCP:

## 2020-03-01 NOTE — PROGRESS NOTE ADULT - ASSESSMENT
63 yo male. 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  c/o worsening left sided paresthesia in December, hospitalized at [facility] Dec 3-17, 2019, found to have subacute SDH along R frontal lobe and large mass in third ventricle. pt was referred to neurosurgery consult and transferred to rehab facility, now presents to PST scheduled for craniotomy surgery for removal of brain mass.      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 Pathology: colloid cyst. Hospital course c/b difficult to manage hyponatremia.       PLAN:  Neuro:   - hyponatremia- Na currently 134, stopped 2% NaCl this morning per Nephrology, f/u BMP, continue salt tabs 2g q6, Fluid restriction 1 liter/day  - pain control- gabapentin 300tid  - depressed mood - continue cymbalta, VPA (seizure ppx) and melatonin for sleep, managed by psych  CV:  - HTN- continue amlodipine, followed by Cardiology  Endocrine:   - DMT2- continue fingersticks with sliding scale insulin coverage          DVT ppx:   - venodynes, sq lovenox  GI:    - constipation- resolved, pt had multiple BM's yesterday and today  PT/OT:   - CÉSAR    Assessment:  Please Check When Present   []  GCS  E   V  M     Heart Failure: []Acute, [] acute on chronic , []chronic  Heart Failure:  [] Diastolic (HFpEF), [] Systolic (HFrEF), []Combined (HFpEF and HFrEF), [] RHF, [] Pulm HTN, [] Other    [] RA, [] ATN, [] AIN, [] other  [] CKD1, [] CKD2, [] CKD 3, [] CKD 4, [] CKD 5, []ESRD    Encephalopathy: [] Metabolic, [] Hepatic, [] toxic, [] Neurological, [] Other    Abnormal Nurtitional Status: [] malnurtition (see nutrition note), [ ]underweight: BMI < 19, [] morbid obesity: BMI >40, [] Cachexia    [] Sepsis  [] hypovolemic shock,[] cardiogenic shock, [] hemorrhagic shock, [] neuogenic shock  [] Acute Respiratory Failure  []Cerebral edema, [] Brain compression/ herniation,   [] Functional quadriplegia  [] Acute blood loss anemia    Spectralink # [id number]

## 2020-03-01 NOTE — PROGRESS NOTE ADULT - SUBJECTIVE AND OBJECTIVE BOX
[facility]   [phone]  RENAL FOLLOW UP NOTE  --------------------------------------------------------------------------------  Chief Complaint:    24 hour events/subjective:    seen earlier.   2% nacl dc   c/o being thirsty    PAST HISTORY  --------------------------------------------------------------------------------  No significant changes to PMH, PSH, FHx, SHx, unless otherwise noted    ALLERGIES & MEDICATIONS  --------------------------------------------------------------------------------  Allergies    No Known Allergies    Intolerances      Standing Inpatient Medications  amLODIPine   Tablet 5 milliGRAM(s) Oral daily  atorvastatin 10 milliGRAM(s) Oral at bedtime  bisacodyl 5 milliGRAM(s) Oral every 12 hours  dextrose 5%. 1000 milliLiter(s) IV Continuous <Continuous>  dextrose 50% Injectable 12.5 Gram(s) IV Push once  dextrose 50% Injectable 25 Gram(s) IV Push once  dextrose 50% Injectable 25 Gram(s) IV Push once  DULoxetine 40 milliGRAM(s) Oral daily  enoxaparin Injectable 40 milliGRAM(s) SubCutaneous <User Schedule>  fluticasone propionate 50 MICROgram(s)/spray Nasal Spray 1 Spray(s) Both Nostrils two times a day  folic acid 1 milliGRAM(s) Oral daily  gabapentin 300 milliGRAM(s) Oral three times a day  insulin lispro (HumaLOG) corrective regimen sliding scale   SubCutaneous three times a day with meals  insulin lispro (HumaLOG) corrective regimen sliding scale   SubCutaneous at bedtime  melatonin 5 milliGRAM(s) Oral at bedtime  multivitamin 1 Tablet(s) Oral daily  pantoprazole    Tablet 40 milliGRAM(s) Oral at bedtime  polyethylene glycol 3350 17 Gram(s) Oral two times a day  senna 2 Tablet(s) Oral at bedtime  sodium chloride 2 Gram(s) Oral every 6 hours  valproic acid 500 milliGRAM(s) Oral every 12 hours    PRN Inpatient Medications  acetaminophen   Tablet .. 650 milliGRAM(s) Oral every 6 hours PRN  dextrose 40% Gel 15 Gram(s) Oral once PRN  glucagon  Injectable 1 milliGRAM(s) IntraMuscular once PRN  ondansetron Injectable 4 milliGRAM(s) IV Push every 8 hours PRN  oxyCODONE    IR 5 milliGRAM(s) Oral every 4 hours PRN  oxyCODONE    IR 10 milliGRAM(s) Oral every 4 hours PRN      REVIEW OF SYSTEMS  --------------------------------------------------------------------------------    Gen: denies fevers/chills,  CVS: denies chest pain/palpitations  Resp: denies SOB/Cough  GI: Denies N/V/Abd pain  : Denies dysuria     All other systems were reviewed and are negative, except as noted.    VITALS/PHYSICAL EXAM  --------------------------------------------------------------------------------  T(C): 36.6 (03-01-20 @ 12:06), Max: 37.1 (03-01-20 @ 08:08)  HR: 68 (03-01-20 @ 12:06) (68 - 80)  BP: 129/77 (03-01-20 @ 12:06) (124/72 - 137/65)  RR: 18 (03-01-20 @ 12:06) (16 - 18)  SpO2: 97% (03-01-20 @ 12:06) (95% - 97%)  Wt(kg): --        03-01-20 @ 07:01  -  03-01-20 @ 16:23  --------------------------------------------------------  IN: 20 mL / OUT: 0 mL / NET: 20 mL      Physical Exam:  	  Gen: falls still  overall lethargic  	no jvd  	Pulm: decrease bs  no rales or ronchi or wheezing  	CV: RRR, S1S2; no rub  	Abd: +BS, soft, nontender/nondistended  	: No suprapubic tenderness  	UE: Warm, no cyanosis  no clubbing,  no edema;  	LE: Warm, no cyanosis  no clubbing, no edema  	  LABS/STUDIES  --------------------------------------------------------------------------------              12.2   13.37 >-----------<  204      [03-01-20 @ 06:09]              37.9     131  |  98  |  26  ----------------------------<  194      [03-01-20 @ 15:31]  4.2   |  22  |  0.87        Ca     8.3     [03-01-20 @ 15:31]            Creatinine Trend:  SCr 0.87 [03-01 @ 15:31]  SCr 0.84 [03-01 @ 06:59]  SCr 0.91 [03-01 @ 01:47]  SCr 0.84 [02-29 @ 17:35]  SCr 0.80 [02-29 @ 10:44]                  HbA1c 6.0      [02-06-20 @ 13:46]  TSH 1.57      [02-27-20 @ 17:39]

## 2020-03-02 LAB
ANION GAP SERPL CALC-SCNC: 11 MMOL/L — SIGNIFICANT CHANGE UP (ref 5–17)
ANION GAP SERPL CALC-SCNC: 12 MMOL/L — SIGNIFICANT CHANGE UP (ref 5–17)
ANION GAP SERPL CALC-SCNC: 15 MMOL/L — SIGNIFICANT CHANGE UP (ref 5–17)
BUN SERPL-MCNC: 19 MG/DL — SIGNIFICANT CHANGE UP (ref 7–23)
BUN SERPL-MCNC: 20 MG/DL — SIGNIFICANT CHANGE UP (ref 7–23)
BUN SERPL-MCNC: 22 MG/DL — SIGNIFICANT CHANGE UP (ref 7–23)
CALCIUM SERPL-MCNC: 8.7 MG/DL — SIGNIFICANT CHANGE UP (ref 8.4–10.5)
CALCIUM SERPL-MCNC: 8.9 MG/DL — SIGNIFICANT CHANGE UP (ref 8.4–10.5)
CALCIUM SERPL-MCNC: 9.2 MG/DL — SIGNIFICANT CHANGE UP (ref 8.4–10.5)
CHLORIDE SERPL-SCNC: 102 MMOL/L — SIGNIFICANT CHANGE UP (ref 96–108)
CHLORIDE SERPL-SCNC: 96 MMOL/L — SIGNIFICANT CHANGE UP (ref 96–108)
CHLORIDE SERPL-SCNC: 97 MMOL/L — SIGNIFICANT CHANGE UP (ref 96–108)
CO2 SERPL-SCNC: 19 MMOL/L — LOW (ref 22–31)
CO2 SERPL-SCNC: 19 MMOL/L — LOW (ref 22–31)
CO2 SERPL-SCNC: 25 MMOL/L — SIGNIFICANT CHANGE UP (ref 22–31)
CREAT SERPL-MCNC: 0.73 MG/DL — SIGNIFICANT CHANGE UP (ref 0.5–1.3)
CREAT SERPL-MCNC: 0.85 MG/DL — SIGNIFICANT CHANGE UP (ref 0.5–1.3)
CREAT SERPL-MCNC: 0.89 MG/DL — SIGNIFICANT CHANGE UP (ref 0.5–1.3)
GLUCOSE BLDC GLUCOMTR-MCNC: 101 MG/DL — HIGH (ref 70–99)
GLUCOSE BLDC GLUCOMTR-MCNC: 107 MG/DL — HIGH (ref 70–99)
GLUCOSE BLDC GLUCOMTR-MCNC: 120 MG/DL — HIGH (ref 70–99)
GLUCOSE BLDC GLUCOMTR-MCNC: 126 MG/DL — HIGH (ref 70–99)
GLUCOSE BLDC GLUCOMTR-MCNC: 218 MG/DL — HIGH (ref 70–99)
GLUCOSE SERPL-MCNC: 100 MG/DL — HIGH (ref 70–99)
GLUCOSE SERPL-MCNC: 143 MG/DL — HIGH (ref 70–99)
GLUCOSE SERPL-MCNC: 158 MG/DL — HIGH (ref 70–99)
POTASSIUM SERPL-MCNC: 3.8 MMOL/L — SIGNIFICANT CHANGE UP (ref 3.5–5.3)
POTASSIUM SERPL-MCNC: 4.5 MMOL/L — SIGNIFICANT CHANGE UP (ref 3.5–5.3)
POTASSIUM SERPL-MCNC: 6 MMOL/L — HIGH (ref 3.5–5.3)
POTASSIUM SERPL-SCNC: 3.8 MMOL/L — SIGNIFICANT CHANGE UP (ref 3.5–5.3)
POTASSIUM SERPL-SCNC: 4.5 MMOL/L — SIGNIFICANT CHANGE UP (ref 3.5–5.3)
POTASSIUM SERPL-SCNC: 6 MMOL/L — HIGH (ref 3.5–5.3)
SODIUM SERPL-SCNC: 128 MMOL/L — LOW (ref 135–145)
SODIUM SERPL-SCNC: 130 MMOL/L — LOW (ref 135–145)
SODIUM SERPL-SCNC: 138 MMOL/L — SIGNIFICANT CHANGE UP (ref 135–145)

## 2020-03-02 PROCEDURE — 99232 SBSQ HOSP IP/OBS MODERATE 35: CPT

## 2020-03-02 RX ADMIN — DULOXETINE HYDROCHLORIDE 40 MILLIGRAM(S): 30 CAPSULE, DELAYED RELEASE ORAL at 12:13

## 2020-03-02 RX ADMIN — Medication 5 MILLIGRAM(S): at 05:22

## 2020-03-02 RX ADMIN — OXYCODONE HYDROCHLORIDE 10 MILLIGRAM(S): 5 TABLET ORAL at 12:12

## 2020-03-02 RX ADMIN — AMLODIPINE BESYLATE 5 MILLIGRAM(S): 2.5 TABLET ORAL at 05:22

## 2020-03-02 RX ADMIN — Medication 1 SPRAY(S): at 17:55

## 2020-03-02 RX ADMIN — Medication 1 TABLET(S): at 12:13

## 2020-03-02 RX ADMIN — OXYCODONE HYDROCHLORIDE 10 MILLIGRAM(S): 5 TABLET ORAL at 22:35

## 2020-03-02 RX ADMIN — Medication 500 MILLIGRAM(S): at 17:54

## 2020-03-02 RX ADMIN — GABAPENTIN 300 MILLIGRAM(S): 400 CAPSULE ORAL at 13:26

## 2020-03-02 RX ADMIN — ENOXAPARIN SODIUM 40 MILLIGRAM(S): 100 INJECTION SUBCUTANEOUS at 17:54

## 2020-03-02 RX ADMIN — GABAPENTIN 300 MILLIGRAM(S): 400 CAPSULE ORAL at 21:34

## 2020-03-02 RX ADMIN — ATORVASTATIN CALCIUM 10 MILLIGRAM(S): 80 TABLET, FILM COATED ORAL at 21:34

## 2020-03-02 RX ADMIN — Medication 500 MILLIGRAM(S): at 05:22

## 2020-03-02 RX ADMIN — Medication 1 MILLIGRAM(S): at 12:13

## 2020-03-02 RX ADMIN — GABAPENTIN 300 MILLIGRAM(S): 400 CAPSULE ORAL at 05:22

## 2020-03-02 RX ADMIN — Medication 1 SPRAY(S): at 05:23

## 2020-03-02 RX ADMIN — SENNA PLUS 2 TABLET(S): 8.6 TABLET ORAL at 21:44

## 2020-03-02 RX ADMIN — PANTOPRAZOLE SODIUM 40 MILLIGRAM(S): 20 TABLET, DELAYED RELEASE ORAL at 21:34

## 2020-03-02 RX ADMIN — Medication 650 MILLIGRAM(S): at 00:16

## 2020-03-02 RX ADMIN — Medication 2: at 13:25

## 2020-03-02 RX ADMIN — OXYCODONE HYDROCHLORIDE 10 MILLIGRAM(S): 5 TABLET ORAL at 23:05

## 2020-03-02 RX ADMIN — Medication 5 MILLIGRAM(S): at 21:34

## 2020-03-02 NOTE — PROGRESS NOTE ADULT - SUBJECTIVE AND OBJECTIVE BOX
[facility] Division of Hospital Medicine  Elina Pavon MD  spectra [phone]    Patient is a 64y old  Male who presents with a chief complaint of brain mass (01 Mar 2020 15:48)      SUBJECTIVE / OVERNIGHT EVENTS: per neurosurgery, patient c/o rectal pain requiring lidocaine jelly. + BM yesterday.   ADDITIONAL REVIEW OF SYSTEMS: patient seen this morning, asking for water. denied HA, any pain. no SOB, cough.     MEDICATIONS  (STANDING):  amLODIPine   Tablet 5 milliGRAM(s) Oral daily  atorvastatin 10 milliGRAM(s) Oral at bedtime  bisacodyl 5 milliGRAM(s) Oral every 12 hours  dextrose 5%. 1000 milliLiter(s) (50 mL/Hr) IV Continuous <Continuous>  dextrose 50% Injectable 12.5 Gram(s) IV Push once  dextrose 50% Injectable 25 Gram(s) IV Push once  dextrose 50% Injectable 25 Gram(s) IV Push once  DULoxetine 40 milliGRAM(s) Oral daily  enoxaparin Injectable 40 milliGRAM(s) SubCutaneous <User Schedule>  fluticasone propionate 50 MICROgram(s)/spray Nasal Spray 1 Spray(s) Both Nostrils two times a day  folic acid 1 milliGRAM(s) Oral daily  gabapentin 300 milliGRAM(s) Oral three times a day  insulin lispro (HumaLOG) corrective regimen sliding scale   SubCutaneous three times a day with meals  insulin lispro (HumaLOG) corrective regimen sliding scale   SubCutaneous at bedtime  melatonin 5 milliGRAM(s) Oral at bedtime  multivitamin 1 Tablet(s) Oral daily  pantoprazole    Tablet 40 milliGRAM(s) Oral at bedtime  polyethylene glycol 3350 17 Gram(s) Oral two times a day  senna 2 Tablet(s) Oral at bedtime  valproic acid 500 milliGRAM(s) Oral every 12 hours    MEDICATIONS  (PRN):  acetaminophen   Tablet .. 650 milliGRAM(s) Oral every 6 hours PRN Temp greater or equal to 38C (100.4F), Mild Pain (1 - 3)  dextrose 40% Gel 15 Gram(s) Oral once PRN Blood Glucose LESS THAN 70 milliGRAM(s)/deciLiter  glucagon  Injectable 1 milliGRAM(s) IntraMuscular once PRN Glucose <70 milliGRAM(s)/deciLiter  ondansetron Injectable 4 milliGRAM(s) IV Push every 8 hours PRN Nausea and/or Vomiting  oxyCODONE    IR 5 milliGRAM(s) Oral every 4 hours PRN Moderate Pain (4 - 6)  oxyCODONE    IR 10 milliGRAM(s) Oral every 4 hours PRN Severe Pain (7 - 10)      CAPILLARY BLOOD GLUCOSE      POCT Blood Glucose.: 101 mg/dL (02 Mar 2020 08:41)  POCT Blood Glucose.: 107 mg/dL (02 Mar 2020 08:37)  POCT Blood Glucose.: 105 mg/dL (01 Mar 2020 21:52)  POCT Blood Glucose.: 124 mg/dL (01 Mar 2020 17:23)    I&O's Summary    01 Mar 2020 07:01  -  02 Mar 2020 07:00  --------------------------------------------------------  IN: 20 mL / OUT: 0 mL / NET: 20 mL        PHYSICAL EXAM:  Vital Signs Last 24 Hrs  T(C): 36.8 (02 Mar 2020 08:26), Max: 36.8 (01 Mar 2020 19:10)  T(F): 98.2 (02 Mar 2020 08:26), Max: 98.3 (01 Mar 2020 19:10)  HR: 67 (02 Mar 2020 08:26) (67 - 78)  BP: 122/78 (02 Mar 2020 08:26) (122/78 - 152/81)  BP(mean): --  RR: 17 (02 Mar 2020 08:26) (17 - 18)  SpO2: 96% (02 Mar 2020 08:26) (96% - 98%)    CONSTITUTIONAL: NAD, well-developed, well-groomed  NECK: Supple, no palpable masses; no thyromegaly  RESPIRATORY: Normal respiratory effort; lungs are clear to auscultation bilaterally  CARDIOVASCULAR: normal S1 and S2; No lower extremity edema  ABDOMEN: Nontender to palpation, normoactive bowel sounds, no rebound/guarding; No hepatosplenomegaly  MUSCULOSKELETAL:  no clubbing or cyanosis of digits; no joint swelling or tenderness to palpation  PSYCH: A+O to person, place; depressed affect  NEURO: follows simple commands, moves all extremities  SKIN: No rashes; no palpable lesions    LABS:                        12.2   13.37 )-----------( 204      ( 01 Mar 2020 06:09 )             37.9     03-02    138  |  102  |  22  ----------------------------<  100<H>  3.8   |  25  |  0.89    Ca    8.7      02 Mar 2020 05:15                  RADIOLOGY & ADDITIONAL TESTS:  Results Reviewed:   Imaging Personally Reviewed:  Electrocardiogram Personally Reviewed:    COORDINATION OF CARE:  Care Discussed with Consultants/Other Providers [Y/N]: neurosurgery PA  Prior or Outpatient Records Reviewed [Y/N]:

## 2020-03-02 NOTE — PROGRESS NOTE ADULT - SUBJECTIVE AND OBJECTIVE BOX
Subjective: Patient seen and examined. No new events except as noted.     SUBJECTIVE/ROS:        MEDICATIONS:  MEDICATIONS  (STANDING):  amLODIPine   Tablet 5 milliGRAM(s) Oral daily  atorvastatin 10 milliGRAM(s) Oral at bedtime  bisacodyl 5 milliGRAM(s) Oral every 12 hours  dextrose 5%. 1000 milliLiter(s) (50 mL/Hr) IV Continuous <Continuous>  dextrose 50% Injectable 12.5 Gram(s) IV Push once  dextrose 50% Injectable 25 Gram(s) IV Push once  dextrose 50% Injectable 25 Gram(s) IV Push once  DULoxetine 40 milliGRAM(s) Oral daily  enoxaparin Injectable 40 milliGRAM(s) SubCutaneous <User Schedule>  fluticasone propionate 50 MICROgram(s)/spray Nasal Spray 1 Spray(s) Both Nostrils two times a day  folic acid 1 milliGRAM(s) Oral daily  gabapentin 300 milliGRAM(s) Oral three times a day  insulin lispro (HumaLOG) corrective regimen sliding scale   SubCutaneous three times a day with meals  insulin lispro (HumaLOG) corrective regimen sliding scale   SubCutaneous at bedtime  melatonin 5 milliGRAM(s) Oral at bedtime  multivitamin 1 Tablet(s) Oral daily  pantoprazole    Tablet 40 milliGRAM(s) Oral at bedtime  polyethylene glycol 3350 17 Gram(s) Oral two times a day  senna 2 Tablet(s) Oral at bedtime  valproic acid 500 milliGRAM(s) Oral every 12 hours      PHYSICAL EXAM:  T(C): 36.7 (03-02-20 @ 04:49), Max: 36.8 (03-01-20 @ 19:10)  HR: 67 (03-02-20 @ 04:49) (67 - 78)  BP: 124/75 (03-02-20 @ 04:49) (124/75 - 152/81)  RR: 18 (03-02-20 @ 04:49) (18 - 18)  SpO2: 97% (03-02-20 @ 04:49) (96% - 98%)  Wt(kg): --  I&O's Summary    01 Mar 2020 07:01  -  02 Mar 2020 07:00  --------------------------------------------------------  IN: 20 mL / OUT: 0 mL / NET: 20 mL            JVP: Normal  Neck: supple  Lung: clear   CV: S1 S2 , Murmur:  Abd: soft  Ext: No edema  neuro: Awake   Psych: flat affect  Skin: normal``    LABS/DATA:    CARDIAC MARKERS:                                12.2   13.37 )-----------( 204      ( 01 Mar 2020 06:09 )             37.9     03-02    138  |  102  |  22  ----------------------------<  100<H>  3.8   |  25  |  0.89    Ca    8.7      02 Mar 2020 05:15      proBNP:   Lipid Profile:   HgA1c:   TSH:     TELE:  EKG:

## 2020-03-02 NOTE — PROGRESS NOTE ADULT - SUBJECTIVE AND OBJECTIVE BOX
[facility]   [phone]  RENAL FOLLOW UP NOTE  --------------------------------------------------------------------------------  Chief Complaint:    24 hour events/subjective:    seen earlier.   states is thirsty. no nausea  received samsca last pm due to hyponatremia     PAST HISTORY  --------------------------------------------------------------------------------  No significant changes to PMH, PSH, FHx, SHx, unless otherwise noted    ALLERGIES & MEDICATIONS  --------------------------------------------------------------------------------  Allergies    No Known Allergies    Intolerances      Standing Inpatient Medications  amLODIPine   Tablet 5 milliGRAM(s) Oral daily  atorvastatin 10 milliGRAM(s) Oral at bedtime  bisacodyl 5 milliGRAM(s) Oral every 12 hours  dextrose 5%. 1000 milliLiter(s) IV Continuous <Continuous>  dextrose 50% Injectable 12.5 Gram(s) IV Push once  dextrose 50% Injectable 25 Gram(s) IV Push once  dextrose 50% Injectable 25 Gram(s) IV Push once  DULoxetine 40 milliGRAM(s) Oral daily  enoxaparin Injectable 40 milliGRAM(s) SubCutaneous <User Schedule>  fluticasone propionate 50 MICROgram(s)/spray Nasal Spray 1 Spray(s) Both Nostrils two times a day  folic acid 1 milliGRAM(s) Oral daily  gabapentin 300 milliGRAM(s) Oral three times a day  insulin lispro (HumaLOG) corrective regimen sliding scale   SubCutaneous three times a day with meals  insulin lispro (HumaLOG) corrective regimen sliding scale   SubCutaneous at bedtime  melatonin 5 milliGRAM(s) Oral at bedtime  multivitamin 1 Tablet(s) Oral daily  pantoprazole    Tablet 40 milliGRAM(s) Oral at bedtime  polyethylene glycol 3350 17 Gram(s) Oral two times a day  senna 2 Tablet(s) Oral at bedtime  valproic acid 500 milliGRAM(s) Oral every 12 hours    PRN Inpatient Medications  acetaminophen   Tablet .. 650 milliGRAM(s) Oral every 6 hours PRN  dextrose 40% Gel 15 Gram(s) Oral once PRN  glucagon  Injectable 1 milliGRAM(s) IntraMuscular once PRN  ondansetron Injectable 4 milliGRAM(s) IV Push every 8 hours PRN  oxyCODONE    IR 5 milliGRAM(s) Oral every 4 hours PRN  oxyCODONE    IR 10 milliGRAM(s) Oral every 4 hours PRN      REVIEW OF SYSTEMS  --------------------------------------------------------------------------------    Gen: denies fevers/chills,  CVS: denies chest pain/palpitations  Resp: denies SOB/Cough  GI: Denies N/V/Abd pain  : Denies dysuria    All other systems were reviewed and are negative, except as noted.    VITALS/PHYSICAL EXAM  --------------------------------------------------------------------------------  T(C): 36.5 (03-02-20 @ 19:12), Max: 36.8 (03-02-20 @ 08:26)  HR: 83 (03-02-20 @ 19:12) (67 - 88)  BP: 123/71 (03-02-20 @ 19:12) (122/78 - 154/81)  RR: 18 (03-02-20 @ 19:12) (17 - 18)  SpO2: 99% (03-02-20 @ 19:12) (96% - 99%)  Wt(kg): --        03-01-20 @ 07:01  -  03-02-20 @ 07:00  --------------------------------------------------------  IN: 20 mL / OUT: 0 mL / NET: 20 mL    03-02-20 @ 07:01  -  03-02-20 @ 22:16  --------------------------------------------------------  IN: 120 mL / OUT: 0 mL / NET: 120 mL      Physical Exam:  	  Gen: still  overall lethargic but communicative today   	no jvd  	Pulm: decrease bs  no rales or ronchi or wheezing  	CV: RRR, S1S2; no rub  	Abd: +BS, soft, nontender/nondistended  	: No suprapubic tenderness  	UE: Warm, no cyanosis  no clubbing,  no edema;  	LE: Warm, no cyanosis  no clubbing, no edema  	      LABS/STUDIES  --------------------------------------------------------------------------------              12.2   13.37 >-----------<  204      [03-01-20 @ 06:09]              37.9     130  |  96  |  20  ----------------------------<  143      [03-02-20 @ 18:59]  4.5   |  19  |  0.85        Ca     9.2     [03-02-20 @ 18:59]            Creatinine Trend:  SCr 0.85 [03-02 @ 18:59]  SCr 0.73 [03-02 @ 16:26]  SCr 0.89 [03-02 @ 05:15]  SCr 0.91 [03-01 @ 20:38]  SCr 0.87 [03-01 @ 15:31]                  HbA1c 6.0      [02-06-20 @ 13:46]  TSH 1.57      [02-27-20 @ 17:39]

## 2020-03-02 NOTE — PROGRESS NOTE ADULT - ASSESSMENT
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 noticed with hyponatremia.    1- hyponatremia  2- nausea overall subsiding   3-  HTN       suspect hyponatremia siadh no urine lytes available due to pt was already on 2% nacl when initially seen   s/p  samsca na did improve but decreased again after increase po fluid intake   hold nacl tab for now    will give another [id number] mg if na decrease further   norvasc 5 mg daily   d/w  NS team

## 2020-03-02 NOTE — PROGRESS NOTE ADULT - ASSESSMENT
65 yo male. 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  c/o worsening left sided paresthesia in December, hospitalized at [facility] Dec 3-17, 2019, found to have subacute SDH along R frontal lobe and large mass in third ventricle. pt was referred to neurosurgery consult and transferred to rehab facility, now presents to PST scheduled for craniotomy surgery for removal of brain mass.      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 Pathology: colloid cyst. Hospital course c/b difficult to manage hyponatremia.       PLAN:  Neuro:   - hyponatremia- Na currently 138 after receiving tolvaptan last night, f/u BMP q6, off salt tabs and no Fluid restriction  - pain control- gabapentin 300tid, oxycodone prn  - depressed mood - continue cymbalta, VPA (seizure ppx) and melatonin for sleep, managed by psych  CV:  - HTN- continue amlodipine, followed by Cardiology  Endocrine:   - DMT2- continue fingersticks with sliding scale insulin coverage          DVT ppx:   - venodynes, sq lovenox  GI:    - constipation- resolved, pt had multiple BM's yesterday and today  - rectal pain improved with lidocaine jelly  PT/OT:   - CÉSAR    Assessment:  Please Check When Present   []  GCS  E   V  M     Heart Failure: []Acute, [] acute on chronic , []chronic  Heart Failure:  [] Diastolic (HFpEF), [] Systolic (HFrEF), []Combined (HFpEF and HFrEF), [] RHF, [] Pulm HTN, [] Other    [] RA, [] ATN, [] AIN, [] other  [] CKD1, [] CKD2, [] CKD 3, [] CKD 4, [] CKD 5, []ESRD    Encephalopathy: [] Metabolic, [] Hepatic, [] toxic, [] Neurological, [] Other    Abnormal Nurtitional Status: [] malnurtition (see nutrition note), [ ]underweight: BMI < 19, [] morbid obesity: BMI >40, [] Cachexia    [] Sepsis  [] hypovolemic shock,[] cardiogenic shock, [] hemorrhagic shock, [] neuogenic shock  [] Acute Respiratory Failure  []Cerebral edema, [] Brain compression/ herniation,   [] Functional quadriplegia  [] Acute blood loss anemia    Spectralink # [id number]

## 2020-03-02 NOTE — PROGRESS NOTE ADULT - ASSESSMENT
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 Course c/b hyponatremia, depressed mood.

## 2020-03-02 NOTE — PROGRESS NOTE ADULT - PROBLEM SELECTOR PLAN 1
s/p craniotomy with subtotal resection of brain mass on 2/12   path consistent with colloid cyst  pain control, management per neurosurgery  on Valproic acid for seizure ppx and delirium/mood

## 2020-03-02 NOTE — PROGRESS NOTE ADULT - PROBLEM SELECTOR PLAN 4
continue Amlodipine 5 mg oral daily. if remains elevated might increase to 10 mg oral daily   close BP monitoring

## 2020-03-02 NOTE — PROGRESS NOTE ADULT - SUBJECTIVE AND OBJECTIVE BOX
SUBJECTIVE: Pt seen and examined, resting comfortably in bed    OVERNIGHT EVENTS: c/o rectal pain overnight- given lidocaine jelly with relief    Vital Signs Last 24 Hrs  T(C): 36.4 (02 Mar 2020 13:03), Max: 36.8 (01 Mar 2020 19:10)  T(F): 97.6 (02 Mar 2020 13:03), Max: 98.3 (01 Mar 2020 19:10)  HR: 88 (02 Mar 2020 13:03) (67 - 88)  BP: 154/81 (02 Mar 2020 13:03) (122/78 - 154/81)  BP(mean): --  RR: 18 (02 Mar 2020 13:03) (17 - 18)  SpO2: 97% (02 Mar 2020 13:03) (96% - 98%)    PHYSICAL EXAM:    General: No Acute Distress     Neurological: Awake, alert oriented to person, place and time, Following Commands, PERRL, EOMI, Face Symmetrical, Speech Fluent, Moving all extremities, LUE drift, LUE 4/5, otherwise 5/5, Sensation to Light Touch Intact    Pulmonary: Clear to Auscultation, No Rales, No Rhonchi, No Wheezes     Cardiovascular: S1, S2, Regular Rate and Rhythm     Gastrointestinal: Soft, Nontender, Nondistended     Incision: crani incision healed    LABS:                        12.2   13.37 )-----------( 204      ( 01 Mar 2020 06:09 )             37.9    03-02    138  |  102  |  22  ----------------------------<  100<H>  3.8   |  25  |  0.89    Ca    8.7      02 Mar 2020 05:15      Hemoglobin A1C, Whole Blood: 6.0 % (02-06 @ 13:46)      03-01 @ 07:01  -  03-02 @ 07:00  --------------------------------------------------------  IN: 20 mL / OUT: 0 mL / NET: 20 mL      DRAINS: none    MEDICATIONS:  Antibiotics:    Neuro:  acetaminophen   Tablet .. 650 milliGRAM(s) Oral every 6 hours PRN Temp greater or equal to 38C (100.4F), Mild Pain (1 - 3)  DULoxetine 40 milliGRAM(s) Oral daily  gabapentin 300 milliGRAM(s) Oral three times a day  melatonin 5 milliGRAM(s) Oral at bedtime  ondansetron Injectable 4 milliGRAM(s) IV Push every 8 hours PRN Nausea and/or Vomiting  oxyCODONE    IR 5 milliGRAM(s) Oral every 4 hours PRN Moderate Pain (4 - 6)  oxyCODONE    IR 10 milliGRAM(s) Oral every 4 hours PRN Severe Pain (7 - 10)  valproic acid 500 milliGRAM(s) Oral every 12 hours    Cardiac:  amLODIPine   Tablet 5 milliGRAM(s) Oral daily    Pulm:    GI/:  bisacodyl 5 milliGRAM(s) Oral every 12 hours  pantoprazole    Tablet 40 milliGRAM(s) Oral at bedtime  polyethylene glycol 3350 17 Gram(s) Oral two times a day  senna 2 Tablet(s) Oral at bedtime    Other:   atorvastatin 10 milliGRAM(s) Oral at bedtime  dextrose 40% Gel 15 Gram(s) Oral once PRN Blood Glucose LESS THAN 70 milliGRAM(s)/deciLiter  dextrose 5%. 1000 milliLiter(s) IV Continuous <Continuous>  dextrose 50% Injectable 12.5 Gram(s) IV Push once  dextrose 50% Injectable 25 Gram(s) IV Push once  dextrose 50% Injectable 25 Gram(s) IV Push once  enoxaparin Injectable 40 milliGRAM(s) SubCutaneous <User Schedule>  fluticasone propionate 50 MICROgram(s)/spray Nasal Spray 1 Spray(s) Both Nostrils two times a day  folic acid 1 milliGRAM(s) Oral daily  glucagon  Injectable 1 milliGRAM(s) IntraMuscular once PRN Glucose <70 milliGRAM(s)/deciLiter  insulin lispro (HumaLOG) corrective regimen sliding scale   SubCutaneous three times a day with meals  insulin lispro (HumaLOG) corrective regimen sliding scale   SubCutaneous at bedtime  multivitamin 1 Tablet(s) Oral daily    DIET: [] Regular [] CCD [] Renal [] Puree [] Dysphagia [] Tube Feeds:     IMAGING:   < from: MR Head w/wo IV Cont (02.20.20 @ 23:20) >  IMPRESSION:     Unchanged appearance of the third ventricular mass which is known to be a colloid cyst. No change in hydrocephalus compared with 2/17/2020.    Stable severe ventricular dilation with ventriculoperitoneal shunt catheter in appropriate position.    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    < end of copied text >  < from: VA Duplex Upper Ext Vein Scan, Left (02.26.20 @ 10:33) >  IMPRESSION:     No evidence of left upper extremity deep venous thrombosis.    < end of copied text >

## 2020-03-03 LAB
ANION GAP SERPL CALC-SCNC: 11 MMOL/L — SIGNIFICANT CHANGE UP (ref 5–17)
ANION GAP SERPL CALC-SCNC: 12 MMOL/L — SIGNIFICANT CHANGE UP (ref 5–17)
ANION GAP SERPL CALC-SCNC: 15 MMOL/L — SIGNIFICANT CHANGE UP (ref 5–17)
BUN SERPL-MCNC: 20 MG/DL — SIGNIFICANT CHANGE UP (ref 7–23)
BUN SERPL-MCNC: 20 MG/DL — SIGNIFICANT CHANGE UP (ref 7–23)
BUN SERPL-MCNC: 21 MG/DL — SIGNIFICANT CHANGE UP (ref 7–23)
CALCIUM SERPL-MCNC: 8.8 MG/DL — SIGNIFICANT CHANGE UP (ref 8.4–10.5)
CALCIUM SERPL-MCNC: 9.1 MG/DL — SIGNIFICANT CHANGE UP (ref 8.4–10.5)
CALCIUM SERPL-MCNC: 9.3 MG/DL — SIGNIFICANT CHANGE UP (ref 8.4–10.5)
CHLORIDE SERPL-SCNC: 96 MMOL/L — SIGNIFICANT CHANGE UP (ref 96–108)
CHLORIDE SERPL-SCNC: 96 MMOL/L — SIGNIFICANT CHANGE UP (ref 96–108)
CHLORIDE SERPL-SCNC: 97 MMOL/L — SIGNIFICANT CHANGE UP (ref 96–108)
CO2 SERPL-SCNC: 23 MMOL/L — SIGNIFICANT CHANGE UP (ref 22–31)
CO2 SERPL-SCNC: 25 MMOL/L — SIGNIFICANT CHANGE UP (ref 22–31)
CO2 SERPL-SCNC: 26 MMOL/L — SIGNIFICANT CHANGE UP (ref 22–31)
CREAT SERPL-MCNC: 0.94 MG/DL — SIGNIFICANT CHANGE UP (ref 0.5–1.3)
CREAT SERPL-MCNC: 0.96 MG/DL — SIGNIFICANT CHANGE UP (ref 0.5–1.3)
CREAT SERPL-MCNC: 0.98 MG/DL — SIGNIFICANT CHANGE UP (ref 0.5–1.3)
GLUCOSE BLDC GLUCOMTR-MCNC: 112 MG/DL — HIGH (ref 70–99)
GLUCOSE BLDC GLUCOMTR-MCNC: 140 MG/DL — HIGH (ref 70–99)
GLUCOSE BLDC GLUCOMTR-MCNC: 159 MG/DL — HIGH (ref 70–99)
GLUCOSE BLDC GLUCOMTR-MCNC: 168 MG/DL — HIGH (ref 70–99)
GLUCOSE SERPL-MCNC: 113 MG/DL — HIGH (ref 70–99)
GLUCOSE SERPL-MCNC: 127 MG/DL — HIGH (ref 70–99)
GLUCOSE SERPL-MCNC: 171 MG/DL — HIGH (ref 70–99)
POTASSIUM SERPL-MCNC: 3.8 MMOL/L — SIGNIFICANT CHANGE UP (ref 3.5–5.3)
POTASSIUM SERPL-MCNC: 3.9 MMOL/L — SIGNIFICANT CHANGE UP (ref 3.5–5.3)
POTASSIUM SERPL-MCNC: 4.2 MMOL/L — SIGNIFICANT CHANGE UP (ref 3.5–5.3)
POTASSIUM SERPL-SCNC: 3.8 MMOL/L — SIGNIFICANT CHANGE UP (ref 3.5–5.3)
POTASSIUM SERPL-SCNC: 3.9 MMOL/L — SIGNIFICANT CHANGE UP (ref 3.5–5.3)
POTASSIUM SERPL-SCNC: 4.2 MMOL/L — SIGNIFICANT CHANGE UP (ref 3.5–5.3)
SODIUM SERPL-SCNC: 133 MMOL/L — LOW (ref 135–145)
SODIUM SERPL-SCNC: 133 MMOL/L — LOW (ref 135–145)
SODIUM SERPL-SCNC: 135 MMOL/L — SIGNIFICANT CHANGE UP (ref 135–145)

## 2020-03-03 PROCEDURE — 99232 SBSQ HOSP IP/OBS MODERATE 35: CPT

## 2020-03-03 RX ORDER — SODIUM CHLORIDE 9 MG/ML
2 INJECTION INTRAMUSCULAR; INTRAVENOUS; SUBCUTANEOUS EVERY 12 HOURS
Refills: 0 | Status: DISCONTINUED | OUTPATIENT
Start: 2020-03-03 | End: 2020-03-04

## 2020-03-03 RX ORDER — POLYETHYLENE GLYCOL 3350 17 G/17G
17 POWDER, FOR SOLUTION ORAL DAILY
Refills: 0 | Status: DISCONTINUED | OUTPATIENT
Start: 2020-03-03 | End: 2020-03-04

## 2020-03-03 RX ADMIN — ATORVASTATIN CALCIUM 10 MILLIGRAM(S): 80 TABLET, FILM COATED ORAL at 21:35

## 2020-03-03 RX ADMIN — Medication 1 TABLET(S): at 11:50

## 2020-03-03 RX ADMIN — Medication 1: at 13:18

## 2020-03-03 RX ADMIN — Medication 1 MILLIGRAM(S): at 11:50

## 2020-03-03 RX ADMIN — Medication 1 SPRAY(S): at 17:26

## 2020-03-03 RX ADMIN — Medication 650 MILLIGRAM(S): at 11:51

## 2020-03-03 RX ADMIN — GABAPENTIN 300 MILLIGRAM(S): 400 CAPSULE ORAL at 05:33

## 2020-03-03 RX ADMIN — Medication 1 SPRAY(S): at 05:34

## 2020-03-03 RX ADMIN — SODIUM CHLORIDE 2 GRAM(S): 9 INJECTION INTRAMUSCULAR; INTRAVENOUS; SUBCUTANEOUS at 12:12

## 2020-03-03 RX ADMIN — Medication 5 MILLIGRAM(S): at 21:35

## 2020-03-03 RX ADMIN — PANTOPRAZOLE SODIUM 40 MILLIGRAM(S): 20 TABLET, DELAYED RELEASE ORAL at 21:36

## 2020-03-03 RX ADMIN — GABAPENTIN 300 MILLIGRAM(S): 400 CAPSULE ORAL at 21:35

## 2020-03-03 RX ADMIN — Medication 650 MILLIGRAM(S): at 12:21

## 2020-03-03 RX ADMIN — Medication 1: at 18:12

## 2020-03-03 RX ADMIN — Medication 500 MILLIGRAM(S): at 05:33

## 2020-03-03 RX ADMIN — OXYCODONE HYDROCHLORIDE 5 MILLIGRAM(S): 5 TABLET ORAL at 17:24

## 2020-03-03 RX ADMIN — GABAPENTIN 300 MILLIGRAM(S): 400 CAPSULE ORAL at 13:19

## 2020-03-03 RX ADMIN — SODIUM CHLORIDE 2 GRAM(S): 9 INJECTION INTRAMUSCULAR; INTRAVENOUS; SUBCUTANEOUS at 17:23

## 2020-03-03 RX ADMIN — Medication 500 MILLIGRAM(S): at 17:24

## 2020-03-03 RX ADMIN — ENOXAPARIN SODIUM 40 MILLIGRAM(S): 100 INJECTION SUBCUTANEOUS at 17:25

## 2020-03-03 RX ADMIN — AMLODIPINE BESYLATE 5 MILLIGRAM(S): 2.5 TABLET ORAL at 05:33

## 2020-03-03 RX ADMIN — SENNA PLUS 2 TABLET(S): 8.6 TABLET ORAL at 21:35

## 2020-03-03 RX ADMIN — DULOXETINE HYDROCHLORIDE 40 MILLIGRAM(S): 30 CAPSULE, DELAYED RELEASE ORAL at 11:50

## 2020-03-03 NOTE — PROGRESS NOTE ADULT - PROBLEM SELECTOR PLAN 7
on cymbalta per psych  Valproic acid added for QTc concerns- periodic LFT monitoring and serum levels

## 2020-03-03 NOTE — PROGRESS NOTE ADULT - PROBLEM SELECTOR PROBLEM 3
Hyperlipidemia, unspecified hyperlipidemia type
Hyperlipidemia, unspecified hyperlipidemia type
Essential hypertension
Hyperlipidemia, unspecified hyperlipidemia type
Hyponatremia

## 2020-03-03 NOTE — PROGRESS NOTE ADULT - SUBJECTIVE AND OBJECTIVE BOX
Subjective: Patient seen and examined. No new events except as noted.     SUBJECTIVE/ROS:  feels ok       MEDICATIONS:  MEDICATIONS  (STANDING):  amLODIPine   Tablet 5 milliGRAM(s) Oral daily  atorvastatin 10 milliGRAM(s) Oral at bedtime  bisacodyl 5 milliGRAM(s) Oral every 12 hours  dextrose 5%. 1000 milliLiter(s) (50 mL/Hr) IV Continuous <Continuous>  dextrose 50% Injectable 12.5 Gram(s) IV Push once  dextrose 50% Injectable 25 Gram(s) IV Push once  dextrose 50% Injectable 25 Gram(s) IV Push once  DULoxetine 40 milliGRAM(s) Oral daily  enoxaparin Injectable 40 milliGRAM(s) SubCutaneous <User Schedule>  fluticasone propionate 50 MICROgram(s)/spray Nasal Spray 1 Spray(s) Both Nostrils two times a day  folic acid 1 milliGRAM(s) Oral daily  gabapentin 300 milliGRAM(s) Oral three times a day  insulin lispro (HumaLOG) corrective regimen sliding scale   SubCutaneous three times a day with meals  insulin lispro (HumaLOG) corrective regimen sliding scale   SubCutaneous at bedtime  melatonin 5 milliGRAM(s) Oral at bedtime  multivitamin 1 Tablet(s) Oral daily  pantoprazole    Tablet 40 milliGRAM(s) Oral at bedtime  polyethylene glycol 3350 17 Gram(s) Oral two times a day  senna 2 Tablet(s) Oral at bedtime  valproic acid 500 milliGRAM(s) Oral every 12 hours      PHYSICAL EXAM:  T(C): 36.6 (03-03-20 @ 04:34), Max: 36.8 (03-02-20 @ 08:26)  HR: 75 (03-03-20 @ 04:34) (67 - 88)  BP: 141/85 (03-03-20 @ 04:34) (118/75 - 154/81)  RR: 18 (03-03-20 @ 04:34) (17 - 18)  SpO2: 95% (03-03-20 @ 04:34) (95% - 99%)  Wt(kg): --  I&O's Summary    02 Mar 2020 07:01  -  03 Mar 2020 07:00  --------------------------------------------------------  IN: 240 mL / OUT: 0 mL / NET: 240 mL            JVP: Normal  Neck: supple  Lung: clear   CV: S1 S2 , Murmur:  Abd: soft  Ext: No edema  neuro: Awake / alert  Psych: flat affect  Skin: normal``    LABS/DATA:    CARDIAC MARKERS:            03-03    133<L>  |  96  |  21  ----------------------------<  113<H>  3.8   |  26  |  0.96    Ca    9.1      03 Mar 2020 06:24      proBNP:   Lipid Profile:   HgA1c:   TSH:     TELE:  EKG:

## 2020-03-03 NOTE — PROGRESS NOTE ADULT - PROBLEM SELECTOR PLAN 3
-b/p at goal  -c/w Amlodipine 5mg daily    -I spent a total time of 25 mins with the patient at bedside/on unit of which more than 50% of time was spent on counseling/coordination of care.
2% restarted for dropping Na  con't to monitor   wean when able
2% restarted for dropping Na  con't to monitor   wean when able - ?transition to salt tab
On 2% NaCl with increase of Na  from 127-->130 cont current treatment   Monitor Na   cont regular diet
On 2% NaCl with increase of Na  from 129-->130-->134 cont current treatment   Monitor Na   cont regular diet
On 2% NaCl with increase of Na  from 129-->130-->134 cont current treatment   Monitor Na   cont regular diet  Might consider DC the hypertonic saline once Na remains stable
c/w Atorvastatin 10mg daily  -f/u lipid profile as outpt.    -I spent a total time of 25 mins with the patient at bedside/on unit of which more than 50% of time was spent on counseling/coordination of care.
fluctuating serum Na   s/p Ure-NA 2/29 and samsca 3/1.   fluid restrict and plan to resume salt tab per renal rec per discussion with Nsx  renal following  Of note, Valproic acid might worsen the hyponatremia as Valproic acid can cause SIADH
fluctuating serum Na ( decreasing today )   cont Salt tab ( Nacl tab 2 gram every 6 hours )   Of note, Valproic acid might worsen the hyponatremia as Valproic acid can cause SIADH
fluctuating serum Na ( decreasing today )   cont Salt tab ( Nacl tab 2 gram every 6 hours )   Ure-na ordered today   f/up repeat Na   Of note, Valproic acid might worsen the hyponatremia as Valproic acid can cause SIADH
fluctuating serum Na but normalized now  s/p Ure-NA 2/29 and samsca 3/1. Salt tab off now  f/u repeat BMP  renal following  Of note, Valproic acid might worsen the hyponatremia as Valproic acid can cause SIADH
weaned 2% successfully  Monitor Na   cont regular diet
Continue atorvastatin

## 2020-03-03 NOTE — PROGRESS NOTE ADULT - ASSESSMENT
HPI: 63 yo male. 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  c/o worsening left sided paresthesia in December, hospitalized at [facility] Dec 3-17, 2019, found to have subacute SDH along R frontal lobe and large mass in third ventricle. pt was referred to neurosurgery consult and transferred to rehab facility, now presents to PST scheduled for craniotomy surgery for removal of brain mass.      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 Pathology: colloid cyst. Hospital course c/b difficult to manage hyponatremia.     PLAN:  - Nephrology follow up appreciated. Resume 1L FWR and start NaCl tabs 2g q 12. Check BMP q 6; received one dose of Tolvaptan 15 mg on 3/1  - Continue Gabapentin 300 TID for neuropathic pain  - Continue Oxy IR PRN for pain control  - Continue  q 12 for seizure prophylaxis  - Continue cymbalta 40mg daily per psych recommendations  - Continue melatonin 5mg at bedtime for insomnia  - Continue Norvasc for history of HTN; cardiology following  - Continue Lipitor for history of HLD  - Continue HISS for history of T2DM  - Continue senna for bowel regimen, was previously constipated but now having bowel movements  - Encouraged mobilization with assistance  - Incentive spirometer if patient cooperative  - DVT prophylaxis: SQL, b/l venodynes  - Dispo: CÉSAR  - Will discuss with Dr. Manley  - Spectralink # [id number]    Assessment:  Please Check When Present   []  GCS  E   V  M     Heart Failure: []Acute, [] acute on chronic , []chronic  Heart Failure:  [] Diastolic (HFpEF), [] Systolic (HFrEF), []Combined (HFpEF and HFrEF), [] RHF, [] Pulm HTN, [] Other    [] RA, [] ATN, [] AIN, [] other  [] CKD1, [] CKD2, [] CKD 3, [] CKD 4, [] CKD 5, []ESRD    Encephalopathy: [] Metabolic, [] Hepatic, [] toxic, [] Neurological, [] Other    Abnormal Nurtitional Status: [] malnurtition (see nutrition note), [ ]underweight: BMI < 19, [] morbid obesity: BMI >40, [] Cachexia    [] Sepsis  [] hypovolemic shock,[] cardiogenic shock, [] hemorrhagic shock, [] neuogenic shock  [] Acute Respiratory Failure  []Cerebral edema, [] Brain compression/ herniation,   [] Functional quadriplegia  [] Acute blood loss anemia

## 2020-03-03 NOTE — PROGRESS NOTE ADULT - ASSESSMENT
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 noticed with hyponatremia.    1- hyponatremia  2- nausea overall subsiding   3-  HTN       suspect hyponatremia siadh no urine lytes available due to pt was already on 2% nacl when initially seen   s/p  samsca na  improved   resume nacl 2 g bid for now and monitor na level   na is steady at this range   norvasc 5 mg daily   d/w  NS team

## 2020-03-03 NOTE — PROGRESS NOTE ADULT - SUBJECTIVE AND OBJECTIVE BOX
SUBJECTIVE: Patient seen and examined at bedside. NO acute overnight events. Resumed 1L FWR and started NaCl tabs 2g q 12. Denies chest pain, shortness of breath, nausea/vomiting.    Vital Signs Last 24 Hrs  T(C): 36.6 (03-03-20 @ 13:16), Max: 36.7 (03-03-20 @ 08:34)  T(F): 97.8 (03-03-20 @ 13:16), Max: 98 (03-03-20 @ 08:34)  HR: 64 (03-03-20 @ 13:16) (64 - 86)  BP: 114/72 (03-03-20 @ 13:16) (114/72 - 141/85)  RR: 18 (03-03-20 @ 13:16) (18 - 18)  SpO2: 97% (03-03-20 @ 13:16) (95% - 99%)    PHYSICAL EXAM:    General: No Acute Distress, resting comfortably in bed    Neurological: Awake, alert, oriented to person, place and time, briskly following commands, PERRL, Face Symmetrical, Speech Fluent, Moving all extremities, mild LUE drift, LUE 4/5, otherwise 5/5, Sensation to Light Touch Intact    Incision: right frontal crani sutures C/D/I    Pulmonary: Clear to Auscultation, No Rales, No Rhonchi, No Wheezes     Cardiovascular: S1, S2, Regular Rate and Rhythm     Gastrointestinal: Soft, Nontender, Nondistended       LABS:    03-03    133<L>  |  96  |  21  ----------------------------<  113<H>  3.8   |  26  |  0.96    Ca    9.1      03 Mar 2020 06:24        Hemoglobin A1C, Whole Blood: 6.0 % (02-06 @ 13:46)      03-01 @ 07:01  -  03-02 @ 07:00  --------------------------------------------------------  IN: 20 mL / OUT: 0 mL / NET: 20 mL      IMAGING:     MR Head w/wo IV Cont (02.20.20 @ 23:20)  IMPRESSION:   Unchanged appearance of the third ventricular mass which is known to be a colloid cyst. No change in hydrocephalus compared with 2/17/2020.  Stable severe ventricular dilation with ventriculoperitoneal shunt catheter in appropriate position.    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    VA Duplex Upper Ext Vein Scan, Left (02.26.20 @ 10:33)  IMPRESSION:   No evidence of left upper extremity deep venous thrombosis.    MEDICATIONS  (STANDING):  amLODIPine   Tablet 5 milliGRAM(s) Oral daily  atorvastatin 10 milliGRAM(s) Oral at bedtime  dextrose 5%. 1000 milliLiter(s) (50 mL/Hr) IV Continuous <Continuous>  dextrose 50% Injectable 12.5 Gram(s) IV Push once  dextrose 50% Injectable 25 Gram(s) IV Push once  dextrose 50% Injectable 25 Gram(s) IV Push once  DULoxetine 40 milliGRAM(s) Oral daily  enoxaparin Injectable 40 milliGRAM(s) SubCutaneous <User Schedule>  fluticasone propionate 50 MICROgram(s)/spray Nasal Spray 1 Spray(s) Both Nostrils two times a day  folic acid 1 milliGRAM(s) Oral daily  gabapentin 300 milliGRAM(s) Oral three times a day  insulin lispro (HumaLOG) corrective regimen sliding scale   SubCutaneous three times a day with meals  insulin lispro (HumaLOG) corrective regimen sliding scale   SubCutaneous at bedtime  melatonin 5 milliGRAM(s) Oral at bedtime  multivitamin 1 Tablet(s) Oral daily  pantoprazole    Tablet 40 milliGRAM(s) Oral at bedtime  senna 2 Tablet(s) Oral at bedtime  sodium chloride 2 Gram(s) Oral every 12 hours  valproic acid 500 milliGRAM(s) Oral every 12 hours    MEDICATIONS  (PRN):  acetaminophen   Tablet .. 650 milliGRAM(s) Oral every 6 hours PRN Temp greater or equal to 38C (100.4F), Mild Pain (1 - 3)  dextrose 40% Gel 15 Gram(s) Oral once PRN Blood Glucose LESS THAN 70 milliGRAM(s)/deciLiter  glucagon  Injectable 1 milliGRAM(s) IntraMuscular once PRN Glucose <70 milliGRAM(s)/deciLiter  ondansetron Injectable 4 milliGRAM(s) IV Push every 8 hours PRN Nausea and/or Vomiting  oxyCODONE    IR 5 milliGRAM(s) Oral every 4 hours PRN Moderate Pain (4 - 6)  oxyCODONE    IR 10 milliGRAM(s) Oral every 4 hours PRN Severe Pain (7 - 10)  polyethylene glycol 3350 17 Gram(s) Oral daily PRN Constipation

## 2020-03-03 NOTE — PROGRESS NOTE ADULT - SUBJECTIVE AND OBJECTIVE BOX
[facility] Division of Hospital Medicine  Elina Pavon MD  spectra [id number]    Patient is a 64y old  Male who presents with a chief complaint of Brain tumor (02 Mar 2020 13:19)      SUBJECTIVE / OVERNIGHT EVENTS: no acute events  ADDITIONAL REVIEW OF SYSTEMS: patient reports feeling thirsty. denies HA, SOB, cough, ab pain. + multiple BM per chart review.     MEDICATIONS  (STANDING):  amLODIPine   Tablet 5 milliGRAM(s) Oral daily  atorvastatin 10 milliGRAM(s) Oral at bedtime  dextrose 5%. 1000 milliLiter(s) (50 mL/Hr) IV Continuous <Continuous>  dextrose 50% Injectable 12.5 Gram(s) IV Push once  dextrose 50% Injectable 25 Gram(s) IV Push once  dextrose 50% Injectable 25 Gram(s) IV Push once  DULoxetine 40 milliGRAM(s) Oral daily  enoxaparin Injectable 40 milliGRAM(s) SubCutaneous <User Schedule>  fluticasone propionate 50 MICROgram(s)/spray Nasal Spray 1 Spray(s) Both Nostrils two times a day  folic acid 1 milliGRAM(s) Oral daily  gabapentin 300 milliGRAM(s) Oral three times a day  insulin lispro (HumaLOG) corrective regimen sliding scale   SubCutaneous three times a day with meals  insulin lispro (HumaLOG) corrective regimen sliding scale   SubCutaneous at bedtime  melatonin 5 milliGRAM(s) Oral at bedtime  multivitamin 1 Tablet(s) Oral daily  pantoprazole    Tablet 40 milliGRAM(s) Oral at bedtime  senna 2 Tablet(s) Oral at bedtime  valproic acid 500 milliGRAM(s) Oral every 12 hours    MEDICATIONS  (PRN):  acetaminophen   Tablet .. 650 milliGRAM(s) Oral every 6 hours PRN Temp greater or equal to 38C (100.4F), Mild Pain (1 - 3)  dextrose 40% Gel 15 Gram(s) Oral once PRN Blood Glucose LESS THAN 70 milliGRAM(s)/deciLiter  glucagon  Injectable 1 milliGRAM(s) IntraMuscular once PRN Glucose <70 milliGRAM(s)/deciLiter  ondansetron Injectable 4 milliGRAM(s) IV Push every 8 hours PRN Nausea and/or Vomiting  oxyCODONE    IR 5 milliGRAM(s) Oral every 4 hours PRN Moderate Pain (4 - 6)  oxyCODONE    IR 10 milliGRAM(s) Oral every 4 hours PRN Severe Pain (7 - 10)  polyethylene glycol 3350 17 Gram(s) Oral daily PRN Constipation      CAPILLARY BLOOD GLUCOSE      POCT Blood Glucose.: 112 mg/dL (03 Mar 2020 08:39)  POCT Blood Glucose.: 120 mg/dL (02 Mar 2020 21:41)  POCT Blood Glucose.: 126 mg/dL (02 Mar 2020 17:07)  POCT Blood Glucose.: 218 mg/dL (02 Mar 2020 12:56)    I&O's Summary    02 Mar 2020 07:01  -  03 Mar 2020 07:00  --------------------------------------------------------  IN: 240 mL / OUT: 0 mL / NET: 240 mL        PHYSICAL EXAM:  Vital Signs Last 24 Hrs  T(C): 36.7 (03 Mar 2020 08:34), Max: 36.7 (03 Mar 2020 08:34)  T(F): 98 (03 Mar 2020 08:34), Max: 98 (03 Mar 2020 08:34)  HR: 64 (03 Mar 2020 08:34) (64 - 88)  BP: 122/75 (03 Mar 2020 08:34) (118/75 - 154/81)  BP(mean): --  RR: 18 (03 Mar 2020 08:34) (18 - 18)  SpO2: 96% (03 Mar 2020 08:34) (95% - 99%)    CONSTITUTIONAL: NAD, well-developed, well-groomed  NECK: Supple, no palpable masses; no thyromegaly  RESPIRATORY: Normal respiratory effort; lungs are clear to auscultation bilaterally  CARDIOVASCULAR: normal S1 and S2; No lower extremity edema  ABDOMEN: Nontender to palpation, normoactive bowel sounds, no rebound/guarding; No hepatosplenomegaly  MUSCULOSKELETAL:  no clubbing or cyanosis of digits; no joint swelling or tenderness to palpation  PSYCH: A+O to person, place, and month; affect appropriate   SKIN: No rashes; no palpable lesions    LABS:    03-03    133<L>  |  96  |  21  ----------------------------<  113<H>  3.8   |  26  |  0.96    Ca    9.1      03 Mar 2020 06:24                  RADIOLOGY & ADDITIONAL TESTS:  Results Reviewed:   Imaging Personally Reviewed:  Electrocardiogram Personally Reviewed:    COORDINATION OF CARE:  Care Discussed with Consultants/Other Providers [Y/N]: neurosurgery PA  Prior or Outpatient Records Reviewed [Y/N]:

## 2020-03-03 NOTE — PROGRESS NOTE ADULT - ASSESSMENT
HTN  stable  cont current meds     hyponatremia  fu with med  improving     DM  on insulin   fu with med

## 2020-03-03 NOTE — PROGRESS NOTE ADULT - SUBJECTIVE AND OBJECTIVE BOX
[facility]   [phone]  RENAL FOLLOW UP NOTE  --------------------------------------------------------------------------------  Chief Complaint:    24 hour events/subjective:    seen earlier. still overall lethargic     PAST HISTORY  --------------------------------------------------------------------------------  No significant changes to PMH, PSH, FHx, SHx, unless otherwise noted    ALLERGIES & MEDICATIONS  --------------------------------------------------------------------------------  Allergies    No Known Allergies    Intolerances      Standing Inpatient Medications  amLODIPine   Tablet 5 milliGRAM(s) Oral daily  atorvastatin 10 milliGRAM(s) Oral at bedtime  dextrose 5%. 1000 milliLiter(s) IV Continuous <Continuous>  dextrose 50% Injectable 12.5 Gram(s) IV Push once  dextrose 50% Injectable 25 Gram(s) IV Push once  dextrose 50% Injectable 25 Gram(s) IV Push once  DULoxetine 40 milliGRAM(s) Oral daily  enoxaparin Injectable 40 milliGRAM(s) SubCutaneous <User Schedule>  fluticasone propionate 50 MICROgram(s)/spray Nasal Spray 1 Spray(s) Both Nostrils two times a day  folic acid 1 milliGRAM(s) Oral daily  gabapentin 300 milliGRAM(s) Oral three times a day  insulin lispro (HumaLOG) corrective regimen sliding scale   SubCutaneous three times a day with meals  insulin lispro (HumaLOG) corrective regimen sliding scale   SubCutaneous at bedtime  melatonin 5 milliGRAM(s) Oral at bedtime  multivitamin 1 Tablet(s) Oral daily  pantoprazole    Tablet 40 milliGRAM(s) Oral at bedtime  senna 2 Tablet(s) Oral at bedtime  sodium chloride 2 Gram(s) Oral every 12 hours  valproic acid 500 milliGRAM(s) Oral every 12 hours    PRN Inpatient Medications  acetaminophen   Tablet .. 650 milliGRAM(s) Oral every 6 hours PRN  dextrose 40% Gel 15 Gram(s) Oral once PRN  glucagon  Injectable 1 milliGRAM(s) IntraMuscular once PRN  ondansetron Injectable 4 milliGRAM(s) IV Push every 8 hours PRN  oxyCODONE    IR 5 milliGRAM(s) Oral every 4 hours PRN  oxyCODONE    IR 10 milliGRAM(s) Oral every 4 hours PRN  polyethylene glycol 3350 17 Gram(s) Oral daily PRN      REVIEW OF SYSTEMS  --------------------------------------------------------------------------------    Gen: denies fevers/chills,  CVS: denies chest pain/palpitations  Resp: denies SOB/Cough  GI: Denies N/V/Abd pain  : Denies dysuria    All other systems were reviewed and are negative, except as noted.    VITALS/PHYSICAL EXAM  --------------------------------------------------------------------------------  T(C): 36.8 (03-03-20 @ 19:23), Max: 36.8 (03-03-20 @ 15:25)  HR: 82 (03-03-20 @ 19:23) (64 - 86)  BP: 121/76 (03-03-20 @ 19:23) (114/72 - 141/85)  RR: 18 (03-03-20 @ 19:23) (18 - 18)  SpO2: 97% (03-03-20 @ 19:23) (95% - 98%)  Wt(kg): --        03-02-20 @ 07:01  -  03-03-20 @ 07:00  --------------------------------------------------------  IN: 240 mL / OUT: 0 mL / NET: 240 mL    03-03-20 @ 07:01  -  03-03-20 @ 21:15  --------------------------------------------------------  IN: 640 mL / OUT: 0 mL / NET: 640 mL      Physical Exam:  	  Gen: still  overall lethargic but communicative  	no jvd  	Pulm: decrease bs  no rales or ronchi or wheezing  	CV: RRR, S1S2; no rub  	Abd: +BS, soft, nontender/nondistended  	: No suprapubic tenderness  	UE: Warm, no cyanosis  no clubbing,  no edema;  	LE: Warm, no cyanosis  no clubbing, no edema  	      LABS/STUDIES  --------------------------------------------------------------------------------    135  |  97  |  20  ----------------------------<  171      [03-03-20 @ 16:25]  4.2   |  23  |  0.98        Ca     8.8     [03-03-20 @ 16:25]            Creatinine Trend:  SCr 0.98 [03-03 @ 16:25]  SCr 0.96 [03-03 @ 06:24]  SCr 0.94 [03-03 @ 01:23]  SCr 0.85 [03-02 @ 18:59]  SCr 0.73 [03-02 @ 16:26]                  HbA1c 6.0      [02-06-20 @ 13:46]  TSH 1.57      [02-27-20 @ 17:39]

## 2020-03-03 NOTE — PROGRESS NOTE ADULT - ATTENDING COMMENTS
Sol Odom MD  On 2/17/20: pager   Other times:  Diabetes team: [phone] business hours  [phone] night/weekend
Jordana Alonso (pager [phone])  On evenings and weekends, please call [phone] or page endocrine fellow on call.   Please note that this patient may be followed by different provider tomorrow. If no answer, contact endocrine fellow on call.
Jordana Alonso (pager [phone])  On evenings and weekends, please call [phone] or page endocrine fellow on call.   Please note that this patient may be followed by different provider tomorrow. If no answer, contact endocrine fellow on call.
As per nurse , patient is depressed .  He is currently on Cymbalta , WOuld recommend psych evaluation instead of changing his antidepressant since antidepressants take time to be effective     Keyana Rea   [facility]   [phone]
Keyana Rea   hospitalist   [phone]
Keyana Rea   hospitalist   [phone]
Keyana Rea   Hospitalist   [phone]   or [phone]
spectra [phone]
spectra [phone]

## 2020-03-03 NOTE — PROGRESS NOTE ADULT - PROBLEM SELECTOR PLAN 8
Transitions of Care Status:  1.  Name of PCP: Georgia Leonard  2.  PCP Contacted on Admission: [ ] Y    [ ] N    3.  PCP contacted at Discharge: [ ] Y    [ ] N    [ ] N/A  4.  Post-Discharge Appointment Date and Location:  5.  Summary of Handoff given to PCP:

## 2020-03-03 NOTE — PROGRESS NOTE ADULT - PROBLEM SELECTOR PROBLEM 2
Essential hypertension
Hyperlipidemia, unspecified hyperlipidemia type
Type 2 diabetes mellitus with hyperglycemia, without long-term current use of insulin

## 2020-03-04 ENCOUNTER — TRANSCRIPTION ENCOUNTER (OUTPATIENT)
Age: 65
End: 2020-03-04

## 2020-03-04 VITALS
SYSTOLIC BLOOD PRESSURE: 133 MMHG | OXYGEN SATURATION: 95 % | RESPIRATION RATE: 18 BRPM | HEART RATE: 71 BPM | TEMPERATURE: 97 F | DIASTOLIC BLOOD PRESSURE: 76 MMHG

## 2020-03-04 LAB
ANION GAP SERPL CALC-SCNC: 13 MMOL/L — SIGNIFICANT CHANGE UP (ref 5–17)
BUN SERPL-MCNC: 20 MG/DL — SIGNIFICANT CHANGE UP (ref 7–23)
CALCIUM SERPL-MCNC: 9.2 MG/DL — SIGNIFICANT CHANGE UP (ref 8.4–10.5)
CHLORIDE SERPL-SCNC: 95 MMOL/L — LOW (ref 96–108)
CO2 SERPL-SCNC: 26 MMOL/L — SIGNIFICANT CHANGE UP (ref 22–31)
CREAT SERPL-MCNC: 0.99 MG/DL — SIGNIFICANT CHANGE UP (ref 0.5–1.3)
GLUCOSE BLDC GLUCOMTR-MCNC: 114 MG/DL — HIGH (ref 70–99)
GLUCOSE BLDC GLUCOMTR-MCNC: 185 MG/DL — HIGH (ref 70–99)
GLUCOSE SERPL-MCNC: 135 MG/DL — HIGH (ref 70–99)
POTASSIUM SERPL-MCNC: 4.7 MMOL/L — SIGNIFICANT CHANGE UP (ref 3.5–5.3)
POTASSIUM SERPL-SCNC: 4.7 MMOL/L — SIGNIFICANT CHANGE UP (ref 3.5–5.3)
SODIUM SERPL-SCNC: 134 MMOL/L — LOW (ref 135–145)

## 2020-03-04 PROCEDURE — 80177 DRUG SCRN QUAN LEVETIRACETAM: CPT

## 2020-03-04 PROCEDURE — 85610 PROTHROMBIN TIME: CPT

## 2020-03-04 PROCEDURE — 71045 X-RAY EXAM CHEST 1 VIEW: CPT

## 2020-03-04 PROCEDURE — 84480 ASSAY TRIIODOTHYRONINE (T3): CPT

## 2020-03-04 PROCEDURE — 88333 PATH CONSLTJ SURG CYTO XM 1: CPT

## 2020-03-04 PROCEDURE — 93005 ELECTROCARDIOGRAM TRACING: CPT

## 2020-03-04 PROCEDURE — 82565 ASSAY OF CREATININE: CPT

## 2020-03-04 PROCEDURE — 80053 COMPREHEN METABOLIC PANEL: CPT

## 2020-03-04 PROCEDURE — 87102 FUNGUS ISOLATION CULTURE: CPT

## 2020-03-04 PROCEDURE — 85027 COMPLETE CBC AUTOMATED: CPT

## 2020-03-04 PROCEDURE — 74018 RADEX ABDOMEN 1 VIEW: CPT

## 2020-03-04 PROCEDURE — 83735 ASSAY OF MAGNESIUM: CPT

## 2020-03-04 PROCEDURE — 83690 ASSAY OF LIPASE: CPT

## 2020-03-04 PROCEDURE — 84132 ASSAY OF SERUM POTASSIUM: CPT

## 2020-03-04 PROCEDURE — 82803 BLOOD GASES ANY COMBINATION: CPT

## 2020-03-04 PROCEDURE — 83605 ASSAY OF LACTIC ACID: CPT

## 2020-03-04 PROCEDURE — 97116 GAIT TRAINING THERAPY: CPT

## 2020-03-04 PROCEDURE — 85730 THROMBOPLASTIN TIME PARTIAL: CPT

## 2020-03-04 PROCEDURE — 97530 THERAPEUTIC ACTIVITIES: CPT

## 2020-03-04 PROCEDURE — 82962 GLUCOSE BLOOD TEST: CPT

## 2020-03-04 PROCEDURE — 88334 PATH CONSLTJ SURG CYTO XM EA: CPT

## 2020-03-04 PROCEDURE — 82010 KETONE BODYS QUAN: CPT

## 2020-03-04 PROCEDURE — 82607 VITAMIN B-12: CPT

## 2020-03-04 PROCEDURE — 70553 MRI BRAIN STEM W/O & W/DYE: CPT

## 2020-03-04 PROCEDURE — 70460 CT HEAD/BRAIN W/DYE: CPT

## 2020-03-04 PROCEDURE — 93970 EXTREMITY STUDY: CPT

## 2020-03-04 PROCEDURE — C1889: CPT

## 2020-03-04 PROCEDURE — 82435 ASSAY OF BLOOD CHLORIDE: CPT

## 2020-03-04 PROCEDURE — 84443 ASSAY THYROID STIM HORMONE: CPT

## 2020-03-04 PROCEDURE — 84484 ASSAY OF TROPONIN QUANT: CPT

## 2020-03-04 PROCEDURE — A9585: CPT

## 2020-03-04 PROCEDURE — 84436 ASSAY OF TOTAL THYROXINE: CPT

## 2020-03-04 PROCEDURE — C1769: CPT

## 2020-03-04 PROCEDURE — 82746 ASSAY OF FOLIC ACID SERUM: CPT

## 2020-03-04 PROCEDURE — 82150 ASSAY OF AMYLASE: CPT

## 2020-03-04 PROCEDURE — 94640 AIRWAY INHALATION TREATMENT: CPT

## 2020-03-04 PROCEDURE — 70450 CT HEAD/BRAIN W/O DYE: CPT

## 2020-03-04 PROCEDURE — 97161 PT EVAL LOW COMPLEX 20 MIN: CPT

## 2020-03-04 PROCEDURE — 87070 CULTURE OTHR SPECIMN AEROBIC: CPT

## 2020-03-04 PROCEDURE — 82947 ASSAY GLUCOSE BLOOD QUANT: CPT

## 2020-03-04 PROCEDURE — 93971 EXTREMITY STUDY: CPT

## 2020-03-04 PROCEDURE — 88307 TISSUE EXAM BY PATHOLOGIST: CPT

## 2020-03-04 PROCEDURE — 82330 ASSAY OF CALCIUM: CPT

## 2020-03-04 PROCEDURE — 70250 X-RAY EXAM OF SKULL: CPT

## 2020-03-04 PROCEDURE — 84295 ASSAY OF SERUM SODIUM: CPT

## 2020-03-04 PROCEDURE — 97165 OT EVAL LOW COMPLEX 30 MIN: CPT

## 2020-03-04 PROCEDURE — 97535 SELF CARE MNGMENT TRAINING: CPT

## 2020-03-04 PROCEDURE — 84100 ASSAY OF PHOSPHORUS: CPT

## 2020-03-04 PROCEDURE — 97110 THERAPEUTIC EXERCISES: CPT

## 2020-03-04 PROCEDURE — 85014 HEMATOCRIT: CPT

## 2020-03-04 PROCEDURE — C1713: CPT

## 2020-03-04 PROCEDURE — 80048 BASIC METABOLIC PNL TOTAL CA: CPT

## 2020-03-04 RX ORDER — PANTOPRAZOLE SODIUM 20 MG/1
1 TABLET, DELAYED RELEASE ORAL
Qty: 0 | Refills: 0 | DISCHARGE
Start: 2020-03-04

## 2020-03-04 RX ORDER — POLYETHYLENE GLYCOL 3350 17 G/17G
17 POWDER, FOR SOLUTION ORAL
Qty: 0 | Refills: 0 | DISCHARGE
Start: 2020-03-04

## 2020-03-04 RX ORDER — OXYCODONE HYDROCHLORIDE 5 MG/1
1 TABLET ORAL
Qty: 0 | Refills: 0 | DISCHARGE
Start: 2020-03-04

## 2020-03-04 RX ORDER — DULOXETINE HYDROCHLORIDE 30 MG/1
1 CAPSULE, DELAYED RELEASE ORAL
Qty: 0 | Refills: 0 | DISCHARGE
Start: 2020-03-04

## 2020-03-04 RX ORDER — ENOXAPARIN SODIUM 100 MG/ML
40 INJECTION SUBCUTANEOUS
Qty: 0 | Refills: 0 | DISCHARGE
Start: 2020-03-04

## 2020-03-04 RX ORDER — VALPROIC ACID (AS SODIUM SALT) 250 MG/5ML
2 SOLUTION, ORAL ORAL
Qty: 0 | Refills: 0 | DISCHARGE
Start: 2020-03-04

## 2020-03-04 RX ORDER — LANOLIN ALCOHOL/MO/W.PET/CERES
1 CREAM (GRAM) TOPICAL
Qty: 0 | Refills: 0 | DISCHARGE
Start: 2020-03-04

## 2020-03-04 RX ORDER — SODIUM CHLORIDE 9 MG/ML
2 INJECTION INTRAMUSCULAR; INTRAVENOUS; SUBCUTANEOUS
Qty: 0 | Refills: 0 | DISCHARGE
Start: 2020-03-04

## 2020-03-04 RX ADMIN — Medication 1 MILLIGRAM(S): at 11:35

## 2020-03-04 RX ADMIN — Medication 1 TABLET(S): at 11:35

## 2020-03-04 RX ADMIN — Medication 650 MILLIGRAM(S): at 13:14

## 2020-03-04 RX ADMIN — SODIUM CHLORIDE 2 GRAM(S): 9 INJECTION INTRAMUSCULAR; INTRAVENOUS; SUBCUTANEOUS at 05:14

## 2020-03-04 RX ADMIN — Medication 1: at 13:12

## 2020-03-04 RX ADMIN — GABAPENTIN 300 MILLIGRAM(S): 400 CAPSULE ORAL at 13:14

## 2020-03-04 RX ADMIN — Medication 650 MILLIGRAM(S): at 11:35

## 2020-03-04 RX ADMIN — AMLODIPINE BESYLATE 5 MILLIGRAM(S): 2.5 TABLET ORAL at 05:13

## 2020-03-04 RX ADMIN — DULOXETINE HYDROCHLORIDE 40 MILLIGRAM(S): 30 CAPSULE, DELAYED RELEASE ORAL at 13:13

## 2020-03-04 RX ADMIN — Medication 500 MILLIGRAM(S): at 05:13

## 2020-03-04 RX ADMIN — Medication 1 SPRAY(S): at 05:13

## 2020-03-04 RX ADMIN — GABAPENTIN 300 MILLIGRAM(S): 400 CAPSULE ORAL at 05:13

## 2020-03-04 NOTE — PROGRESS NOTE ADULT - SUBJECTIVE AND OBJECTIVE BOX
Subjective: Patient seen and examined. No new events except as noted.     SUBJECTIVE/ROS:        MEDICATIONS:  MEDICATIONS  (STANDING):  amLODIPine   Tablet 5 milliGRAM(s) Oral daily  atorvastatin 10 milliGRAM(s) Oral at bedtime  dextrose 5%. 1000 milliLiter(s) (50 mL/Hr) IV Continuous <Continuous>  dextrose 50% Injectable 12.5 Gram(s) IV Push once  dextrose 50% Injectable 25 Gram(s) IV Push once  dextrose 50% Injectable 25 Gram(s) IV Push once  DULoxetine 40 milliGRAM(s) Oral daily  enoxaparin Injectable 40 milliGRAM(s) SubCutaneous <User Schedule>  fluticasone propionate 50 MICROgram(s)/spray Nasal Spray 1 Spray(s) Both Nostrils two times a day  folic acid 1 milliGRAM(s) Oral daily  gabapentin 300 milliGRAM(s) Oral three times a day  insulin lispro (HumaLOG) corrective regimen sliding scale   SubCutaneous three times a day with meals  insulin lispro (HumaLOG) corrective regimen sliding scale   SubCutaneous at bedtime  melatonin 5 milliGRAM(s) Oral at bedtime  multivitamin 1 Tablet(s) Oral daily  pantoprazole    Tablet 40 milliGRAM(s) Oral at bedtime  senna 2 Tablet(s) Oral at bedtime  sodium chloride 2 Gram(s) Oral every 12 hours  valproic acid 500 milliGRAM(s) Oral every 12 hours      PHYSICAL EXAM:  T(C): 37 (03-04-20 @ 04:51), Max: 37 (03-04-20 @ 04:51)  HR: 71 (03-04-20 @ 04:51) (64 - 82)  BP: 148/84 (03-04-20 @ 04:51) (114/72 - 148/84)  RR: 18 (03-04-20 @ 04:51) (18 - 18)  SpO2: 94% (03-04-20 @ 04:51) (94% - 98%)  Wt(kg): --  I&O's Summary    03 Mar 2020 07:01  -  04 Mar 2020 07:00  --------------------------------------------------------  IN: 640 mL / OUT: 0 mL / NET: 640 mL            JVP: Normal  Neck: supple  Lung: clear   CV: S1 S2 , Murmur:  Abd: soft  Ext: No edema  neuro: Awake / alert  Psych: flat affect  Skin: normal``    LABS/DATA:    CARDIAC MARKERS:            03-04    134<L>  |  95<L>  |  20  ----------------------------<  135<H>  4.7   |  26  |  0.99    Ca    9.2      04 Mar 2020 04:23      proBNP:   Lipid Profile:   HgA1c:   TSH:     TELE:  EKG:

## 2020-03-04 NOTE — DISCHARGE NOTE PROVIDER - NSDCCPCAREPLAN_GEN_ALL_CORE_FT
PRINCIPAL DISCHARGE DIAGNOSIS  Diagnosis: Colloid cyst of third ventricle  Assessment and Plan of Treatment: 2/21/20 s/p right craniotomy for resection of third ventricular colloid cyst      SECONDARY DISCHARGE DIAGNOSES  Diagnosis: Hyponatremia  Assessment and Plan of Treatment: Resolved, followed by Nephrology for management. Continue salt tabs and 1 liter fluid restriction daily.    Diagnosis: Hypertension  Assessment and Plan of Treatment: Continue current medication    Diagnosis: Depressive disorder  Assessment and Plan of Treatment: Continue current medication

## 2020-03-04 NOTE — PROGRESS NOTE ADULT - PROVIDER SPECIALTY LIST ADULT
Cardiology
Endocrinology
Hospitalist
Internal Medicine
Internal Medicine
NSICU
Nephrology
Neurosurgery

## 2020-03-04 NOTE — DISCHARGE NOTE PROVIDER - NSDCMRMEDTOKEN_GEN_ALL_CORE_FT
amLODIPine 5 mg oral tablet: 1 tab(s) orally once a day  aspirin 81 mg oral tablet: 1 tab(s) orally once a day  atorvastatin 10 mg oral tablet: 1 tab(s) orally once a day (at bedtime)  clonazePAM 0.5 mg oral tablet: 1 tab(s) orally every 12 hours, As needed, anxiety/panic attack  Cymbalta 60 mg oral delayed release capsule: 1 cap(s) orally once a day  fluticasone 50 mcg/inh nasal spray: 1 spray(s) nasal 2 times a day  folic acid 1 mg oral tablet: 1 tab(s) orally once a day  Lyrica 50 mg oral capsule: 1 cap(s) orally every 8 hours  melatonin 3 mg oral tablet: 1 tab(s) orally once a day (at bedtime)  metFORMIN 500 mg oral tablet: 1 tab(s) orally 2 times a day  Multiple Vitamins oral tablet: 1 tab(s) orally once a day  Neurontin 300 mg oral capsule: 1 cap(s) orally 3 times a day  senna oral tablet: 2 tab(s) orally once a day (at bedtime)  Tylenol 325 mg oral tablet: 2 tab(s) orally every 6 hours, As Needed for mild pain  Visine Advanced Relief 1%-0.05% ophthalmic drops: 1 drop(s) to each affected eye 2 times a day amLODIPine 5 mg oral tablet: 1 tab(s) orally once a day  aspirin 81 mg oral tablet: 1 tab(s) orally once a day  atorvastatin 10 mg oral tablet: 1 tab(s) orally once a day (at bedtime)  clonazePAM 0.5 mg oral tablet: 1 tab(s) orally every 12 hours, As needed, anxiety/panic attack  DULoxetine 40 mg oral delayed release capsule: 1 cap(s) orally once a day  enoxaparin: 40 milligram(s) subcutaneous once a day (at bedtime)  fluticasone 50 mcg/inh nasal spray: 1 spray(s) nasal 2 times a day  folic acid 1 mg oral tablet: 1 tab(s) orally once a day  Lyrica 50 mg oral capsule: 1 cap(s) orally 2 times a day, titrate to 50 mg q 8 hrs (home dose)  melatonin 5 mg oral tablet: 1 tab(s) orally once a day (at bedtime)  metFORMIN 500 mg oral tablet: 1 tab(s) orally 2 times a day  Multiple Vitamins oral tablet: 1 tab(s) orally once a day  Neurontin 300 mg oral capsule: 1 cap(s) orally 3 times a day  oxyCODONE 5 mg oral tablet: 1 tab(s) orally every 4 hours, As needed, Moderate Pain (4 - 6)  pantoprazole 40 mg oral delayed release tablet: 1 tab(s) orally once a day (at bedtime)  polyethylene glycol 3350 oral powder for reconstitution: 17 gram(s) orally once a day  senna oral tablet: 2 tab(s) orally once a day (at bedtime)  sodium chloride 1 g oral tablet: 2 tab(s) orally every 12 hours  Tylenol 325 mg oral tablet: 2 tab(s) orally every 6 hours, As Needed for mild pain  valproic acid 250 mg oral capsule: 2 cap(s) orally every 12 hours  Visine Advanced Relief 1%-0.05% ophthalmic drops: 1 drop(s) to each affected eye 2 times a day

## 2020-03-04 NOTE — PROGRESS NOTE ADULT - REASON FOR ADMISSION
admitted after elective right crani transcallosal for subtotal resection of 3rd ventricular tumor, ETV
Brain tumor
Brain tumor
Admitted 2/12 s/p RT crani transcallosal for subtotal resection of 3rd ventricular tumor
Brain mass
Brain tumor
Brain tumor
craniotomy
tumor resection
brain mass
T2DM
Brain mass

## 2020-03-04 NOTE — DISCHARGE NOTE PROVIDER - NSDCFUADDAPPT_GEN_ALL_CORE_FT
Please make an appointment with Dr Manley after rehab.  Please make an appointment for follow up with your Primary Care physician after rehab.

## 2020-03-04 NOTE — DISCHARGE NOTE PROVIDER - NSDCFUADDINST_GEN_ALL_CORE_FT
Return to ER for fever, chills, nausea or vomiting, severe headache or pain not relieved with pain medication, sluggishness or change in mental status, any bleeding or drainage from wound,  or any weakness of extremities.   You may shower and wash your hair.  No rubbing or scrubbing of incision. Keep incision clean and dry. Do not apply creams or lotions to incision.   No driving until cleared by your surgeon.   Do not return to work until cleared by surgeon. Do not take advil, aleve or ibuprofen as they can cause bleeding.

## 2020-03-04 NOTE — PROGRESS NOTE ADULT - ASSESSMENT
63 yo male. 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  c/o worsening left sided paresthesia in December, hospitalized at [facility] Dec 3-17, 2019, found to have subacute SDH along R frontal lobe and large mass in third ventricle. pt was referred to neurosurgery consult and transferred to rehab facility, now presents to PST scheduled for craniotomy surgery for removal of brain mass.      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 Pathology: colloid cyst. Hospital course c/b difficult to manage hyponatremia, followed by Nephrology for management.      PLAN:  Neuro:   - hyponatremia-SIADH, Na currently 134,  received 1 dose of 15mg  tolvaptan on 3/1 with improvement, f/u BMP q6, on salt tabs 2g q12 hrs, and 1 liter Fluid restriction  - pain control- gabapentin 300tid, oxycodone prn  - depressed mood - continue cymbalta, VPA (seizure ppx) and melatonin for sleep, managed by psych  CV:  - HTN- continue amlodipine, followed by Cardiology  Endocrine:   - DMT2- continue fingersticks with sliding scale insulin coverage          DVT ppx:   - venodynes, sq lovenox  GI:    - constipation- resolved, pt had multiple BM's yesterday and today  - rectal pain improved with lidocaine jelly  PT/OT:   - CÉSAR    Assessment:  Please Check When Present   []  GCS  E   V  M     Heart Failure: []Acute, [] acute on chronic , []chronic  Heart Failure:  [] Diastolic (HFpEF), [] Systolic (HFrEF), []Combined (HFpEF and HFrEF), [] RHF, [] Pulm HTN, [] Other    [] RA, [] ATN, [] AIN, [] other  [] CKD1, [] CKD2, [] CKD 3, [] CKD 4, [] CKD 5, []ESRD    Encephalopathy: [] Metabolic, [] Hepatic, [] toxic, [] Neurological, [] Other    Abnormal Nurtitional Status: [] malnurtition (see nutrition note), [ ]underweight: BMI < 19, [] morbid obesity: BMI >40, [] Cachexia    [] Sepsis  [] hypovolemic shock,[] cardiogenic shock, [] hemorrhagic shock, [] neuogenic shock  [] Acute Respiratory Failure  []Cerebral edema, [] Brain compression/ herniation,   [] Functional quadriplegia  [] Acute blood loss anemia    Spectralink # [id number]

## 2020-03-04 NOTE — PROGRESS NOTE ADULT - SUBJECTIVE AND OBJECTIVE BOX
SUBJECTIVE: Pt seen and examined    OVERNIGHT EVENTS:     Vital Signs Last 24 Hrs  T(C): 36.7 (04 Mar 2020 08:40), Max: 37 (04 Mar 2020 04:51)  T(F): 98 (04 Mar 2020 08:40), Max: 98.6 (04 Mar 2020 04:51)  HR: 69 (04 Mar 2020 08:40) (64 - 82)  BP: 137/85 (04 Mar 2020 08:40) (114/72 - 148/84)  BP(mean): --  RR: 18 (04 Mar 2020 08:40) (18 - 18)  SpO2: 96% (04 Mar 2020 08:40) (94% - 98%)    PHYSICAL EXAM:    General: No Acute Distress     Neurological: Awake, alert oriented to person, place and time, Following Commands, PERRL, EOMI, Face Symmetrical, Speech Fluent, Moving all extremities, LUE drift, LUE 4/5, otherwise 5/5, Sensation to Light Touch Intact    Pulmonary: Clear to Auscultation, No Rales, No Rhonchi, No Wheezes     Cardiovascular: S1, S2, Regular Rate and Rhythm     Gastrointestinal: Soft, Nontender, Nondistended     Incision: healed crani incision    LABS:   03-04    134<L>  |  95<L>  |  20  ----------------------------<  135<H>  4.7   |  26  |  0.99    Ca    9.2      04 Mar 2020 04:23      Hemoglobin A1C, Whole Blood: 6.0 % (02-06 @ 13:46)      03-03 @ 07:01  -  03-04 @ 07:00  --------------------------------------------------------  IN: 640 mL / OUT: 0 mL / NET: 640 mL      DRAINS: none    MEDICATIONS:  Antibiotics:    Neuro:  acetaminophen   Tablet .. 650 milliGRAM(s) Oral every 6 hours PRN Temp greater or equal to 38C (100.4F), Mild Pain (1 - 3)  DULoxetine 40 milliGRAM(s) Oral daily  gabapentin 300 milliGRAM(s) Oral three times a day  melatonin 5 milliGRAM(s) Oral at bedtime  ondansetron Injectable 4 milliGRAM(s) IV Push every 8 hours PRN Nausea and/or Vomiting  oxyCODONE    IR 5 milliGRAM(s) Oral every 4 hours PRN Moderate Pain (4 - 6)  oxyCODONE    IR 10 milliGRAM(s) Oral every 4 hours PRN Severe Pain (7 - 10)  valproic acid 500 milliGRAM(s) Oral every 12 hours    Cardiac:  amLODIPine   Tablet 5 milliGRAM(s) Oral daily    Pulm:    GI/:  pantoprazole    Tablet 40 milliGRAM(s) Oral at bedtime  polyethylene glycol 3350 17 Gram(s) Oral daily PRN Constipation  senna 2 Tablet(s) Oral at bedtime    Other:   atorvastatin 10 milliGRAM(s) Oral at bedtime  dextrose 40% Gel 15 Gram(s) Oral once PRN Blood Glucose LESS THAN 70 milliGRAM(s)/deciLiter  dextrose 5%. 1000 milliLiter(s) IV Continuous <Continuous>  dextrose 50% Injectable 12.5 Gram(s) IV Push once  dextrose 50% Injectable 25 Gram(s) IV Push once  dextrose 50% Injectable 25 Gram(s) IV Push once  enoxaparin Injectable 40 milliGRAM(s) SubCutaneous <User Schedule>  fluticasone propionate 50 MICROgram(s)/spray Nasal Spray 1 Spray(s) Both Nostrils two times a day  folic acid 1 milliGRAM(s) Oral daily  glucagon  Injectable 1 milliGRAM(s) IntraMuscular once PRN Glucose <70 milliGRAM(s)/deciLiter  insulin lispro (HumaLOG) corrective regimen sliding scale   SubCutaneous three times a day with meals  insulin lispro (HumaLOG) corrective regimen sliding scale   SubCutaneous at bedtime  multivitamin 1 Tablet(s) Oral daily  sodium chloride 2 Gram(s) Oral every 12 hours    DIET: [] Regular [] CCD [] Renal [] Puree [] Dysphagia [] Tube Feeds:     IMAGING: SUBJECTIVE: Pt seen and examined, resting comfortably in bed    OVERNIGHT EVENTS: none    Vital Signs Last 24 Hrs  T(C): 36.7 (04 Mar 2020 08:40), Max: 37 (04 Mar 2020 04:51)  T(F): 98 (04 Mar 2020 08:40), Max: 98.6 (04 Mar 2020 04:51)  HR: 69 (04 Mar 2020 08:40) (64 - 82)  BP: 137/85 (04 Mar 2020 08:40) (114/72 - 148/84)  BP(mean): --  RR: 18 (04 Mar 2020 08:40) (18 - 18)  SpO2: 96% (04 Mar 2020 08:40) (94% - 98%)    PHYSICAL EXAM:    General: No Acute Distress     Neurological: Awake, alert oriented to person, place and time, Following Commands, PERRL, EOMI, Face Symmetrical, Speech Fluent, Moving all extremities, LUE drift, LUE 4/5, otherwise 5/5, Sensation to Light Touch Intact    Pulmonary: Clear to Auscultation, No Rales, No Rhonchi, No Wheezes     Cardiovascular: S1, S2, Regular Rate and Rhythm     Gastrointestinal: Soft, Nontender, Nondistended     Incision: healed crani incision    LABS:   03-04    134<L>  |  95<L>  |  20  ----------------------------<  135<H>  4.7   |  26  |  0.99    Ca    9.2      04 Mar 2020 04:23      Hemoglobin A1C, Whole Blood: 6.0 % (02-06 @ 13:46)      03-03 @ 07:01  -  03-04 @ 07:00  --------------------------------------------------------  IN: 640 mL / OUT: 0 mL / NET: 640 mL      DRAINS: none    MEDICATIONS:  Antibiotics:    Neuro:  acetaminophen   Tablet .. 650 milliGRAM(s) Oral every 6 hours PRN Temp greater or equal to 38C (100.4F), Mild Pain (1 - 3)  DULoxetine 40 milliGRAM(s) Oral daily  gabapentin 300 milliGRAM(s) Oral three times a day  melatonin 5 milliGRAM(s) Oral at bedtime  ondansetron Injectable 4 milliGRAM(s) IV Push every 8 hours PRN Nausea and/or Vomiting  oxyCODONE    IR 5 milliGRAM(s) Oral every 4 hours PRN Moderate Pain (4 - 6)  oxyCODONE    IR 10 milliGRAM(s) Oral every 4 hours PRN Severe Pain (7 - 10)  valproic acid 500 milliGRAM(s) Oral every 12 hours    Cardiac:  amLODIPine   Tablet 5 milliGRAM(s) Oral daily    Pulm:    GI/:  pantoprazole    Tablet 40 milliGRAM(s) Oral at bedtime  polyethylene glycol 3350 17 Gram(s) Oral daily PRN Constipation  senna 2 Tablet(s) Oral at bedtime    Other:   atorvastatin 10 milliGRAM(s) Oral at bedtime  dextrose 40% Gel 15 Gram(s) Oral once PRN Blood Glucose LESS THAN 70 milliGRAM(s)/deciLiter  dextrose 5%. 1000 milliLiter(s) IV Continuous <Continuous>  dextrose 50% Injectable 12.5 Gram(s) IV Push once  dextrose 50% Injectable 25 Gram(s) IV Push once  dextrose 50% Injectable 25 Gram(s) IV Push once  enoxaparin Injectable 40 milliGRAM(s) SubCutaneous <User Schedule>  fluticasone propionate 50 MICROgram(s)/spray Nasal Spray 1 Spray(s) Both Nostrils two times a day  folic acid 1 milliGRAM(s) Oral daily  glucagon  Injectable 1 milliGRAM(s) IntraMuscular once PRN Glucose <70 milliGRAM(s)/deciLiter  insulin lispro (HumaLOG) corrective regimen sliding scale   SubCutaneous three times a day with meals  insulin lispro (HumaLOG) corrective regimen sliding scale   SubCutaneous at bedtime  multivitamin 1 Tablet(s) Oral daily  sodium chloride 2 Gram(s) Oral every 12 hours    DIET: [] Regular [] CCD [] Renal [] Puree [] Dysphagia [] Tube Feeds:     IMAGING:   < from: MR Head w/wo IV Cont (02.20.20 @ 23:20) >  IMPRESSION:     Unchanged appearance of the third ventricular mass which is known to be a colloid cyst. No change in hydrocephalus compared with 2/17/2020.    Stable severe ventricular dilation with ventriculoperitoneal shunt catheter in appropriate position.    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    < end of copied text >  < from: [facility] Duplex Upper Ext Vein Scan, Left (02.26.20 @ 10:33) >  IMPRESSION:     No evidence of left upper extremity deep venous thrombosis.    < end of copied text >

## 2020-03-04 NOTE — DISCHARGE NOTE NURSING/CASE MANAGEMENT/SOCIAL WORK - PATIENT PORTAL LINK FT
You can access the FollowMyHealth Patient Portal offered by [facility] by registering at the following website: [email or web address]. By joining [facility] [employer]’s FollowMyHealth portal, you will also be able to view your health information using other applications (apps) compatible with our system.

## 2020-03-04 NOTE — DISCHARGE NOTE PROVIDER - NSDCACTIVITY_GEN_ALL_CORE
Walking - Outdoors allowed/Stairs allowed/Walking - Indoors allowed/No heavy lifting/straining/Do not drive or operate machinery/Showering allowed

## 2020-03-04 NOTE — DISCHARGE NOTE PROVIDER - CARE PROVIDER_API CALL
Tamar Manley (MD)  Neurosurgery  General  [address], [address] [place] [address]  Phone: [phone]  Fax: [phone]  Follow Up Time:     David Bonds (DO)  Nephrology  [address] [address]  Phone: [phone]  Fax: [phone]  Follow Up Time:

## 2020-03-04 NOTE — DISCHARGE NOTE PROVIDER - HOSPITAL COURSE
65 yo male. 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  c/o worsening left sided paresthesia in December, hospitalized at [facility] Dec 3-17, 2019, found to have subacute SDH along R frontal lobe and large mass in third ventricle. pt was referred to neurosurgery consult and transferred to rehab facility, now presents to PST scheduled for craniotomy surgery for removal of brain mass.    On 2/12/20 underweent  Right craniotomy for tumor resection of a third ventricular recurrent dermoid cyst. Pathology: colloid cyst. Hospital course c/b difficult to manage hyponatremia/SIADH, followed by Nephrology for management. Sodium currently stable and cleared for discharge to rehab. Patient was evaluated by Psychiatry for depressed mood and medications adjusted. On day of discharge patient is medically and neurologically stable.

## 2020-03-14 LAB
CULTURE RESULTS: SIGNIFICANT CHANGE UP
CULTURE RESULTS: SIGNIFICANT CHANGE UP
SPECIMEN SOURCE: SIGNIFICANT CHANGE UP
SPECIMEN SOURCE: SIGNIFICANT CHANGE UP

## 2020-08-06 PROBLEM — I63.9 CEREBRAL INFARCTION, UNSPECIFIED: Chronic | Status: ACTIVE | Noted: 2019-11-27

## 2020-08-06 PROBLEM — E11.9 TYPE 2 DIABETES MELLITUS WITHOUT COMPLICATIONS: Chronic | Status: ACTIVE | Noted: 2019-11-28

## 2020-08-06 PROBLEM — Z86.69 PERSONAL HISTORY OF OTHER DISEASES OF THE NERVOUS SYSTEM AND SENSE ORGANS: Chronic | Status: ACTIVE | Noted: 2019-11-28

## 2020-08-06 PROBLEM — R56.9 UNSPECIFIED CONVULSIONS: Chronic | Status: ACTIVE | Noted: 2019-11-27

## 2020-08-06 PROBLEM — I63.9 CEREBRAL INFARCTION, UNSPECIFIED: Chronic | Status: ACTIVE | Noted: 2019-11-28

## 2020-08-06 PROBLEM — G47.33 OBSTRUCTIVE SLEEP APNEA (ADULT) (PEDIATRIC): Chronic | Status: ACTIVE | Noted: 2020-02-06

## 2020-08-21 ENCOUNTER — APPOINTMENT (OUTPATIENT)
Dept: NEUROSURGERY | Facility: CLINIC | Age: 65
End: 2020-08-21

## 2020-09-08 NOTE — PROGRESS NOTE BEHAVIORAL HEALTH - AFFECT RANGE
- Management per primary team   - Duodenal necrosis s/p explolatory lap.   
Constricted

## 2020-10-16 ENCOUNTER — APPOINTMENT (OUTPATIENT)
Dept: NEUROSURGERY | Facility: CLINIC | Age: 65
End: 2020-10-16
Payer: MEDICARE

## 2020-10-16 VITALS — WEIGHT: 190 LBS | BODY MASS INDEX: 27.2 KG/M2 | HEIGHT: 70 IN

## 2020-10-16 VITALS
SYSTOLIC BLOOD PRESSURE: 135 MMHG | RESPIRATION RATE: 16 BRPM | TEMPERATURE: 97.1 F | OXYGEN SATURATION: 94 % | DIASTOLIC BLOOD PRESSURE: 83 MMHG | HEART RATE: 81 BPM

## 2020-10-16 DIAGNOSIS — Z86.69 PERSONAL HISTORY OF OTHER DISEASES OF THE NERVOUS SYSTEM AND SENSE ORGANS: ICD-10-CM

## 2020-10-16 DIAGNOSIS — Z86.79 PERSONAL HISTORY OF OTHER DISEASES OF THE CIRCULATORY SYSTEM: ICD-10-CM

## 2020-10-16 DIAGNOSIS — Z86.73 PERSONAL HISTORY OF TRANSIENT ISCHEMIC ATTACK (TIA), AND CEREBRAL INFARCTION W/OUT RESIDUAL DEFICITS: ICD-10-CM

## 2020-10-16 DIAGNOSIS — Q04.6 CONGENITAL CEREBRAL CYSTS: ICD-10-CM

## 2020-10-16 DIAGNOSIS — Z80.1 FAMILY HISTORY OF MALIGNANT NEOPLASM OF TRACHEA, BRONCHUS AND LUNG: ICD-10-CM

## 2020-10-16 DIAGNOSIS — Z84.1 FAMILY HISTORY OF DISORDERS OF KIDNEY AND URETER: ICD-10-CM

## 2020-10-16 DIAGNOSIS — Z87.898 PERSONAL HISTORY OF OTHER SPECIFIED CONDITIONS: ICD-10-CM

## 2020-10-16 DIAGNOSIS — Z86.39 PERSONAL HISTORY OF OTHER ENDOCRINE, NUTRITIONAL AND METABOLIC DISEASE: ICD-10-CM

## 2020-10-16 DIAGNOSIS — Z86.59 PERSONAL HISTORY OF OTHER MENTAL AND BEHAVIORAL DISORDERS: ICD-10-CM

## 2020-10-16 DIAGNOSIS — Z78.9 OTHER SPECIFIED HEALTH STATUS: ICD-10-CM

## 2020-10-16 PROCEDURE — 99215 OFFICE O/P EST HI 40 MIN: CPT

## 2020-10-16 RX ORDER — BUPROPION HYDROCHLORIDE 150 MG/1
150 TABLET, EXTENDED RELEASE ORAL
Refills: 0 | Status: ACTIVE | COMMUNITY

## 2020-10-16 RX ORDER — ROSIGLITAZONE MALEATE 4 MG/1
TABLET, FILM COATED ORAL
Refills: 0 | Status: ACTIVE | COMMUNITY

## 2020-10-16 RX ORDER — FLUOXETINE HYDROCHLORIDE 40 MG/1
CAPSULE ORAL
Refills: 0 | Status: ACTIVE | COMMUNITY

## 2020-10-16 RX ORDER — METOPROLOL SUCCINATE 50 MG/1
50 TABLET, EXTENDED RELEASE ORAL
Refills: 0 | Status: ACTIVE | COMMUNITY

## 2020-10-16 RX ORDER — GLIPIZIDE 5 MG/1
5 TABLET ORAL
Refills: 0 | Status: ACTIVE | COMMUNITY

## 2020-10-16 RX ORDER — METFORMIN HYDROCHLORIDE 625 MG/1
TABLET ORAL
Refills: 0 | Status: ACTIVE | COMMUNITY

## 2020-10-16 RX ORDER — AMLODIPINE BESYLATE 5 MG/1
TABLET ORAL
Refills: 0 | Status: ACTIVE | COMMUNITY

## 2020-10-19 NOTE — HISTORY OF PRESENT ILLNESS
[FreeTextEntry1] : Hospital Course: \par Discharge Date	04-Mar-2020 \par Admission Date	12-Feb-2020 11:30 \par Reason for Admission	Brain tumor \par \par 65 yo male. 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  c/o worsening left sided paresthesia in December, hospitalized at [facility] Dec 3-17, 2019, found to have subacute SDH along R frontal lobe and large mass in third ventricle. pt was referred to neurosurgery consult and transferred to rehab facility, now presents to PST \par scheduled for craniotomy surgery for removal of brain mass. 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 Hospital course \par c/b difficult to manage hyponatremia/SIADH, followed by Nephrology for management. Sodium currently stable and cleared for discharge to rehab. Patient was evaluated by Psychiatry for depressed mood and medications adjusted. On day of discharge patient is medically and neurologically stable.\par \par Today patient presents for follow up accompanied by her HHA from nursing home [facility].  Pt is A & O x 3 and is in a wheelchair.  States that he ambulates with a walker while at the nursing home.  He denies falls.  He reports that his entire left side is weak and somewhat numb after his stroke in 2008.  Pt has a VPS that was placed in 2008 in [place].  Pt does not know the brand nor valve setting.  States that he is blind in right eye and has poor vision in left.

## 2020-10-19 NOTE — ASSESSMENT
[FreeTextEntry1] : IMPRESSION:\par \par \par \par PLAN:\par 1. MRI brain w/wo in February 2021.\par 2. Encouraged pt to continue participating in pT sessions to strengthen left side.\par 3. F/U with opthalmology for evaluation.\par 4. F/u after imaging 2/21.\par

## 2020-10-19 NOTE — REVIEW OF SYSTEMS
[Depression] : depression [Memory Lapses or Loss] : memory loss [Difficulty Walking] : difficulty walking [Eyesight Problems] : eyesight problems [Numbness] : numbness [Negative] : Heme/Lymph [FreeTextEntry9] : left side weakness [FreeTextEntry3] : blind in right eye

## 2020-10-19 NOTE — PHYSICAL EXAM
[General Appearance - Well Nourished] : well nourished [General Appearance - In No Acute Distress] : in no acute distress [General Appearance - Alert] : alert [General Appearance - Well Developed] : well developed [General Appearance - Well-Appearing] : healthy appearing [Oriented To Time, Place, And Person] : oriented to person, place, and time [Impaired Insight] : insight and judgment were intact [Memory Recent] : recent memory was not impaired [Affect] : the affect was normal [Person] : oriented to person [Place] : oriented to place [Time] : oriented to time [Cranial Nerves Oculomotor (III)] : extraocular motion intact [Cranial Nerves Facial (VII)] : face symmetrical [Cranial Nerves Glossopharyngeal (IX)] : tongue and palate midline [Cranial Nerves Vestibulocochlear (VIII)] : hearing was intact bilaterally [Cranial Nerves Hypoglossal (XII)] : there was no tongue deviation with protrusion [Cranial Nerves Accessory (XI - Cranial And Spinal)] : head turning and shoulder shrug symmetric [Sclera] : the sclera and conjunctiva were normal [Outer Ear] : the ears and nose were normal in appearance [Hearing Threshold Finger Rub Not [place]] : hearing was normal [Neck Appearance] : the appearance of the neck was normal [Oropharynx] : the oropharynx was normal [Exaggerated Use Of Accessory Muscles For Inspiration] : no accessory muscle use [Respiration, Rhythm And Depth] : normal respiratory rhythm and effort [Heart Rate And Rhythm] : heart rate was normal and rhythm regular [Involuntary Movements] : no involuntary movements were seen [Skin Color & Pigmentation] : normal skin color and pigmentation [] : no rash [FreeTextEntry6] : came to visit via wheelchair [FreeTextEntry1] : came via

## 2020-10-19 NOTE — DATA REVIEWED
[de-identified] : EXAM: MR BRAIN WAW IC \par \par \par PROCEDURE DATE: 02/20/2020 \par \par \par \par \par INTERPRETATION: CLINICAL INDICATION: ADMDIAG1: I67.1 CEREBRAL ANEURYSM, \par NONRUPTURED/ \par ADMDIAG2: I67.1 CEREBRAL ANEURYSM, NONRUPTURED. Status post craniotomy for \par colloid cyst resection.. \par \par TECHNIQUE: MRI of the brain was performed without and with contrast using \par the following sequences: Axial DWI/ADC map, axial SWI, axial gradient echo, \par axial SPGR, sagittal T1 FLAIR, axial T2 FLAIR, axial T2 FSE axial T1 SPGR \par postcontrast and axial/coronal/sagittal T1 spin-echo postcontrast. \par \par 10 mls of Gadavist was administered intravenously without complication and 0 \par mls were discarded. \par \par COMPARISON: MR brain dated 11/28/2019. CT head dated 2/17/2020 \par \par FINDINGS: \par \par Redemonstration of bifrontal craniotomy changes. \par \par Similar appearing third ventricular mass which measures approximately 2.8 x \par 2.1 x 3.3 cm, maximum measurement. The mass displays internal T1 signal with \par small foci of enhancement anteriorly as well as internal fat and \par calcification, has been biopsy-proven to be a colloid cyst.. \par \par The lateral ventricles remain significantly dilated. \par \par Unchanged appearance of a CSF collection along the right interhemispheric \par fissure and frontal subdural space with a small amount of hemorrhage \par layering posteriorly. This collection appears to extend into the extra \par calvarial soft tissues. \par \par There is unchanged encephalomalacia and gliosis involving the right frontal \par lobe. \par \par There is a right frontal  shunt catheter. \par \par There is no evidence of acute infarct or midline shift. \par \par Major intracranial flow-voids are preserved. \par \par The orbits, sellar and suprasellar structures, and craniocervical junction \par are unremarkable. There has been bilateral cataract surgery. \par \par The visualized paranasal sinuses and tympanomastoid cavities are clear. \par \par IMPRESSION: \par \par Unchanged appearance of the third ventricular mass which is known to be a \par colloid cyst. No change in hydrocephalus compared with 2/17/2020. \par \par Stable severe ventricular dilation with ventriculoperitoneal shunt catheter \par in appropriate position. \par \par Extra-axial collection along the right interhemispheric fissure which likely \par communicates into the extra calvarial soft tissues, concerning for \par pseudomeningocele. \par \par \par \par \par \par \par \par MADELYN SALAS M.D., RADIOLOGY FELLOW \par This document has been electronically signed. \par GUERLINE OLIVO M.D., ATTENDING RADIOLOGIST \par This document has been electronically signed. Feb 21 2020 12:14PM \par \par \par

## 2020-11-23 NOTE — PHYSICAL THERAPY INITIAL EVALUATION ADULT - PHYSICAL ASSIST/NONPHYSICAL ASSIST: STAND/SIT, REHAB EVAL
SLEEP MEDICINE FOLLOW-UP NOTE    Impression and Recommendations:    No problem-specific Assessment & Plan notes found for this encounter.      Other medical problems as previously outlined - care as per Torres Hughes MD, her primary care provider.   __________________________________________________________________________  _______________________________________________________________________________________________    History of Present Illness:  Ms. Елена Weber is a 61 year old year old female who comes in today in follow-up to MAURO, sleep hygiene, and obesity. Please see discussions above. The patient was last seen here on 10/15/2019. The patient continues to follow along with Torres Hughes MD for her general medical care.    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  Lacks exercise.     • Overweight 03/15/2019     Priority: Low   • MAURO (obstructive sleep apnea) 03/14/2019     Priority: Low     HSAT performed at [place] on 3/25/2019.  Wt. = 85.3 kg/ 187 lbs.  LISA = 37.8 events/hr. Supine LISA = 120 events/hr.  Max. Desat. = 83%.  CPAP titration performed at [place] on 4/26/2019.  CPAP = 9 cmH2O lead to an AHI = 1 events/hr.    NPSG performed at [place] on 3/13/2009.  Wt =  175 lbs. / 79.5 kg.  AHI = 31.7 events/hr. Max. Desat = 88%.  CPAP @ 10 cmH2O led to no \"apneic events\"     • Status post LUAN-BSO 03/14/2019     Priority: Low   • Status post tonsillectomy and adenoidectomy 03/14/2019     Priority: Low   • History of tobacco use 03/14/2019     Priority: Low     2 packs per day from age 16 until age 37.     • Mood disorder (CMS/HCC) 03/14/2019     Priority: Low   • Benign colonic polyp 10/24/2014     Priority: Low     Hyperplastic polyp, rectal  (4/2009); Dr Henry recommended 5 yr interval follow-up.       •  Diabetes (CMS/[facility]) 10/22/2013     Priority: Low   • Dyslipidemia      Priority: Low   • Eczema      Priority: Low   • Hypertension      Priority: Low   • GERD (gastroesophageal reflux disease)      Priority: Low       Medications, Allergies, Past Medical History, Past Surgical History, Social History, and Family History:  I have reviewed the patient's medications and allergies, past medical, surgical, social and family history, updating these as appropriate.  See Histories section of the medical record for a display of this information.     Complete Review of Systems:  All other systems were reviewed and are negative, except for the pertinent positives and negatives as documented in the History of Present Illness.    Physical Examination:  CONSTITUTIONAL: Pleasant, middle aged, female ,in no acute distress.  VITAL SIGNS:  There were no vitals taken for this visit., There is no height or weight on file to calculate BMI.  HEENT: Head is normocephalic and atraumatic. Pupils are equal, round, and reactive to light. Nose is patent. Nasal membranes are without erythema, swelling, or discharge.Throat is *** without erythema, swelling, or exudate.  Dentition is in good repair.***  NECK: Neck is{pdneck:682472}supple and symmetric. There is no lymphadenopathy.  Thorax/Back: Thorax is {pdthorax:908443} There is no point tenderness or bony  deformity.   LUNGS: Respirations easy and full with good effort. No retraction.  Lungs are clear to auscultation bilaterally.  No wheeze, rhonchi, or crackles present. Good air movement.  CARDIAC: Heart is{pdheart:171297}regular with normal S1 and S2. There is no S3 or S4. There is *** no appreciable murmur or rub.   ABDOMEN: Abdomen is{pdabdomen:483609} soft with normoactive bowel sounds. No gross mass or tenderness.  MUSCULOSKELETAL: Extremities show no clubbing or cyanosis.  There is {EDEMA:861629} pedal edema, bilaterally.   NEUROLOGIC: Patient is alert and oriented times 3. Neurologic  exam is grossly intact and gait is ***.   DERMATOLOGIC: No acute rash.    Ancillary Studies:    11/23/20: CPAP download as dictated in the outlined discussion above. This was reviewed and discussed with the patient.       1 person assist/verbal cues/nonverbal cues (demo/gestures)

## 2020-11-27 NOTE — PATIENT PROFILE ADULT - HOME ACCESSIBILITY CONCERNS
Outpatient Maternal-Fetal Medicine Consultation    Dear Dr. Charles Monaco,    Thank you for requesting ultrasound evaluation and maternal fetal medicine consultation on your patient Feliciano Fraser.   As you are aware she is a 26year old female with a Meno pr Osteoporosis, PID (pelvic inflammatory disease), Substance abuse ([facility] [address] Syphilis, Thyroid disease, Tuberculosis contact, Urinary incontinence, Viral hepatitis C, or Viral infection characterized by skin and mucous membrane lesions.     Past Surgical History was reviewed with the patient. This screening test utilizes maternal age, nuchal translucency, SANDRA-A, and free beta-hCG to calculate the risk for trisomies 21 and 25.  The results will be faxed to your office directly from the lab when they become availab only method to definitively exclude the diagnosis of fetal aneuploidy. OBESITY:  Her BMI prior to pregnancy was 47.5  Obesity during pregnancy is associated with numerous maternal and  risks.   It is not clear whether obesity is a direct cause o in the obese  is associated with numerous perioperative concerns, including emergency delivery, prolonged incision to delivery interval, blood loss >1000 mL, longer operative times, wound infection, thromboembolism, and endometritis.             Mate  a ? 26-28 weeks due to severe preeclampsia. Baby was only 15 oz and was in the NICU for ~6 weeks. Minerva Pierson has no sequelae of prematurity. Regardless of whether the baby was 26 weeks or 28 weeks, the pregnancy was complicated by IUGR.   The jo to 7 percent) for women who had preeclampsia without severe features in their first pregnancy and less than 1 percent in women who had a normotensive first pregnancy (does not apply to abortions).  These relationships were illustrated by a series of 125 wom previous partner had preeclampsia, Hydrops fetalis, and Unexplained fetal growth restriction.             The weight of evidence indicates that inherited thrombophilias (such as Factor V Leiden mutation, prothrombin gene mutation, protein C or S deficiency recurrent HELLP syndrome, but data are limited.     Antiplatelet Agents  The observation that preeclampsia is associated with increased platelet turnover and increased platelet-derived thromboxane levels led to randomized trials evaluating low-dose aspirin mellitus  · Chronic kidney disease  · Autoimmune disease (antiphospholipid syndrome, systemic lupus erythematosus)     Women with multiple moderate risk factors for preeclampsia are considered high risk, as well.  Identification of women with an appropriate nonpregnant individuals, aspirin appears to be equally effective at doses between 75 and 325 mg per day.   The safety of low-dose aspirin use in the second and third trimesters is well established, but questions linger regarding use in the first trimester ( M.D.    The majority of the time (>50%) was spent in review of records, consultation and coordination of care. Our discussion is summarized above. The approximate physician face-to-face time was 60 minutes. stairs within home

## 2021-03-02 NOTE — OCCUPATIONAL THERAPY INITIAL EVALUATION ADULT - IMPAIRED TRANSFERS: SIT/STAND, REHAB EVAL
Problem: PSYCHOSIS  Goal: Will report no hallucinations or delusions  Description: Interventions:  - Administer medication as  ordered  - Every waking shifts and PRN assess for the presence of hallucinations and or delusions  - Assist with reality testing to support increasing orientation  - Assess if patient's hallucinations or delusions are encouraging self-harm or harm to others and intervene as appropriate  Outcome: Progressing     Problem: BEHAVIOR  Goal: Pt/Family maintain appropriate behavior and adhere to behavioral management agreement, if implemented  Description: INTERVENTIONS:  - Assess the family dynamic   - Encourage verbalization of thoughts and concerns in a socially appropriate manner  - Assess patient/family's coping skills and non-compliant behavior (including use of illegal substances)  - Utilize positive, consistent limit setting strategies supporting safety of patient, staff and others  - Initiate consult with Case Management, Spiritual Care or other ancillary services as appropriate  - If a patient's/visitor's behavior jeopardizes the safety of the patient, staff, or others, refer to organization procedure     - Notify Security of behavior or suspected illegal substances which indicate the need for search of the patient and/or belongings  - Encourage participation in the decision making process about a behavioral management agreement; implement if patient meets criteria  Outcome: Progressing     Problem: ANXIETY  Goal: Will report anxiety at manageable levels  Description: INTERVENTIONS:  - Administer medication as ordered  - Teach and encourage coping skills  - Provide emotional support  - Assess patient/family for anxiety and ability to cope  Outcome: Progressing  Goal: By discharge: Patient will verbalize 2 strategies to deal with anxiety  Description: Interventions:  - Identify any obvious source/trigger to anxiety  - Staff will assist patient in applying identified coping technique/skills  - Encourage attendance of scheduled groups and activities  Outcome: Progressing     Problem: INVOLUNTARY ADMIT  Goal: Will cooperate with staff recommendations and doctor's orders and will demonstrate appropriate behavior  Description: INTERVENTIONS:  - Treat underlying conditions and offer medication as ordered  - Educate regarding involuntary admission procedures and rules  - Utilize positive consistent limit setting strategies to support patient and staff safety  Outcome: Progressing     Problem: SELF CARE DEFICIT  Goal: Return ADL status to a safe level of function  Description: INTERVENTIONS:  - Administer medication as ordered  - Assess ADL deficits and provide assistive devices as needed  - Obtain PT/OT consults as needed  - Assist and instruct patient to increase activity and self care as tolerated  Outcome: Progressing impaired balance/cognition/impaired postural control/decreased strength

## 2021-03-22 NOTE — BEHAVIORAL HEALTH ASSESSMENT NOTE - DOMICILE TYPE
Medication refill:    Medication Name: folic acid    Medication last refilled: 10/12/2020    Provider: Karen Lugo DO     Pharmacy: [facility]    Last office visit: 02/01/2021    Follow up: Karen Lugo DO     Last lab related to medication: 07/09/2020    Upcoming appointments: 10/04/2021    Refill outcome: Please advise if you are ok filling until patient is able to see Dr Sy in June.      
Patient is not able to get a new patient appointment with Dr. Sy until June, and she is asking if PCP will prescribe until then.  
Private Residence

## 2021-03-25 NOTE — BEHAVIORAL HEALTH ASSESSMENT NOTE - ORIENTED TO PERSON
Last office visit: 03/11/2021  Follow up: 6 months around 09/11/2021  Next appointment: 09/16/2021    Refilled per protocol   
Yes

## 2021-06-11 NOTE — DISCHARGE NOTE NURSING/CASE MANAGEMENT/SOCIAL WORK - NSDCPETBCESMAN_GEN_ALL_CORE
If you are a smoker, it is important for your health to stop smoking. Please be aware that second hand smoke is also harmful. [facility]  Rheumatology  [address], [address]  [place] [address]  Phone: [phone]  Fax: [phone]  Follow Up Time: Routine

## 2021-07-19 NOTE — PROGRESS NOTE ADULT - PROBLEM SELECTOR PLAN 7
Adele with [facility] calling stating they did a re certification for pt Home Health, and just calling to find out if Dr Perez Stokes will continue to follow the [place]? on cymbalta - mgmt per psych  vpa added for qtc concerns/ periodic LFT monitoring and serum levels

## 2021-07-21 NOTE — PROGRESS NOTE ADULT - ASSESSMENT
Pt previously traiged by William today   Has appt today at 6pm  64 year-old Man with a PMH of CVA who was found to have subacute SDH along R frontal lobe and large mass in third ventricle and is now with Gait Instability, ADL impairments and Functional impairments- PT/OT/dvt ppx, pain meds, gabapentin, cymbalta,    MRI showing recurrence of third ventricle/septum pellucidum tumor per Neurosurgery, plan for OR in 4 weeks.    dm2- ISS Accu-Cheks,  was on metformin at home, A1C 6.4, diabetic diet    HTN-- norvasc     HLD- Statin     insomnia- melatonin     Anxiety- clonazepam prn    DVT: Lovenox    former smoker- nicotine patch Transdermal daily    will follow, c/w current care

## 2022-01-20 NOTE — PROGRESS NOTE ADULT - PROBLEM SELECTOR PLAN 6
on cymbalta - consider psych eval [facility] Counseling:  I discussed with the patient the risks of ustekinumab including but not limited to immunosuppression, malignancy, posterior leukoencephalopathy syndrome, and serious infections.  The patient understands that monitoring is required including a PPD at baseline and must alert us or the primary physician if symptoms of infection or other concerning signs are noted.

## 2022-07-14 NOTE — PROGRESS NOTE ADULT - SUBJECTIVE AND OBJECTIVE BOX
14-Jul-2022 SUBJECTIVE: HPI:  63 yo male. 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  c/o worsening left sided paresthesia in December, hospitalized at [facility] Dec 3-17, 2019, found to have subacute SDH along R frontal lobe and large mass in third ventricle. pt was referred to neurosurgery consult and transferred to rehab facility, now presents to PST scheduled for craniotomy surgery for removal of brain mass.  ***contacted surgeon, Dr. Manley's office, s/w  Elida who had consulted with Vineet, pt to hold aspirin 1 week preop.*** (06 Feb 2020 10:22)      OVERNIGHT EVENTS:     Vital Signs Last 24 Hrs  T(C): 36.6 (25 Feb 2020 08:45), Max: 36.8 (24 Feb 2020 12:21)  T(F): 97.9 (25 Feb 2020 08:45), Max: 98.2 (24 Feb 2020 12:21)  HR: 57 (25 Feb 2020 08:45) (56 - 71)  BP: 158/77 (25 Feb 2020 08:45) (129/62 - 158/77)  BP(mean): --  RR: 18 (25 Feb 2020 08:45) (16 - 18)  SpO2: 98% (25 Feb 2020 08:45) (96% - 98%)    PHYSICAL EXAM:    General: No Acute Distress     Neurological:     Pulmonary: Clear to Auscultation, No Rales, No Rhonchi, No Wheezes     Cardiovascular: S1, S2, Regular Rate and Rhythm     Gastrointestinal: Soft, Nontender, Nondistended     Incision:     LABS:                        12.8   13.29 )-----------( 275      ( 24 Feb 2020 07:30 )             39.4    02-25    129<L>  |  95<L>  |  33<H>  ----------------------------<  141<H>  4.4   |  24  |  0.96    Ca    8.8      25 Feb 2020 06:33      Hemoglobin A1C, Whole Blood: 6.0 % (02-06 @ 13:46)      02-24 @ 07:01  -  02-25 @ 07:00  --------------------------------------------------------  IN: 300 mL / OUT: 1100 mL / NET: -800 mL      DRAINS:     MEDICATIONS:  Antibiotics:    Neuro:  acetaminophen   Tablet .. 650 milliGRAM(s) Oral every 6 hours PRN Temp greater or equal to 38C (100.4F), Mild Pain (1 - 3)  DULoxetine 60 milliGRAM(s) Oral daily  gabapentin 300 milliGRAM(s) Oral three times a day  levETIRAcetam 500 milliGRAM(s) Oral two times a day  melatonin 5 milliGRAM(s) Oral at bedtime  oxyCODONE    IR 5 milliGRAM(s) Oral every 4 hours PRN Moderate Pain (4 - 6)  oxyCODONE    IR 10 milliGRAM(s) Oral every 4 hours PRN Severe Pain (7 - 10)    Cardiac:  amLODIPine   Tablet 5 milliGRAM(s) Oral daily    Pulm:    GI/:  pantoprazole    Tablet 40 milliGRAM(s) Oral at bedtime  polyethylene glycol 3350 17 Gram(s) Oral two times a day  senna 2 Tablet(s) Oral at bedtime    Other:   atorvastatin 10 milliGRAM(s) Oral at bedtime  dexAMETHasone     Tablet 2 milliGRAM(s) Oral two times a day  dextrose 40% Gel 15 Gram(s) Oral once PRN Blood Glucose LESS THAN 70 milliGRAM(s)/deciLiter  dextrose 5%. 1000 milliLiter(s) IV Continuous <Continuous>  dextrose 50% Injectable 12.5 Gram(s) IV Push once  dextrose 50% Injectable 25 Gram(s) IV Push once  dextrose 50% Injectable 25 Gram(s) IV Push once  enoxaparin Injectable 40 milliGRAM(s) SubCutaneous <User Schedule>  fluticasone propionate 50 MICROgram(s)/spray Nasal Spray 1 Spray(s) Both Nostrils two times a day  folic acid 1 milliGRAM(s) Oral daily  glucagon  Injectable 1 milliGRAM(s) IntraMuscular once PRN Glucose <70 milliGRAM(s)/deciLiter  insulin glargine Injectable (LANTUS) 10 Unit(s) SubCutaneous at bedtime  insulin lispro (HumaLOG) corrective regimen sliding scale   SubCutaneous three times a day with meals  insulin lispro (HumaLOG) corrective regimen sliding scale   SubCutaneous at bedtime  insulin lispro Injectable (HumaLOG) 5 Unit(s) SubCutaneous three times a day before meals  multivitamin 1 Tablet(s) Oral daily  sodium chloride 2 Gram(s) Oral every 6 hours  sodium chloride 2% . 1000 milliLiter(s) IV Continuous <Continuous>    DIET: [] Regular [] CCD [] Renal [] Puree [] Dysphagia [] Tube Feeds:     IMAGING: SUBJECTIVE: HPI:  63 yo male. 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  c/o worsening left sided paresthesia in December, hospitalized at [facility] Dec 3-17, 2019, found to have subacute SDH along R frontal lobe and large mass in third ventricle. pt was referred to neurosurgery consult and transferred to rehab facility, now presents to PST scheduled for craniotomy surgery for removal of brain mass.  ***contacted surgeon, Dr. Manley's office, s/w  Elida who had consulted with Vineet pt to hold aspirin 1 week preop.*** (06 Feb 2020 10:22)      OVERNIGHT EVENTS: No acute events overnight, patient was weaned off hypertonic IVF yesterday. Patient seen and evaluated at bedside this morning, no complaints of pain though mild nausea.     Vital Signs Last 24 Hrs  T(C): 36.6 (25 Feb 2020 08:45), Max: 36.8 (24 Feb 2020 12:21)  T(F): 97.9 (25 Feb 2020 08:45), Max: 98.2 (24 Feb 2020 12:21)  HR: 57 (25 Feb 2020 08:45) (56 - 71)  BP: 158/77 (25 Feb 2020 08:45) (129/62 - 158/77)  BP(mean): --  RR: 18 (25 Feb 2020 08:45) (16 - 18)  SpO2: 98% (25 Feb 2020 08:45) (96% - 98%)    PHYSICAL EXAM:    General: No Acute Distress     Neurological: Arousable to voice, Ox3, following commands, requires encouragement at times, LUE drift, LUE 4+/5, RUE 5/5, lowers 4+/5 secondary to effort    Pulmonary: Clear to Auscultation, No Rales, No Rhonchi, No Wheezes     Cardiovascular: S1, S2, Regular Rate and Rhythm     Gastrointestinal: Soft, Nontender, Nondistended     Incision: Crani incision C/D/I    LABS:                        12.8   13.29 )-----------( 275      ( 24 Feb 2020 07:30 )             39.4    02-25    129<L>  |  95<L>  |  33<H>  ----------------------------<  141<H>  4.4   |  24  |  0.96    Ca    8.8      25 Feb 2020 06:33      Hemoglobin A1C, Whole Blood: 6.0 % (02-06 @ 13:46)      02-24 @ 07:01  -  02-25 @ 07:00  --------------------------------------------------------  IN: 300 mL / OUT: 1100 mL / NET: -800 mL      DRAINS: None    MEDICATIONS:  Antibiotics:    Neuro:  acetaminophen   Tablet .. 650 milliGRAM(s) Oral every 6 hours PRN Temp greater or equal to 38C (100.4F), Mild Pain (1 - 3)  DULoxetine 60 milliGRAM(s) Oral daily  gabapentin 300 milliGRAM(s) Oral three times a day  levETIRAcetam 500 milliGRAM(s) Oral two times a day  melatonin 5 milliGRAM(s) Oral at bedtime  oxyCODONE    IR 5 milliGRAM(s) Oral every 4 hours PRN Moderate Pain (4 - 6)  oxyCODONE    IR 10 milliGRAM(s) Oral every 4 hours PRN Severe Pain (7 - 10)    Cardiac:  amLODIPine   Tablet 5 milliGRAM(s) Oral daily    Pulm:    GI/:  pantoprazole    Tablet 40 milliGRAM(s) Oral at bedtime  polyethylene glycol 3350 17 Gram(s) Oral two times a day  senna 2 Tablet(s) Oral at bedtime    Other:   atorvastatin 10 milliGRAM(s) Oral at bedtime  dexAMETHasone     Tablet 2 milliGRAM(s) Oral two times a day  dextrose 40% Gel 15 Gram(s) Oral once PRN Blood Glucose LESS THAN 70 milliGRAM(s)/deciLiter  dextrose 5%. 1000 milliLiter(s) IV Continuous <Continuous>  dextrose 50% Injectable 12.5 Gram(s) IV Push once  dextrose 50% Injectable 25 Gram(s) IV Push once  dextrose 50% Injectable 25 Gram(s) IV Push once  enoxaparin Injectable 40 milliGRAM(s) SubCutaneous <User Schedule>  fluticasone propionate 50 MICROgram(s)/spray Nasal Spray 1 Spray(s) Both Nostrils two times a day  folic acid 1 milliGRAM(s) Oral daily  glucagon  Injectable 1 milliGRAM(s) IntraMuscular once PRN Glucose <70 milliGRAM(s)/deciLiter  insulin glargine Injectable (LANTUS) 10 Unit(s) SubCutaneous at bedtime  insulin lispro (HumaLOG) corrective regimen sliding scale   SubCutaneous three times a day with meals  insulin lispro (HumaLOG) corrective regimen sliding scale   SubCutaneous at bedtime  insulin lispro Injectable (HumaLOG) 5 Unit(s) SubCutaneous three times a day before meals  multivitamin 1 Tablet(s) Oral daily  sodium chloride 2 Gram(s) Oral every 6 hours  sodium chloride 2% . 1000 milliLiter(s) IV Continuous <Continuous>    DIET: [] Regular [x - 1L fluid restriction] CCD [] Renal [] Puree [] Dysphagia [] Tube Feeds:     IMAGING:   Imaging reviewed, no recent.

## 2022-09-21 NOTE — PATIENT PROFILE ADULT - FUNCTIONAL SCREEN CURRENT LEVEL: COMMUNICATION, MLM
0 = understands/communicates without difficulty Opioid Counseling: I discussed with the patient the potential side effects of opioids including but not limited to addiction, altered mental status, and depression. I stressed avoiding alcohol, benzodiazepines, muscle relaxants and sleep aids unless specifically okayed by a physician. The patient verbalized understanding of the proper use and possible adverse effects of opioids. All of the patient's questions and concerns were addressed. They were instructed to flush the remaining pills down the toilet if they did not need them for pain.

## 2022-11-12 NOTE — PROGRESS NOTE BEHAVIORAL HEALTH - HYGIENE
Hourly rounds in progress. Medicated with torodol x 2 for pain. JPs flushed x 1 and emptied x 2. Temp 100.8 this am. Blood cx drawn with am lab. Bed in low locked position. Call light within reach. Will continue to monitor and give report to oncoming day shift nurse.
Fair

## 2022-12-31 NOTE — OCCUPATIONAL THERAPY INITIAL EVALUATION ADULT - BALANCE TRAINING, PT EVAL
Constitutional: No fever, chills.  Eyes:  No visual changes, eye pain, eye discharge.  ENMT:  No hearing changes, pain, discharge or infections. No neck pain, neck stiffness. No sore throat  Cardiac:  No chest pain, palpitations  Respiratory:  No cough, SOB  GI:  (+)nausea, and abd pain. no vomiting or diarrhea  :  No dysuria, frequency or burning. No hematuria  MS:  No myalgia, back pain. No leg swelling.   Neuro:  No headache or lightheadedness  Skin:  No skin rash    Except as documented in the HPI,  all other systems are negative. Goal: Pt will demonstrate F+ dynamic standing balance with least restrictive device to improve safety for ADL performance in 4 weeks.

## 2024-02-28 NOTE — DIETITIAN INITIAL EVALUATION ADULT. - OTHER INFO
cut-up vegetables with a low-fat dip as an appetizer instead of chips and dip.  Sprinkle sunflower seeds or chopped almonds over salads. Or try adding chopped walnuts or almonds to cooked vegetables.  Try some vegetarian meals using beans and peas. Add garbanzo or kidney beans to salads. Make burritos and tacos with mashed redd beans or black beans.  Where can you learn more?  Go to https://www.[employer].net/patientEd and enter H967 to learn more about \"DASH Diet: Care Instructions.\"  Current as of: September 20, 2023               Content Version: 13.9  © 2006-2023 [employer].   Care instructions adapted under license by [employer]. If you have questions about a medical condition or this instruction, always ask your healthcare professional. [employer] disclaims any warranty or liability for your use of this information.           Per chart, pt is a 69 y/o male with a PMH of HTN, DM2, FELICE, CVA, benign brain tumor s/p craniotomy and resection + hydrocephalus s/p  shunt (2008). Presents from rehab for scheduled removal of brain mass. S/p right craniotomy for tumor resection 2/11/2020. Endocrinology following for uncontrolled glucose levels on high dose steroids, pt started on insulin infusion today. A1C 6.0% suggests good control PTA.     Unable to speak with pt at time of visit. Pt agitated and yelling persistently. No family at bedside to obtain intake/weight history from. Spoke with RN who reports this has been ongoing all day. Pt was on a regular diet up until this morning, made NPO after pt had multiple episodes of emesis overnight, continuing today. Remains NPO at this time, receiving LR @100mL/hr for fluid maintenance.    Per chart review, pt weighed 190 pounds in December 2019, with current dosing weight of 213 pounds. Question weight gain versus accuracy of weight. Will address further at follow up visit when able to converse with pt.     Skin: no pressure injuries  Edema: +1 left and right hands

## 2024-03-03 NOTE — DISCHARGE NOTE PROVIDER - REASON FOR ADMISSION
Problem: Adult Inpatient Plan of Care  Goal: Plan of Care Review  Outcome: Ongoing, Not Progressing  Goal: Patient-Specific Goal (Individualized)  Outcome: Ongoing, Not Progressing  Goal: Absence of Hospital-Acquired Illness or Injury  Outcome: Ongoing, Not Progressing  Goal: Optimal Comfort and Wellbeing  Outcome: Ongoing, Not Progressing  Goal: Readiness for Transition of Care  Outcome: Ongoing, Not Progressing     Problem: Adjustment to Illness (Sepsis/Septic Shock)  Goal: Optimal Coping  Outcome: Ongoing, Not Progressing     Problem: Bleeding (Sepsis/Septic Shock)  Goal: Absence of Bleeding  Outcome: Ongoing, Not Progressing     Problem: Glycemic Control Impaired (Sepsis/Septic Shock)  Goal: Blood Glucose Level Within Desired Range  Outcome: Ongoing, Not Progressing     Problem: Infection Progression (Sepsis/Septic Shock)  Goal: Absence of Infection Signs and Symptoms  Outcome: Ongoing, Not Progressing     Problem: Nutrition Impaired (Sepsis/Septic Shock)  Goal: Optimal Nutrition Intake  Outcome: Ongoing, Not Progressing     Problem: Infection  Goal: Absence of Infection Signs and Symptoms  Outcome: Ongoing, Not Progressing     Problem: Communication Impairment (Mechanical Ventilation, Invasive)  Goal: Effective Communication  Outcome: Ongoing, Not Progressing     Problem: Device-Related Complication Risk (Mechanical Ventilation, Invasive)  Goal: Optimal Device Function  Outcome: Ongoing, Not Progressing     Problem: Inability to Wean (Mechanical Ventilation, Invasive)  Goal: Mechanical Ventilation Liberation  Outcome: Ongoing, Not Progressing     Problem: Nutrition Impairment (Mechanical Ventilation, Invasive)  Goal: Optimal Nutrition Delivery  Outcome: Ongoing, Not Progressing     Problem: Skin and Tissue Injury (Mechanical Ventilation, Invasive)  Goal: Absence of Device-Related Skin and Tissue Injury  Outcome: Ongoing, Not Progressing     Problem: Ventilator-Induced Lung Injury (Mechanical Ventilation,  Invasive)  Goal: Absence of Ventilator-Induced Lung Injury  Outcome: Ongoing, Not Progressing     Problem: Communication Impairment (Artificial Airway)  Goal: Effective Communication  Outcome: Ongoing, Not Progressing     Problem: Device-Related Complication Risk (Artificial Airway)  Goal: Optimal Device Function  Outcome: Ongoing, Not Progressing     Problem: Skin and Tissue Injury (Artificial Airway)  Goal: Absence of Device-Related Skin or Tissue Injury  Outcome: Ongoing, Not Progressing     Problem: Noninvasive Ventilation Acute  Goal: Effective Unassisted Ventilation and Oxygenation  Outcome: Ongoing, Not Progressing     Problem: Fall Injury Risk  Goal: Absence of Fall and Fall-Related Injury  Outcome: Ongoing, Not Progressing     Problem: Skin Injury Risk Increased  Goal: Skin Health and Integrity  Outcome: Ongoing, Not Progressing     Problem: Impaired Wound Healing  Goal: Optimal Wound Healing  Outcome: Ongoing, Not Progressing     Problem: Coping Ineffective  Goal: Effective Coping  Outcome: Ongoing, Not Progressing      SDH, brain tumor

## 2024-05-22 NOTE — CONSULT NOTE ADULT - SUBJECTIVE AND OBJECTIVE BOX
64y m PMhx prior CVA w/ residual L sided weakness and paresthesias, brain "cyst" s/p removal c/b hydrocephalus s/p  shunt placement in 1980 in [place], HTN, pw worsening L sided paresthesias and transferred from [facility] for new CT findings. Pt found to have subacute SDH along R frontal lobe and large mass in third ventricle. Pt denies any falls or recent head trauma to explain the SDH. 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  Hospital course complicated  with leukocytosis during admission, workup ongoing.  Pt received IV fluids for hypotension, home anti htn medications held.      seen at the bedside, c/o left sided pain, in UE and LE, since 1 yr, 8/10, from prior cva, feels like pins and needles, not relieved with gabapentin. also c/o pain in the left knee for the last few days, mild aching, unable to bear weight on LE, due to poor balance and buckling of the knee on standing,  requests knee brace.    Review of Systems: per hpi          Allergies and Intolerances:        Allergies:  	No Known Allergies:     Home Medications:   * Patient Currently Takes Medications as of 03-Dec-2019 15:24 documented in Structured Notes  · 	folic acid 1 mg oral tablet: 1 tab(s) orally once a day  · 	Multiple Vitamins oral tablet: 1 tab(s) orally once a day  · 	nicotine 21 mg/24 hr transdermal film, extended release: 24 milligram(s) transdermal once a day  · 	senna oral tablet: 2 tab(s) orally once a day (at bedtime)  · 	polyethylene glycol 3350 oral powder for reconstitution: 17 gram(s) orally 2 times a day  · 	bisacodyl 5 mg oral delayed release tablet: 1 tab(s) orally once a day, As needed, Constipation  · 	nystatin 100,000 units/g topical powder: 1 application topically 3 times a day  · 	melatonin 3 mg oral tablet: 1 tab(s) orally once a day (at bedtime)  · 	clonazePAM 0.5 mg oral tablet: 1 tab(s) orally every 12 hours, As needed, anxiety/panic attack  · 	simvastatin 20 mg oral tablet: 1 tab(s) orally once a day (at bedtime)  · 	DULoxetine 60 mg oral delayed release capsule: 1 cap(s) orally once a day  · 	gabapentin 300 mg oral capsule: 1 cap(s) orally 3 times a day  · 	enoxaparin: 40 milligram(s) subcutaneous once a day (at bedtime)  · 	oxyCODONE 5 mg oral tablet: 1 tab(s) orally every 4 hours, As needed, Moderate Pain (4 - 6)  · 	acetaminophen 325 mg oral tablet: 2 tab(s) orally every 6 hours, As needed, Mild Pain (1 - 3)  · 	amLODIPine 5 mg oral tablet: 1 tab(s) orally once a day  · 	metFORMIN: orally 3 times a day after meals?    .Patient History:   Past Medical, Past Surgical, and Family History:  PAST MEDICAL HISTORY:  Diabetes     H/O brain tumor     H/O hydrocephalus     Hypertension     Peripheral neuropathy     Stroke.     PAST SURGICAL HISTORY:  H/O craniotomy     S/P ventriculoperitoneal shunt.    FH- Non contributory    Social History: denies smoking or etoh      Vital Signs Last 24 Hrs  T(C): 36.5 (04 Dec 2019 07:47), Max: 37.2 (03 Dec 2019 12:53)  T(F): 97.7 (04 Dec 2019 07:47), Max: 98.9 (03 Dec 2019 12:53)  HR: 65 (04 Dec 2019 07:47) (63 - 78)  BP: 134/86 (04 Dec 2019 07:47) (117/76 - 142/90)  BP(mean): --  RR: 14 (04 Dec 2019 07:47) (14 - 18)  SpO2: 96% (04 Dec 2019 07:47) (96% - 99%)    GENERAL- NAD  EAR/NOSE/MOUTH/THROAT - no pharyngeal exudates, no oral leisions,  MMM  EYES- CORINNE, conjunctiva and Sclera clear  NECK- supple  RESPIRATORY-  clear to auscultation bilaterally  CARDIOVASCULAR - SIS2, RRR  GI - soft NT BS present  EXTREMITIES- no pedal edema  NEUROLOGY- no gross focal deficits  SKIN- no rashes, warm to touch  PSYCHIATRY- AAO X 3  MUSCULOSKELETAL- ROM normal                            12.7   9.29  )-----------( 344      ( 03 Dec 2019 22:00 )             38.6       12-03    137  |  103  |  31<H>  ----------------------------<  130<H>  4.0   |  23  |  1.51<H>    Ca    8.7      03 Dec 2019 22:00  Phos  4.5     12-04  Mg     2.0     12-04    TPro  7.5  /  Alb  3.2<L>  /  TBili  0.4  /  DBili  x   /  AST  15  /  ALT  25  /  AlkPhos  116  12-03        CAPILLARY BLOOD GLUCOSE      POCT Blood Glucose.: 104 mg/dL (04 Dec 2019 11:55)  POCT Blood Glucose.: 102 mg/dL (04 Dec 2019 07:45)  POCT Blood Glucose.: 126 mg/dL (03 Dec 2019 21:31)  POCT Blood Glucose.: 119 mg/dL (03 Dec 2019 17:40)  POCT Blood Glucose.: 123 mg/dL (03 Dec 2019 13:05)    Hemoglobin A1C, Whole Blood (11.29.19 @ 08:25)    Hemoglobin A1C, Whole Blood: 6.4 %        POCT Blood Glucose.: 104 mg/dL (04 Dec 2019 11:55)        Labs reviewed:     CXR personally reviewed:   < from: Xray Chest 1 View AP/PA (11.27.19 @ 17:01) >  EXAM:  XR CHEST AP OR PA 1V                            PROCEDURE DATE:  11/27/2019          INTERPRETATION:  Weakness.    AP chest.    Normal heart size. Unfolding calcification thoracic aorta. No   pleural-parenchymal abnormality. Degenerative spondylosis dorsal spine.   Ventricular shunt tubing projects over the right hemithorax.    Impression: No active disease.    < end of copied text >      ECG reviewed and interpreted:   < from: 12 Lead ECG (11.28.19 @ 02:28) >    Ventricular Rate 89 BPM    Atrial Rate 89 BPM    P-R Interval 188 ms    QRS Duration 82 ms    Q-T Interval 370 ms    QTC Calculation(Bezet) 450 ms    P Axis 35 degrees    R Axis 253 degrees    T Axis 32 degrees    Diagnosis Line NORMAL SINUS RHYTHM  LOW VOLTAGE QRS  POSSIBLE LATERAL INFARCT , AGE UNDETERMINED  INFERIOR INFARCT , AGE UNDETERMINED  ABNORMAL ECG    < end of copied text >    < from: Transthoracic Echocardiogram (12.01.19 @ 08:02) >  Conclusions:  1. Mitral annular calcification, otherwise normal mitral  valve.  2. Aortic valve not well visualized; probably normal.  3. Normal left ventricular systolic function. No segmental  wall motion abnormalities.  4. Normal right ventricular size and function.    < end of copied text >    < from: MR Head w/wo IV Cont (11.28.19 @ 15:22) >  here is evidence of abnormal midline lesion seen in the septum   pellucidum region. This lesion does demonstrate subtle area of   enhancement anteriorly as well as calcification on prior head CT.   Approximately 2.9 x 2.1 x 3.3 cm. This lesion was present on prior CT   scan. Mass effect on the third ventricle is seen with dilatation of the   third and lateral ventricles seen and normal appearing fourth ventricle.    Diffuse smooth dural enhancement is seen which is likely the result of   patient's shunt catheter.    Right frontal shunt catheter is identified.     There is extra-axial collection seen involving the left frontal region   again seen. This collection measures approximately 1.2 cm in widest   diameter.     Abnormal T2 prolongation involving the rightfrontal region is identified   which is likely compatible area gliosis.    The visualized paranasal sinuses mastoid and middle ear regions appear   clear.
None

## 2025-02-21 NOTE — CONSULT NOTE ADULT - PROBLEM SELECTOR RECOMMENDATION 2
3rd ventricle mass seen on CT  - f/u MRI read for further characterization  - management as per Neurosurgery
[Negative] : Heme/Lymph
[Negative] : Heme/Lymph